# Patient Record
Sex: MALE | Race: BLACK OR AFRICAN AMERICAN | Employment: OTHER | URBAN - METROPOLITAN AREA
[De-identification: names, ages, dates, MRNs, and addresses within clinical notes are randomized per-mention and may not be internally consistent; named-entity substitution may affect disease eponyms.]

---

## 2017-02-21 ENCOUNTER — OFFICE VISIT (OUTPATIENT)
Dept: INTERNAL MEDICINE CLINIC | Age: 52
End: 2017-02-21

## 2017-02-21 VITALS
BODY MASS INDEX: 32.93 KG/M2 | HEIGHT: 70 IN | WEIGHT: 230 LBS | OXYGEN SATURATION: 99 % | SYSTOLIC BLOOD PRESSURE: 112 MMHG | TEMPERATURE: 98 F | RESPIRATION RATE: 16 BRPM | HEART RATE: 72 BPM | DIASTOLIC BLOOD PRESSURE: 80 MMHG

## 2017-02-21 DIAGNOSIS — E78.00 HYPERCHOLESTEROLEMIA: ICD-10-CM

## 2017-02-21 DIAGNOSIS — G35 MULTIPLE SCLEROSIS (HCC): Primary | ICD-10-CM

## 2017-02-21 DIAGNOSIS — R73.03 PREDIABETES: ICD-10-CM

## 2017-02-21 LAB
CHOLEST SERPL-MCNC: 168 MG/DL
GLUCOSE POC: 88 MG/DL
HBA1C MFR BLD HPLC: 6.2 %
HDLC SERPL-MCNC: 41 MG/DL
LDL CHOLESTEROL POC: 81
NON-HDL GOAL (POC): 127
TCHOL/HDL RATIO (POC): 4.1
TRIGL SERPL-MCNC: 233 MG/DL

## 2017-02-21 NOTE — MR AVS SNAPSHOT
Visit Information Date & Time Provider Department Dept. Phone Encounter #  
 2/21/2017 10:45 AM Elio Mahan MD [address] [facility] [phone] [id number] Follow-up Instructions Return in about 3 months (around 5/21/2017), or if symptoms worsen or fail to improve. Upcoming Health Maintenance Date Due  
 EYE EXAM RETINAL OR DILATED Q1 1/27/2013 FOOT EXAM Q1 9/3/2016 MICROALBUMIN Q1 2/24/2017 DTaP/Tdap/Td series (1 - Tdap) 12/20/2017* HEMOGLOBIN A1C Q6M 3/23/2017 LIPID PANEL Q1 9/23/2017 *Topic was postponed. The date shown is not the original due date. Allergies as of 2/21/2017  Review Complete On: 2/21/2017 By: Elio Mahan MD  
 No Known Allergies Current Immunizations  Reviewed on 6/25/2013 No immunizations on file. Not reviewed this visit You Were Diagnosed With   
  
 Codes Comments Multiple sclerosis ([facility]    -  Primary ICD-10-CM: G35 
ICD-9-CM: 935 Hypercholesterolemia     ICD-10-CM: E78.00 ICD-9-CM: 272.0 Prediabetes     ICD-10-CM: R73.03 
ICD-9-CM: 790.29 Vitals BP Pulse Temp Resp Height(growth percentile) Weight(growth percentile) 112/80 72 98 °F (36.7 °C) (Oral) 16 5' 10\" (1.778 m) 230 lb (104.3 kg) SpO2 BMI Smoking Status 99% 33 kg/m2 Current Every Day Smoker BMI and BSA Data Body Mass Index Body Surface Area  
 33 kg/m 2 2.27 m 2 Preferred Pharmacy Pharmacy Name Phone [facility] [address] [facility] [phone] Your Updated Medication List  
  
   
This list is accurate as of: 2/21/17 12:10 PM.  Always use your most recent med list.  
  
  
  
  
 aspirin 81 mg tablet Take 1 Tab by mouth daily. atorvastatin 80 mg tablet Commonly known as:  LIPITOR Take 1 Tab by mouth daily. baclofen 10 mg tablet Commonly known as:  LIORESAL Take 1 Tab by mouth three (3) times daily. buPROPion  mg tablet Commonly known as:  Tegan Plume Take 1 Tab by mouth every morning. colesevelam 625 mg tablet Commonly known as:  [facility] Take 3 Tabs by mouth two (2) times daily (with meals). gabapentin 300 mg capsule Commonly known as:  NEURONTIN Take 1 Cap by mouth three (3) times daily. Nerve pain HYDROcodone-acetaminophen  mg tablet Commonly known as:  NORCO  
  
 lidocaine 5 % Commonly known as:  LIDODERM  
1 Patch by TransDERmal route every twenty-four (24) hours. lidocaine HCl-hydrocortison ac topical cream  
Apply  to affected area two (2) times a day. metFORMIN 500 mg tablet Commonly known as:  GLUCOPHAGE Take 1 Tab by mouth daily (with breakfast). naproxen 500 mg tablet Commonly known as:  NAPROSYN Take 1 Tab by mouth two (2) times daily as needed. Omeprazole delayed release 20 mg tablet Commonly known as:  PRILOSEC D/R Take 1 Tab by mouth daily. * REBIF REBIDOSE 44 mcg/0.5 mL Pnij Generic drug:  interferon beta-1a (albumin) * interferon beta-1a (albumin) 44 mcg/0.5 mL injection Commonly known as:  REBIF (WITH ALBUMIN) 0.5 mL by SubCUTAneous route every Monday, Wednesday, Friday. * Notice: This list has 2 medication(s) that are the same as other medications prescribed for you. Read the directions carefully, and ask your doctor or other care provider to review them with you. We Performed the Following AMB POC GLUCOSE BLOOD, BY GLUCOSE MONITORING DEVICE [72145 CPT(R)] AMB POC HEMOGLOBIN A1C [08195 CPT(R)] AMB POC LIPID PROFILE [39065 CPT(R)] REFERRAL TO NEUROLOGY [BYL43 Custom] Follow-up Instructions Return in about 3 months (around 5/21/2017), or if symptoms worsen or fail to improve. Referral Information Referral ID Referred By Referred To  
  
 [id number] [facility], [address] Visits Status Start Date End Date 1 New Request 17 If your referral has a status of pending review or denied, additional information will be sent to support the outcome of this decision. Patient Instructions [employer]. Die F[employer]rder[employer]elmanufakturhart Activation Thank you for requesting access to [email or web address]. Please follow the instructions below to securely access and download your online medical record. [email or web address].COM allows you to send messages to your doctor, view your test results, renew your prescriptions, schedule appointments, and more. How Do I Sign Up? 1. In your internet browser, go to [email or web address] 
2. Click on the First Time User? Click Here link in the Sign In box. You will be redirect to the New Member Sign Up page. 3. Enter your ARTENCY.COM Access Code exactly as it appears below. You will not need to use this code after youve completed the sign-up process. If you do not sign up before the expiration date, you must request a new code. ARTENCY.COM Access Code: Activation code not generated Current [email or web address].COM Status: Active (This is the date your ARTENCY.COM access code will ) 4. Enter the last four digits of your Social Security Number (xxxx) and Date of Birth (mm/dd/yyyy) as indicated and click Submit. You will be taken to the next sign-up page. 5. Create a ARTENCY.COM ID. This will be your ARTENCY.COM login ID and cannot be changed, so think of one that is secure and easy to remember. 6. Create a ARTENCY.COM password. You can change your password at any time. 7. Enter your Password Reset Question and Answer. This can be used at a later time if you forget your password. 8. Enter your e-mail address. You will receive e-mail notification when new information is available in [address] 9. Click Sign Up. You can now view and download portions of your medical record. 10. Click the Download Summary menu link to download a portable copy of your medical information. Additional Information If you have questions, please visit the Frequently Asked Questions section of the Glowbl website at [email or web address] Remember, MyChart is NOT to be used for urgent needs. For medical emergencies, dial 911. Introducing [facility] SERVICES! Dear Christopher Montaño: 
Thank you for requesting a Glowbl account. Our records indicate that you already have an active Glowbl account. You can access your account anytime at [email or web address] Did you know that you can access your hospital and ER discharge instructions at any time in Glowbl? You can also review all of your test results from your hospital stay or ER visit. Additional Information If you have questions, please visit the Frequently Asked Questions section of the Glowbl website at [email or web address] Remember, MyChart is NOT to be used for urgent needs. For medical emergencies, dial 911. Now available from your iPhone and Android! Please provide this summary of care documentation to your next provider. Your primary care clinician is listed as Ivett Blulard. If you have any questions after today's visit, please call [phone].

## 2017-02-21 NOTE — PATIENT INSTRUCTIONS
[employer]Call Activation    Thank you for requesting access to Medprex. Please follow the instructions below to securely access and download your online medical record. Medprex allows you to send messages to your doctor, view your test results, renew your prescriptions, schedule appointments, and more. How Do I Sign Up? 1. In your internet browser, go to [email or web address]  2. Click on the First Time User? Click Here link in the Sign In box. You will be redirect to the New Member Sign Up page. 3. Enter your Medprex Access Code exactly as it appears below. You will not need to use this code after youve completed the sign-up process. If you do not sign up before the expiration date, you must request a new code. Medprex Access Code: Activation code not generated  Current Medprex Status: Active (This is the date your Medprex access code will )    4. Enter the last four digits of your Social Security Number (xxxx) and Date of Birth (mm/dd/yyyy) as indicated and click Submit. You will be taken to the next sign-up page. 5. Create a Medprex ID. This will be your Medprex login ID and cannot be changed, so think of one that is secure and easy to remember. 6. Create a Medprex password. You can change your password at any time. 7. Enter your Password Reset Question and Answer. This can be used at a later time if you forget your password. 8. Enter your e-mail address. You will receive e-mail notification when new information is available in [address] 9. Click Sign Up. You can now view and download portions of your medical record. 10. Click the Download Summary menu link to download a portable copy of your medical information. Additional Information    If you have questions, please visit the Frequently Asked Questions section of the Medprex website at [email or web address] Remember, Medprex is NOT to be used for urgent needs. For medical emergencies, dial 911.

## 2017-02-21 NOTE — PROGRESS NOTES
Courtney Dowd is a 54 y.o. male and presents with Hypertension and Blood sugar problem    Subjective:  Hypertension Review:  The patient has hypertension  Diet and Lifestyle: generally follows a  low sodium diet, exercises sporadically  Home BP Monitoring: is not measured at home. Pertinent ROS: taking medications as instructed, no medication side effects noted, no TIA's, no chest pain on exertion, no dyspnea on exertion, no swelling of ankles. Diabetes Mellitus Review:  He has diabetes mellitus. he has prediabetes  Diabetic ROS - medication compliance: compliant all of the time, diabetic diet compliance: compliant all of the time, home glucose monitoring: is performed. Known diabetic complications: none  Cardiovascular risk factors: family history, dyslipidemia, diabetes mellitus, obesity, hypertension  Current diabetic medications include oral agents  Eye exam current (within one year): no  Weight trend: stable  Prior visit with dietician: no  Current diet: \"healthy\" diet  in general  Current exercise: walking  Current monitoring regimen: home blood tests - daily  Home blood sugar records: trend: stable  Any episodes of hypoglycemia? no  Is He on ACE inhibitor or angiotensin II receptor blocker? Yes     Dyslipidemia Review:  Patient presents for evaluation of lipids. Compliance with treatment thus far has been excellent. A repeat fasting lipid profile was done. The patient does not use medications that may worsen dyslipidemias (corticosteroids, progestins, anabolic steroids, diuretics, beta-blockers, amiodarone, cyclosporine, olanzapine). The patient exercises daily. The patient is not known to have coexisting coronary artery disease.     He has multiple sclerosis and remains under the care of neurology  He has had multiple complaints      Review of Systems  Constitutional: negative for fevers, chills, anorexia and weight loss  Eyes:   negative for visual disturbance and irritation  ENT:   ear pains and wax  Respiratory:  negative for cough, hemoptysis, dyspnea,wheezing  CV:   negative for chest pain, palpitations, lower extremity edema  GI:   negative for nausea, vomiting, diarrhea, abdominal pain,melena  Endo:               negative for polyuria,polydipsia,polyphagia,heat intolerance  Genitourinary: negative for frequency, dysuria and hematuria  Integument:  negative for rash and pruritus  Hematologic:  negative for easy bruising and gum/nose bleeding  Musculoskel: myalgias, arthralgias,, joint pain  Neurological:  negative for headaches, dizziness, vertigo, memory problems and gait   Behavl/Psych: negative for feelings of anxiety, depression, mood changes    Past Medical History   Diagnosis Date    Back pain 10/8/2010    Back pain 10/8/2010    Depression     Diabetes ([facility]     Fatigue 8/24/2012    Hearing loss 1/25/2013    Memory loss 1/25/2013    MS (multiple sclerosis) ([facility]      Past Surgical History   Procedure Laterality Date    Hx heent       Social History     Social History    Marital status: SINGLE     Spouse name: N/A    Number of children: N/A    Years of education: N/A     Social History Main Topics    Smoking status: Current Every Day Smoker     Packs/day: 0.50     Years: 21.00     Types: Cigarettes    Smokeless tobacco: Never Used    Alcohol use No    Drug use: No    Sexual activity: Yes     Partners: Female     Birth control/ protection: Condom     Other Topics Concern    None     Social History Narrative     Family History   Problem Relation Age of Onset    Hypertension Father     Hypertension Sister     Diabetes Maternal Grandmother      Current Outpatient Prescriptions   Medication Sig Dispense Refill    lidocaine HCl-hydrocortison ac topical cream Apply  to affected area two (2) times a day. 85 g 0    interferon beta-1a, albumin, (REBIF, WITH ALBUMIN,) 44 mcg/0.5 mL injection 0.5 mL by SubCUTAneous route every Monday, Wednesday, Friday.  36 Syringe 2    metFORMIN (GLUCOPHAGE) 500 mg tablet Take 1 Tab by mouth daily (with breakfast). 30 Tab 12    atorvastatin (LIPITOR) 80 mg tablet Take 1 Tab by mouth daily. 30 Tab 12    REBIF REBIDOSE 44 mcg/0.5 mL pnij       HYDROcodone-acetaminophen (NORCO)  mg tablet       gabapentin (NEURONTIN) 300 mg capsule Take 1 Cap by mouth three (3) times daily. Nerve pain 90 Cap 5    buPROPion XL (WELLBUTRIN XL) 150 mg tablet Take 1 Tab by mouth every morning. 30 Tab 3    baclofen (LIORESAL) 10 mg tablet Take 1 Tab by mouth three (3) times daily. 90 Tab 5    lidocaine (LIDODERM) 5 % 1 Patch by TransDERmal route every twenty-four (24) hours. 30 Each 5    colesevelam (WELCHOL) 625 mg tablet Take 3 Tabs by mouth two (2) times daily (with meals). 180 Tab 12    Omeprazole delayed release (PRILOSEC D/R) 20 mg tablet Take 1 Tab by mouth daily. 30 Tab 12    naproxen (NAPROSYN) 500 mg tablet Take 1 Tab by mouth two (2) times daily as needed. 40 Tab 5    aspirin 81 mg tablet Take 1 Tab by mouth daily.  30 Tab 6     No Known Allergies    Objective:  Visit Vitals    /80    Pulse 72    Temp 98 °F (36.7 °C) (Oral)    Resp 16    Ht 5' 10\" (1.778 m)    Wt 230 lb (104.3 kg)    SpO2 99%    BMI 33 kg/m2     Physical Exam:   General appearance - alert, well appearing, and in no distress  Mental status - alert, oriented to person, place, and time  EYE-JUS, EOMI, corneas normal, no foreign bodies  ENT-ENT cerumen on lt.,rt.canal chronic scarring  Nose - normal and patent, no erythema, discharge or polyps  Mouth - mucous membranes moist, pharynx normal without lesions  Neck - supple, no significant adenopathy   Chest - clear to auscultation, no wheezes, rales or rhonchi, symmetric air entry   Heart - normal rate, regular rhythm, normal S1, S2, no murmurs, rubs, clicks or gallops   Abdomen - soft, nontender, nondistended, no masses or organomegaly  Lymph- no adenopathy palpable  Ext-peripheral pulses normal, no pedal edema, no clubbing or cyanosis  Skin-Warm and dry. no hyperpigmentation, vitiligo, or suspicious lesions  Neuro -alert, oriented, normal speech, no focal findings or movement disorder noted  Neck-normal C-spine, no tenderness, full ROM without pain      Results for orders placed or performed in visit on 02/21/17   AMB POC GLUCOSE BLOOD, BY GLUCOSE MONITORING DEVICE   Result Value Ref Range    Glucose POC 88 mg/dL   AMB POC HEMOGLOBIN A1C   Result Value Ref Range    Hemoglobin A1c (POC) 6.2 %   AMB POC LIPID PROFILE   Result Value Ref Range    Cholesterol (POC) 168     Triglycerides (POC) 233     HDL Cholesterol (POC) 41     LDL Cholesterol (POC) 81     Non-HDL Goal (POC) 127     TChol/HDL Ratio (POC) 4.1        Assessment/Plan:    ICD-10-CM ICD-9-CM    1. Multiple sclerosis (HCC) G35 340 REFERRAL TO NEUROLOGY   2. Hypercholesterolemia E78.00 272.0 AMB POC GLUCOSE BLOOD, BY GLUCOSE MONITORING DEVICE      AMB POC HEMOGLOBIN A1C      AMB POC LIPID PROFILE   3. Prediabetes R73.03 790.29 AMB POC GLUCOSE BLOOD, BY GLUCOSE MONITORING DEVICE      AMB POC HEMOGLOBIN A1C      AMB POC LIPID PROFILE     Orders Placed This Encounter    REFERRAL TO NEUROLOGY     Referral Priority:   Routine     Referral Type:   Consultation     Referral Reason:   Specialty Services Required     Referral Location:   [facility]     Referred to Provider:   Lesa Vizcaino MD     Number of Visits Requested:   1    AMB POC GLUCOSE BLOOD, BY GLUCOSE MONITORING DEVICE    AMB POC HEMOGLOBIN A1C    AMB POC LIPID PROFILE     lose weight, increase physical activity, follow low fat diet, follow low salt diet,Take 81mg aspirin daily    Cerumen was manually removed from the left ear     Patient Instructions   [employer] Activation    Thank you for requesting access to [employer]. Please follow the instructions below to securely access and download your online medical record.  [employer] allows you to send messages to your doctor, view your test results, renew your prescriptions, schedule appointments, and more. How Do I Sign Up? 1. In your internet browser, go to [email or web address].[email or web address]. [employer]  2. Click on the First Time User? Click Here link in the Sign In box. You will be redirect to the New Member Sign Up page. 3. Enter your Leonardo Biosystemst Access Code exactly as it appears below. You will not need to use this code after youve completed the sign-up process. If you do not sign up before the expiration date, you must request a new code. MyChart Access Code: Activation code not generated  Current Traackr Status: Active (This is the date your MyCLocalMedt access code will )    4. Enter the last four digits of your Social Security Number (xxxx) and Date of Birth (mm/dd/yyyy) as indicated and click Submit. You will be taken to the next sign-up page. 5. Create a Traackr ID. This will be your Traackr login ID and cannot be changed, so think of one that is secure and easy to remember. 6. Create a Traackr password. You can change your password at any time. 7. Enter your Password Reset Question and Answer. This can be used at a later time if you forget your password. 8. Enter your e-mail address. You will receive e-mail notification when new information is available in [address] 9. Click Sign Up. You can now view and download portions of your medical record. 10. Click the Download Summary menu link to download a portable copy of your medical information. Additional Information    If you have questions, please visit the Frequently Asked Questions section of the Traackr website at [email or web address] Remember, Traackr is NOT to be used for urgent needs. For medical emergencies, dial 911. Follow-up Disposition:  Return in about 3 months (around 2017), or if symptoms worsen or fail to improve. I have reviewed with the patient details of the assessment and plan and all questions were answered.  Relevent patient education was performed    An After Visit Summary was printed and given to the patient.

## 2017-04-13 ENCOUNTER — TELEPHONE (OUTPATIENT)
Dept: NEUROLOGY | Age: 52
End: 2017-04-13

## 2017-04-13 DIAGNOSIS — G35 MS (MULTIPLE SCLEROSIS) (HCC): Primary | ICD-10-CM

## 2017-04-13 NOTE — TELEPHONE ENCOUNTER
Patient called, ID verified times 2. Patient made aware available for . Patient stated she will make an appointment when she comes to  her medication.

## 2017-04-13 NOTE — TELEPHONE ENCOUNTER
Patient requesting refill on Oxycodone 15mg #60, one po q6-8 hrs prn for severe pain. Call when ready for .

## 2017-04-17 RX ORDER — OXYCODONE HYDROCHLORIDE 15 MG/1
15 TABLET ORAL
Qty: 60 TAB | Refills: 0 | Status: SHIPPED | OUTPATIENT
Start: 2017-04-17 | End: 2017-05-01 | Stop reason: SDUPTHER

## 2017-05-01 ENCOUNTER — OFFICE VISIT (OUTPATIENT)
Dept: NEUROLOGY | Age: 52
End: 2017-05-01

## 2017-05-01 VITALS
OXYGEN SATURATION: 97 % | DIASTOLIC BLOOD PRESSURE: 80 MMHG | HEIGHT: 70 IN | WEIGHT: 215.8 LBS | BODY MASS INDEX: 30.9 KG/M2 | RESPIRATION RATE: 16 BRPM | HEART RATE: 92 BPM | SYSTOLIC BLOOD PRESSURE: 130 MMHG | TEMPERATURE: 97.9 F

## 2017-05-01 DIAGNOSIS — R26.9 GAIT DISORDER: ICD-10-CM

## 2017-05-01 DIAGNOSIS — G35 MS (MULTIPLE SCLEROSIS) (HCC): ICD-10-CM

## 2017-05-01 DIAGNOSIS — R42 DIZZINESS: Primary | ICD-10-CM

## 2017-05-01 DIAGNOSIS — G95.9 CERVICAL MYELOPATHY (HCC): ICD-10-CM

## 2017-05-01 RX ORDER — OXYCODONE HYDROCHLORIDE 15 MG/1
15 TABLET ORAL
Qty: 60 TAB | Refills: 0 | Status: SHIPPED | OUTPATIENT
Start: 2017-05-01 | End: 2017-06-19 | Stop reason: SDUPTHER

## 2017-05-01 NOTE — MR AVS SNAPSHOT
Visit Information Date & Time Provider Department Dept. Phone Encounter #  
 5[facility]1/2017  2:00 [facility] [address] [place] [address] [id number] Follow-up Instructions Return in about 4 weeks (around 5/29/2017). Upcoming Health Maintenance Date Due  
 EYE EXAM RETINAL OR DILATED Q1 1/27/2013 FOOT EXAM Q1 9/3/2016 MICROALBUMIN Q1 2/24/2017 DTaP/Tdap/Td series (1 - Tdap) 12/20/2017* INFLUENZA AGE 9 TO ADULT 8/1/2017 HEMOGLOBIN A1C Q6M 8/21/2017 LIPID PANEL Q1 2/21/2018 *Topic was postponed. The date shown is not the original due date. Allergies as of 5/1/2017  Review Complete On: 5/1/2017 By: Brant Asher LPN No Known Allergies Current Immunizations  Reviewed on 6/25/2013 No immunizations on file. Not reviewed this visit You Were Diagnosed With   
  
 Codes Comments Dizziness    -  Primary ICD-10-CM: M79 ICD-9-CM: 780.4 [facility]) [facility]     ICD-10-CM: G35 
ICD-9-CM: 343 Gait disorder     ICD-10-CM: R26.9 ICD-9-CM: 376. 2 Cervical myelopathy (HCC)     ICD-10-CM: G95.9 ICD-9-CM: 721.1 Vitals BP Pulse Temp Resp Height(growth percentile) Weight(growth percentile) 130/80 (BP 1 Location: Right arm, BP Patient Position: Sitting) 92 97.9 °F (36.6 °C) (Oral) 16 5' 10\" (1.778 m) 215 lb 12.8 oz (97.9 kg) SpO2 BMI Smoking Status 97% 30.96 kg/m2 Current Every Day Smoker Vitals History BMI and BSA Data Body Mass Index Body Surface Area 30.96 kg/m 2 2.2 m 2 Preferred Pharmacy Pharmacy Name Phone [facility] [address] [facility] [phone] Your Updated Medication List  
  
   
This list is accurate as of: 5/1/17  3:18 PM.  Always use your most recent med list.  
  
  
  
  
 aspirin 81 mg tablet Take 1 Tab by mouth daily. atorvastatin 80 mg tablet Commonly known as:  LIPITOR Take 1 Tab by mouth daily. baclofen 10 mg tablet Commonly known as:  LIORESAL Take 1 Tab by mouth three (3) times daily. buPROPion  mg tablet Commonly known as:  Anuj Perla Take 1 Tab by mouth every morning. colesevelam 625 mg tablet Commonly known as:  [facility] Take 3 Tabs by mouth two (2) times daily (with meals). gabapentin 300 mg capsule Commonly known as:  NEURONTIN Take 1 Cap by mouth three (3) times daily. Nerve pain HYDROcodone-acetaminophen  mg tablet Commonly known as:  NORCO  
  
 interferon beta-1a (albumin) 44 mcg/0.5 mL injection Commonly known as:  REBIF (WITH ALBUMIN) 0.5 mL by SubCUTAneous route every Monday, Wednesday, Friday. lidocaine 5 % Commonly known as:  LIDODERM  
1 Patch by TransDERmal route every twenty-four (24) hours. lidocaine HCl-hydrocortison ac topical cream  
Apply  to affected area two (2) times a day. metFORMIN 500 mg tablet Commonly known as:  GLUCOPHAGE Take 1 Tab by mouth daily (with breakfast). naproxen 500 mg tablet Commonly known as:  NAPROSYN Take 1 Tab by mouth two (2) times daily as needed. Omeprazole delayed release 20 mg tablet Commonly known as:  PRILOSEC D/R Take 1 Tab by mouth daily. oxyCODONE IR 15 mg immediate release tablet Commonly known as:  OXY-IR Take 1 Tab by mouth every eight (8) hours as needed for Pain. Max Daily Amount: 45 mg.  
  
  
  
  
Prescriptions Printed Refills  
 oxyCODONE IR (OXY-IR) 15 mg immediate release tablet 0 Sig: Take 1 Tab by mouth every eight (8) hours as needed for Pain. Max Daily Amount: 45 mg.  
 Class: Print Route: Oral  
  
Follow-up Instructions Return in about 4 weeks (around 5/29/2017). To-Do List   
 05/01/2017 Imaging:  MRI CERV SPINE WO CONT Introducing [facility]! Dear Amisha Pill: Thank you for requesting a Shyp account. Our records indicate that you already have an active Shyp account. You can access your account anytime at [email or web address] Did you know that you can access your hospital and ER discharge instructions at any time in Shyp? You can also review all of your test results from your hospital stay or ER visit. Additional Information If you have questions, please visit the Frequently Asked Questions section of the Shyp website at [email or web address] Remember, Shyp is NOT to be used for urgent needs. For medical emergencies, dial 911. Now available from your iPhone and Android! Please provide this summary of care documentation to your next provider. Your primary care clinician is listed as Radha Logan. If you have any questions after today's visit, please call [phone].

## 2017-05-01 NOTE — PROGRESS NOTES
Neurology Progress Note     NAME:  Luis Lopez   :   1965   MRN:   [id number]     Date/Time:  2017  Subjective:      Luis Lopze is a 54 y.o. male here today for  follow up. Says he is experiencing dizziness, had some fall. Says he increasingly getting slow. Admits fatigue,numbness and tingling sensation. Occasional headache, neck pain,nausea,joint pains. Denies chest pain, Odynophagia,difficulty swallowing,constipation or diarrhea,Denies incontinence. Admits blurry vision. Says he is easily exhausted. Review of Systems:  Per HPI - Otherwise 12 point ROS was negative     []Unable to obtain  ROS due to  []mental status change  []sedated   []intubated    Medications reviewed:  Current Outpatient Prescriptions   Medication Sig Dispense Refill    oxyCODONE IR (OXY-IR) 15 mg immediate release tablet Take 1 Tab by mouth every eight (8) hours as needed for Pain. Max Daily Amount: 45 mg. 60 Tab 0    lidocaine HCl-hydrocortison ac topical cream Apply  to affected area two (2) times a day. 85 g 0    interferon beta-1a, albumin, (REBIF, WITH ALBUMIN,) 44 mcg/0.5 mL injection 0.5 mL by SubCUTAneous route every Monday, Wednesday, Friday. 36 Syringe 2    atorvastatin (LIPITOR) 80 mg tablet Take 1 Tab by mouth daily. 30 Tab 12    HYDROcodone-acetaminophen (NORCO)  mg tablet       gabapentin (NEURONTIN) 300 mg capsule Take 1 Cap by mouth three (3) times daily. Nerve pain 90 Cap 5    buPROPion XL (WELLBUTRIN XL) 150 mg tablet Take 1 Tab by mouth every morning. 30 Tab 3    baclofen (LIORESAL) 10 mg tablet Take 1 Tab by mouth three (3) times daily. 90 Tab 5    aspirin 81 mg tablet Take 1 Tab by mouth daily. 30 Tab 6    lidocaine (LIDODERM) 5 % 1 Patch by TransDERmal route every twenty-four (24) hours. 30 Each 5    metFORMIN (GLUCOPHAGE) 500 mg tablet Take 1 Tab by mouth daily (with breakfast). 30 Tab 12    colesevelam (WELCHOL) 625 mg tablet Take 3 Tabs by mouth two (2) times daily (with meals).  [address] [address] Tab 12    Omeprazole delayed release (PRILOSEC D/R) 20 mg tablet Take 1 Tab by mouth daily. 30 Tab 12    naproxen (NAPROSYN) 500 mg tablet Take 1 Tab by mouth two (2) times daily as needed. 40 Tab 5        Objective:   Vitals:  Vitals:    05/01/17 1429   BP: 130/80   Pulse: 92   Resp: 16   Temp: 97.9 °F (36.6 °C)   TempSrc: Oral   SpO2: 97%   Weight: 215 lb 12.8 oz (97.9 kg)   Height: 5' 10\" (1.778 m)   PainSc:   0 - No pain       PHYSICAL EXAM:  General:    Alert, cooperative, mild painful distress, appears stated age. Head:   Normocephalic, without obvious abnormality, atraumatic. Eyes:   Conjunctivae/corneas clear. PERRLA  Nose:  Nares normal. No drainage or sinus tenderness. Throat:    Lips, mucosa, and tongue normal.  No Thrush  Neck:  Supple, symmetrical,  no adenopathy, thyroid: non tender    no carotid bruit and no JVD. Back:    Symmetric,  No CVA tenderness. Lungs:   Clear to auscultation bilaterally. No Wheezing or Rhonchi. No rales. Chest wall:  No tenderness or deformity. No Accessory muscle use. Heart:   Regular rate and rhythm,  no murmur, rub or gallop. Abdomen:   Soft, non-tender. Not distended. Bowel sounds normal. No masses  Extremities: Extremities normal, atraumatic, No cyanosis. No edema. No clubbing  Skin:     Texture, turgor normal. No rashes or lesions. Not Jaundiced  Lymph nodes: Cervical, supraclavicular normal.  Psych:  Good insight. Depressed. Anxious . NEUROLOGICAL EXAM:  Appearance: The patient is well developed, well nourished, provides a coherent history and is in mild painful distress. Mental Status: Oriented to time, place and person. Mood and affect appropriate. Cranial Nerves:   Intact visual fields. Fundi are benign. JUS, EOM's full, no nystagmus, no ptosis. Facial sensation is normal. Corneal reflexes are intact. Facial movement is symmetric. Hearing is normal bilaterally.  Palate is midline with normal sternocleidomastoid and trapezius muscles are normal. Tongue is midline. Motor:  5/5 strength in upper and 4+/5 lower proximal and distal muscles. Normal bulk and tone. No fasciculations. Reflexes:   Deep tendon reflexes 2+/4 and symmetrical.   Sensory:   Normal to touch, pinprick and vibration. Gait:  Unsteady  gait. Tremor:   Mild tremor noted. Cerebellar:  No cerebellar signs present. Neurovascular:  Normal heart sounds and regular rhythm, peripheral pulses intact, and no carotid bruits. Lab Data Reviewed:    Office Visit on 02/21/2017   Component Date Value Ref Range Status    Glucose POC 02/21/2017 88  mg/dL Final    Hemoglobin A1c (POC) 02/21/2017 6.2  % Final    Cholesterol (POC) 02/21/2017 168   Final    Triglycerides (POC) 02/21/2017 233   Final    HDL Cholesterol (POC) 02/21/2017 41   Final    LDL Cholesterol (POC) 02/21/2017 81   Final    Non-HDL Goal (POC) 02/21/2017 127   Final    TChol/HDL Ratio (POC) 02/21/2017 4.1   Final       CT Results (recent):  No results found for this or any previous visit. MRI Results (recent):    Results from [place] encounter on 05/04/16   MRI CERV SPINE W WO CONT   Narrative **Final Report**      ICD Codes / Adm. Diagnosis: 340  336.9 / Multiple sclerosis ([facility] [address]    Unspecified disease of spinal  Examination:  MRI [facility] WO CON  - [id number] - May  4 2016  4:12PM  Accession No:  [id number]  Reason:  multiple sclerosis upper extremity weakness, myelopathy      REPORT:  Clinical history: Multiple sclerosis follow-up, increased weakness in both   arms and both legs right greater than left    INDICATION: Multiple sclerosis follow-up, increased weakness in both arms   and both legs right greater than left multiple sclerosis 340    COMPARISON: 6/6/2013    EXAM: Sagittal T1-weighted spin-echo, sagittal T2-weighted fast spin-echo,   sagittal inversion recovery, axial T1-weighted spin-echo, axial gradient   echo, and post-IV contrast-enhanced axial and sagittal T1-weighted spin-echo   MR images of the cervical spine are obtained. A total of 13 mL of   gadolinium-based contrast was administered for the study. FINDINGS: Cord signal and enhancement are normal. There is a focus of   abnormal signal without enhancement in the central right cerebellar   hemisphere. There is normal vertebral body height and bone signal. There is   anatomic alignment. No paraspinal soft tissue mass or enhancement   abnormality is shown. C2-3 shows normal disc height with minimal central disc protrusion. There is   no facet arthropathy, canal stenosis or foraminal stenosis. C3-4 shows normal disc height with small bilateral uncovertebral osteophytes   and small central disc protrusion. There is no facet arthropathy. There is   no canal stenosis. There is mild bilateral foraminal narrowing. C4-5: Disc desiccation loss of disc height. Mild broad-based disc protrusion   extends the foramina. Canal is patent. Mild bilateral foraminal stenosis. .    C5-6 Disc desiccation loss of disc height. Mild broad-based disc protrusion   extends the foramina. Canal is patent. Mild bilateral foraminal stenosis. .    C6-7, C7-T1, and the upper thoracic discs and facets appear normal.       IMPRESSION:     Mild cervical degenerative changes without significant interval change since   examination of 6/6/2013. No evidence of demyelinating disease burden in the cervical cord. No cervical cord enhancement abnormality demonstrated. Signing/Reading Doctor: Addison Pagan ([id number])    Approved: Addison Pagan ([id number])  May  4 2016  4:27PM                                      IR Results (recent):  No results found for this or any previous visit. VAS/US Results (recent):  No results found for this or any previous visit.           Assesment  Patient Active Problem List   Diagnosis Code    Back pain M54.9    Hypercholesterolemia E78.00    Cellulitis L03.90    Tobacco abuse Z72.0    ED (erectile dysfunction) N52.9    Decreased vision H54.7    Acute bronchitis J20.9    MS [facility] sclerosis) ([facility] G35    Fatigue R53.83    Incontinence of urine R32    Elevated hemoglobin A1c R73.09    Hearing loss H91.90    Memory loss R41.3    Multiple sclerosis ([facility]) G35    Prediabetes R73.03    Disability examination Z02.71    Anxiety F41.9    Midline low back pain without sciatica M54.5    Paresthesia of both hands R20.2    Mixed incontinence N39.46    Balance problem R26.89    Falls W19. XXXA    Bilateral thigh pain M79.651, M79.652      ___________________________________________________  PLAN:Continue current management. ICD-10-CM ICD-9-CM    1. Dizziness R42 780.4    2. MS (multiple sclerosis) ([facility]) G35 340 oxyCODONE IR (OXY-IR) 15 mg immediate release tablet   3. Gait disorder R26.9 781.2    4. Cervical myelopathy ([facility]) G95.9 721.1      Follow-up Disposition:  Return in about 4 weeks (around 5/29/2017).            ___________________________________________________    Total time spent with patient:  []15   []25   []35   [] __ minutes    Care Plan discussed with:    []Patient   []Family    []Care Manager   []Consultant/Specialist :    ___________________________________________________    Attending Physician: Shalom Cerda MD

## 2017-06-19 DIAGNOSIS — G35 MS (MULTIPLE SCLEROSIS) (HCC): ICD-10-CM

## 2017-06-19 RX ORDER — OXYCODONE HYDROCHLORIDE 15 MG/1
15 TABLET ORAL
Qty: 60 TAB | Refills: 0 | Status: SHIPPED | OUTPATIENT
Start: 2017-06-19 | End: 2017-08-03 | Stop reason: SDUPTHER

## 2017-06-19 NOTE — TELEPHONE ENCOUNTER
Patient in between insurance, he had to cancel his insurance because the premium went up to $390.00    Patient requesting rx for his pain medication,. Says he is not sleeping.

## 2017-08-03 ENCOUNTER — OFFICE VISIT (OUTPATIENT)
Dept: NEUROLOGY | Age: 52
End: 2017-08-03

## 2017-08-03 VITALS
DIASTOLIC BLOOD PRESSURE: 94 MMHG | WEIGHT: 227.8 LBS | OXYGEN SATURATION: 99 % | TEMPERATURE: 97.6 F | RESPIRATION RATE: 16 BRPM | HEART RATE: 74 BPM | BODY MASS INDEX: 32.61 KG/M2 | HEIGHT: 70 IN | SYSTOLIC BLOOD PRESSURE: 133 MMHG

## 2017-08-03 DIAGNOSIS — M79.652 BILATERAL THIGH PAIN: ICD-10-CM

## 2017-08-03 DIAGNOSIS — M79.651 BILATERAL THIGH PAIN: ICD-10-CM

## 2017-08-03 DIAGNOSIS — G35 MS (MULTIPLE SCLEROSIS) (HCC): ICD-10-CM

## 2017-08-03 DIAGNOSIS — G62.9 POLYNEUROPATHY: ICD-10-CM

## 2017-08-03 DIAGNOSIS — G31.84 MCI (MILD COGNITIVE IMPAIRMENT): ICD-10-CM

## 2017-08-03 DIAGNOSIS — R20.2 PARESTHESIA: ICD-10-CM

## 2017-08-03 DIAGNOSIS — F11.90 CHRONIC, CONTINUOUS USE OF OPIOIDS: Primary | ICD-10-CM

## 2017-08-03 DIAGNOSIS — R26.9 GAIT DISORDER: ICD-10-CM

## 2017-08-03 DIAGNOSIS — M54.50 MIDLINE LOW BACK PAIN WITHOUT SCIATICA, UNSPECIFIED CHRONICITY: ICD-10-CM

## 2017-08-03 DIAGNOSIS — F48.2 PBA (PSEUDOBULBAR AFFECT): ICD-10-CM

## 2017-08-03 DIAGNOSIS — G89.4 CHRONIC PAIN SYNDROME: ICD-10-CM

## 2017-08-03 DIAGNOSIS — G35 MULTIPLE SCLEROSIS (HCC): ICD-10-CM

## 2017-08-03 RX ORDER — GABAPENTIN 300 MG/1
300 CAPSULE ORAL 3 TIMES DAILY
Qty: 90 CAP | Refills: 5 | Status: SHIPPED | OUTPATIENT
Start: 2017-08-03 | End: 2019-02-07 | Stop reason: DRUGHIGH

## 2017-08-03 RX ORDER — BACLOFEN 10 MG/1
10 TABLET ORAL 3 TIMES DAILY
Qty: 90 TAB | Refills: 5 | Status: SHIPPED | OUTPATIENT
Start: 2017-08-03 | End: 2021-11-12

## 2017-08-03 RX ORDER — INTERFERON BETA-1A 44 UG/.5ML
INJECTION, SOLUTION SUBCUTANEOUS
COMMUNITY
Start: 2017-06-23 | End: 2017-08-03 | Stop reason: SDUPTHER

## 2017-08-03 RX ORDER — OXYCODONE HYDROCHLORIDE 15 MG/1
15 TABLET ORAL
Qty: 60 TAB | Refills: 0 | Status: SHIPPED | OUTPATIENT
Start: 2017-09-03 | End: 2017-10-05 | Stop reason: SDUPTHER

## 2017-08-03 RX ORDER — OXYCODONE HYDROCHLORIDE 15 MG/1
15 TABLET ORAL
Qty: 60 TAB | Refills: 0 | Status: SHIPPED | OUTPATIENT
Start: 2017-08-03 | End: 2017-08-03 | Stop reason: SDUPTHER

## 2017-08-03 NOTE — PROGRESS NOTES
Chief Complaint   Patient presents with    Multiple Sclerosis    Medication Refill     1. Have you been to the ER, urgent care clinic since your last visit? Hospitalized since your last visit? no    2. Have you seen or consulted any other health care providers outside of the [address] [place] [address] since your last visit? Include any pap smears or colon screening. no    Patient stated he is having visual disturbances and his eye are watery everyday. Patient signed control substance contract and urine specimen obtained for toxicology screening.     Samples: Nudexta 20 mg/10, quantity of 23 capsules  NDC: 62264-012-05  Lot: [id number]  Expires: 7/2019  Manufacture:[employer]

## 2017-08-03 NOTE — PROGRESS NOTES
Neurology Progress Note    NAME:  Annalisa Ponce   :   1965   MRN:   [id number]     Date/Time:  8/3/2017  Subjective:      Annalisa Ponce is a 50 y.o. male here today for follow up. Pain  Weakness  Fall  Headache        Review of Systems:   Neurological ROS: positive for - dizziness, gait disturbance, impaired coordination/balance, memory loss, numbness/tingling, speech problems and weakness            Medications reviewed:  Current Outpatient Prescriptions   Medication Sig Dispense Refill    baclofen (LIORESAL) 10 mg tablet Take 1 Tab by mouth three (3) times daily. 90 Tab 5    dextromethorphan-quiNIDine (NUEDEXTA) 20-10 mg per capsule Take 1 Cap by mouth every twelve (12) hours. 60 Cap 2    gabapentin (NEURONTIN) 300 mg capsule Take 1 Cap by mouth three (3) times daily. Nerve pain 90 Cap 5    [START ON 9/3/2017] oxyCODONE IR (OXY-IR) 15 mg immediate release tablet Take 1 Tab by mouth every eight (8) hours as needed for Pain. Max Daily Amount: 45 mg. 60 Tab 0    interferon beta-1a, albumin, (REBIF, WITH ALBUMIN,) 44 mcg/0.5 mL injection 0.5 mL by SubCUTAneous route every Monday, Wednesday, Friday. 36 Syringe 2    lidocaine HCl-hydrocortison ac topical cream Apply  to affected area two (2) times a day. 85 g 0    lidocaine (LIDODERM) 5 % 1 Patch by TransDERmal route every twenty-four (24) hours. 30 Each 5    metFORMIN (GLUCOPHAGE) 500 mg tablet Take 1 Tab by mouth daily (with breakfast). 30 Tab 12    atorvastatin (LIPITOR) 80 mg tablet Take 1 Tab by mouth daily. 30 Tab 12    HYDROcodone-acetaminophen (NORCO)  mg tablet       buPROPion XL (WELLBUTRIN XL) 150 mg tablet Take 1 Tab by mouth every morning. 30 Tab 3    colesevelam (WELCHOL) 625 mg tablet Take 3 Tabs by mouth two (2) times daily (with meals). 180 Tab 12    Omeprazole delayed release (PRILOSEC D/R) 20 mg tablet Take 1 Tab by mouth daily.  30 Tab 12    naproxen (NAPROSYN) 500 mg tablet Take 1 Tab by mouth two (2) times daily as needed. 40 Tab 5    aspirin 81 mg tablet Take 1 Tab by mouth daily. 30 Tab 6        Objective:   Vitals:  Vitals:    08/03/17 0912   BP: (!) 133/94   Pulse: 74   Resp: 16   Temp: 97.6 °F (36.4 °C)   TempSrc: Oral   SpO2: 99%   Weight: 227 lb 12.8 oz (103.3 kg)   Height: 5' 10\" (1.778 m)   PainSc:   0 - No pain               Lab Data Reviewed:  Lab Results   Component Value Date/Time    WBC 4.7 06/22/2016 02:11 PM    HCT 42.2 06/22/2016 02:11 PM    HGB 13.9 06/22/2016 02:11 PM    PLATELET 460 97/75/9396 02:11 PM       Lab Results   Component Value Date/Time    Sodium 144 06/22/2016 02:11 PM    Potassium 4.4 06/22/2016 02:11 PM    Chloride 104 06/22/2016 02:11 PM    CO2 26 06/22/2016 02:11 PM    Glucose 98 06/22/2016 02:11 PM    BUN 6 06/22/2016 02:11 PM    Creatinine 0.81 06/22/2016 02:11 PM    Calcium 9.2 06/22/2016 02:11 PM       No components found for: TROPQUANT    No results found for: BELEN      Lab Results   Component Value Date/Time    Hemoglobin A1c 6.2 05/08/2013 10:17 AM    Hemoglobin A1c (POC) 6.2 02/21/2017 12:06 PM        No results found for: B12LT, FOL, RBCF    No results found for: BELEN, Jacinda Core, XBANA    Lab Results   Component Value Date/Time    Cholesterol, total 232 05/08/2013 10:17 AM    Cholesterol (POC) 168 02/21/2017 12:06 PM    HDL Cholesterol 41 05/08/2013 10:17 AM    HDL Cholesterol (POC) 41 02/21/2017 12:06 PM    LDL Cholesterol (POC) 81 02/21/2017 12:06 PM    LDL, calculated 163 05/08/2013 10:17 AM    VLDL, calculated 28 05/08/2013 10:17 AM    Triglyceride 142 05/08/2013 10:17 AM    Triglycerides (POC) 233 02/21/2017 12:06 PM    CHOL/HDL Ratio 7.3 10/08/2010 01:10 PM         CT Results (recent):  No results found for this or any previous visit. MRI Results (recent):    Results from [place] encounter on 05/04/16   MRI CERV SPINE W WO CONT   Narrative **Final Report**      ICD Codes / Adm. Diagnosis: 340  336.9 / Multiple sclerosis (HCC)    Unspecified disease of spinal  Examination:  [facility]  - [id number] - May  4 2016  4:12PM  Accession No:  [id number]  Reason:  multiple sclerosis upper extremity weakness, myelopathy      REPORT:  Clinical history: Multiple sclerosis follow-up, increased weakness in both   arms and both legs right greater than left    INDICATION: Multiple sclerosis follow-up, increased weakness in both arms   and both legs right greater than left multiple sclerosis 340    COMPARISON: 6/6/2013    EXAM: Sagittal T1-weighted spin-echo, sagittal T2-weighted fast spin-echo,   sagittal inversion recovery, axial T1-weighted spin-echo, axial gradient   echo, and post-IV contrast-enhanced axial and sagittal T1-weighted spin-echo   MR images of the cervical spine are obtained. A total of 13 mL of   gadolinium-based contrast was administered for the study. FINDINGS: Cord signal and enhancement are normal. There is a focus of   abnormal signal without enhancement in the central right cerebellar   hemisphere. There is normal vertebral body height and bone signal. There is   anatomic alignment. No paraspinal soft tissue mass or enhancement   abnormality is shown. C2-3 shows normal disc height with minimal central disc protrusion. There is   no facet arthropathy, canal stenosis or foraminal stenosis. C3-4 shows normal disc height with small bilateral uncovertebral osteophytes   and small central disc protrusion. There is no facet arthropathy. There is   no canal stenosis. There is mild bilateral foraminal narrowing. C4-5: Disc desiccation loss of disc height. Mild broad-based disc protrusion   extends the foramina. Canal is patent. Mild bilateral foraminal stenosis. .    C5-6 Disc desiccation loss of disc height. Mild broad-based disc protrusion   extends the foramina. Canal is patent. Mild bilateral foraminal stenosis. .    C6-7, C7-T1, and the upper thoracic discs and facets appear normal.       IMPRESSION:     Mild cervical degenerative changes without significant interval change since   examination of 6/6/2013. No evidence of demyelinating disease burden in the cervical cord. No cervical cord enhancement abnormality demonstrated. Signing/Reading Doctor: Isaiah Barrow ([id number])    Approved: Isaiah Barrow ([id number])  May  4 2016  4:27PM                                      IR Results (recent):  No results found for this or any previous visit. VAS/US Results (recent):  No results found for this or any previous visit. PHYSICAL EXAM:  General:    Alert, cooperative, no distress, appears stated age. Head:   Normocephalic, without obvious abnormality, atraumatic. Eyes:   Conjunctivae/corneas clear. PERRLA  Nose:  Nares normal. No drainage or sinus tenderness. Throat:    Lips, mucosa, and tongue normal.  No Thrush  Neck:  Supple, symmetrical,  no adenopathy, thyroid: non tender    no carotid bruit and no JVD. Back:    Symmetric,  No CVA tenderness. Lungs:   Clear to auscultation bilaterally. No Wheezing or Rhonchi. No rales. Chest wall:  No tenderness or deformity. No Accessory muscle use. Heart:   Regular rate and rhythm,  no murmur, rub or gallop. Abdomen:   Soft, non-tender. Not distended. Bowel sounds normal. No masses  Extremities: Extremities normal, atraumatic, No cyanosis. No edema. No clubbing  Skin:     Texture, turgor normal. No rashes or lesions. Not Jaundiced  Lymph nodes: Cervical, supraclavicular normal.  Psych:  Good insight. Depressed. Not anxious or agitated. NEUROLOGICAL EXAM:  Appearance: The patient is well developed, well nourished, provides a coherent history and is in no acute distress. Mental Status: Oriented to time, place and person. Mood and affect appropriate. Cranial Nerves:   Intact visual fields. Fundi are benign. JUS, EOM's full, no nystagmus, no ptosis. Facial sensation is normal. Corneal reflexes are intact. Facial movement is asymmetric. Decreased right NLF Hearing is normal bilaterally. Palate is midline with normal sternocleidomastoid and trapezius muscles are normal. Tongue is midline. Motor:  4+/5 strength in right upper and lower proximal and distal muscles. Normal bulk and tone. No fasciculations. Reflexes:   Deep tendon reflexes 2+/4 and symmetrical.   Sensory:   Decreased sensation to touch, pinprick and vibration. Gait:  Unsteady  gait. A mbulates with cane   Cerebellar:  No cerebellar signs present. Neurovascular:  Normal heart sounds and regular rhythm, peripheral pulses intact, and no carotid bruits. Assesment  1. MS (multiple sclerosis) (HCC)    - baclofen (LIORESAL) 10 mg tablet; Take 1 Tab by mouth three (3) times daily. Dispense: 90 Tab; Refill: 5  - oxyCODONE IR (OXY-IR) 15 mg immediate release tablet; Take 1 Tab by mouth every eight (8) hours as needed for Pain. Max Daily Amount: 45 mg. Dispense: 60 Tab; Refill: 0  - MRI BRAIN W WO CONT; Future    2. Midline low back pain without sciatica, unspecified chronicity    - baclofen (LIORESAL) 10 mg tablet; Take 1 Tab by mouth three (3) times daily. Dispense: 90 Tab; Refill: 5    3. Chronic, continuous use of opioids    - COMPLIANCE DRUG SCREEN/PRESCRIPTION MONITORING    4. Chronic pain syndrome    5. Gait disorder    - MRI BRAIN W WO CONT; Future    6. MCI (mild cognitive impairment)    - MRI BRAIN W WO CONT; Future    7. PBA (pseudobulbar affect)      8. Paresthesia    - MRI BRAIN W WO CONT; Future    9. Polyneuropathy ([facility] [address]  *    10. Multiple sclerosis (HCC)    - gabapentin (NEURONTIN) 300 mg capsule; Take 1 Cap by mouth three (3) times daily. Nerve pain  Dispense: 90 Cap; Refill: 5    11. Bilateral thigh pain    - gabapentin (NEURONTIN) 300 mg capsule; Take 1 Cap by mouth three (3) times daily. Nerve pain  Dispense: 90 Cap; Refill: 5    ___________________________________________________  PLAN:      ICD-10-CM ICD-9-CM    1.  Chronic, continuous use of opioids F11.90 305.51 COMPLIANCE DRUG SCREEN/PRESCRIPTION MONITORING   2. MS (multiple sclerosis) ([facility]) G35 340 baclofen (LIORESAL) 10 mg tablet      oxyCODONE IR (OXY-IR) 15 mg immediate release tablet      MRI BRAIN W WO CONT      DISCONTINUED: oxyCODONE IR (OXY-IR) 15 mg immediate release tablet      DISCONTINUED: oxyCODONE IR (OXY-IR) 15 mg immediate release tablet   3. Midline low back pain without sciatica, unspecified chronicity M54.5 724.2 baclofen (LIORESAL) 10 mg tablet   4. Chronic pain syndrome G89.4 338.4    5. Gait disorder R26.9 781.2 MRI BRAIN W WO CONT   6. MCI (mild cognitive impairment) G31.84 331.83 MRI BRAIN W WO CONT   7. PBA (pseudobulbar affect) F48.2 310.81    8. Paresthesia R20.2 782.0 MRI BRAIN W WO CONT   9. Polyneuropathy (HCC) G62.9 356.9    10. Multiple sclerosis ([facility]) G35 340 gabapentin (NEURONTIN) 300 mg capsule   11. Bilateral thigh pain M79.651 729.5 gabapentin (NEURONTIN) 300 mg capsule    M79.652       Follow-up Disposition:  Return in about 2 months (around 10/3/2017).          ___________________________________________________    Total time spent with patient:  []15   []25   []35   [] __ minutes    Care Plan discussed with:    []Patient   []Family    []Care Manager   []Consultant/Specialist :    ___________________________________________________    Attending Physician: Ambreen Mathews MD

## 2017-08-03 NOTE — PROGRESS NOTES
Neurology Progress Note    NAME:  Kristian Gaines   :   1965   MRN:   [id number]     Date/Time:  8/3/2017  Subjective:      Kristian Gaines is a 50 y.o. male here today for follow up  Weak  Pain  Headache  Fall    Review of Systems:   Neurological ROS: positive for - confusion, dizziness, gait disturbance, headaches, numbness/tingling and weakness          Medications reviewed:  Current Outpatient Prescriptions   Medication Sig Dispense Refill    baclofen (LIORESAL) 10 mg tablet Take 1 Tab by mouth three (3) times daily. 90 Tab 5    dextromethorphan-quiNIDine (NUEDEXTA) 20-10 mg per capsule Take 1 Cap by mouth every twelve (12) hours. 60 Cap 2    gabapentin (NEURONTIN) 300 mg capsule Take 1 Cap by mouth three (3) times daily. Nerve pain 90 Cap 5    [START ON 9/3/2017] oxyCODONE IR (OXY-IR) 15 mg immediate release tablet Take 1 Tab by mouth every eight (8) hours as needed for Pain. Max Daily Amount: 45 mg. 60 Tab 0    interferon beta-1a, albumin, (REBIF, WITH ALBUMIN,) 44 mcg/0.5 mL injection 0.5 mL by SubCUTAneous route every Monday, Wednesday, Friday. 36 Syringe 2    lidocaine HCl-hydrocortison ac topical cream Apply  to affected area two (2) times a day. 85 g 0    lidocaine (LIDODERM) 5 % 1 Patch by TransDERmal route every twenty-four (24) hours. 30 Each 5    metFORMIN (GLUCOPHAGE) 500 mg tablet Take 1 Tab by mouth daily (with breakfast). 30 Tab 12    atorvastatin (LIPITOR) 80 mg tablet Take 1 Tab by mouth daily. 30 Tab 12    HYDROcodone-acetaminophen (NORCO)  mg tablet       buPROPion XL (WELLBUTRIN XL) 150 mg tablet Take 1 Tab by mouth every morning. 30 Tab 3    colesevelam (WELCHOL) 625 mg tablet Take 3 Tabs by mouth two (2) times daily (with meals). 180 Tab 12    Omeprazole delayed release (PRILOSEC D/R) 20 mg tablet Take 1 Tab by mouth daily. 30 Tab 12    naproxen (NAPROSYN) 500 mg tablet Take 1 Tab by mouth two (2) times daily as needed.  40 Tab 5    aspirin 81 mg tablet Take 1 Tab by mouth daily. 30 Tab 6        Objective:   Vitals:  Vitals:    08/03/17 0912   BP: (!) 133/94   Pulse: 74   Resp: 16   Temp: 97.6 °F (36.4 °C)   TempSrc: Oral   SpO2: 99%   Weight: 227 lb 12.8 oz (103.3 kg)   Height: 5' 10\" (1.778 m)   PainSc:   0 - No pain               Lab Data Reviewed:  Lab Results   Component Value Date/Time    WBC 4.7 06/22/2016 02:11 PM    HCT 42.2 06/22/2016 02:11 PM    HGB 13.9 06/22/2016 02:11 PM    PLATELET 894 95/34/2358 02:11 PM       Lab Results   Component Value Date/Time    Sodium 144 06/22/2016 02:11 PM    Potassium 4.4 06/22/2016 02:11 PM    Chloride 104 06/22/2016 02:11 PM    CO2 26 06/22/2016 02:11 PM    Glucose 98 06/22/2016 02:11 PM    BUN 6 06/22/2016 02:11 PM    Creatinine 0.81 06/22/2016 02:11 PM    Calcium 9.2 06/22/2016 02:11 PM       No components found for: TROPQUANT    No results found for: BELEN      Lab Results   Component Value Date/Time    Hemoglobin A1c 6.2 05/08/2013 10:17 AM    Hemoglobin A1c (POC) 6.2 02/21/2017 12:06 PM        No results found for: B12LT, FOL, RBCF    No results found for: BELEN, Myrle [place], XBANA    Lab Results   Component Value Date/Time    Cholesterol, total 232 05/08/2013 10:17 AM    Cholesterol (POC) 168 02/21/2017 12:06 PM    HDL Cholesterol 41 05/08/2013 10:17 AM    HDL Cholesterol (POC) 41 02/21/2017 12:06 PM    LDL Cholesterol (POC) 81 02/21/2017 12:06 PM    LDL, calculated 163 05/08/2013 10:17 AM    VLDL, calculated 28 05/08/2013 10:17 AM    Triglyceride 142 05/08/2013 10:17 AM    Triglycerides (POC) 233 02/21/2017 12:06 PM    CHOL/HDL Ratio 7.3 10/08/2010 01:10 PM         CT Results (recent):  No results found for this or any previous visit. MRI Results (recent):    Results from [place] encounter on 05/04/16   MRI CERV SPINE W WO CONT   Narrative **Final Report**      ICD Codes / Adm. Diagnosis: 340  336.9 / Multiple sclerosis (HCC)    Unspecified disease of spinal  Examination:  MRI C SPINE W [facility]  - [id number] - May  4 2016  4:12PM  Accession No:  [id number]  Reason:  multiple sclerosis upper extremity weakness, myelopathy      REPORT:  Clinical history: Multiple sclerosis follow-up, increased weakness in both   arms and both legs right greater than left    INDICATION: Multiple sclerosis follow-up, increased weakness in both arms   and both legs right greater than left multiple sclerosis 340    COMPARISON: 6/6/2013    EXAM: Sagittal T1-weighted spin-echo, sagittal T2-weighted fast spin-echo,   sagittal inversion recovery, axial T1-weighted spin-echo, axial gradient   echo, and post-IV contrast-enhanced axial and sagittal T1-weighted spin-echo   MR images of the cervical spine are obtained. A total of 13 mL of   gadolinium-based contrast was administered for the study. FINDINGS: Cord signal and enhancement are normal. There is a focus of   abnormal signal without enhancement in the central right cerebellar   hemisphere. There is normal vertebral body height and bone signal. There is   anatomic alignment. No paraspinal soft tissue mass or enhancement   abnormality is shown. C2-3 shows normal disc height with minimal central disc protrusion. There is   no facet arthropathy, canal stenosis or foraminal stenosis. C3-4 shows normal disc height with small bilateral uncovertebral osteophytes   and small central disc protrusion. There is no facet arthropathy. There is   no canal stenosis. There is mild bilateral foraminal narrowing. C4-5: Disc desiccation loss of disc height. Mild broad-based disc protrusion   extends the foramina. Canal is patent. Mild bilateral foraminal stenosis. .    C5-6 Disc desiccation loss of disc height. Mild broad-based disc protrusion   extends the foramina. Canal is patent. Mild bilateral foraminal stenosis. .    C6-7, C7-T1, and the upper thoracic discs and facets appear normal.       IMPRESSION:     Mild cervical degenerative changes without significant interval change since   examination of 6/6/2013. No evidence of demyelinating disease burden in the cervical cord. No cervical cord enhancement abnormality demonstrated. Signing/Reading Doctor: Lisa Babcock ([id number])    Approved: Lisa Babcock ([id number])  May  4 2016  4:27PM                                      IR Results (recent):  No results found for this or any previous visit. VAS/US Results (recent):  No results found for this or any previous visit. PHYSICAL EXAM:  General:    Alert, cooperative, no distress, appears stated age. Head:   Normocephalic, without obvious abnormality, atraumatic. Eyes:   Conjunctivae/corneas clear. PERRLA  Nose:  Nares normal. No drainage or sinus tenderness. Throat:    Lips, mucosa, and tongue normal.  No Thrush  Neck:  Supple, symmetrical,  no adenopathy, thyroid: non tender    no carotid bruit and no JVD. Back:    Symmetric,  No CVA tenderness. Lungs:   Clear to auscultation bilaterally. No Wheezing or Rhonchi. No rales. Chest wall:  No tenderness or deformity. No Accessory muscle use. Heart:   Regular rate and rhythm,  no murmur, rub or gallop. Abdomen:   Soft, non-tender. Not distended. Bowel sounds normal. No masses  Extremities: Extremities normal, atraumatic, No cyanosis. No edema. No clubbing  Skin:     Texture, turgor normal. No rashes or lesions. Not Jaundiced  Lymph nodes: Cervical, supraclavicular normal.  Psych:  Good insight. Depressed. Not anxious or agitated. NEUROLOGICAL EXAM:  Appearance: The patient is well developed, well nourished, provides a coherent history and is in no acute distress. Mental Status: Oriented to time, place and person. Mood and affect appropriate. Cranial Nerves:   Intact visual fields. Fundi are benign. JUS, EOM's full, no nystagmus, no ptosis. Facial sensation is normal. Corneal reflexes are intact. Facial movement is asymmetric. Hearing is normal bilaterally.  Palate is midline with normal. Right facial droop sternocleidomastoid and trapezius muscles are normal. Tongue is midline. Motor:  5/5 strength in upper and lower proximal and distal muscles. Normal bulk and tone. No fasciculations. Reflexes:   Deep tendon reflexes 2+/4 and symmetrical.   Sensory:   Decreased sensation to touch, pinprick and vibration. Gait:  Unsteady  gait. Tremor:   No tremor noted. Cerebellar:  No cerebellar signs present. Neurovascular:  Normal heart sounds and regular rhythm, peripheral pulses intact, and no carotid bruits. Assesment  1. MS (multiple sclerosis) ([facility])    - oxyCODONE IR (OXY-IR) 15 mg immediate release tablet; Take 1 Tab by mouth every eight (8) hours as needed for Pain. Max Daily Amount: 45 mg. Dispense: 60 Tab; Refill: 0  - baclofen (LIORESAL) 10 mg tablet; Take 1 Tab by mouth three (3) times daily. Dispense: 90 Tab; Refill: 5    2. Midline low back pain without sciatica, unspecified chronicity    - baclofen (LIORESAL) 10 mg tablet; Take 1 Tab by mouth three (3) times daily. Dispense: 90 Tab; Refill: 5    3. Chronic, continuous use of opioids    - COMPLIANCE DRUG SCREEN/PRESCRIPTION MONITORING    ___________________________________________________  PLAN:      ICD-10-CM ICD-9-CM    1. Chronic, continuous use of opioids F11.90 305.51 COMPLIANCE DRUG SCREEN/PRESCRIPTION MONITORING   2. MS (multiple sclerosis) ([facility]) G35 340 baclofen (LIORESAL) 10 mg tablet      oxyCODONE IR (OXY-IR) 15 mg immediate release tablet      DISCONTINUED: oxyCODONE IR (OXY-IR) 15 mg immediate release tablet      DISCONTINUED: oxyCODONE IR (OXY-IR) 15 mg immediate release tablet   3. Midline low back pain without sciatica, unspecified chronicity M54.5 724.2 baclofen (LIORESAL) 10 mg tablet   4. Chronic pain syndrome G89.4 338.4    5. Gait disorder R26.9 781.2    6. MCI (mild cognitive impairment) G31.84 331.83    7. PBA (pseudobulbar affect) F48.2 310.81    8. Paresthesia R20.2 782.0    9.  Polyneuropathy (HCC) G62.9 356.9    10. Multiple sclerosis ([facility] [address] G35 340 gabapentin (NEURONTIN) 300 mg capsule   11. Bilateral thigh pain M79.651 729.5 gabapentin (NEURONTIN) 300 mg capsule    M79.652       Follow-up Disposition:  Return in about 2 months (around 10/3/2017).          ___________________________________________________    Total time spent with patient:  []15   []25   []35   [] __ minutes    Care Plan discussed with:    []Patient   []Family    []Care Manager   []Consultant/Specialist :    ___________________________________________________    Attending Physician: Dakota Schmitt MD

## 2017-08-04 NOTE — PROGRESS NOTES
Neurology Progress Note    NAME:  Monserrat Elena   :   1965   MRN:   [id number]     Date/Time:  2017  Subjective:      Monserrat Elena is a 50 y.o. male here today for follow up  Pain is persistent, activity tend to aggravate it, pain is sharp in nature and goes down the legs, making the legs to tingle and weak  Weakness of the extremities makes it difficult for patient to fully attend to his activity of daily living. Due to weakness of the extremity patient has taken a few falls because his legs gave out on him  Headache waxes and wanes, throbbing in nature,aggravated by activity,there is associated dizziness, double and blurry vision, he noted that he has been having some weight loss  Admits fatigue,neck pain,back pain,muscle and joint pain,numbness and tingling sensation. Denies dysphagia, odynophagia,constipation,diarrhea,dysuria,hematuria,hematochezia,  Review of Systems:   Neurological ROS: positive for - dizziness, gait disturbance, headaches, impaired coordination/balance, memory loss, numbness/tingling, visual changes and weakness    Medications reviewed:  Current Outpatient Prescriptions   Medication Sig Dispense Refill    baclofen (LIORESAL) 10 mg tablet Take 1 Tab by mouth three (3) times daily. 90 Tab 5    dextromethorphan-quiNIDine (NUEDEXTA) 20-10 mg per capsule Take 1 Cap by mouth every twelve (12) hours. 60 Cap 2    gabapentin (NEURONTIN) 300 mg capsule Take 1 Cap by mouth three (3) times daily. Nerve pain 90 Cap 5    [START ON 9/3/2017] oxyCODONE IR (OXY-IR) 15 mg immediate release tablet Take 1 Tab by mouth every eight (8) hours as needed for Pain. Max Daily Amount: 45 mg. 60 Tab 0    interferon beta-1a, albumin, (REBIF, WITH ALBUMIN,) 44 mcg/0.5 mL injection 0.5 mL by SubCUTAneous route every Monday, Wednesday, Friday. 36 Syringe 2    lidocaine HCl-hydrocortison ac topical cream Apply  to affected area two (2) times a day.  85 g 0    lidocaine (LIDODERM) 5 % 1 Patch by TransDERmal route every twenty-four (24) hours. 30 Each 5    metFORMIN (GLUCOPHAGE) 500 mg tablet Take 1 Tab by mouth daily (with breakfast). 30 Tab 12    atorvastatin (LIPITOR) 80 mg tablet Take 1 Tab by mouth daily. 30 Tab 12    HYDROcodone-acetaminophen (NORCO)  mg tablet       buPROPion XL (WELLBUTRIN XL) 150 mg tablet Take 1 Tab by mouth every morning. 30 Tab 3    colesevelam (WELCHOL) 625 mg tablet Take 3 Tabs by mouth two (2) times daily (with meals). 180 Tab 12    Omeprazole delayed release (PRILOSEC D/R) 20 mg tablet Take 1 Tab by mouth daily. 30 Tab 12    naproxen (NAPROSYN) 500 mg tablet Take 1 Tab by mouth two (2) times daily as needed. 40 Tab 5    aspirin 81 mg tablet Take 1 Tab by mouth daily.  30 Tab 6        Objective:   Vitals:  Vitals:    08/03/17 0912   BP: (!) 133/94   Pulse: 74   Resp: 16   Temp: 97.6 °F (36.4 °C)   TempSrc: Oral   SpO2: 99%   Weight: 227 lb 12.8 oz (103.3 kg)   Height: 5' 10\" (1.778 m)   PainSc:   0 - No pain               Lab Data Reviewed:  Lab Results   Component Value Date/Time    WBC 4.7 06/22/2016 02:11 PM    HCT 42.2 06/22/2016 02:11 PM    HGB 13.9 06/22/2016 02:11 PM    PLATELET 390 14/76/3712 02:11 PM       Lab Results   Component Value Date/Time    Sodium 144 06/22/2016 02:11 PM    Potassium 4.4 06/22/2016 02:11 PM    Chloride 104 06/22/2016 02:11 PM    CO2 26 06/22/2016 02:11 PM    Glucose 98 06/22/2016 02:11 PM    BUN 6 06/22/2016 02:11 PM    Creatinine 0.81 06/22/2016 02:11 PM    Calcium 9.2 06/22/2016 02:11 PM       No components found for: TROPQUANT    No results found for: BELEN      Lab Results   Component Value Date/Time    Hemoglobin A1c 6.2 05/08/2013 10:17 AM    Hemoglobin A1c (POC) 6.2 02/21/2017 12:06 PM        No results found for: B12LT, FOL, RBCF    No results found for: BELEN, Lowanda [place], XBANA    Lab Results   Component Value Date/Time    Cholesterol, total 232 05/08/2013 10:17 AM    Cholesterol (POC) 168 02/21/2017 12:06 PM    HDL Cholesterol 41 05/08/2013 10:17 AM    HDL Cholesterol (POC) 41 02/21/2017 12:06 PM    LDL Cholesterol (POC) 81 02/21/2017 12:06 PM    LDL, calculated 163 05/08/2013 10:17 AM    VLDL, calculated 28 05/08/2013 10:17 AM    Triglyceride 142 05/08/2013 10:17 AM    Triglycerides (POC) 233 02/21/2017 12:06 PM    CHOL/HDL Ratio 7.3 10/08/2010 01:10 PM         CT Results (recent):  No results found for this or any previous visit. MRI Results (recent):    Results from [place] encounter on 05/04/16   MRI CERV SPINE W WO CONT   Narrative **Final Report**      ICD Codes / Adm. Diagnosis: 340  336.9 / Multiple sclerosis ([facility] [address]    Unspecified disease of spinal  Examination:  MRI Jennifer [facility] WO CON  - [id number] - May  4 2016  4:12PM  Accession No:  [id number]  Reason:  multiple sclerosis upper extremity weakness, myelopathy      REPORT:  Clinical history: Multiple sclerosis follow-up, increased weakness in both   arms and both legs right greater than left    INDICATION: Multiple sclerosis follow-up, increased weakness in both arms   and both legs right greater than left multiple sclerosis 340    COMPARISON: 6/6/2013    EXAM: Sagittal T1-weighted spin-echo, sagittal T2-weighted fast spin-echo,   sagittal inversion recovery, axial T1-weighted spin-echo, axial gradient   echo, and post-IV contrast-enhanced axial and sagittal T1-weighted spin-echo   MR images of the cervical spine are obtained. A total of 13 mL of   gadolinium-based contrast was administered for the study. FINDINGS: Cord signal and enhancement are normal. There is a focus of   abnormal signal without enhancement in the central right cerebellar   hemisphere. There is normal vertebral body height and bone signal. There is   anatomic alignment. No paraspinal soft tissue mass or enhancement   abnormality is shown. C2-3 shows normal disc height with minimal central disc protrusion. There is   no facet arthropathy, canal stenosis or foraminal stenosis.     C3-4 shows normal disc height with small bilateral uncovertebral osteophytes   and small central disc protrusion. There is no facet arthropathy. There is   no canal stenosis. There is mild bilateral foraminal narrowing. C4-5: Disc desiccation loss of disc height. Mild broad-based disc protrusion   extends the foramina. Canal is patent. Mild bilateral foraminal stenosis. .    C5-6 Disc desiccation loss of disc height. Mild broad-based disc protrusion   extends the foramina. Canal is patent. Mild bilateral foraminal stenosis. .    C6-7, C7-T1, and the upper thoracic discs and facets appear normal.       IMPRESSION:     Mild cervical degenerative changes without significant interval change since   examination of 6/6/2013. No evidence of demyelinating disease burden in the cervical cord. No cervical cord enhancement abnormality demonstrated. Signing/Reading Doctor: Rocco Warren ([id number])    Approved: Rocco Warren ([id number])  May  4 2016  4:27PM                                      IR Results (recent):  No results found for this or any previous visit. VAS/US Results (recent):  No results found for this or any previous visit. PHYSICAL EXAM:  General:    Alert, cooperative, painful distress, appears stated age. Head:   Normocephalic, without obvious abnormality, atraumatic. Eyes:   Conjunctivae/corneas clear. PERRLA  Nose:  Nares normal. No drainage or sinus tenderness. Throat:    Lips, mucosa, and tongue normal.  No Thrush  Neck:  Supple, symmetrical,  no adenopathy, thyroid: non tender    no carotid bruit and no JVD. Back:    Symmetric,  No CVA tenderness. Lungs:   Clear to auscultation bilaterally. No Wheezing or Rhonchi. No rales. Chest wall:  No tenderness or deformity. No Accessory muscle use. Heart:   Regular rate and rhythm,  no murmur, rub or gallop. Abdomen:   Soft, non-tender. Not distended. Bowel sounds normal. No masses  Extremities: Extremities normal, atraumatic, No cyanosis.   No edema. No clubbing  Skin:     Texture, turgor normal. No rashes or lesions. Not Jaundiced  Lymph nodes: Cervical, supraclavicular normal.  Psych:  Good insight. Not depressed. Not anxious or agitated. NEUROLOGICAL EXAM:  Appearance: The patient is well developed, well nourished, provides a coherent history and is in painful distress. Mental Status: Oriented to time, place and person. Mood and affect appropriate. Cranial Nerves:   Intact visual fields. Fundi are benign. JUS, EOM's full, no nystagmus, no ptosis. Facial sensation is normal. Corneal reflexes are intact. Facial movement is asymmetric, decreased right NLF. Hearing is normal bilaterally. Palate is midline with normal sternocleidomastoid and trapezius muscles are normal. Tongue is midline. Motor:  5/5 strength in upper and 4+/5 lower proximal and distal muscles. Normal bulk and tone. No fasciculations. Reflexes:   Deep tendon reflexes 3+/4 and symmetrical.   Sensory:   Normal to touch, pinprick and vibration. Gait:  Unsteady gait. .Ambulates with cane   Tremor:   No tremor noted. Cerebellar:  No cerebellar signs present. Neurovascular:  Normal heart sounds and regular rhythm, peripheral pulses intact, and no carotid bruits. Assesment  1. MS (multiple sclerosis) (HCC)    - baclofen (LIORESAL) 10 mg tablet; Take 1 Tab by mouth three (3) times daily. Dispense: 90 Tab; Refill: 5  - oxyCODONE IR (OXY-IR) 15 mg immediate release tablet; Take 1 Tab by mouth every eight (8) hours as needed for Pain. Max Daily Amount: 45 mg. Dispense: 60 Tab; Refill: 0  - MRI BRAIN W WO CONT; Future    2. Midline low back pain without sciatica, unspecified chronicity    - baclofen (LIORESAL) 10 mg tablet; Take 1 Tab by mouth three (3) times daily. Dispense: 90 Tab; Refill: 5    3. Chronic, continuous use of opioids    - COMPLIANCE DRUG SCREEN/PRESCRIPTION MONITORING    4. Chronic pain syndrome      5.  Gait disorder    - MRI BRAIN W WO CONT; Future    6. MCI (mild cognitive impairment)    - MRI BRAIN W WO CONT; Future    7. PBA (pseudobulbar affect)      8. Paresthesia    - MRI BRAIN W WO CONT; Future    9. Polyneuropathy ([address]      10. Multiple sclerosis ([facility])    - gabapentin (NEURONTIN) 300 mg capsule; Take 1 Cap by mouth three (3) times daily. Nerve pain  Dispense: 90 Cap; Refill: 5    11. Bilateral thigh pain    - gabapentin (NEURONTIN) 300 mg capsule; Take 1 Cap by mouth three (3) times daily. Nerve pain  Dispense: 90 Cap; Refill: 5    ___________________________________________________  PLAN: Medicattion reviewed with patient      ICD-10-CM ICD-9-CM    1. Chronic, continuous use of opioids F11.90 305.51 COMPLIANCE DRUG SCREEN/PRESCRIPTION MONITORING   2. MS (multiple sclerosis) ([facility]) G35 340 baclofen (LIORESAL) 10 mg tablet      oxyCODONE IR (OXY-IR) 15 mg immediate release tablet      MRI BRAIN W WO CONT      DISCONTINUED: oxyCODONE IR (OXY-IR) 15 mg immediate release tablet      DISCONTINUED: oxyCODONE IR (OXY-IR) 15 mg immediate release tablet   3. Midline low back pain without sciatica, unspecified chronicity M54.5 724.2 baclofen (LIORESAL) 10 mg tablet   4. Chronic pain syndrome G89.4 338.4    5. Gait disorder R26.9 781.2 MRI BRAIN W WO CONT   6. MCI (mild cognitive impairment) G31.84 331.83 MRI BRAIN W WO CONT   7. PBA (pseudobulbar affect) F48.2 310.81    8. Paresthesia R20.2 782.0 MRI BRAIN W WO CONT   9. Polyneuropathy ([facility]) G62.9 356.9    10. Multiple sclerosis ([facility]) G35 340 gabapentin (NEURONTIN) 300 mg capsule   11. Bilateral thigh pain M79.651 729.5 gabapentin (NEURONTIN) 300 mg capsule    M79.652       Follow-up Disposition:  Return in about 2 months (around 10/3/2017).            ___________________________________________________    Total time spent with patient:  []15   []25   []35   [] __ minutes    Care Plan discussed with:    [x]Patient   []Family    []Care Manager   []Consultant/Specialist :    ___________________________________________________    Attending Physician: Remberto Singh MD

## 2017-08-08 ENCOUNTER — OFFICE VISIT (OUTPATIENT)
Dept: INTERNAL MEDICINE CLINIC | Age: 52
End: 2017-08-08

## 2017-08-08 VITALS
HEIGHT: 70 IN | OXYGEN SATURATION: 98 % | WEIGHT: 221 LBS | HEART RATE: 85 BPM | SYSTOLIC BLOOD PRESSURE: 130 MMHG | DIASTOLIC BLOOD PRESSURE: 90 MMHG | RESPIRATION RATE: 17 BRPM | BODY MASS INDEX: 31.64 KG/M2 | TEMPERATURE: 98.6 F

## 2017-08-08 DIAGNOSIS — Z12.11 SCREENING FOR COLON CANCER: ICD-10-CM

## 2017-08-08 DIAGNOSIS — N52.1 ERECTILE DISORDER DUE TO MEDICAL CONDITION IN MALE PATIENT: ICD-10-CM

## 2017-08-08 DIAGNOSIS — E11.9 CONTROLLED TYPE 2 DIABETES MELLITUS WITHOUT COMPLICATION, WITHOUT LONG-TERM CURRENT USE OF INSULIN (HCC): ICD-10-CM

## 2017-08-08 DIAGNOSIS — E78.00 HYPERCHOLESTEROLEMIA: Primary | ICD-10-CM

## 2017-08-08 DIAGNOSIS — G35 MULTIPLE SCLEROSIS (HCC): ICD-10-CM

## 2017-08-08 LAB
GLUCOSE POC: 91 MG/DL
HBA1C MFR BLD HPLC: 5.3 %

## 2017-08-08 RX ORDER — ATORVASTATIN CALCIUM 80 MG/1
80 TABLET, FILM COATED ORAL DAILY
Qty: 30 TAB | Refills: 12 | Status: SHIPPED | OUTPATIENT
Start: 2017-08-08 | End: 2019-06-06 | Stop reason: ALTCHOICE

## 2017-08-08 RX ORDER — TADALAFIL 20 MG/1
20 TABLET ORAL AS NEEDED
Qty: 10 TAB | Refills: 12 | Status: SHIPPED | OUTPATIENT
Start: 2017-08-08 | End: 2021-11-12

## 2017-08-08 NOTE — MR AVS SNAPSHOT
Visit Information Date & Time Provider Department Dept. Phone Encounter #  
 8/8/2017  2:30 PM Dominguez Gilmore MD [facility] [phone] [id number] Follow-up Instructions Return in about 4 weeks (around 9/5/2017), or if symptoms worsen or fail to improve. Your Appointments 10/4/2017 11:00 AM  
New Patient with Ambreen Mathews MD  
[facility]) Appt Note: RETURN IN 2 MONTHS  
 [place] [address] [facility] Upcoming Health Maintenance Date Due  
 EYE EXAM RETINAL OR DILATED Q1 1/27/2013 FOOT EXAM Q1 9/3/2016 MICROALBUMIN Q1 2/24/2017 FOBT Q 1 YEAR AGE 50-75 6/13/2017 INFLUENZA AGE 9 TO ADULT 8/1/2017 HEMOGLOBIN A1C Q6M 8/21/2017 DTaP/Tdap/Td series (1 - Tdap) 12/20/2017* LIPID PANEL Q1 2/21/2018 *Topic was postponed. The date shown is not the original due date. Allergies as of 8/8/2017  Review Complete On: 8/8/2017 By: Dominguez Gilmore MD  
 No Known Allergies Current Immunizations  Reviewed on 6/25/2013 No immunizations on file. Not reviewed this visit You Were Diagnosed With   
  
 Codes Comments Hypercholesterolemia    -  Primary ICD-10-CM: E78.00 ICD-9-CM: 272.0 Multiple sclerosis ([facility] [address]     ICD-10-CM: G35 
ICD-9-CM: 130 Controlled type 2 diabetes mellitus without complication, without long-term current use of insulin ([facility] [address]     ICD-10-CM: E11.9 ICD-9-CM: 250.00 Screening for colon cancer     ICD-10-CM: Z12.11 ICD-9-CM: V76.51 Erectile disorder due to medical condition in male patient     ICD-10-CM: N52.1 ICD-9-CM: 607.84 Vitals BP Pulse Temp Resp Height(growth percentile) Weight(growth percentile)  130/90 (BP 1 Location: Left arm, BP Patient Position: Sitting) 85 98.6 °F (37 °C) (Oral) 17 5' 10\" (1.778 m) 221 lb (100.2 kg) SpO2 BMI Smoking Status 98% 31.71 kg/m2 Current Every Day Smoker BMI and BSA Data Body Mass Index Body Surface Area 31.71 kg/m 2 2.22 m 2 Preferred Pharmacy Pharmacy Name Phone [facility] [address] [facility] [phone] Your Updated Medication List  
  
   
This list is accurate as of: 8/8/17  4:12 PM.  Always use your most recent med list.  
  
  
  
  
 aspirin 81 mg tablet Take 1 Tab by mouth daily. atorvastatin 80 mg tablet Commonly known as:  LIPITOR Take 1 Tab by mouth daily. baclofen 10 mg tablet Commonly known as:  LIORESAL Take 1 Tab by mouth three (3) times daily. buPROPion  mg tablet Commonly known as:  [place] [facility] Take 1 Tab by mouth every morning. colesevelam 625 mg tablet Commonly known as:  [facility] Take 3 Tabs by mouth two (2) times daily (with meals). dextromethorphan-quiNIDine 20-10 mg per capsule Commonly known as:  [place] Ester Take 1 Cap by mouth every twelve (12) hours. gabapentin 300 mg capsule Commonly known as:  NEURONTIN Take 1 Cap by mouth three (3) times daily. Nerve pain HYDROcodone-acetaminophen  mg tablet Commonly known as:  NORCO  
  
 interferon beta-1a (albumin) 44 mcg/0.5 mL injection Commonly known as:  REBIF (WITH ALBUMIN) 0.5 mL by SubCUTAneous route every Monday, Wednesday, Friday. lidocaine 5 % Commonly known as:  LIDODERM  
1 Patch by TransDERmal route every twenty-four (24) hours. lidocaine HCl-hydrocortison ac topical cream  
Apply  to affected area two (2) times a day. metFORMIN 500 mg tablet Commonly known as:  GLUCOPHAGE Take 1 Tab by mouth daily (with breakfast). naproxen 500 mg tablet Commonly known as:  NAPROSYN Take 1 Tab by mouth two (2) times daily as needed. Omeprazole delayed release 20 mg tablet Commonly known as:  PRILOSEC D/R Take 1 Tab by mouth daily. oxyCODONE IR 15 mg immediate release tablet Commonly known as:  OXY-IR Take 1 Tab by mouth every eight (8) hours as needed for Pain. Max Daily Amount: 45 mg. Start taking on:  9/3/2017  
  
 tadalafil 20 mg tablet Commonly known as:  CIALIS Take 1 Tab by mouth as needed. Prescriptions Sent to Pharmacy Refills  
 tadalafil (CIALIS) 20 mg tablet 12 Sig: Take 1 Tab by mouth as needed. Class: Normal  
 Pharm[facility] [address] [facility] Ph #: [phone] Route: Oral  
  
We Performed the Following AMB POC GLUCOSE BLOOD, BY GLUCOSE MONITORING DEVICE [18090 CPT(R)] AMB POC HEMOGLOBIN A1C [43087 CPT(R)] AMB POC LIPID PROFILE [10864 CPT(R)] AMB POC URINE, MICROALBUMIN, SEMIQUANT (3 RESULTS) [49564 CPT(R)] OCCULT BLOOD, IMMUNOASSAY (FIT) [id number] CPT(R)] Follow-up Instructions Return in about 4 weeks (around 2017), or if symptoms worsen or fail to improve. Patient Instructions [employer] Activation Thank you for requesting access to [employer]. Please follow the instructions below to securely access and download your online medical record. [employer] allows you to send messages to your doctor, view your test results, renew your prescriptions, schedule appointments, and more. How Do I Sign Up? 1. In your internet browser, go to [email or web address] 
2. Click on the First Time User? Click Here link in the Sign In box. You will be redirect to the New Member Sign Up page. 3. Enter your [employer] Access Code exactly as it appears below. You will not need to use this code after youve completed the sign-up process. If you do not sign up before the expiration date, you must request a new code. [employer] Access Code: Activation code not generated Current [employer] Status: Active (This is the date your [employer] access code will ) 4. Enter the last four digits of your Social Security Number (xxxx) and Date of Birth (mm/dd/yyyy) as indicated and click Submit. You will be taken to the next sign-up page. 5. Create a Concept3D ID. This will be your Concept3D login ID and cannot be changed, so think of one that is secure and easy to remember. 6. Create a Concept3D password. You can change your password at any time. 7. Enter your Password Reset Question and Answer. This can be used at a later time if you forget your password. 8. Enter your e-mail address. You will receive e-mail notification when new information is available in [address] 9. Click Sign Up. You can now view and download portions of your medical record. 10. Click the Download Summary menu link to download a portable copy of your medical information. Additional Information If you have questions, please visit the Frequently Asked Questions section of the Concept3D website at [email or web address] Remember, Concept3D is NOT to be used for urgent needs. For medical emergencies, dial 911. Introducing [facility]! Dear Rio Ramsey: 
Thank you for requesting a Concept3D account. Our records indicate that you already have an active Concept3D account. You can access your account anytime at [email or web address] Did you know that you can access your hospital and ER discharge instructions at any time in Concept3D? You can also review all of your test results from your hospital stay or ER visit. Additional Information If you have questions, please visit the Frequently Asked Questions section of the Concept[employer] website at [email or web address] Remember, Concept[employer]D is NOT to be used for urgent needs. For medical emergencies, dial 911. Now available from your iPhone and Android! Please provide this summary of care documentation to your next provider. Your primary care clinician is listed as Sathish Steel. If you have any questions after today's visit, please call [phone].

## 2017-08-08 NOTE — PROGRESS NOTES
Maria Teresa Gibbs is a 50 y.o. male and presents with Diabetes; Hypertension; and Multiple Sclerosis  . Subjective:    Hypertension Review:  The patient has hypertension  Diet and Lifestyle: generally follows a  low sodium diet, exercises sporadically  Home BP Monitoring: is not measured at home. Pertinent ROS: taking medications as instructed, no medication side effects noted, no TIA's, no chest pain on exertion, no dyspnea on exertion, no swelling of ankles. Diabetes Mellitus Review:  He has diabetes mellitus. he has prediabetes  Diabetic ROS - medication compliance: compliant all of the time, diabetic diet compliance: compliant all of the time, home glucose monitoring: is performed. Known diabetic complications: none  Cardiovascular risk factors: family history, dyslipidemia, diabetes mellitus, obesity, hypertension  Current diabetic medications include oral agents  Eye exam current (within one year): no  Weight trend: stable  Prior visit with dietician: no  Current diet: \"healthy\" diet  in general  Current exercise: walking  Current monitoring regimen: home blood tests - daily  Home blood sugar records: trend: stable  Any episodes of hypoglycemia? no  Is He on ACE inhibitor or angiotensin II receptor blocker? Yes     Dyslipidemia Review:  Patient presents for evaluation of lipids. Compliance with treatment thus far has been excellent. A repeat fasting lipid profile was done. The patient does not use medications that may worsen dyslipidemias (corticosteroids, progestins, anabolic steroids, diuretics, beta-blockers, amiodarone, cyclosporine, olanzapine). The patient exercises daily. The patient is not known to have coexisting coronary artery disease.     He has multiple sclerosis and remains under the care of neurology  He has had multiple complaints to include blurred vision and bouts of weakness  His weight has been erratic  His appetite has been fair  He has had some myalgias,arthralgias and muscle weakness reported    Erectile dysfunction Review[de-identified]  Patient complains of difficulty in maintaining an adequate erection. He states that his present medication is helping thus far. Review of Systems  Constitutional: negative for fevers, chills, anorexia and weight loss  Eyes:   negative for visual disturbance and irritation  ENT:   negative for tinnitus,sore throat,nasal congestion,ear pains. hoarseness  Respiratory:  negative for cough, hemoptysis, dyspnea,wheezing  CV:   negative for chest pain, palpitations, lower extremity edema  GI:   negative for nausea, vomiting, diarrhea, abdominal pain,melena  Endo:               negative for polyuria,polydipsia,polyphagia,heat intolerance  Genitourinary: negative for frequency, dysuria and hematuria  Integument:  negative for rash and pruritus  Hematologic:  negative for easy bruising and gum/nose bleeding  Musculoskel: myalgias, arthralgias, back pain, muscle weakness, joint pain  Neurological:  negative for headaches, dizziness, vertigo, memory problems and gait   Behavl/Psych: feelings of anxiety, depression, mood changes    Past Medical History:   Diagnosis Date    Back pain 10/8/2010    Back pain 10/8/2010    Depression     Diabetes ([address]     Fatigue 8/24/2012    Hearing loss 1/25/2013    Memory loss 1/25/2013    [facility]     Muscle pain     Muscle weakness     Poor appetite     Snoring     Unintentional weight change     Visual disturbance      Past Surgical History:   Procedure Laterality Date    HX HEENT Right     ear     Social History     Social History    Marital status: SINGLE     Spouse name: N/A    Number of children: N/A    Years of education: N/A     Social History Main Topics    Smoking status: Current Every Day Smoker     Packs/day: 0.50     Years: 21.00     Types: Cigarettes    Smokeless tobacco: Never Used    Alcohol use No    Drug use: No    Sexual activity: Yes     Partners: Female     Birth control/ protection: Condom     Other Topics Concern    None     Social History Narrative     Family History   Problem Relation Age of Onset    Hypertension Father     Cancer Father      prostate    Hypertension Sister     Diabetes Maternal Grandmother     Cancer Paternal Uncle      prostate    Dementia Paternal Uncle      Current Outpatient Prescriptions   Medication Sig Dispense Refill    tadalafil (CIALIS) 20 mg tablet Take 1 Tab by mouth as needed. 10 Tab 12    gabapentin (NEURONTIN) 300 mg capsule Take 1 Cap by mouth three (3) times daily. Nerve pain 90 Cap 5    [START ON 9/3/2017] oxyCODONE IR (OXY-IR) 15 mg immediate release tablet Take 1 Tab by mouth every eight (8) hours as needed for Pain. Max Daily Amount: 45 mg. 60 Tab 0    interferon beta-1a, albumin, (REBIF, WITH ALBUMIN,) 44 mcg/0.5 mL injection 0.5 mL by SubCUTAneous route every Monday, Wednesday, Friday. 36 Syringe 2    aspirin 81 mg tablet Take 1 Tab by mouth daily. 30 Tab 6    baclofen (LIORESAL) 10 mg tablet Take 1 Tab by mouth three (3) times daily. 90 Tab 5    dextromethorphan-quiNIDine (NUEDEXTA) 20-10 mg per capsule Take 1 Cap by mouth every twelve (12) hours. 60 Cap 2    lidocaine HCl-hydrocortison ac topical cream Apply  to affected area two (2) times a day. 85 g 0    lidocaine (LIDODERM) 5 % 1 Patch by TransDERmal route every twenty-four (24) hours. 30 Each 5    metFORMIN (GLUCOPHAGE) 500 mg tablet Take 1 Tab by mouth daily (with breakfast). 30 Tab 12    atorvastatin (LIPITOR) 80 mg tablet Take 1 Tab by mouth daily. 30 Tab 12    HYDROcodone-acetaminophen (NORCO)  mg tablet       buPROPion XL (WELLBUTRIN XL) 150 mg tablet Take 1 Tab by mouth every morning. 30 Tab 3    colesevelam (WELCHOL) 625 mg tablet Take 3 Tabs by mouth two (2) times daily (with meals). 180 Tab 12    Omeprazole delayed release (PRILOSEC D/R) 20 mg tablet Take 1 Tab by mouth daily.  30 Tab 12    naproxen (NAPROSYN) 500 mg tablet Take 1 Tab by mouth two (2) times daily as needed. 40 Tab 5     No Known Allergies    Objective:  Visit Vitals    /90 (BP 1 Location: Left arm, BP Patient Position: Sitting)    Pulse 85    Temp 98.6 °F (37 °C) (Oral)    Resp 17    Ht 5' 10\" (1.778 m)    Wt 221 lb (100.2 kg)    SpO2 98%    BMI 31.71 kg/m2     Physical Exam:   General appearance - alert, well appearing, and in no distress  Mental status - alert, oriented to person, place, and time  EYE-JUS, EOMI, corneas normal, no foreign bodies  ENT-ENT exam normal, no neck nodes or sinus tenderness  Nose - normal and patent, no erythema, discharge or polyps  Mouth - mucous membranes moist, pharynx normal without lesions  Neck - supple, no significant adenopathy   Chest - clear to auscultation, no wheezes, rales or rhonchi, symmetric air entry   Heart - normal rate, regular rhythm, normal S1, S2, no murmurs, rubs, clicks or gallops   Abdomen - soft, nontender, nondistended, no masses or organomegaly  Lymph- no adenopathy palpable  Ext-peripheral pulses normal, no pedal edema, no clubbing or cyanosis  Skin-Warm and dry. no hyperpigmentation, vitiligo, or suspicious lesions  Neuro -alert, oriented, normal speech,upper and lower body weakness noted  Neck-normal C-spine, no tenderness, full ROM without pain  Feet-no nail deformities or callus formation with good pulses noted      Results for orders placed or performed in visit on 02/21/17   AMB POC GLUCOSE BLOOD, BY GLUCOSE MONITORING DEVICE   Result Value Ref Range    Glucose POC 88 mg/dL   AMB POC HEMOGLOBIN A1C   Result Value Ref Range    Hemoglobin A1c (POC) 6.2 %   AMB POC LIPID PROFILE   Result Value Ref Range    Cholesterol (POC) 168     Triglycerides (POC) 233     HDL Cholesterol (POC) 41     LDL Cholesterol (POC) 81     Non-HDL Goal (POC) 127     TChol/HDL Ratio (POC) 4.1        Assessment/Plan:    ICD-10-CM ICD-9-CM    1. Hypercholesterolemia E78.00 272.0 AMB POC LIPID PROFILE   2.  Multiple sclerosis ([facility] [address] G35 340    3. Controlled type 2 diabetes mellitus without complication, without long-term current use of insulin (HCC) E11.9 250.00 AMB POC GLUCOSE BLOOD, BY GLUCOSE MONITORING DEVICE      AMB POC HEMOGLOBIN A1C      AMB POC URINE, MICROALBUMIN, SEMIQUANT (3 RESULTS)   4. Screening for colon cancer Z12.11 V76.51 OCCULT BLOOD, IMMUNOASSAY (FIT)   5. Erectile disorder due to medical condition in male patient N52.1 607.84 tadalafil (CIALIS) 20 mg tablet     Orders Placed This Encounter    OCCULT BLOOD, IMMUNOASSAY (FIT)     Order Specific Question:   QUEST SOURCE     Answer:   Stool [1161]    AMB POC LIPID PROFILE    AMB POC GLUCOSE BLOOD, BY GLUCOSE MONITORING DEVICE    AMB POC HEMOGLOBIN A1C    AMB POC URINE, MICROALBUMIN, SEMIQUANT (3 RESULTS)    tadalafil (CIALIS) 20 mg tablet     Sig: Take 1 Tab by mouth as needed. Dispense:  10 Tab     Refill:  12     increase physical activity, follow low fat diet, follow low salt diet,Take 81mg aspirin daily  Patient Instructions   M[employer] Activation    Thank you for requesting access to [employer]. Please follow the instructions below to securely access and download your online medical record. Minekey allows you to send messages to your doctor, view your test results, renew your prescriptions, schedule appointments, and more. How Do I Sign Up? 1. In your internet browser, go to [email or web address]  2. Click on the First Time User? Click Here link in the Sign In box. You will be redirect to the New Member Sign Up page. 3. Enter your Minekey Access Code exactly as it appears below. You will not need to use this code after youve completed the sign-up process. If you do not sign up before the expiration date, you must request a new code. Minekey Access Code: Activation code not generated  Current Minekey Status: Active (This is the date your Minekey access code will )    4.  Enter the last four digits of your Social Security Number (xxxx) and Date of Birth (tyra/yolie/yyyy) as indicated and click Submit. You will be taken to the next sign-up page. 5. Create a Petcot ID. This will be your Purer Skin login ID and cannot be changed, so think of one that is secure and easy to remember. 6. Create a Purer Skin password. You can change your password at any time. 7. Enter your Password Reset Question and Answer. This can be used at a later time if you forget your password. 8. Enter your e-mail address. You will receive e-mail notification when new information is available in [address] 9. Click Sign Up. You can now view and download portions of your medical record. 10. Click the Download Summary menu link to download a portable copy of your medical information. Additional Information    If you have questions, please visit the Frequently Asked Questions section of the P[employer] website at [email or web address] Remember, [employer] is NOT to be used for urgent needs. For medical emergencies, dial 911. Follow-up Disposition:  Return in about 4 weeks (around 9/5/2017), or if symptoms worsen or fail to improve. I have reviewed with the patient details of the assessment and plan and all questions were answered. Relevent patient education was performed. The most recent lab findings were reviewed with the patient. An After Visit Summary was printed and given to the patient.

## 2017-08-08 NOTE — PATIENT INSTRUCTIONS
[employer] Activation    Thank you for requesting access to [employer]. Please follow the instructions below to securely access and download your online medical record. [employer] allows you to send messages to your doctor, view your test results, renew your prescriptions, schedule appointments, and more. How Do I Sign Up? 1. In your internet browser, go to [email or web address]  2. Click on the First Time User? Click Here link in the Sign In box. You will be redirect to the New Member Sign Up page. 3. Enter your [employer] Access Code exactly as it appears below. You will not need to use this code after youve completed the sign-up process. If you do not sign up before the expiration date, you must request a new code. [employer] Access Code: Activation code not generated  Current [employer] Status: Active (This is the date your [employer] access code will )    4. Enter the last four digits of your Social Security Number (xxxx) and Date of Birth (mm/dd/yyyy) as indicated and click Submit. You will be taken to the next sign-up page. 5. Create a FOCUS [employer]ARCH ID. This will be your [employer] login ID and cannot be changed, so think of one that is secure and easy to remember. 6. Create a F[employer] password. You can change your password at any time. 7. Enter your Password Reset Question and Answer. This can be used at a later time if you forget your password. 8. Enter your e-mail address. You will receive e-mail notification when new information is available in [address] 9. Click Sign Up. You can now view and download portions of your medical record. 10. Click the Download Summary menu link to download a portable copy of your medical information. Additional Information    If you have questions, please visit the Frequently Asked Questions section of the [employer] website at [email or web address] Remember, [employer] is NOT to be used for urgent needs. For medical emergencies, dial 911.

## 2017-08-11 LAB — DRUGS UR: NORMAL

## 2017-10-05 DIAGNOSIS — G35 MS (MULTIPLE SCLEROSIS) (HCC): ICD-10-CM

## 2017-10-05 RX ORDER — OXYCODONE HYDROCHLORIDE 15 MG/1
15 TABLET ORAL
Qty: 60 TAB | Refills: 0 | Status: SHIPPED | OUTPATIENT
Start: 2017-10-05 | End: 2017-12-20 | Stop reason: SDUPTHER

## 2017-12-20 ENCOUNTER — OFFICE VISIT (OUTPATIENT)
Dept: NEUROLOGY | Age: 52
End: 2017-12-20

## 2017-12-20 VITALS
RESPIRATION RATE: 16 BRPM | DIASTOLIC BLOOD PRESSURE: 92 MMHG | HEIGHT: 70 IN | TEMPERATURE: 98.2 F | SYSTOLIC BLOOD PRESSURE: 130 MMHG | WEIGHT: 210 LBS | BODY MASS INDEX: 30.06 KG/M2 | HEART RATE: 76 BPM | OXYGEN SATURATION: 99 %

## 2017-12-20 DIAGNOSIS — R20.2 PARESTHESIA: ICD-10-CM

## 2017-12-20 DIAGNOSIS — R26.9 GAIT DISORDER: ICD-10-CM

## 2017-12-20 DIAGNOSIS — G89.4 CHRONIC PAIN SYNDROME: Primary | ICD-10-CM

## 2017-12-20 DIAGNOSIS — R53.83 FATIGUE, UNSPECIFIED TYPE: ICD-10-CM

## 2017-12-20 DIAGNOSIS — R29.6 FREQUENT FALLS: ICD-10-CM

## 2017-12-20 DIAGNOSIS — G35 MS (MULTIPLE SCLEROSIS) (HCC): ICD-10-CM

## 2017-12-20 RX ORDER — OXYCODONE HYDROCHLORIDE 15 MG/1
15 TABLET ORAL
Qty: 60 TAB | Refills: 0 | Status: SHIPPED | OUTPATIENT
Start: 2018-01-20 | End: 2018-02-16 | Stop reason: SDUPTHER

## 2017-12-20 RX ORDER — OXYCODONE HYDROCHLORIDE 15 MG/1
15 TABLET ORAL
Qty: 60 TAB | Refills: 0 | Status: SHIPPED | OUTPATIENT
Start: 2017-12-20 | End: 2017-12-20 | Stop reason: SDUPTHER

## 2017-12-20 NOTE — PROGRESS NOTES
Neurology Progress Note    NAME:  Yoko Snider   :   1965   MRN:   [id number]     Date/Time:  2017  Subjective:      Yoko Snider is a 50 y.o. male here today for  follow up for MS, falls and pain. Says he has been having frequent falls, says his legs become weak and give out on him,even though he uses cane, he can not stand for a long time, walking has been difficult. He is always in a lot of pain,pain is sharp in nature, generalized, pain is aggravated by activity, wakes patient up at night, on a scale of 1-10, pain level remains at 8. He noted that he is constantly tired, any little exertion wear him out, his vision is always blurry, occasional double vision. He currently on Rebif.   Review of Systems - General ROS: positive for  - fatigue, night sweats and sleep disturbance  Psychological ROS: positive for - anxiety, depression and sleep disturbances  Ophthalmic ROS: positive for - blurry vision, decreased vision, double vision and photophobia  ENT ROS: positive for - headaches, tinnitus, vertigo and visual changes  Allergy and Immunology ROS: negative  Hematological and Lymphatic ROS: negative  Endocrine ROS: negative  Respiratory ROS: no cough, shortness of breath, or wheezing  Cardiovascular ROS: no chest pain or dyspnea on exertion  Gastrointestinal ROS: no abdominal pain, change in bowel habits, or black or bloody stools  Genito-Urinary ROS: no dysuria, trouble voiding, or hematuria  Musculoskeletal ROS: positive for - gait disturbance, joint pain, joint stiffness, joint swelling, muscle pain and muscular weakness  Neurological ROS: positive for - dizziness, gait disturbance, headaches, impaired coordination/balance, numbness/tingling, tremors and weakness  Dermatological ROS: negative    Medications reviewed:  Current Outpatient Prescriptions   Medication Sig Dispense Refill    oxyCODONE IR (OXY-IR) 15 mg immediate release tablet Take 1 Tab by mouth every eight (8) hours as needed for Pain. Max Daily Amount: 45 mg. 60 Tab 0    tadalafil (CIALIS) 20 mg tablet Take 1 Tab by mouth as needed. 10 Tab 12    atorvastatin (LIPITOR) 80 mg tablet Take 1 Tab by mouth daily. 30 Tab 12    baclofen (LIORESAL) 10 mg tablet Take 1 Tab by mouth three (3) times daily. 90 Tab 5    dextromethorphan-quiNIDine (NUEDEXTA) 20-10 mg per capsule Take 1 Cap by mouth every twelve (12) hours. 60 Cap 2    gabapentin (NEURONTIN) 300 mg capsule Take 1 Cap by mouth three (3) times daily. Nerve pain 90 Cap 5    lidocaine HCl-hydrocortison ac topical cream Apply  to affected area two (2) times a day. 85 g 0    interferon beta-1a, albumin, (REBIF, WITH ALBUMIN,) 44 mcg/0.5 mL injection 0.5 mL by SubCUTAneous route every Monday, Wednesday, Friday. 36 Syringe 2    metFORMIN (GLUCOPHAGE) 500 mg tablet Take 1 Tab by mouth daily (with breakfast). 30 Tab 12    buPROPion XL (WELLBUTRIN XL) 150 mg tablet Take 1 Tab by mouth every morning. 30 Tab 3    lidocaine (LIDODERM) 5 % 1 Patch by TransDERmal route every twenty-four (24) hours. 30 Each 5    HYDROcodone-acetaminophen (NORCO)  mg tablet every six (6) hours as needed.  colesevelam (WELCHOL) 625 mg tablet Take 3 Tabs by mouth two (2) times daily (with meals). 180 Tab 12    Omeprazole delayed release (PRILOSEC D/R) 20 mg tablet Take 1 Tab by mouth daily. 30 Tab 12    naproxen (NAPROSYN) 500 mg tablet Take 1 Tab by mouth two (2) times daily as needed. 40 Tab 5    aspirin 81 mg tablet Take 1 Tab by mouth daily.  30 Tab 6        Objective:   Vitals:  Vitals:    12/20/17 0958   BP: (!) 130/92   Pulse: 76   Resp: 16   Temp: 98.2 °F (36.8 °C)   TempSrc: Temporal   SpO2: 99%   Weight: 210 lb (95.3 kg)   Height: 5' 10\" (1.778 m)   PainSc:   8   PainLoc: Teeth     Lab Data Reviewed:  Lab Results   Component Value Date/Time    WBC 4.7 06/22/2016 02:11 PM    HCT 42.2 06/22/2016 02:11 PM    HGB 13.9 06/22/2016 02:11 PM    PLATELET 340 66/78/7513 02:11 PM       Lab Results Component Value Date/Time    Sodium 144 06/22/2016 02:11 PM    Potassium 4.4 06/22/2016 02:11 PM    Chloride 104 06/22/2016 02:11 PM    CO2 26 06/22/2016 02:11 PM    Glucose 98 06/22/2016 02:11 PM    BUN 6 06/22/2016 02:11 PM    Creatinine 0.81 06/22/2016 02:11 PM    Calcium 9.2 06/22/2016 02:11 PM       No components found for: TROPQUANT    No results found for: BELEN      Lab Results   Component Value Date/Time    Hemoglobin A1c 6.2 05/08/2013 10:17 AM    Hemoglobin A1c (POC) 5.3 08/08/2017 04:22 PM        No results found for: B12LT, FOL, RBCF    No results found for: BELEN, Nando Gave, XBANA    Lab Results   Component Value Date/Time    Cholesterol, total 232 05/08/2013 10:17 AM    Cholesterol (POC) 168 02/21/2017 12:06 PM    HDL Cholesterol 41 05/08/2013 10:17 AM    HDL Cholesterol (POC) 41 02/21/2017 12:06 PM    LDL Cholesterol (POC) 81 02/21/2017 12:06 PM    LDL, calculated 163 05/08/2013 10:17 AM    VLDL, calculated 28 05/08/2013 10:17 AM    Triglyceride 142 05/08/2013 10:17 AM    Triglycerides (POC) 233 02/21/2017 12:06 PM    CHOL/HDL Ratio 7.3 10/08/2010 01:10 PM         CT Results (recent):  No results found for this or any previous visit. MRI Results (recent):    Results from [place] encounter on 05/04/16   MRI CERV SPINE W WO CONT   Narrative **Final Report**      ICD Codes / Adm. Diagnosis: 340  336.9 / Multiple sclerosis ([facility]    Unspecified disease of spinal  Examination:  MRI [facility] [place] WO CON  - [id number] - May  4 2016  4:12PM  Accession No:  [id number]  Reason:  multiple sclerosis upper extremity weakness, myelopathy      REPORT:  Clinical history: Multiple sclerosis follow-up, increased weakness in both   arms and both legs right greater than left    INDICATION: Multiple sclerosis follow-up, increased weakness in both arms   and both legs right greater than left multiple sclerosis 340    COMPARISON: 6/6/2013    EXAM: Sagittal T1-weighted spin-echo, sagittal T2-weighted fast spin-echo, sagittal inversion recovery, axial T1-weighted spin-echo, axial gradient   echo, and post-IV contrast-enhanced axial and sagittal T1-weighted spin-echo   MR images of the cervical spine are obtained. A total of 13 mL of   gadolinium-based contrast was administered for the study. FINDINGS: Cord signal and enhancement are normal. There is a focus of   abnormal signal without enhancement in the central right cerebellar   hemisphere. There is normal vertebral body height and bone signal. There is   anatomic alignment. No paraspinal soft tissue mass or enhancement   abnormality is shown. C2-3 shows normal disc height with minimal central disc protrusion. There is   no facet arthropathy, canal stenosis or foraminal stenosis. C3-4 shows normal disc height with small bilateral uncovertebral osteophytes   and small central disc protrusion. There is no facet arthropathy. There is   no canal stenosis. There is mild bilateral foraminal narrowing. C4-5: Disc desiccation loss of disc height. Mild broad-based disc protrusion   extends the foramina. Canal is patent. Mild bilateral foraminal stenosis. .    C5-6 Disc desiccation loss of disc height. Mild broad-based disc protrusion   extends the foramina. Canal is patent. Mild bilateral foraminal stenosis. .    C6-7, C7-T1, and the upper thoracic discs and facets appear normal.       IMPRESSION:     Mild cervical degenerative changes without significant interval change since   examination of 6/6/2013. No evidence of demyelinating disease burden in the cervical cord. No cervical cord enhancement abnormality demonstrated. Signing/Reading Doctor: Ernesto Hernandez ([id number])    Approved: Ernesto Hernandez ([id number])  May  4 2016  4:27PM                                      IR Results (recent):  No results found for this or any previous visit. VAS/US Results (recent):  No results found for this or any previous visit.     PHYSICAL EXAM:  General:    Alert, cooperative, no distress, appears stated age. Head:   Normocephalic, without obvious abnormality, atraumatic. Eyes:   Conjunctivae/corneas clear. PERRLA  Nose:  Nares normal. No drainage or sinus tenderness. Throat:    Lips, mucosa, and tongue normal.  No Thrush  Neck:  Supple, symmetrical,  no adenopathy, thyroid: non tender    no carotid bruit and no JVD. Paraspinal tenderness  Back:    Symmetric,  Bilateral tenderness. Lungs:   Clear to auscultation bilaterally. No Wheezing or Rhonchi. No rales. Chest wall:  No tenderness or deformity. No Accessory muscle use. Heart:   Regular rate and rhythm,  no murmur, rub or gallop. Abdomen:   Soft, non-tender. Not distended. Bowel sounds normal. No masses  Extremities: Extremities normal, atraumatic, No cyanosis. No edema. No clubbing  Skin:     Texture, turgor normal. No rashes or lesions. Not Jaundiced  Lymph nodes: Cervical, supraclavicular normal.  Psych:  Good insight. Depressed. Anxious . NEUROLOGICAL EXAM:  Appearance: The patient is well developed, well nourished, provides a coherent history and is in no acute distress. Mental Status: Oriented to time, place and person. Mood and affect appropriate. Cranial Nerves:   Intact visual fields. Fundi are benign. JUS, EOM's full, no nystagmus, no ptosis. Facial sensation is normal. Corneal reflexes are intact. Facial movement is symmetric. Hearing is normal bilaterally. Palate is midline with normal sternocleidomastoid and trapezius muscles are normal. Tongue is midline. Motor:  5/5 strength in upper and 4+/5  lower proximal and distal muscles. Normal bulk and tone. No fasciculations. Reflexes:   Deep tendon reflexes 2+/4 and symmetrical.   Sensory:   Decreased sensation to touch, pinprick and vibration. Gait:  Abnormal gait. Ambulates with cane. Tremor:   No tremor noted. Cerebellar:  No cerebellar signs present.    Neurovascular:  Normal heart sounds and regular rhythm, peripheral pulses intact, and no carotid bruits. Assesment  1. MS (multiple sclerosis) ([facility])    - oxyCODONE IR (OXY-IR) 15 mg immediate release tablet; Take 1 Tab by mouth every eight (8) hours as needed for Pain. Max Daily Amount: 45 mg. Dispense: 60 Tab; Refill: 0    2. Chronic pain syndrome    Continue management  3. Paresthesia   Stable    4. Gait disorder    Physical therapy  ___________________________________________________  PLAN:Medication reviewed with patient  Continue management      ICD-10-CM ICD-9-CM    1. Chronic pain syndrome G89.4 338.4    2. MS (multiple sclerosis) ([facility]) G35 340 oxyCODONE IR (OXY-IR) 15 mg immediate release tablet   3. Paresthesia R20.2 782.0    4. Gait disorder R26.9 781.2      Follow-up Disposition:  Return in about 3 months (around 3/20/2018).            ___________________________________________________    Total time spent with patient:  []15   []25   []35   [] __ minutes    Care Plan discussed with:    [x]Patient   []Family    []Care Manager   []Consultant/Specialist :    ___________________________________________________    Attending Physician: Yolanda Kessler MD

## 2017-12-20 NOTE — PROGRESS NOTES
Chief Complaint   Patient presents with    Multiple Sclerosis    Fall     patient stated he fell 6 times    Medication Refill     1. Have you been to the ER, urgent care clinic since your last visit? Hospitalized since your last visit? No    2. Have you seen or consulted any other health care providers outside of the [address] [place] [address] since your last visit? Include any pap smears or colon screening.  No

## 2017-12-20 NOTE — MR AVS SNAPSHOT
Visit Information Date & Time Provider Department Dept. Phone Encounter #  
 12/20/2017  9:40 AM Syed Parnell MD [facility] at [address] [place] [address] [id number] Follow-up Instructions Return in about 3 months (around 3/20/2018). Your Appointments 12/26/2017 10:30 AM  
ROUTINE CARE with Belem Bledsoe MD  
[facility] - [address] [place] [address] ([address]) Appt Note: Check up [facility],[address] [place] [id number]  
[phone]  
  
   
 [address] [place] [address],[address] [place] [id number] Upcoming Health Maintenance Date Due  
 EYE EXAM RETINAL OR DILATED Q1 1/27/2013 FOOT EXAM Q1 9/3/2016 MICROALBUMIN Q1 2/24/2017 FOBT Q 1 YEAR AGE 50-75 6/13/2017 Influenza Age 5 to Adult 8/1/2017 DTaP/Tdap/Td series (1 - Tdap) 12/20/2017* HEMOGLOBIN A1C Q6M 2/8/2018 LIPID PANEL Q1 2/21/2018 *Topic was postponed. The date shown is not the original due date. Allergies as of 12/20/2017  Review Complete On: 12/20/2017 By: Hank Carballo MD  
 No Known Allergies Current Immunizations  Reviewed on 6/25/2013 No immunizations on file. Not reviewed this visit You Were Diagnosed With   
  
 Codes Comments Chronic pain syndrome    -  Primary ICD-10-CM: G89.4 ICD-9-CM: 338.4 [facility]     ICD-10-CM: G35 
ICD-9-CM: 745 Paresthesia     ICD-10-CM: R20.2 ICD-9-CM: 782.0 Gait disorder     ICD-10-CM: R26.9 ICD-9-CM: 781.2 Frequent falls     ICD-10-CM: R29.6 ICD-9-CM: V15.88 Fatigue, unspecified type     ICD-10-CM: R53.83 ICD-9-CM: 780.79 Vitals BP Pulse Temp Resp Height(growth percentile) (!) 130/92 (BP 1 Location: Right arm, BP Patient Position: Sitting) 76 98.2 °F (36.8 °C) (Temporal) 16 5' 10\" (1.778 m) Weight(growth percentile) SpO2 BMI Smoking Status 210 lb (95.3 kg) 99% 30.13 kg/m2 Current Every Day Smoker BMI and BSA Data Body Mass Index Body Surface Area  
 30.13 kg/m 2 2.17 m 2 Preferred Pharmacy Pharmacy Name [facility] [address] [facility] [phone] Your Updated Medication List  
  
   
This list is accurate as of: 12/20/17 10:26 AM.  Always use your most recent med list.  
  
  
  
  
 aspirin 81 mg tablet Take 1 Tab by mouth daily. atorvastatin 80 mg tablet Commonly known as:  LIPITOR Take 1 Tab by mouth daily. baclofen 10 mg tablet Commonly known as:  LIORESAL Take 1 Tab by mouth three (3) times daily. buPROPion  mg tablet Commonly known as:  Ema Hire Take 1 Tab by mouth every morning. colesevelam 625 mg tablet Commonly known as:  [facility] Take 3 Tabs by mouth two (2) times daily (with meals). dextromethorphan-quiNIDine 20-10 mg per capsule Commonly known as:  Marlaine Didier Take 1 Cap by mouth every twelve (12) hours. gabapentin 300 mg capsule Commonly known as:  NEURONTIN Take 1 Cap by mouth three (3) times daily. Nerve pain HYDROcodone-acetaminophen  mg tablet Commonly known as:  NORCO  
every six (6) hours as needed. interferon beta-1a (albumin) 44 mcg/0.5 mL injection Commonly known as:  REBIF (WITH ALBUMIN) 0.5 mL by SubCUTAneous route every Monday, Wednesday, Friday. lidocaine 5 % Commonly known as:  LIDODERM  
1 Patch by TransDERmal route every twenty-four (24) hours. lidocaine HCl-hydrocortison ac topical cream  
Apply  to affected area two (2) times a day. metFORMIN 500 mg tablet Commonly known as:  GLUCOPHAGE Take 1 Tab by mouth daily (with breakfast). naproxen 500 mg tablet Commonly known as:  NAPROSYN Take 1 Tab by mouth two (2) times daily as needed. Omeprazole delayed release 20 mg tablet Commonly known as:  PRILOSEC D/R Take 1 Tab by mouth daily. oxyCODONE IR 15 mg immediate release tablet Commonly known as:  OXY-IR Take 1 Tab by mouth every eight (8) hours as needed for Pain. Max Daily Amount: 45 mg. Start taking on:  1/20/2018  
  
 tadalafil 20 mg tablet Commonly known as:  CIALIS Take 1 Tab by mouth as needed. Prescriptions Printed Refills  
 oxyCODONE IR (OXY-IR) 15 mg immediate release tablet 0 Starting on: 1/20/2018 Sig: Take 1 Tab by mouth every eight (8) hours as needed for Pain. Max Daily Amount: 45 mg.  
 Class: Print Route: Oral  
  
Follow-up Instructions Return in about 3 months (around 3/20/2018). Introducing [facility]! Dear Emani Tijerina: 
Thank you for requesting a [employer] account. Our records indicate that you already have an active Kel[employer]ice account. You can access your account anytime at [email or web address] Did you know that you can access your hospital and ER discharge instructions at any time in Kel[employer]ice? You can also review all of your test results from your hospital stay or ER visit. Additional Information If you have questions, please visit the Frequently Asked Questions section of the [employer] website at [email or web address] Remember, [employer]ice is NOT to be used for urgent needs. For medical emergencies, dial 911. Now available from your iPhone and Android! Please provide this summary of care documentation to your next provider. Your primary care clinician is listed as Nena Reed. If you have any questions after today's visit, please call [phone].

## 2018-01-04 PROBLEM — F33.9 RECURRENT DEPRESSION (HCC): Status: ACTIVE | Noted: 2018-01-04

## 2018-01-11 ENCOUNTER — OFFICE VISIT (OUTPATIENT)
Dept: INTERNAL MEDICINE CLINIC | Age: 53
End: 2018-01-11

## 2018-01-11 VITALS
BODY MASS INDEX: 28.63 KG/M2 | WEIGHT: 200 LBS | OXYGEN SATURATION: 99 % | HEIGHT: 70 IN | HEART RATE: 80 BPM | TEMPERATURE: 98.7 F | SYSTOLIC BLOOD PRESSURE: 120 MMHG | DIASTOLIC BLOOD PRESSURE: 80 MMHG | RESPIRATION RATE: 19 BRPM

## 2018-01-11 DIAGNOSIS — H61.21 IMPACTED CERUMEN OF RIGHT EAR: ICD-10-CM

## 2018-01-11 DIAGNOSIS — E11.9 CONTROLLED TYPE 2 DIABETES MELLITUS WITHOUT COMPLICATION, WITHOUT LONG-TERM CURRENT USE OF INSULIN (HCC): ICD-10-CM

## 2018-01-11 DIAGNOSIS — H60.501 ACUTE OTITIS EXTERNA OF RIGHT EAR, UNSPECIFIED TYPE: ICD-10-CM

## 2018-01-11 DIAGNOSIS — E78.00 HYPERCHOLESTEROLEMIA: Primary | ICD-10-CM

## 2018-01-11 DIAGNOSIS — G35 MULTIPLE SCLEROSIS (HCC): ICD-10-CM

## 2018-01-11 DIAGNOSIS — G35 MS (MULTIPLE SCLEROSIS) (HCC): ICD-10-CM

## 2018-01-11 NOTE — PATIENT INSTRUCTIONS
[email or web address] Activation    Thank you for requesting access to [employer]. Please follow the instructions below to securely access and download your online medical record. [employer]ed allows you to send messages to your doctor, view your test results, renew your prescriptions, schedule appointments, and more. How Do I Sign Up? 1. In your internet browser, go to [email or web address]  2. Click on the First Time User? Click Here link in the Sign In box. You will be redirect to the New Member Sign Up page. 3. Enter your NewsC[employer]ed Access Code exactly as it appears below. You will not need to use this code after youve completed the sign-up process. If you do not sign up before the expiration date, you must request a new code. [employer]ed Access Code: Activation code not generated  Current NewsCrafted Status: Active (This is the date your NewsC[employer]ed access code will )    4. Enter the last four digits of your Social Security Number (xxxx) and Date of Birth (mm/dd/yyyy) as indicated and click Submit. You will be taken to the next sign-up page. 5. Create a NewsCrafted ID. This will be your [employer]ed login ID and cannot be changed, so think of one that is secure and easy to remember. 6. Create a [employer]ed password. You can change your password at any time. 7. Enter your Password Reset Question and Answer. This can be used at a later time if you forget your password. 8. Enter your e-mail address. You will receive e-mail notification when new information is available in [address] 9. Click Sign Up. You can now view and download portions of your medical record. 10. Click the Download Summary menu link to download a portable copy of your medical information. Additional Information    If you have questions, please visit the Frequently Asked Questions section of the [employer]ed website at [email or web address] Remember, [employer]ed is NOT to be used for urgent needs. For medical emergencies, dial 911.

## 2018-01-11 NOTE — PROGRESS NOTES
Sharlene Raya is a 50 y.o. male and presents with Ear Pain; Cholesterol Problem; and Diabetes  . Subjective:    Cerumen Impaction  Patient presents for evaluation of a plugged ear. He has noticed the  symptoms in the right ear a few weeks ago. There is a prior history of cerumen impaction. Patient denies ear pain. The patient was not using ear drops to loosen wax immediately prior to this visit. Hypertension Review:  The patient has hypertension  Diet and Lifestyle: generally follows a  low sodium diet, exercises sporadically  Home BP Monitoring: is not measured at home. Pertinent ROS: taking medications as instructed, no medication side effects noted, no TIA's, no chest pain on exertion, no dyspnea on exertion, no swelling of ankles. Diabetes Mellitus Review:  He has diabetes mellitus. he has prediabetes  Diabetic ROS - medication compliance: compliant all of the time, diabetic diet compliance: compliant all of the time, home glucose monitoring: is performed. Known diabetic complications: none  Cardiovascular risk factors: family history, dyslipidemia, diabetes mellitus, obesity, hypertension  Current diabetic medications include oral agents  Eye exam current (within one year): no  Weight trend: stable  Prior visit with dietician: no  Current diet: \"healthy\" diet  in general  Current exercise: walking  Current monitoring regimen: home blood tests - daily  Home blood sugar records: trend: stable  Any episodes of hypoglycemia? no  Is He on ACE inhibitor or angiotensin II receptor blocker? Yes     Dyslipidemia Review:  Patient presents for evaluation of lipids. Compliance with treatment thus far has been excellent. A repeat fasting lipid profile was done. The patient does not use medications that may worsen dyslipidemias (corticosteroids, progestins, anabolic steroids, diuretics, beta-blockers, amiodarone, cyclosporine, olanzapine). The patient exercises daily.   The patient is not known to have coexisting coronary artery disease. He has multiple sclerosis and remains under the care of neurology  He still has had multiple complaints to include blurred vision and bouts of weakness  His weight has been erratic  His appetite has been fair  He has had some myalgias,arthralgias and muscle weakness reported    Erectile dysfunction Review[de-identified]  Patient complains of difficulty in maintaining an adequate erection. He states that his present medication is helping thus far. Review of Systems  Constitutional: negative for fevers, chills, anorexia and weight loss  Eyes:   negative for visual disturbance and irritation  ENT:   negative for tinnitus,sore throat,nasal congestion,ear pains. hoarseness  Respiratory:  negative for cough, hemoptysis, dyspnea,wheezing  CV:   negative for chest pain, palpitations, lower extremity edema  GI:   negative for nausea, vomiting, diarrhea, abdominal pain,melena  Endo:               negative for polyuria,polydipsia,polyphagia,heat intolerance  Genitourinary: negative for frequency, dysuria and hematuria  Integument:  negative for rash and pruritus  Hematologic:  negative for easy bruising and gum/nose bleeding  Musculoskel: myalgias, arthralgias, back pain, muscle weakness, joint pain  Neurological:  negative for headaches, dizziness, vertigo, memory problems and gait   Behavl/Psych: feelings of anxiety, depression, mood changes    Past Medical History:   Diagnosis Date    Back pain 10/8/2010    Back pain 10/8/2010    Depression     Diabetes ([facility]     Fatigue 8/24/2012    Hearing loss 1/25/2013    Memory loss 1/25/2013    [facility] sclerosis) ([facility])     Muscle pain     Muscle weakness     Poor appetite     Snoring     Unintentional weight change     Visual disturbance      Past Surgical History:   Procedure Laterality Date    HX HEENT Right     ear     Social History     Social History    Marital status: SINGLE     Spouse name: N/A    Number of children: N/A  Years of education: N/A     Social History Main Topics    Smoking status: Current Every Day Smoker     Packs/day: 0.50     Years: 21.00     Types: Cigarettes    Smokeless tobacco: Never Used    Alcohol use No    Drug use: No    Sexual activity: Yes     Partners: Female     Birth control/ protection: Condom     Other Topics Concern    None     Social History Narrative     Family History   Problem Relation Age of Onset    Hypertension Father     Cancer Father      prostate    Hypertension Sister     Diabetes Maternal Grandmother     Cancer Paternal Uncle      prostate    Dementia Paternal Uncle      Current Outpatient Prescriptions   Medication Sig Dispense Refill    ciprofloxacin-hydrocortisone (CIPRO HC OTIC) otic suspension Administer 3 Drops in right ear two (2) times a day for 7 days. 10 mL 0    [START ON 1/20/2018] oxyCODONE IR (OXY-IR) 15 mg immediate release tablet Take 1 Tab by mouth every eight (8) hours as needed for Pain. Max Daily Amount: 45 mg. 60 Tab 0    gabapentin (NEURONTIN) 300 mg capsule Take 1 Cap by mouth three (3) times daily. Nerve pain 90 Cap 5    interferon beta-1a, albumin, (REBIF, WITH ALBUMIN,) 44 mcg/0.5 mL injection 0.5 mL by SubCUTAneous route every Monday, Wednesday, Friday. 36 Syringe 2    tadalafil (CIALIS) 20 mg tablet Take 1 Tab by mouth as needed. 10 Tab 12    atorvastatin (LIPITOR) 80 mg tablet Take 1 Tab by mouth daily. 30 Tab 12    baclofen (LIORESAL) 10 mg tablet Take 1 Tab by mouth three (3) times daily. 90 Tab 5    dextromethorphan-quiNIDine (NUEDEXTA) 20-10 mg per capsule Take 1 Cap by mouth every twelve (12) hours. 60 Cap 2    lidocaine HCl-hydrocortison ac topical cream Apply  to affected area two (2) times a day. 85 g 0    lidocaine (LIDODERM) 5 % 1 Patch by TransDERmal route every twenty-four (24) hours. 30 Each 5    metFORMIN (GLUCOPHAGE) 500 mg tablet Take 1 Tab by mouth daily (with breakfast).  30 Tab 12    HYDROcodone-acetaminophen (Rudine Ivis)  mg tablet every six (6) hours as needed.  buPROPion XL (WELLBUTRIN XL) 150 mg tablet Take 1 Tab by mouth every morning. 30 Tab 3    colesevelam (WELCHOL) 625 mg tablet Take 3 Tabs by mouth two (2) times daily (with meals). 180 Tab 12    Omeprazole delayed release (PRILOSEC D/R) 20 mg tablet Take 1 Tab by mouth daily. 30 Tab 12    naproxen (NAPROSYN) 500 mg tablet Take 1 Tab by mouth two (2) times daily as needed. 40 Tab 5    aspirin 81 mg tablet Take 1 Tab by mouth daily. 30 Tab 6     No Known Allergies    Objective:  Visit Vitals    /80 (BP 1 Location: Left arm, BP Patient Position: Sitting)    Pulse 80    Temp 98.7 °F (37.1 °C) (Oral)    Resp 19    Ht 5' 10\" (1.778 m)    Wt 200 lb (90.7 kg)    SpO2 99%    BMI 28.7 kg/m2     Physical Exam:   General appearance - alert, well appearing, and in no distress  Mental status - alert, oriented to person, place, and time  EYE-JUS, EOMI, corneas normal, no foreign bodies  ENT-ENT exam normal, no neck nodes or sinus tenderness,rt.ear cerumen impaction  Nose - normal and patent, no erythema, discharge or polyps  Mouth - mucous membranes moist, pharynx normal without lesions  Neck - supple, no significant adenopathy   Chest - clear to auscultation, no wheezes, rales or rhonchi, symmetric air entry   Heart - normal rate, regular rhythm, normal S1, S2, no murmurs, rubs, clicks or gallops   Abdomen - soft, nontender, nondistended, no masses or organomegaly  Lymph- no adenopathy palpable  Ext-peripheral pulses normal, no pedal edema, no clubbing or cyanosis  Skin-Warm and dry.  no hyperpigmentation, vitiligo, or suspicious lesions  Neuro -alert, oriented, normal speech,upper and lower body weakness noted  Neck-normal C-spine, no tenderness, full ROM without pain  Feet-no nail deformities or callus formation with good pulses noted      Results for orders placed or performed in visit on 08/08/17   AMB POC GLUCOSE BLOOD, BY GLUCOSE MONITORING DEVICE Result Value Ref Range    Glucose POC 91 mg/dL   AMB POC HEMOGLOBIN A1C   Result Value Ref Range    Hemoglobin A1c (POC) 5.3 %       Assessment/Plan:    ICD-10-CM ICD-9-CM    1. Hypercholesterolemia E78.00 272.0 AMB POC LIPID PROFILE   2. Multiple sclerosis ([facility] [place] [facility] G35 340    3. Controlled type 2 diabetes mellitus without complication, without long-term current use of insulin ([facility]) E11.9 250.00 AMB POC HEMOGLOBIN A1C      AMB POC GLUCOSE BLOOD, BY GLUCOSE MONITORING DEVICE   4. Impacted cerumen of right ear H61.21 380.4    5. MS (multiple sclerosis) ([facility] [place] [facility] G35 340    6. Acute otitis externa of right ear, unspecified type H60.501 380.10 ciprofloxacin-hydrocortisone (CIPRO HC OTIC) otic suspension     Orders Placed This Encounter    AMB POC LIPID PROFILE    AMB POC HEMOGLOBIN A1C    AMB POC GLUCOSE BLOOD, BY GLUCOSE MONITORING DEVICE    ciprofloxacin-hydrocortisone (CIPRO HC OTIC) otic suspension     Sig: Administer 3 Drops in right ear two (2) times a day for 7 days. Dispense:  10 mL     Refill:  0     increase physical activity, follow low fat diet, follow low salt diet,Take 81mg aspirin daily  Cerumen was manually removed from the right ear     Patient Instructions   Instabug Activation    Thank you for requesting access to Instabug. Please follow the instructions below to securely access and download your online medical record. Instabug allows you to send messages to your doctor, view your test results, renew your prescriptions, schedule appointments, and more. How Do I Sign Up? 1. In your internet browser, go to [email or web address]  2. Click on the First Time User? Click Here link in the Sign In box. You will be redirect to the New Member Sign Up page. 3. Enter your Instabug Access Code exactly as it appears below. You will not need to use this code after youve completed the sign-up process. If you do not sign up before the expiration date, you must request a new code.     Instabug Access Code: Activation code not generated  Current Osen Status: Active (This is the date your Triboldt access code will )    4. Enter the last four digits of your Social Security Number (xxxx) and Date of Birth (mm/dd/yyyy) as indicated and click Submit. You will be taken to the next sign-up page. 5. Create a Triboldt ID. This will be your Osen login ID and cannot be changed, so think of one that is secure and easy to remember. 6. Create a Triboldt password. You can change your password at any time. 7. Enter your Password Reset Question and Answer. This can be used at a later time if you forget your password. 8. Enter your e-mail address. You will receive e-mail notification when new information is available in [address] 9. Click Sign Up. You can now view and download portions of your medical record. 10. Click the Download Summary menu link to download a portable copy of your medical information. Additional Information    If you have questions, please visit the Frequently Asked Questions section of the Osen website at [email or web address] Remember, Osen is NOT to be used for urgent needs. For medical emergencies, dial 911. Follow-up Disposition:  Return in about 4 weeks (around 2018), or if symptoms worsen or fail to improve. I have reviewed with the patient details of the assessment and plan and all questions were answered. Relevent patient education was performed. The most recent lab findings were reviewed with the patient. An After Visit Summary was printed and given to the patient.

## 2018-01-11 NOTE — MR AVS SNAPSHOT
Visit Information Date & Time Provider Department Dept. Phone Encounter #  
 1/11/2018 11:45 AM Lisset Castillo MD [address] [place] [address] [phone] [id number] Follow-up Instructions Return in about 4 weeks (around 2/8/2018), or if symptoms worsen or fail to improve. Your Appointments 3/20/2018  1:40 PM  
Follow Up with Syed Howard MD  
[facility]) Appt Note: [facility], [address] [facility] Upcoming Health Maintenance Date Due DTaP/Tdap/Td series (1 - Tdap) 6/13/1988 EYE EXAM RETINAL OR DILATED Q1 1/27/2013 FOOT EXAM Q1 9/3/2016 MICROALBUMIN Q1 2/24/2017 FOBT Q 1 YEAR AGE 50-75 6/13/2017 Influenza Age 5 to Adult 8/1/2017 HEMOGLOBIN A1C Q6M 2/8/2018 LIPID PANEL Q1 2/21/2018 Allergies as of 1/11/2018  Review Complete On: 1/11/2018 By: Lisset Castillo MD  
 No Known Allergies Current Immunizations  Reviewed on 6/25/2013 No immunizations on file. Not reviewed this visit You Were Diagnosed With   
  
 Codes Comments Hypercholesterolemia    -  Primary ICD-10-CM: E78.00 ICD-9-CM: 272.0 Multiple sclerosis ([facility]     ICD-10-CM: G35 
ICD-9-CM: 890 Controlled type 2 diabetes mellitus without complication, without long-term current use of insulin ([facility]     ICD-10-CM: E11.9 ICD-9-CM: 250.00 Impacted cerumen of right ear     ICD-10-CM: H61.21 ICD-9-CM: 380.4 MS (multiple sclerosis) ([facility]     ICD-10-CM: G35 
ICD-9-CM: 647 Acute otitis externa of right ear, unspecified type     ICD-10-CM: H60.501 ICD-9-CM: 380.10 Vitals BP Pulse Temp Resp Height(growth percentile) Weight(growth percentile)  120/80 (BP 1 Location: Left arm, BP Patient Position: Sitting) 80 98.7 °F (37.1 °C) (Oral) 19 5' 10\" (1.778 m) 200 lb (90.7 kg) SpO2 BMI Smoking Status 99% 28.7 kg/m2 Current Every Day Smoker BMI and BSA Data Body Mass Index Body Surface Area 28.7 kg/m 2 2.12 m 2 Preferred Pharmacy Pharmacy Name Phone [address] Darlene Schaeffer [phone] Your Updated Medication List  
  
   
This list is accurate as of: 1/11/18 12:53 PM.  Always use your most recent med list.  
  
  
  
  
 aspirin 81 mg tablet Take 1 Tab by mouth daily. atorvastatin 80 mg tablet Commonly known as:  LIPITOR Take 1 Tab by mouth daily. baclofen 10 mg tablet Commonly known as:  LIORESAL Take 1 Tab by mouth three (3) times daily. buPROPion  mg tablet Commonly known as:  [place] Hence Take 1 Tab by mouth every morning. ciprofloxacin-hydrocortisone otic suspension Commonly known as:  CIPRO HC OTIC Administer 3 Drops in right ear two (2) times a day for 7 days. colesevelam 625 mg tablet Commonly known as:  [facility] Take 3 Tabs by mouth two (2) times daily (with meals). dextromethorphan-quiNIDine 20-10 mg per capsule Commonly known as:  [place] Larry Take 1 Cap by mouth every twelve (12) hours. gabapentin 300 mg capsule Commonly known as:  NEURONTIN Take 1 Cap by mouth three (3) times daily. Nerve pain HYDROcodone-acetaminophen  mg tablet Commonly known as:  NORCO  
every six (6) hours as needed. interferon beta-1a (albumin) 44 mcg/0.5 mL injection Commonly known as:  REBIF (WITH ALBUMIN) 0.5 mL by SubCUTAneous route every Monday, Wednesday, Friday. lidocaine 5 % Commonly known as:  LIDODERM  
1 Patch by TransDERmal route every twenty-four (24) hours. lidocaine HCl-hydrocortison ac topical cream  
Apply  to affected area two (2) times a day. metFORMIN 500 mg tablet Commonly known as:  GLUCOPHAGE  
 Take 1 Tab by mouth daily (with breakfast). naproxen 500 mg tablet Commonly known as:  NAPROSYN Take 1 Tab by mouth two (2) times daily as needed. Omeprazole delayed release 20 mg tablet Commonly known as:  PRILOSEC D/R Take 1 Tab by mouth daily. oxyCODONE IR 15 mg immediate release tablet Commonly known as:  OXY-IR Take 1 Tab by mouth every eight (8) hours as needed for Pain. Max Daily Amount: 45 mg. Start taking on:  1/20/2018  
  
 tadalafil 20 mg tablet Commonly known as:  CIALIS Take 1 Tab by mouth as needed. Prescriptions Sent to Pharmacy Refills  
 ciprofloxacin-hydrocortisone (CIPRO HC OTIC) otic suspension 0 Sig: Administer 3 Drops in right ear two (2) times a day for 7 days. Class: Normal  
 Pharmacy: [facility] [address] [facility] Ph #: [phone] Route: Right Ear We Performed the Following AMB POC GLUCOSE BLOOD, BY GLUCOSE MONITORING DEVICE [65345 CPT(R)] AMB POC HEMOGLOBIN A1C [00185 CPT(R)] AMB POC LIPID PROFILE [89910 CPT(R)] Follow-up Instructions Return in about 4 weeks (around 2/8/2018), or if symptoms worsen or fail to improve. Patient Instructions Qwitet Activation Thank you for requesting access to Tripshare. Please follow the instructions below to securely access and download your online medical record. Tripshare allows you to send messages to your doctor, view your test results, renew your prescriptions, schedule appointments, and more. How Do I Sign Up? 1. In your internet browser, go to [email or web address] 
2. Click on the First Time User? Click Here link in the Sign In box. You will be redirect to the New Member Sign Up page. 3. Enter your Tripshare Access Code exactly as it appears below. You will not need to use this code after youve completed the sign-up process. If you do not sign up before the expiration date, you must request a new code. InView Technology Access Code: Activation code not generated Current InView Technology Status: Active (This is the date your InView Technology access code will ) 4. Enter the last four digits of your Social Security Number (xxxx) and Date of Birth (mm/dd/yyyy) as indicated and click Submit. You will be taken to the next sign-up page. 5. Create a Ateedat ID. This will be your InView Technology login ID and cannot be changed, so think of one that is secure and easy to remember. 6. Create a InView Technology password. You can change your password at any time. 7. Enter your Password Reset Question and Answer. This can be used at a later time if you forget your password. 8. Enter your e-mail address. You will receive e-mail notification when new information is available in [address] 9. Click Sign Up. You can now view and download portions of your medical record. 10. Click the Download Summary menu link to download a portable copy of your medical information. Additional Information If you have questions, please visit the Frequently Asked Questions section of the InView Technology website at [email or web address] Remember, InView Technology is NOT to be used for urgent needs. For medical emergencies, dial 911. Introducing [facility] SERVICES! Dear Christine Terry: 
Thank you for requesting a InView Technology account. Our records indicate that you already have an active InView Technology account. You can access your account anytime at [email or web address] Did you know that you can access your hospital and ER discharge instructions at any time in InView Technology? You can also review all of your test results from your hospital stay or ER visit. Additional Information If you have questions, please visit the Frequently Asked Questions section of the InView Technology website at [email or web address] Remember, Ateedat is NOT to be used for urgent needs. For medical emergencies, dial 911. Now available from your iPhone and Android! Please provide this summary of care documentation to your next provider. Your primary care clinician is listed as Namrata Ivy. If you have any questions after today's visit, please call [phone].

## 2018-01-17 LAB
CHOLEST SERPL-MCNC: 195 MG/DL
GLUCOSE POC: 99 MG/DL
HBA1C MFR BLD HPLC: 5.4 %
HDLC SERPL-MCNC: 37 MG/DL
LDL CHOLESTEROL POC: 105 MG/DL
NON-HDL GOAL (POC): 158
TCHOL/HDL RATIO (POC): 5.3
TRIGL SERPL-MCNC: 266 MG/DL

## 2018-02-16 DIAGNOSIS — G35 MS (MULTIPLE SCLEROSIS) (HCC): ICD-10-CM

## 2018-02-16 RX ORDER — OXYCODONE HYDROCHLORIDE 15 MG/1
15 TABLET ORAL
Qty: 60 TAB | Refills: 0 | Status: SHIPPED | OUTPATIENT
Start: 2018-02-16 | End: 2018-03-20 | Stop reason: SDUPTHER

## 2018-03-06 ENCOUNTER — OFFICE VISIT (OUTPATIENT)
Dept: INTERNAL MEDICINE CLINIC | Age: 53
End: 2018-03-06

## 2018-03-06 VITALS
BODY MASS INDEX: 29.2 KG/M2 | OXYGEN SATURATION: 99 % | WEIGHT: 204 LBS | HEIGHT: 70 IN | HEART RATE: 69 BPM | SYSTOLIC BLOOD PRESSURE: 120 MMHG | RESPIRATION RATE: 20 BRPM | TEMPERATURE: 98.5 F | DIASTOLIC BLOOD PRESSURE: 88 MMHG

## 2018-03-06 DIAGNOSIS — J20.9 ACUTE BRONCHITIS, UNSPECIFIED ORGANISM: Primary | ICD-10-CM

## 2018-03-06 DIAGNOSIS — B30.9 ACUTE VIRAL CONJUNCTIVITIS OF LEFT EYE: ICD-10-CM

## 2018-03-06 DIAGNOSIS — E78.00 HYPERCHOLESTEROLEMIA: ICD-10-CM

## 2018-03-06 LAB — GLUCOSE POC: 97 MG/DL

## 2018-03-06 RX ORDER — LEVOFLOXACIN 500 MG/1
500 TABLET, FILM COATED ORAL DAILY
Qty: 7 TAB | Refills: 0 | Status: SHIPPED | OUTPATIENT
Start: 2018-03-06 | End: 2018-03-20 | Stop reason: ALTCHOICE

## 2018-03-06 NOTE — PROGRESS NOTES
Clarissa Frazier is a 50 y.o. male and presents with Sore Throat; Eye Drainage; and Multiple Sclerosis  . Subjective:  Upper respiratory infection Review:  Clarissa Frazier is a 50 y.o. male who complains of nasal congestion,sore throat,chills,fever, productive cough, myalgias and headache for the past few days, gradually worsening since that time. He denies a history of shortness of breath. Evaluation to date: none. Treatment to date: decongestants, antihistamines, cough suppressants, OTC products. Patient does not smoke cigarettes. Relevant PMH: No pertinent additional PMH. Dyslipidemia Review:  Patient presents for evaluation of lipids. Compliance with treatment thus far has been excellent. A repeat fasting lipid profile was done. The patient does not use medications that may worsen dyslipidemias (corticosteroids, progestins, anabolic steroids, diuretics, beta-blockers, amiodarone, cyclosporine, olanzapine). The patient exercises some      Lt.eye redness reported the past few days        Review of Systems  Constitutional: negative for fevers, chills, anorexia and weight loss  Eyes:    irritation  ENT:   negative for tinnitus,sore throat,nasal congestion,ear pains. hoarseness  Respiratory:  negative for cough, hemoptysis, dyspnea,wheezing  CV:   negative for chest pain, palpitations, lower extremity edema  GI:   negative for nausea, vomiting, diarrhea, abdominal pain,melena  Endo:               negative for polyuria,polydipsia,polyphagia,heat intolerance  Genitourinary: negative for frequency, dysuria and hematuria  Integument:  negative for rash and pruritus  Hematologic:  negative for easy bruising and gum/nose bleeding  Musculoskel: negative for myalgias, arthralgias, back pain, muscle weakness, joint pain  Neurological:  negative for headaches, dizziness, vertigo, memory problems and gait   Behavl/Psych: negative for feelings of anxiety, depression, mood changes    Past Medical History: Diagnosis Date    Back pain 10/8/2010    Back pain 10/8/2010    Depression     Diabetes ([address]     Fatigue 8/24/2012    Hearing loss 1/25/2013    Memory loss 1/25/2013    MS [facility]multiple sclerosis[facility])     Muscle pain     Muscle weakness     Poor appetite     Snoring     Unintentional weight change     Visual disturbance      Past Surgical History:   Procedure Laterality Date    HX HEENT Right     ear     Social History     Social History    Marital status: SINGLE     Spouse name: N/A    Number of children: N/A    Years of education: N/A     Social History Main Topics    Smoking status: Current Every Day Smoker     Packs/day: 0.50     Years: 21.00     Types: Cigarettes    Smokeless tobacco: Never Used    Alcohol use No    Drug use: No    Sexual activity: Yes     Partners: Female     Birth control/ protection: Condom     Other Topics Concern    None     Social History Narrative     Family History   Problem Relation Age of Onset    Hypertension Father     Cancer Father      prostate    Hypertension Sister     Diabetes Maternal Grandmother     Cancer Paternal Uncle      prostate    Dementia Paternal Uncle      Current Outpatient Prescriptions   Medication Sig Dispense Refill    levoFLOXacin (LEVAQUIN) 500 mg tablet Take 1 Tab by mouth daily. 7 Tab 0    atorvastatin (LIPITOR) 80 mg tablet Take 1 Tab by mouth daily. 30 Tab 12    baclofen (LIORESAL) 10 mg tablet Take 1 Tab by mouth three (3) times daily. 90 Tab 5    dextromethorphan-quiNIDine (NUEDEXTA) 20-10 mg per capsule Take 1 Cap by mouth every twelve (12) hours. 60 Cap 2    gabapentin (NEURONTIN) 300 mg capsule Take 1 Cap by mouth three (3) times daily. Nerve pain 90 Cap 5    lidocaine HCl-hydrocortison ac topical cream Apply  to affected area two (2) times a day. 85 g 0    interferon beta-1a, albumin, (REBIF, WITH ALBUMIN,) 44 mcg/0.5 mL injection 0.5 mL by SubCUTAneous route every Monday, Wednesday, Friday.  36 Syringe 2    metFORMIN (GLUCOPHAGE) 500 mg tablet Take 1 Tab by mouth daily (with breakfast). 30 Tab 12    buPROPion XL (WELLBUTRIN XL) 150 mg tablet Take 1 Tab by mouth every morning. 30 Tab 3    oxyCODONE IR (OXY-IR) 15 mg immediate release tablet Take 1 Tab by mouth every eight (8) hours as needed for Pain. Max Daily Amount: 45 mg. 60 Tab 0    tadalafil (CIALIS) 20 mg tablet Take 1 Tab by mouth as needed. 10 Tab 12    lidocaine (LIDODERM) 5 % 1 Patch by TransDERmal route every twenty-four (24) hours. 30 Each 5    HYDROcodone-acetaminophen (NORCO)  mg tablet every six (6) hours as needed.  colesevelam (WELCHOL) 625 mg tablet Take 3 Tabs by mouth two (2) times daily (with meals). 180 Tab 12    Omeprazole delayed release (PRILOSEC D/R) 20 mg tablet Take 1 Tab by mouth daily. 30 Tab 12    naproxen (NAPROSYN) 500 mg tablet Take 1 Tab by mouth two (2) times daily as needed. 40 Tab 5    aspirin 81 mg tablet Take 1 Tab by mouth daily.  30 Tab 6     No Known Allergies    Objective:  Visit Vitals    /88 (BP 1 Location: Right arm, BP Patient Position: Sitting)    Pulse 69    Temp 98.5 °F (36.9 °C) (Oral)    Resp 20    Ht 5' 10\" (1.778 m)    Wt 204 lb (92.5 kg)    SpO2 99%    BMI 29.27 kg/m2     Physical Exam:   General appearance - alert, well appearing, and in no distress  Mental status - alert, oriented to person, place, and time  EYE-JUS, EOMI, lt.eye erythema  ENT-ENT exam normal, no neck nodes or sinus tenderness  Nose - normal and patent, no erythema, discharge or polyps  Mouth - mucous membranes moist, pharynx normal without lesions  Neck - supple, no significant adenopathy   Chest - clear to auscultation, no wheezes, rales or rhonchi, symmetric air entry   Heart - normal rate, regular rhythm, normal S1, S2, no murmurs, rubs, clicks or gallops   Abdomen - soft, nontender, nondistended, no masses or organomegaly  Lymph- no adenopathy palpable  Ext-peripheral pulses normal, no pedal edema, no clubbing or cyanosis  Skin-Warm and dry. no hyperpigmentation, vitiligo, or suspicious lesions  Neuro -alert, oriented, normal speech, no focal findings or movement disorder noted  Neck-normal C-spine, no tenderness, full ROM without pain  Feet-no nail deformities or callus formation with good pulses noted      Results for orders placed or performed in visit on 01/11/18   AMB POC LIPID PROFILE   Result Value Ref Range    Cholesterol (POC) 195     Triglycerides (POC) 266     HDL Cholesterol (POC) 37     LDL Cholesterol (POC) 105 MG/DL    Non-HDL Goal (POC) 158     TChol/HDL Ratio (POC) 5.3    AMB POC HEMOGLOBIN A1C   Result Value Ref Range    Hemoglobin A1c (POC) 5.4 %   AMB POC GLUCOSE BLOOD, BY GLUCOSE MONITORING DEVICE   Result Value Ref Range    Glucose POC 99 mg/dL       Assessment/Plan:    ICD-10-CM ICD-9-CM    1. Acute bronchitis, unspecified organism J20.9 466.0 AMB POC GLUCOSE BLOOD, BY GLUCOSE MONITORING DEVICE   2. Acute viral conjunctivitis of left eye B30.9 077.99 AMB POC GLUCOSE BLOOD, BY GLUCOSE MONITORING DEVICE   3. Hypercholesterolemia E78.00 272.0      Orders Placed This Encounter    AMB POC GLUCOSE BLOOD, BY GLUCOSE MONITORING DEVICE    levoFLOXacin (LEVAQUIN) 500 mg tablet     Sig: Take 1 Tab by mouth daily. Dispense:  7 Tab     Refill:  0     lose weight, follow low fat diet, follow low salt diet,Take 81mg aspirin daily  Patient Instructions   FuniumharZite Activation    Thank you for requesting access to WeGather. Please follow the instructions below to securely access and download your online medical record. WeGather allows you to send messages to your doctor, view your test results, renew your prescriptions, schedule appointments, and more. How Do I Sign Up? 1. In your internet browser, go to [email or web address]  2. Click on the First Time User? Click Here link in the Sign In box. You will be redirect to the New Member Sign Up page.   3. Enter your WeGather Access Code exactly as it appears below. You will not need to use this code after youve completed the sign-up process. If you do not sign up before the expiration date, you must request a new code. Showcase Gig Access Code: Activation code not generated  Current Showcase Gig Status: Active (This is the date your Showcase Gig access code will )    4. Enter the last four digits of your Social Security Number (xxxx) and Date of Birth (mm/dd/yyyy) as indicated and click Submit. You will be taken to the next sign-up page. 5. Create a SwingTimet ID. This will be your Showcase Gig login ID and cannot be changed, so think of one that is secure and easy to remember. 6. Create a SwingTimet password. You can change your password at any time. 7. Enter your Password Reset Question and Answer. This can be used at a later time if you forget your password. 8. Enter your e-mail address. You will receive e-mail notification when new information is available in [address] 9. Click Sign Up. You can now view and download portions of your medical record. 10. Click the Download Summary menu link to download a portable copy of your medical information. Additional Information    If you have questions, please visit the Frequently Asked Questions section of the Showcase Gig website at [email or web address] Remember, Showcase Gig is NOT to be used for urgent needs. For medical emergencies, dial 911. Follow-up Disposition:  Return in about 3 months (around 2018), or if symptoms worsen or fail to improve. I have reviewed with the patient details of the assessment and plan and all questions were answered. Relevent patient education was performed. The most recent lab findings were reviewed with the patient. An After Visit Summary was printed and given to the patient.

## 2018-03-06 NOTE — PATIENT INSTRUCTIONS
SoccerFreakzharSDI-Solution Activation    Thank you for requesting access to FiftyThree. Please follow the instructions below to securely access and download your online medical record. FiftyThree allows you to send messages to your doctor, view your test results, renew your prescriptions, schedule appointments, and more. How Do I Sign Up? 1. In your internet browser, go to [email or web address]  2. Click on the First Time User? Click Here link in the Sign In box. You will be redirect to the New Member Sign Up page. 3. Enter your FiftyThree Access Code exactly as it appears below. You will not need to use this code after youve completed the sign-up process. If you do not sign up before the expiration date, you must request a new code. FiftyThree Access Code: Activation code not generated  Current FiftyThree Status: Active (This is the date your FiftyThree access code will )    4. Enter the last four digits of your Social Security Number (xxxx) and Date of Birth (mm/dd/yyyy) as indicated and click Submit. You will be taken to the next sign-up page. 5. Create a FiftyThree ID. This will be your FiftyThree login ID and cannot be changed, so think of one that is secure and easy to remember. 6. Create a FiftyThree password. You can change your password at any time. 7. Enter your Password Reset Question and Answer. This can be used at a later time if you forget your password. 8. Enter your e-mail address. You will receive e-mail notification when new information is available in [address] 9. Click Sign Up. You can now view and download portions of your medical record. 10. Click the Download Summary menu link to download a portable copy of your medical information. Additional Information    If you have questions, please visit the Frequently Asked Questions section of the FiftyThree website at [email or web address] Remember, FiftyThree is NOT to be used for urgent needs. For medical emergencies, dial 911.

## 2018-03-06 NOTE — PROGRESS NOTES
1. Have you been to the ER, urgent care clinic since your last visit? Hospitalized since your last visit?no    2. Have you seen or consulted any other health care providers outside of the [address] [place] [address] since your last visit? Include any pap smears or colon screening.  no

## 2018-03-06 NOTE — MR AVS SNAPSHOT
[address] [facility] [address] 
[phone] Patient: Kami Katz MRN: [id number] :1965 Visit Information Date & Time Provider Department Dept. Phone Encounter #  
 3/6/2018 11:30 AM Maria D Sutherland MD [address] [facility] [phone] [id number] Follow-up Instructions Return in about 3 months (around 2018), or if symptoms worsen or fail to improve. Your Appointments 3/20/2018  1:00 PM  
Follow Up with Milena Cox NP [facility]) Appt Note: FUV, MS; D[facility]YV, [facility] Upcoming Health Maintenance Date Due DTaP/Tdap/Td series (1 - Tdap) 1988 EYE EXAM RETINAL OR DILATED Q1 2013 FOOT EXAM Q1 9/3/2016 MICROALBUMIN Q1 2017 FOBT Q 1 YEAR AGE 50-75 2017 Influenza Age 5 to Adult 2017 HEMOGLOBIN A1C Q6M 2018 LIPID PANEL Q1 2019 Allergies as of 3/6/2018  Review Complete On: 3/6/2018 By: Monique Aguirre LPN No Known Allergies Current Immunizations  Reviewed on 2013 No immunizations on file. Not reviewed this visit You Were Diagnosed With   
  
 Codes Comments Acute bronchitis, unspecified organism    -  Primary ICD-10-CM: J20.9 ICD-9-CM: 466.0 Acute viral conjunctivitis of left eye     ICD-10-CM: B30.9 ICD-9-CM: 077.99 Hypercholesterolemia     ICD-10-CM: E78.00 ICD-9-CM: 272.0 Vitals BP Pulse Temp Resp Height(growth percentile) Weight(growth percentile) 120/88 (BP 1 Location: Right arm, BP Patient Position: Sitting) 69 98.5 °F (36.9 °C) (Oral) 20 5' 10\" (1.778 m) 204 lb (92.5 kg) SpO2 BMI Smoking Status 99% 29.27 kg/m2 Current Every Day Smoker BMI and BSA Data Body Mass Index Body Surface Area  
 29.27 kg/m 2 2.14 m 2 Preferred Pharmacy Pharmacy Name Phone [facility], [address] Darlene Schaeffer [phone] Your Updated Medication List  
  
   
This list is accurate as of 3/6/18 12:35 PM.  Always use your most recent med list.  
  
  
  
  
 aspirin 81 mg tablet Take 1 Tab by mouth daily. atorvastatin 80 mg tablet Commonly known as:  LIPITOR Take 1 Tab by mouth daily. baclofen 10 mg tablet Commonly known as:  LIORESAL Take 1 Tab by mouth three (3) times daily. buPROPion  mg tablet Commonly known as:  Noralyn Rife Take 1 Tab by mouth every morning. colesevelam 625 mg tablet Commonly known as:  [facility] Take 3 Tabs by mouth two (2) times daily (with meals). dextromethorphan-quiNIDine 20-10 mg per capsule Commonly known as:  Ciera Mouse Take 1 Cap by mouth every twelve (12) hours. gabapentin 300 mg capsule Commonly known as:  NEURONTIN Take 1 Cap by mouth three (3) times daily. Nerve pain HYDROcodone-acetaminophen  mg tablet Commonly known as:  NORCO  
every six (6) hours as needed. interferon beta-1a (albumin) 44 mcg/0.5 mL injection Commonly known as:  REBIF (WITH ALBUMIN) 0.5 mL by SubCUTAneous route every Monday, Wednesday, Friday. levoFLOXacin 500 mg tablet Commonly known as:  Shane Shaggy Take 1 Tab by mouth daily. lidocaine 5 % Commonly known as:  LIDODERM  
1 Patch by TransDERmal route every twenty-four (24) hours. lidocaine HCl-hydrocortison ac topical cream  
Apply  to affected area two (2) times a day. metFORMIN 500 mg tablet Commonly known as:  GLUCOPHAGE Take 1 Tab by mouth daily (with breakfast). naproxen 500 mg tablet Commonly known as:  NAPROSYN Take 1 Tab by mouth two (2) times daily as needed. Omeprazole delayed release 20 mg tablet Commonly known as:  PRILOSEC D/R  
 Take 1 Tab by mouth daily. oxyCODONE IR 15 mg immediate release tablet Commonly known as:  OXY-IR Take 1 Tab by mouth every eight (8) hours as needed for Pain. Max Daily Amount: 45 mg.  
  
 tadalafil 20 mg tablet Commonly known as:  CIALIS Take 1 Tab by mouth as needed. Prescriptions Sent to Pharmacy Refills  
 levoFLOXacin (LEVAQUIN) 500 mg tablet 0 Sig: Take 1 Tab by mouth daily. Class: Normal  
 Pharmacy: [facility] [address], [address] #: [phone] Route: Oral  
  
We Performed the Following AMB POC GLUCOSE BLOOD, BY GLUCOSE MONITORING DEVICE [98671 CPT(R)] Follow-up Instructions Return in about 3 months (around 2018), or if symptoms worsen or fail to improve. Patient Instructions EUROBOX Activation Thank you for requesting access to EUROBOX. Please follow the instructions below to securely access and download your online medical record. EUROBOX allows you to send messages to your doctor, view your test results, renew your prescriptions, schedule appointments, and more. How Do I Sign Up? 1. In your internet browser, go to [email or web address] 
2. Click on the First Time User? Click Here link in the Sign In box. You will be redirect to the New Member Sign Up page. 3. Enter your EUROBOX Access Code exactly as it appears below. You will not need to use this code after youve completed the sign-up process. If you do not sign up before the expiration date, you must request a new code. EUROBOX Access Code: Activation code not generated Current EUROBOX Status: Active (This is the date your EUROBOX access code will ) 4. Enter the last four digits of your Social Security Number (xxxx) and Date of Birth (mm/dd/yyyy) as indicated and click Submit. You will be taken to the next sign-up page. 5. Create a EUROBOX ID.  This will be your EUROBOX login ID and cannot be changed, so think of one that is secure and easy to remember. 6. Create a [employer] password. You can change your password at any time. 7. Enter your Password Reset Question and Answer. This can be used at a later time if you forget your password. 8. Enter your e-mail address. You will receive e-mail notification when new information is available in [address] 9. Click Sign Up. You can now view and download portions of your medical record. 10. Click the Download Summary menu link to download a portable copy of your medical information. Additional Information If you have questions, please visit the Frequently Asked Questions section of the [employer] website at [email or web address] Remember, [employer] is NOT to be used for urgent needs. For medical emergencies, dial 911. Introducing [facility] & [facility] SERVICES! Monica Parrish: 
Thank you for requesting a [employer] account. Our records indicate that you already have an active [employer] account. You can access your account anytime at [email or web address] Did you know that you can access your hospital and ER discharge instructions at any time in [employer] You can also review all of your test results from your hospital stay or ER visit. Additional Information If you have questions, please visit the Frequently Asked Questions section of the [employer] website at [email or web address] Remember, [employer] is NOT to be used for urgent needs. For medical emergencies, dial 911. Now available from your iPhone and Android! Please provide this summary of care documentation to your next provider. Your primary care clinician is listed as Pura Salazar. If you have any questions after today's visit, please call [phone].

## 2018-03-20 ENCOUNTER — OFFICE VISIT (OUTPATIENT)
Dept: NEUROLOGY | Age: 53
End: 2018-03-20

## 2018-03-20 VITALS
HEART RATE: 79 BPM | SYSTOLIC BLOOD PRESSURE: 112 MMHG | BODY MASS INDEX: 29.98 KG/M2 | DIASTOLIC BLOOD PRESSURE: 80 MMHG | TEMPERATURE: 97.7 F | OXYGEN SATURATION: 99 % | WEIGHT: 209.4 LBS | HEIGHT: 70 IN | RESPIRATION RATE: 16 BRPM

## 2018-03-20 DIAGNOSIS — R29.6 FREQUENT FALLS: ICD-10-CM

## 2018-03-20 DIAGNOSIS — R26.9 GAIT DISORDER: ICD-10-CM

## 2018-03-20 DIAGNOSIS — G89.4 CHRONIC PAIN SYNDROME: Primary | ICD-10-CM

## 2018-03-20 DIAGNOSIS — Z79.891 USE OF OPIATES FOR THERAPEUTIC PURPOSES: ICD-10-CM

## 2018-03-20 DIAGNOSIS — G35 MS (MULTIPLE SCLEROSIS) (HCC): ICD-10-CM

## 2018-03-20 RX ORDER — OXYCODONE HYDROCHLORIDE 15 MG/1
15 TABLET ORAL
Qty: 40 TAB | Refills: 0 | Status: SHIPPED | OUTPATIENT
Start: 2018-03-20 | End: 2018-05-04 | Stop reason: SDUPTHER

## 2018-03-20 NOTE — PROGRESS NOTES
Date:  18     Name:  Donna Philip  :  1965  MRN:  [id number]     PCP:  Jeimy Norris MD    Chief Complaint   Patient presents with    Multiple Sclerosis    Eye Problem       HISTORY OF PRESENT ILLNESS:The patient comes in for a follow-up for MS. He is currently on Rebif for his DMT which he is doing well on. Missing injection occasionally. He has not had any flare up/ exacerbation but states his still having occasional leg stiffness when his back hurts. He complains of increase back pain. He currently taking Oxycodone(Oxy IR) 15mg. He has not found a pain specialist yet. He has had freq falls he notes 3 since the last visit. As for his spasticity, he is not taking anything but Baclofen. Appetite is fair. Sleeping is good. No difficulties with voiding. He is having blurred vision. He recently saw a eye Doctor, they are planning to  evaluate him for glaucoma. Current Outpatient Prescriptions   Medication Sig    oxyCODONE IR (OXY-IR) 15 mg immediate release tablet Take 1 Tab by mouth every eight (8) hours as needed for Pain. Max Daily Amount: 45 mg.    naloxone 2 mg/actuation spry Use 1 spray intranasally into 1 nostril. Use a new Narcan nasal spray for subsequent doses and administer into alternating nostrils. May repeat every 2 to 3 minutes as needed.  tadalafil (CIALIS) 20 mg tablet Take 1 Tab by mouth as needed.  atorvastatin (LIPITOR) 80 mg tablet Take 1 Tab by mouth daily.  baclofen (LIORESAL) 10 mg tablet Take 1 Tab by mouth three (3) times daily.  dextromethorphan-quiNIDine (NUEDEXTA) 20-10 mg per capsule Take 1 Cap by mouth every twelve (12) hours.  gabapentin (NEURONTIN) 300 mg capsule Take 1 Cap by mouth three (3) times daily. Nerve pain    lidocaine (LIDODERM) 5 % 1 Patch by TransDERmal route every twenty-four (24) hours.     interferon beta-1a, albumin, (REBIF, WITH ALBUMIN,) 44 mcg/0.5 mL injection 0.5 mL by SubCUTAneous route every Monday, Wednesday, Friday.  metFORMIN (GLUCOPHAGE) 500 mg tablet Take 1 Tab by mouth daily (with breakfast).  buPROPion XL (WELLBUTRIN XL) 150 mg tablet Take 1 Tab by mouth every morning. No current facility-administered medications for this visit. No Known Allergies  Past Medical History:   Diagnosis Date    Back pain 10/8/2010    Back pain 10/8/2010    Depression     Diabetes ([address]     Fatigue 8/24/2012    Hearing loss 1/25/2013    Memory loss 1/25/2013    [facility] sclerosis) ([facility])     Muscle pain     Muscle weakness     Poor appetite     Snoring     Unintentional weight change     Visual disturbance      Past Surgical History:   Procedure Laterality Date    HX HEENT Right     ear     Social History     Social History    Marital status: SINGLE     Spouse name: N/A    Number of children: N/A    Years of education: N/A     Occupational History    Not on file. Social History Main Topics    Smoking status: Current Every Day Smoker     Packs/day: 0.50     Years: 21.00     Types: Cigarettes    Smokeless tobacco: Never Used    Alcohol use No    Drug use: No    Sexual activity: Yes     Partners: Female     Birth control/ protection: Condom     Other Topics Concern    Not on file     Social History Narrative     Family History   Problem Relation Age of Onset    Hypertension Father     Cancer Father      prostate    Hypertension Sister     Diabetes Maternal Grandmother     Cancer Paternal Uncle      prostate    Dementia Paternal Uncle        PHYSICAL EXAMINATION:    Visit Vitals    /80 (BP 1 Location: Right arm, BP Patient Position: Sitting)    Pulse 79    Temp 97.7 °F (36.5 °C) (Oral)    Resp 16    Ht 5' 10\" (1.778 m)    Wt 209 lb 6.4 oz (95 kg)    SpO2 99%    BMI 30.05 kg/m2     General:                    Alert, cooperative, no distress, appears stated age. Head:                                   Normocephalic, without obvious abnormality, atraumatic.   Eyes: Conjunctivae/corneas clear. PERRLA  Nose:                                   Nares normal. No drainage or sinus tenderness. Throat:                                 Lips, mucosa, and tongue normal.  No Thrush  Neck:                                   Supple, symmetrical,  no adenopathy, thyroid: non tender                                              no carotid bruit and no JVD. Paraspinal tenderness  Back:                                   Symmetric,  Bilateral tenderness. Lungs:                       Clear to auscultation bilaterally. No Wheezing or Rhonchi. No rales. Chest wall:                No tenderness or deformity. No Accessory muscle use. Heart:                                  Regular rate and rhythm,  no murmur, rub or gallop. Abdomen:                  Soft, non-tender. Not distended. Bowel sounds normal. No masses  Extremities:               Extremities normal, atraumatic, No cyanosis. No edema. No clubbing  Skin:                                    Texture, turgor normal. No rashes or lesions. Not Jaundiced  Lymph nodes:           Cervical, supraclavicular normal.  Psych:                                 Good insight. Depressed. Anxious .        NEUROLOGICAL EXAM:  Appearance: The patient is well developed, well nourished, provides a coherent history and is in no acute distress. Mental Status: Oriented to time, place and person. Mood and affect appropriate. Cranial Nerves:   Intact visual fields. Fundi are benign. JUS, EOM's full, no nystagmus, no ptosis. Facial sensation is normal. Corneal reflexes are intact. Facial movement is symmetric. Hearing is normal bilaterally. Palate is midline with normal sternocleidomastoid and trapezius muscles are normal. Tongue is midline. Motor:  5/5 strength in upper and 4+/5  lower proximal and distal muscles. Normal bulk and tone. No fasciculations.    Reflexes:   Deep tendon reflexes 2+/4 and symmetrical.   Sensory: Decreased sensation to touch, pinprick and vibration. Gait:  Abnormal gait. Ambulates with cane. Tremor:   No tremor noted. Cerebellar:  No cerebellar signs present. Neurovascular:  Normal heart sounds and regular rhythm, peripheral pulses intact, and no carotid bruits.          ASSESSMENT AND PLAN    ICD-10-CM ICD-9-CM    1. Chronic pain syndrome G89.4 338.4 REFERRAL TO PAIN MANAGEMENT      COMPLIANCE DRUG SCREEN/PRESCRIPTION MONITORING   2. MS (multiple sclerosis) ([facility]) G35 340 oxyCODONE IR (OXY-IR) 15 mg immediate release tablet      REFERRAL TO PAIN MANAGEMENT      MRI BRAIN WO CONT      MRI CERV SPINE WO CONT      MRI THORAC SPINE WO CONT      COMPLIANCE DRUG SCREEN/PRESCRIPTION MONITORING   3. Gait disorder R26.9 781.2 COMPLIANCE DRUG SCREEN/PRESCRIPTION MONITORING   4. Frequent falls R29.6 V15.88 COMPLIANCE DRUG SCREEN/PRESCRIPTION MONITORING   5. Use of opiates for therapeutic purposes Z79.891 V58.69 COMPLIANCE DRUG SCREEN/PRESCRIPTION MONITORING    MS. Stable continue Rebif as prescribed. We will get serial imaging since last imaging was about 2 years ago. This is a chronic problem that is not changed. Per review of available records and patients , there are not sign of overuse, misuse, diversion, or concerning side effects. Today we reviewed: the risks and benefits of continuing with a narcotic based pain regimen taper and discontinue narcotic therapy  and refer to pain management   The following changes were made to the patients current treatment plan: Referral to Pain Management  medications adjusted, see orders. Plan  1. Pain management-  Weaning off Oxy 1R 5 mg  Quantity decrease from  60/40. Next visit 20 then stop. 2. Serial Imaging   3. Continue Rebif  $. Would benefit from PT, we discussed talking about this at his next visit after view Imaging. 5. Add  Narcan Nasal   This note will not be viewable in I[employer]ihart.     Vinh Spence NP

## 2018-03-20 NOTE — PROGRESS NOTES
Chief Complaint   Patient presents with    Multiple Sclerosis    Eye Problem     1. Have you been to the ER, urgent care clinic since your last visit? Hospitalized since your last visit? No    2. Have you seen or consulted any other health care providers outside of the [address] [place] [address] since your last visit? Include any pap smears or colon screening.  No    Pill count not performed patient did not bring medications        Pill count not verified with patient  Patient reports taking medication     UDS obtained and sent   Pain contract on file   made available for Anne Burgos NP review             Script given for Oxycodone and Narcan

## 2018-03-20 NOTE — MR AVS SNAPSHOT
[address] [place] [address] [address] [place] [address] 
[phone] Patient: Sybil Orlando MRN: [id number] :1965 Visit Information Date & Time Provider Department Dept. Phone Encounter #  
 3/20/2018  1:00 PM Mildred Jacobo NP [facility] at [address] [place] [address] Dr [id number] Follow-up Instructions Return for after testing. Your Appointments 2018 11:30 AM  
ROUTINE CARE with Belem Bledsoe MD  
[facility] - [address] [place] [address] ([facility]) Appt Note: 3 month check up [address] [place] [address] 7 [phone]  
[phone]  
  
   
 [address] [place] [address] 7 [phone] Upcoming Health Maintenance Date Due DTaP/Tdap/Td series (1 - Tdap) 1988 EYE EXAM RETINAL OR DILATED Q1 2013 FOOT EXAM Q1 9/3/2016 MICROALBUMIN Q1 2017 FOBT Q 1 YEAR AGE 50-75 2017 Influenza Age 5 to Adult 2017 MEDICARE YEARLY EXAM 3/14/2018 HEMOGLOBIN A1C Q6M 2018 LIPID PANEL Q1 2019 Allergies as of 3/20/2018  Review Complete On: 3/20/2018 By: David Jain LPN No Known Allergies Current Immunizations  Reviewed on 2013 No immunizations on file. Not reviewed this visit You Were Diagnosed With   
  
 Codes Comments Chronic pain syndrome    -  Primary ICD-10-CM: G89.4 ICD-9-CM: 338.4 [facility] ([facility]     ICD-10-CM: G35 
ICD-9-CM: 436 Gait disorder     ICD-10-CM: R26.9 ICD-9-CM: 781.2 Frequent falls     ICD-10-CM: R29.6 ICD-9-CM: V15.88 Vitals BP Pulse Temp Resp Height(growth percentile) Weight(growth percentile) 112/80 (BP 1 Location: Right arm, BP Patient Position: Sitting) 79 97.7 °F (36.5 °C) (Oral) 16 5' 10\" (1.778 m) 209 lb 6.4 oz (95 kg) SpO2 BMI Smoking Status 99% 30.05 kg/m2 Current Every Day Smoker BMI and BSA Data Body Mass Index Body Surface Area 30.05 kg/m 2 2.17 m 2 Preferred Pharmacy Pharmacy Name Phone [facility] Darlene Schaeffer [phone] Your Updated Medication List  
  
   
This list is accurate as of 3/20/18  1:39 PM.  Always use your most recent med list.  
  
  
  
  
 atorvastatin 80 mg tablet Commonly known as:  LIPITOR Take 1 Tab by mouth daily. baclofen 10 mg tablet Commonly known as:  LIORESAL Take 1 Tab by mouth three (3) times daily. buPROPion  mg tablet Commonly known as:  Mansi Gills Take 1 Tab by mouth every morning. dextromethorphan-quiNIDine 20-10 mg per capsule Commonly known as:  Tish Poisson Take 1 Cap by mouth every twelve (12) hours. gabapentin 300 mg capsule Commonly known as:  NEURONTIN Take 1 Cap by mouth three (3) times daily. Nerve pain  
  
 interferon beta-1a (albumin) 44 mcg/0.5 mL injection Commonly known as:  REBIF (WITH ALBUMIN) 0.5 mL by SubCUTAneous route every Monday, Wednesday, Friday. lidocaine 5 % Commonly known as:  LIDODERM  
1 Patch by TransDERmal route every twenty-four (24) hours. metFORMIN 500 mg tablet Commonly known as:  GLUCOPHAGE Take 1 Tab by mouth daily (with breakfast). naloxone 2 mg/actuation Spry Use 1 spray intranasally into 1 nostril. Use a new Narcan nasal spray for subsequent doses and administer into alternating nostrils. May repeat every 2 to 3 minutes as needed. oxyCODONE IR 15 mg immediate release tablet Commonly known as:  OXY-IR Take 1 Tab by mouth every eight (8) hours as needed for Pain. Max Daily Amount: 45 mg.  
  
 tadalafil 20 mg tablet Commonly known as:  CIALIS Take 1 Tab by mouth as needed. Prescriptions Printed Refills  
 oxyCODONE IR (OXY-IR) 15 mg immediate release tablet 0 Sig: Take 1 Tab by mouth every eight (8) hours as needed for Pain.  Max Daily Amount: 45 mg.  
 Class: Print Route: Oral  
 naloxone 2 mg/actuation spry 2 Sig: Use 1 spray intranasally into 1 nostril. Use a new Narcan nasal spray for subsequent doses and administer into alternating nostrils. May repeat every 2 to 3 minutes as needed. Class: Print We Performed the Following REFERRAL TO PAIN MANAGEMENT [EMB785 Custom] Follow-up Instructions Return for after testing. To-Do List   
 03/20/2018 Imaging:  MRI BRAIN WO CONT   
  
 03/20/2018 Imaging:  MRI CERV SPINE WO CONT   
  
 03/20/2018 Imaging:  MRI [facility] SPINE WO CONT Referral Information Referral ID Referred By Referred To  
  
 [id number] Cyn Simons Not Available Visits Status Start Date End Date 1 New Request 3/20/18 3/20/19 If your referral has a status of pending review or denied, additional information will be sent to support the outcome of this decision. Introducing [facility] & HEALTH SERVICES! Dear Tilden Nissen: 
Thank you for requesting a [employer]ud[employer]CIL account. Our records indicate that you already have an active [employer]CIL account. You can access your account anytime at [email or web address] Did you know that you can access your hospital and ER discharge instructions at any time in [employer]ud[employer]CIL? You can also review all of your test results from your hospital stay or ER visit. Additional Information If you have questions, please visit the Frequently Asked Questions section of the [employer] website at [email or web address] Remember, SaludF[employer]CIL is NOT to be used for urgent needs. For medical emergencies, dial 911. Now available from your iPhone and Android! Please provide this summary of care documentation to your next provider. Your primary care clinician is listed as Keri Nolan. If you have any questions after today's visit, please call [phone].

## 2018-03-26 LAB — DRUGS UR: NORMAL

## 2018-04-04 ENCOUNTER — TELEPHONE (OUTPATIENT)
Dept: NEUROLOGY | Age: 53
End: 2018-04-04

## 2018-04-04 NOTE — TELEPHONE ENCOUNTER
Patient made aware Dr. Christine Blakely will not be refill pain medications, due to he was referred to pain management by Marnie Forrest NP. Patient verbalized understanding.

## 2018-04-13 ENCOUNTER — HOSPITAL ENCOUNTER (OUTPATIENT)
Dept: MRI IMAGING | Age: 53
Discharge: HOME | End: 2018-04-13
Attending: NURSE PRACTITIONER
Payer: MEDICARE

## 2018-04-13 DIAGNOSIS — G35 MS (MULTIPLE SCLEROSIS) (HCC): ICD-10-CM

## 2018-04-13 PROCEDURE — 72146 MRI CHEST SPINE W/O DYE: CPT

## 2018-04-13 PROCEDURE — 72141 MRI NECK SPINE W/O DYE: CPT

## 2018-04-13 PROCEDURE — 70551 MRI BRAIN STEM W/O DYE: CPT

## 2018-04-20 ENCOUNTER — OFFICE VISIT (OUTPATIENT)
Dept: INTERNAL MEDICINE CLINIC | Age: 53
End: 2018-04-20

## 2018-04-20 VITALS
SYSTOLIC BLOOD PRESSURE: 130 MMHG | DIASTOLIC BLOOD PRESSURE: 90 MMHG | HEART RATE: 75 BPM | HEIGHT: 70 IN | BODY MASS INDEX: 29.92 KG/M2 | RESPIRATION RATE: 20 BRPM | OXYGEN SATURATION: 100 % | TEMPERATURE: 98.1 F | WEIGHT: 209 LBS

## 2018-04-20 DIAGNOSIS — E11.9 TYPE 2 DIABETES MELLITUS WITHOUT COMPLICATION, WITHOUT LONG-TERM CURRENT USE OF INSULIN (HCC): ICD-10-CM

## 2018-04-20 DIAGNOSIS — K04.7 TOOTH ABSCESS: ICD-10-CM

## 2018-04-20 DIAGNOSIS — G35 MULTIPLE SCLEROSIS (HCC): Primary | ICD-10-CM

## 2018-04-20 LAB — GLUCOSE POC: 121 MG/DL

## 2018-04-20 RX ORDER — CEPHALEXIN 500 MG/1
500 CAPSULE ORAL 4 TIMES DAILY
Qty: 28 CAP | Refills: 0 | Status: SHIPPED | OUTPATIENT
Start: 2018-04-20 | End: 2018-05-23 | Stop reason: ALTCHOICE

## 2018-04-20 NOTE — PROGRESS NOTES
1. Have you been to the ER, urgent care clinic since your last visit? Hospitalized since your last visit? yes, [facility]    2. Have you seen or consulted any other health care providers outside of the [address] [place] [address] since your last visit? Include any pap smears or colon screening.  no

## 2018-04-20 NOTE — MR AVS SNAPSHOT
[address] [facility] [address] 
[phone] Patient: Ottoniel Luciano MRN: [id number] :1965 Visit Information Date & Time Provider Department Dept. Phone Encounter #  
 2018 10:45 AM Emily Tee MD [address] [facility] [phone] [id number] Follow-up Instructions Return in about 4 weeks (around 2018). Your Appointments 2018  8:40 AM  
Follow Up with MD Renetta Jaramillo [facility] at [facility]) Appt Note: follow up MS MRI results. ..tlw 18 [facility] 7 [facility] Upcoming Health Maintenance Date Due DTaP/Tdap/Td series (1 - Tdap) 1988 EYE EXAM RETINAL OR DILATED Q1 2013 FOOT EXAM Q1 9/3/2016 MICROALBUMIN Q1 2017 FOBT Q 1 YEAR AGE 50-75 2017 Influenza Age 5 to Adult 2017 MEDICARE YEARLY EXAM 3/14/2018 HEMOGLOBIN A1C Q6M 2018 LIPID PANEL Q1 2019 Allergies as of 2018  Review Complete On: 2018 By: Sue Mccollum LPN No Known Allergies Current Immunizations  Reviewed on 2013 No immunizations on file. Not reviewed this visit You Were Diagnosed With   
  
 Codes Comments Multiple sclerosis ([facility]    -  Primary ICD-10-CM: G35 
ICD-9-CM: 480 Type 2 diabetes mellitus without complication, without long-term current use of insulin (HCC)     ICD-10-CM: E11.9 ICD-9-CM: 250.00 Tooth abscess     ICD-10-CM: K04.7 ICD-9-CM: 522.5 Vitals BP Pulse Temp Resp Height(growth percentile) Weight(growth percentile) 130/90 (BP 1 Location: Right arm, BP Patient Position: Sitting) 75 98.1 °F (36.7 °C) (Oral) 20 5' 10\" (1.778 m) 209 lb (94.8 kg) SpO2 BMI Smoking Status 100% 29.99 kg/m2 Current Every Day Smoker BMI and BSA Data Body Mass Index Body Surface Area  
 29.99 kg/m 2 2.16 m 2 Preferred Pharmacy Pharmacy Name Phone [facility], [address] Darlene Schaeffer [phone] Your Updated Medication List  
  
   
This list is accurate as of 4/20/18 11:02 AM.  Always use your most recent med list.  
  
  
  
  
 atorvastatin 80 mg tablet Commonly known as:  LIPITOR Take 1 Tab by mouth daily. baclofen 10 mg tablet Commonly known as:  LIORESAL Take 1 Tab by mouth three (3) times daily. buPROPion  mg tablet Commonly known as:  Handley Luke Take 1 Tab by mouth every morning. cephALEXin 500 mg capsule Commonly known as:  Harish Lynn Take 1 Cap by mouth four (4) times daily. dextromethorphan-quiNIDine 20-10 mg per capsule Commonly known as:  Marylouise Asa Take 1 Cap by mouth every twelve (12) hours. gabapentin 300 mg capsule Commonly known as:  NEURONTIN Take 1 Cap by mouth three (3) times daily. Nerve pain  
  
 interferon beta-1a (albumin) 44 mcg/0.5 mL injection Commonly known as:  REBIF (WITH ALBUMIN) 0.5 mL by SubCUTAneous route every Monday, Wednesday, Friday. lidocaine 5 % Commonly known as:  LIDODERM  
1 Patch by TransDERmal route every twenty-four (24) hours. metFORMIN 500 mg tablet Commonly known as:  GLUCOPHAGE Take 1 Tab by mouth daily (with breakfast). naloxone 2 mg/actuation Spry Use 1 spray intranasally into 1 nostril. Use a new Narcan nasal spray for subsequent doses and administer into alternating nostrils. May repeat every 2 to 3 minutes as needed. oxyCODONE IR 15 mg immediate release tablet Commonly known as:  OXY-IR Take 1 Tab by mouth every eight (8) hours as needed for Pain. Max Daily Amount: 45 mg.  
  
 tadalafil 20 mg tablet Commonly known as:  CIALIS Take 1 Tab by mouth as needed. Prescriptions Sent to Pharmacy Refills  
 cephALEXin (KEFLEX) 500 mg capsule 0 Sig: Take 1 Cap by mouth four (4) times daily. Class: Normal  
 Pharmacy: [address] [facility]  #: [phone] Route: Oral  
  
We Performed the Following AMB POC GLUCOSE BLOOD, BY GLUCOSE MONITORING DEVICE [97468 CPT(R)] AMB POC URINE, MICROALBUMIN, SEMIQUANT (3 RESULTS) [64372 CPT(R)] REFERRAL TO NEUROLOGY [HEO48 Custom] Follow-up Instructions Return in about 4 weeks (around 2018). Referral Information Referral ID Referred By Referred To  
  
 [id number] Moraima ALTMAN JR 26 MD [place]   
   [address]   
   [address] [place], [address] Phone: [phone] Fax: [phone] Visits Status Start Date End Date 1 New Request 18 If your referral has a status of pending review or denied, additional information will be sent to support the outcome of this decision. Patient Instructions [employer] Activation Thank you for requesting access to [employer]. Please follow the instructions below to securely access and download your online medical record. [employer] allows you to send messages to your doctor, view your test results, renew your prescriptions, schedule appointments, and more. How Do I Sign Up? 1. In your internet browser, go to [email or web address] 
2. Click on the First Time User? Click Here link in the Sign In box. You will be redirect to the New Member Sign Up page. 3. Enter your [employer] Access Code exactly as it appears below. You will not need to use this code after youve completed the sign-up process. If you do not sign up before the expiration date, you must request a new code. [employer] Access Code: Activation code not generated Current [employer] Status: Active (This is the date your [employer] access code will ) 4. Enter the last four digits of your Social Security Number (xxxx) and Date of Birth (mm/dd/yyyy) as indicated and click Submit. You will be taken to the next sign-up page. 5. Create a [employer] ID. This will be your [employer] login ID and cannot be changed, so think of one that is secure and easy to remember. 6. Create a [employer] password. You can change your password at any time. 7. Enter your Password Reset Question and Answer. This can be used at a later time if you forget your password. 8. Enter your e-mail address. You will receive e-mail notification when new information is available in [address] 9. Click Sign Up. You can now view and download portions of your medical record. 10. Click the Download Summary menu link to download a portable copy of your medical information. Additional Information If you have questions, please visit the Frequently Asked Questions section of the [employer] website at [email or web address] Remember, [employer] is NOT to be used for urgent needs. For medical emergencies, dial 911. Introducing [facility] & HEALTH SERVICES! Dear Maria Isabel Ragland: 
Thank you for requesting a [employer] account. Our records indicate that you already have an active [employer] account. You can access your account anytime at [email or web address] Did you know that you can access your hospital and ER discharge instructions at any time in [employer]? You can also review all of your test results from your hospital stay or ER visit. Additional Information If you have questions, please visit the Frequently Asked Questions section of the [employer] website at [email or web address] Remember, [employer] is NOT to be used for urgent needs. For medical emergencies, dial 911. Now available from your iPhone and Android! Please provide this summary of care documentation to your next provider. Your primary care clinician is listed as Tressa Bullard. If you have any questions after today's visit, please call [phone].

## 2018-04-20 NOTE — PATIENT INSTRUCTIONS
[employer] Activation    Thank you for requesting access to [employer]. Please follow the instructions below to securely access and download your online medical record. [employer] allows you to send messages to your doctor, view your test results, renew your prescriptions, schedule appointments, and more. How Do I Sign Up? 1. In your internet browser, go to [email or web address]  2. Click on the First Time User? Click Here link in the Sign In box. You will be redirect to the New Member Sign Up page. 3. Enter your [employer] Access Code exactly as it appears below. You will not need to use this code after youve completed the sign-up process. If you do not sign up before the expiration date, you must request a new code. [employer] Access Code: Activation code not generated  Current [employer] Status: Active (This is the date your [employer] access code will )    4. Enter the last four digits of your Social Security Number (xxxx) and Date of Birth (mm/dd/yyyy) as indicated and click Submit. You will be taken to the next sign-up page. 5. Create a [employer] ID. This will be your [employer] login ID and cannot be changed, so think of one that is secure and easy to remember. 6. Create a [employer] password. You can change your password at any time. 7. Enter your Password Reset Question and Answer. This can be used at a later time if you forget your password. 8. Enter your e-mail address. You will receive e-mail notification when new information is available in [address] 9. Click Sign Up. You can now view and download portions of your medical record. 10. Click the Download Summary menu link to download a portable copy of your medical information. Additional Information    If you have questions, please visit the Frequently Asked Questions section of the [employer] website at [email or web address] Remember, [employer] is NOT to be used for urgent needs. For medical emergencies, dial 911.

## 2018-05-04 ENCOUNTER — OFFICE VISIT (OUTPATIENT)
Dept: NEUROLOGY | Age: 53
End: 2018-05-04

## 2018-05-04 VITALS
OXYGEN SATURATION: 99 % | SYSTOLIC BLOOD PRESSURE: 131 MMHG | DIASTOLIC BLOOD PRESSURE: 93 MMHG | RESPIRATION RATE: 16 BRPM | BODY MASS INDEX: 29.81 KG/M2 | TEMPERATURE: 97.8 F | HEART RATE: 71 BPM | WEIGHT: 208.2 LBS | HEIGHT: 70 IN

## 2018-05-04 DIAGNOSIS — G95.9 CERVICAL MYELOPATHY WITH CERVICAL RADICULOPATHY (HCC): ICD-10-CM

## 2018-05-04 DIAGNOSIS — G35 MULTIPLE SCLEROSIS, SECONDARY PROGRESSIVE (HCC): ICD-10-CM

## 2018-05-04 DIAGNOSIS — R63.4 WEIGHT LOSS: ICD-10-CM

## 2018-05-04 DIAGNOSIS — G35 MS (MULTIPLE SCLEROSIS) (HCC): ICD-10-CM

## 2018-05-04 DIAGNOSIS — G89.4 CHRONIC PAIN SYNDROME: ICD-10-CM

## 2018-05-04 DIAGNOSIS — M54.12 CERVICAL MYELOPATHY WITH CERVICAL RADICULOPATHY (HCC): ICD-10-CM

## 2018-05-04 DIAGNOSIS — F51.01 PRIMARY INSOMNIA: ICD-10-CM

## 2018-05-04 DIAGNOSIS — R20.2 PARESTHESIA: ICD-10-CM

## 2018-05-04 DIAGNOSIS — R26.9 GAIT DISORDER: ICD-10-CM

## 2018-05-04 DIAGNOSIS — G35 MULTIPLE SCLEROSIS, SECONDARY PROGRESSIVE (HCC): Primary | ICD-10-CM

## 2018-05-04 DIAGNOSIS — S22.009A CLOSED FRACTURE OF THORACIC SPINE WITHOUT SPINAL CORD LESION, INITIAL ENCOUNTER (HCC): ICD-10-CM

## 2018-05-04 DIAGNOSIS — R11.0 NAUSEA: ICD-10-CM

## 2018-05-04 RX ORDER — DRONABINOL 5 MG/1
5 CAPSULE ORAL 2 TIMES DAILY
Qty: 60 CAP | Refills: 2 | Status: SHIPPED | OUTPATIENT
Start: 2018-05-04 | End: 2019-02-07 | Stop reason: SDUPTHER

## 2018-05-04 RX ORDER — OXYCODONE HYDROCHLORIDE 15 MG/1
15 TABLET ORAL
Qty: 40 TAB | Refills: 0 | Status: SHIPPED | OUTPATIENT
Start: 2018-05-04 | End: 2018-05-31 | Stop reason: SDUPTHER

## 2018-05-04 RX ORDER — DALFAMPRIDINE 10 MG/1
10 TABLET, FILM COATED, EXTENDED RELEASE ORAL 2 TIMES DAILY
Qty: 60 TAB | Refills: 2 | Status: SHIPPED | OUTPATIENT
Start: 2018-05-04 | End: 2018-12-07 | Stop reason: SDUPTHER

## 2018-05-04 RX ORDER — PREDNISONE 20 MG/1
20 TABLET ORAL
Qty: 30 TAB | Refills: 0 | Status: SHIPPED | OUTPATIENT
Start: 2018-05-04 | End: 2020-01-15 | Stop reason: CLARIF

## 2018-05-04 RX ORDER — DALFAMPRIDINE 10 MG/1
10 TABLET, FILM COATED, EXTENDED RELEASE ORAL 2 TIMES DAILY
Qty: 60 TAB | Refills: 2 | Status: SHIPPED | OUTPATIENT
Start: 2018-05-04 | End: 2018-05-04 | Stop reason: SDUPTHER

## 2018-05-04 NOTE — PROGRESS NOTES
Neurology Progress Note    NAME:  Nabil Eisenberg   :   1965   MRN:   [id number]     Date/Time:  2018  Subjective:      Nabil Eisenberg is a 50 y.o. male here today for follow up Relapsing  MS, Weakness, Hands and feett numbness and cold, test results. Says for the past 3 weeks he has been having numbness and coldness of both hands and feet,according to patient , he is having increasing difficulty ambulating, holding onto things, always very tired. He reports tingling sensation of the arms, tinel sign performed on the patient was positive. Says he has couple of falls, he has difficulty going to sleep and staying asleep, there is persistent nausea. Patient noted that his pain has increased  in intensity,pain is sharp in nature, generalized, activity makes it worse, pain wakes patient up from sleep, on a scale of 1-10, patient reports the pain level to be  above 10. Will obtain EMG/NCS of the extremities. MRI Brain reviewed with patient showed progression since , Cervical spine showed Large new lesion in cervicomedullary junction,shelley lesions in C1, C4-5,Thoracic spine ,showed T9-T10  Cord compression. At this time , I will discontinue Rebif, start patient on ocrevus, patient will be started on Amyra also.   Review of Systems - General ROS: positive for  - fatigue, malaise, night sweats, sleep disturbance and weight loss  Psychological ROS: positive for - anxiety, depression, memory difficulties and sleep disturbances  Ophthalmic ROS: positive for - blurry vision, decreased vision and double vision  ENT ROS: positive for - headaches, tinnitus, vertigo and visual changes  Allergy and Immunology ROS: negative  Hematological and Lymphatic ROS: negative  Endocrine ROS: negative  Respiratory ROS: no cough, shortness of breath, or wheezing  Cardiovascular ROS: no chest pain or dyspnea on exertion  Gastrointestinal ROS: no abdominal pain, change in bowel habits, or black or bloody stools  Genito-Urinary ROS: no dysuria, trouble voiding, or hematuria  Musculoskeletal ROS: positive for - gait disturbance, joint pain, joint stiffness, joint swelling, muscle pain and muscular weakness  Neurological ROS: positive for - dizziness, gait disturbance, headaches, impaired coordination/balance, numbness/tingling, tremors, visual changes and weakness  Dermatological ROS: negative    Medications reviewed:  Current Outpatient Prescriptions   Medication Sig Dispense Refill    oxyCODONE IR (OXY-IR) 15 mg immediate release tablet Take 1 Tab by mouth every eight (8) hours as needed for Pain. Max Daily Amount: 45 mg. 40 Tab 0    dronabinol (MARINOL) 5 mg capsule Take 1 Cap by mouth two (2) times a day. Max Daily Amount: 10 mg. 60 Cap 2    ocrelizumab (OCREVUS) 30 mg/mL soln injection 10 mL by IntraVENous route every fourteen (14) days. 10 mL 1    predniSONE (DELTASONE) 20 mg tablet Take 1 Tab by mouth daily (with breakfast). 30 Tab 0    Dalfampridine (AMPYRA) 10 mg Tb12 Take 10 mg by mouth two (2) times a day. 60 Tab 2    cephALEXin (KEFLEX) 500 mg capsule Take 1 Cap by mouth four (4) times daily. 28 Cap 0    tadalafil (CIALIS) 20 mg tablet Take 1 Tab by mouth as needed. 10 Tab 12    atorvastatin (LIPITOR) 80 mg tablet Take 1 Tab by mouth daily. 30 Tab 12    baclofen (LIORESAL) 10 mg tablet Take 1 Tab by mouth three (3) times daily. 90 Tab 5    dextromethorphan-quiNIDine (NUEDEXTA) 20-10 mg per capsule Take 1 Cap by mouth every twelve (12) hours. 60 Cap 2    gabapentin (NEURONTIN) 300 mg capsule Take 1 Cap by mouth three (3) times daily. Nerve pain 90 Cap 5    metFORMIN (GLUCOPHAGE) 500 mg tablet Take 1 Tab by mouth daily (with breakfast). 30 Tab 12    buPROPion XL (WELLBUTRIN XL) 150 mg tablet Take 1 Tab by mouth every morning. 30 Tab 3    naloxone 2 mg/actuation spry Use 1 spray intranasally into 1 nostril. Use a new Narcan nasal spray for subsequent doses and administer into alternating nostrils.  May repeat every 2 to 3 minutes as needed. 4 Each 2    lidocaine (LIDODERM) 5 % 1 Patch by TransDERmal route every twenty-four (24) hours. 30 Each 5        Objective:   Vitals:  Vitals:    05/04/18 0837   BP: (!) 131/93   Pulse: 71   Resp: 16   Temp: 97.8 °F (36.6 °C)   TempSrc: Temporal   SpO2: 99%   Weight: 208 lb 3.2 oz (94.4 kg)   Height: 5' 10\" (1.778 m)   PainSc:  10 - Worst pain ever   PainLoc: Generalized     Lab Data Reviewed:  Lab Results   Component Value Date/Time    WBC 4.7 06/22/2016 02:11 PM    HCT 42.2 06/22/2016 02:11 PM    HGB 13.9 06/22/2016 02:11 PM    PLATELET 551 67/90/6870 02:11 PM       Lab Results   Component Value Date/Time    Sodium 144 06/22/2016 02:11 PM    Potassium 4.4 06/22/2016 02:11 PM    Chloride 104 06/22/2016 02:11 PM    CO2 26 06/22/2016 02:11 PM    Glucose 98 06/22/2016 02:11 PM    BUN 6 06/22/2016 02:11 PM    Creatinine 0.81 06/22/2016 02:11 PM    Calcium 9.2 06/22/2016 02:11 PM       No components found for: TROPQUANT    No results found for: BELEN      Lab Results   Component Value Date/Time    Hemoglobin A1c 6.2 (H) 05/08/2013 10:17 AM    Hemoglobin A1c (POC) 5.4 01/11/2018 04:22 PM        No results found for: B12LT, FOL, RBCF    No results found for: BELEN, Keila Marques, XBANA    Lab Results   Component Value Date/Time    Cholesterol, total 232 (H) 05/08/2013 10:17 AM    Cholesterol (POC) 195 01/17/2018 04:18 PM    HDL Cholesterol 41 05/08/2013 10:17 AM    HDL Cholesterol (POC) 37 01/17/2018 04:18 PM    LDL Cholesterol (POC) 105 01/17/2018 04:18 PM    LDL, calculated 163 (H) 05/08/2013 10:17 AM    VLDL, calculated 28 05/08/2013 10:17 AM    Triglyceride 142 05/08/2013 10:17 AM    Triglycerides (POC) 266 01/17/2018 04:18 PM    CHOL/HDL Ratio 7.3 (H) 10/08/2010 01:10 PM         CT Results (recent):  No results found for this or any previous visit.     MRI Results (recent):    Results from [facility] encounter on 04/13/18   MRI [facility] SPINE WO CONT   Narrative EXAM:   [facility] CONT    INDICATION: ms    COMPARISON: 6/6/2013    TECHNIQUE:   MR imaging of the thoracic spine was performed with the following sequences:  sagittal T1, T2, stir; axial T1, T2. The study was performed without IV  contrast, as requested. The study was obtained on the 0.7 Marily open MRI unit  because of the patient's claustrophobia. Debra Landrum FINDINGS:  The thoracic spinal cord appears somewhat atrophic. Because of limited spatial  and contrast resolution of the study and mild motion artifact, discrete lesions  are difficult to identify. Axial T2-weighted images suggest multiple scattered  foci of T2 hyperintensity peripherally in the spinal cord. These appear more  prominent currently than on the previous study. There is unchanged mild central disc protrusion at T7-8, with mild indentation  of the ventral cord. There is mild to moderate central disc protrusion at T8-9, with mild indentation  of the ventral cord, slightly increased. There is marked left facet hypertrophy at T9-10 with right anterior displacement  and moderate compression of the spinal cord, unchanged. Impression IMPRESSION:      1. Study somewhat limited by motion, spatial resolution, and contrast  resolution. Atrophic spinal cord with multiple small focal lesions, more clearly  demonstrated currently than on previous study. 2. Unchanged T9-10 cord compression related to left facet hypertrophy. Slight  increase in mild to moderate central disc protrusion at T8-9 and mild central  disc protrusion at T7-8. IR Results (recent):  No results found for this or any previous visit. VAS/US Results (recent):  No results found for this or any previous visit. PHYSICAL EXAM:  General:    Alert, cooperative, no distress, appears stated age. Head:   Normocephalic, without obvious abnormality, atraumatic. Eyes:   Conjunctivae/corneas clear. PERRLA  Nose:  Nares normal. No drainage or sinus tenderness.   Throat:    Lips, mucosa, and tongue normal.  No Thrush  Neck:  Supple, symmetrical,  no adenopathy, thyroid: non tender    no carotid bruit and no JVD. Paraspinal tenderness  Back:    Symmetric,  Bilerateral tenderness. Lungs:   Clear to auscultation bilaterally. No Wheezing or Rhonchi. No rales. Chest wall:  No tenderness or deformity. No Accessory muscle use. Heart:   Regular rate and rhythm,  no murmur, rub or gallop. Abdomen:   Soft, non-tender. Not distended. Bowel sounds normal. No masses  Extremities: Extremities normal, atraumatic, No cyanosis. No edema. No clubbing  Skin:     Texture, turgor normal. No rashes or lesions. Not Jaundiced  Lymph nodes: Cervical, supraclavicular normal.  Psych:  Good insight. Depressed. Anxious . NEUROLOGICAL EXAM:  Appearance: The patient is well developed, well nourished, provides a coherent history and is in no acute distress. Mental Status: Oriented to time, place and person. Mood and affect appropriate. Cranial Nerves:   Intact visual fields. Fundi are benign. JUS, EOM's full, no nystagmus, no ptosis. Facial sensation is normal. Corneal reflexes are intact. Facial movement is symmetric. Hearing is normal bilaterally. Palate is midline with normal sternocleidomastoid and trapezius muscles are normal. Tongue is midline. Motor:  5-/5 strength in upper and 4/5lower proximal and distal muscles. Normal bulk and tone. No fasciculations. Reflexes:   Deep tendon reflexes 3+/4 and symmetrical.   Sensory:   Dysesthesia to touch, pinprick and vibration. Gait:  Abnormal gait. Tremor:   Mild  tremor noted. Cerebellar:  No cerebellar signs present. Neurovascular:  Normal heart sounds and regular rhythm, peripheral pulses intact, and no carotid bruits. Assesment  1. Multiple sclerosis, secondary progressive ([address]  Will d/c Rebif  Commence Ocrevus    2. Paresthesia  Continue  management    3. Cervical myelopathy with cervical radiculopathy  Physical terapy    4.  Gait disorder  Physical therapy  Ampyra 10 mg p.o. bid    5. Primary insomnia  Marinol    6. Weight loss  Will monitor    ___________________________________________________  PLAN:Medication reviewed with patient  D/c Rebif  Ocrevus starter  CBC,LFT, BMP  Ampyra 10 mg p.o. bid      ICD-10-CM ICD-9-CM    1. Multiple sclerosis, secondary progressive (HCC) G35 340 dronabinol (MARINOL) 5 mg capsule      REFERRAL TO PHYSICAL THERAPY      CBC WITH AUTOMATED DIFF      HEPATITIS B EVALUATION      METABOLIC PANEL, COMPREHENSIVE   2. Paresthesia R20.2 782.0 EMG NCV MOTOR WITH F/WAVE PER NERVE      REFERRAL TO PHYSICAL THERAPY   3. Cervical myelopathy with cervical radiculopathy M47.12 721.1 EMG NCV MOTOR WITH F/WAVE PER NERVE      REFERRAL TO PHYSICAL THERAPY   4. Gait disorder R26.9 781.2 REFERRAL TO PHYSICAL THERAPY   5. Primary insomnia F51.01 307.42 dronabinol (MARINOL) 5 mg capsule   6. Weight loss R63.4 783.21    7. Chronic pain syndrome G89.4 338.4    8. Closed fracture of thoracic spine without spinal cord lesion, initial encounter ([address] S22.009A 805.2 EMG NCV MOTOR WITH F/WAVE PER NERVE      REFERRAL TO PHYSICAL THERAPY   9. MS (multiple sclerosis) (HCC) G35 340 oxyCODONE IR (OXY-IR) 15 mg immediate release tablet      CBC WITH AUTOMATED DIFF      HEPATITIS B EVALUATION      METABOLIC PANEL, COMPREHENSIVE   10. Nausea R11.0 787.02 dronabinol (MARINOL) 5 mg capsule     Follow-up Disposition:  Return in about 4 weeks (around 6/1/2018).            ___________________________________________________    Total time spent with patient:  []15   []25   []35   [] __ minutes    Care Plan discussed with:    [x]Patient   []Family    []Care Manager   []Consultant/Specialist :    ___________________________________________________    Attending Physician: Delia Schultz MD

## 2018-05-04 NOTE — PROGRESS NOTES
Chief Complaint   Patient presents with    Multiple Sclerosis    Cold     hands and feet times 3 weeks per patient    Results     MRI     1. Have you been to the ER, urgent care clinic since your last visit? Hospitalized since your last visit? 4/18.18 [facility]    2. Have you seen or consulted any other health care providers outside of the [address] [place] [address] since your last visit? Include any pap smears or colon screening.  [facility] ED    Pill count not performed patient did not bring medications      Pill count not verified with patient  Patient reports taking medication    UDS obtained 3/20/18  Pain contract on file and patient was given a referral to pain management   made available Dr. Annalisa Yi given for Oxycodone and Marinol

## 2018-05-04 NOTE — PATIENT INSTRUCTIONS
All test results will be discussed at the next follow up appointment. Electromyogram (EMG) and Nerve Conduction Studies: About These Tests  What are they? An electromyogram (EMG) measures the electrical activity of your muscles when you are not using them (at rest) and when you tighten them (muscle contraction). Nerve conduction studies (NCS) measure how well and how fast the nerves can send electrical signals. EMG and nerve conduction studies are often done together. If they are done together, the nerve conduction studies are done before the EMG. Why are they done? You may need an EMG to find diseases that damage your muscles or nerves or to find why you cannot move your muscles (paralysis), why they feel weak, or why they twitch. You may need nerve conduction studies to find damage to the nerves that lead from the brain and spinal cord to the rest of the body (peripheral nervous system). Nerve conduction studies are often used to help find nerve disorders, such as carpal tunnel syndrome. How can you prepare for these tests? · Tell your doctors ALL the medicines, vitamins, supplements, and herbal remedies you take. Some medicines can affect the test results. You may need to stop taking some medicines before you have this test.   ? · If you take aspirin or some other blood thinner, be sure to talk to your doctor. He or she will tell you if you should stop taking it before your test. Make sure that you understand exactly what your doctor wants you to do. ? · Wear loose-fitting clothing. You may be given a hospital gown to wear. ? · The electrodes for the test are attached to your skin. Your skin needs to be clean and free of sprays, oils, creams, and lotions. What happens during the tests? You lie on a table or bed or sit in a reclining chair so your muscles are relaxed. For an EMG:  · Your doctor will insert a needle electrode into a muscle.  This will record the electrical activity while the muscle is at rest. You may feel a quick, sharp pain when the needle electrode is put into a muscle. · Your doctor will ask you to tighten the same muscle slowly and steadily while the electrical activity is recorded. · Your doctor may move the electrode to a different area of the muscle or a different muscle. For nerve conduction studies:  · Your doctor will attach two types of electrodes to your skin. ¨ One type of electrode is placed over a nerve and will give the nerve an electrical pulse. ¨ The other type of electrode is placed over the muscle that the nerve controls. It will record how long it takes the muscle to react to the electrical pulse. · You will be able to feel the electrical pulses. They are small shocks and are safe. What else should you know about these tests? · After an EMG, you may be sore and have a tingling feeling in your muscles for up to 2 days. You may have small bruises or swelling at the needle site. · For an EMG, you may be asked to sign a consent form. Talk to your doctor about any concerns you have about the need for the test, its risks, how it will be done, or what the results will mean. How long do they take? · An EMG may take 30 to 60 minutes. · Nerve conduction tests may take from 15 minutes to 1 hour or more. It depends on how many nerves and muscles your doctor tests. What happens after these tests? · If any of the test areas are sore:  ¨ Put ice or a cold pack on the area for 10 to 20 minutes at a time. Put a thin cloth between the ice and your skin. ¨ Take an over-the-counter pain medicine, such as acetaminophen (Tylenol), ibuprofen (Advil, Motrin), or naproxen (Aleve). Be safe with medicines. Read and follow all instructions on the label. · You will probably be able to go home right away. · You can go back to your usual activities right away. When should you call for help?   Watch closely for changes in your health, and be sure to contact your doctor if:  · Muscle pain from an EMG test gets worse or you have swelling, tenderness, or pus at any of the needle sites. · You have any problems that you think may be from the test.  · You have any questions about the test or have not received your results. Follow-up care is a key part of your treatment and safety. Be sure to make and go to all appointments, and call your doctor if you are having problems. It's also a good idea to keep a list of the medicines you take. Ask your doctor when you can expect to have your test results. Where can you learn more? Go to http://concepcion-deepika.info/. Enter C077 in the search box to learn more about \"Electromyogram (EMG) and Nerve Conduction Studies: About These Tests. \"  Current as of: October 14, 2016  Content Version: 11.4  © 9176-5677 [employer]. Care instructions adapted under license by [employer] (which disclaims liability or warranty for this information). If you have questions about a medical condition or this instruction, always ask your healthcare professional. [address] any warranty or liability for your use of this information.

## 2018-05-04 NOTE — MR AVS SNAPSHOT
[address] [place] [address] [address] [address] 
[phone] Patient: Ottoniel Luciano MRN: [id number] :1965 Visit Information Date & Time Provider Department Dept. Phone Encounter #  
 2018  8:40 AM MD Renetta Jaramillo [facility] [phone] [id number] Follow-up Instructions Return in about 4 weeks (around 2018). Your Appointments 2018 10:30 AM  
ROUTINE CARE with Emily Tee MD  
[facility] - [address] [place] [address] ([address]) Appt Note: 4 week fu  
 [facility] [[email or web address]],[address] [place] [id number]  
[phone]  
  
   
 [address] [place] [address] Upcoming Health Maintenance Date Due DTaP/Tdap/Td series (1 - Tdap) 1988 EYE EXAM RETINAL OR DILATED Q1 2013 FOOT EXAM Q1 9/3/2016 MICROALBUMIN Q1 2017 FOBT Q 1 YEAR AGE 50-75 2017 MEDICARE YEARLY EXAM 3/14/2018 HEMOGLOBIN A1C Q6M 2018 Influenza Age 5 to Adult 2018 LIPID PANEL Q1 2019 Allergies as of 2018  Review Complete On: 2018 By: Bre Draper MD  
 No Known Allergies Current Immunizations  Reviewed on 2013 No immunizations on file. Not reviewed this visit You Were Diagnosed With   
  
 Codes Comments Multiple sclerosis, secondary progressive ([facility] [address]    -  Primary ICD-10-CM: G35 
ICD-9-CM: 331 Paresthesia     ICD-10-CM: R20.2 ICD-9-CM: 782.0 Cervical myelopathy with cervical radiculopathy     ICD-10-CM: M47.12 
ICD-9-CM: 721.1 Gait disorder     ICD-10-CM: R26.9 ICD-9-CM: 781.2 Primary insomnia     ICD-10-CM: F51.01 
ICD-9-CM: 307.42 Weight loss     ICD-10-CM: R63.4 ICD-9-CM: 783.21 Chronic pain syndrome     ICD-10-CM: G89.4 ICD-9-CM: 338. 4  Closed fracture of thoracic spine without spinal cord lesion, initial encounter [facility])     ICD-10-CM: G68.541T ICD-9-CM: 805.2 MS [facility]multiple sclerosis) ([facility]     ICD-10-CM: G35 
ICD-9-CM: 055 Nausea     ICD-10-CM: R11.0 ICD-9-CM: 787.02 Vitals BP Pulse Temp Resp Height(growth percentile) Weight(growth percentile) (!) 131/93 71 97.8 °F (36.6 °C) (Temporal) 16 5' 10\" (1.778 m) 208 lb 3.2 oz (94.4 kg) SpO2 BMI Smoking Status 99% 29.87 kg/m2 Current Every Day Smoker BMI and BSA Data Body Mass Index Body Surface Area  
 29.87 kg/m 2 2.16 m 2 Preferred Pharmacy Pharmacy Name Phone [address], [address] [address] [phone] Your Updated Medication List  
  
   
This list is accurate as of 5/4/18  9:48 AM.  Always use your most recent med list.  
  
  
  
  
 atorvastatin 80 mg tablet Commonly known as:  LIPITOR Take 1 Tab by mouth daily. baclofen 10 mg tablet Commonly known as:  LIORESAL Take 1 Tab by mouth three (3) times daily. buPROPion  mg tablet Commonly known as:  Julian Therese Take 1 Tab by mouth every morning. cephALEXin 500 mg capsule Commonly known as:  Graciela Crigler Take 1 Cap by mouth four (4) times daily. Dalfampridine 10 mg Tb12 Commonly known as:  AMPYRA Take 10 mg by mouth two (2) times a day. dextromethorphan-quiNIDine 20-10 mg per capsule Commonly known as:  Rendall Kings Take 1 Cap by mouth every twelve (12) hours. dronabinol 5 mg capsule Commonly known as:  [place] Take 1 Cap by mouth two (2) times a day. Max Daily Amount: 10 mg.  
  
 gabapentin 300 mg capsule Commonly known as:  NEURONTIN Take 1 Cap by mouth three (3) times daily. Nerve pain  
  
 lidocaine 5 % Commonly known as:  LIDODERM  
1 Patch by TransDERmal route every twenty-four (24) hours. metFORMIN 500 mg tablet Commonly known as:  GLUCOPHAGE Take 1 Tab by mouth daily (with breakfast). naloxone 2 mg/actuation Spry Use 1 spray intranasally into 1 nostril. Use a new Narcan nasal spray for subsequent doses and administer into alternating nostrils. May repeat every 2 to 3 minutes as needed. ocrelizumab 30 mg/mL Soln injection Commonly known as:  OCREVUS  
10 mL by IntraVENous route every fourteen (14) days. oxyCODONE IR 15 mg immediate release tablet Commonly known as:  OXY-IR Take 1 Tab by mouth every eight (8) hours as needed for Pain. Max Daily Amount: 45 mg.  
  
 predniSONE 20 mg tablet Commonly known as:  Lennart Spearing Take 1 Tab by mouth daily (with breakfast). tadalafil 20 mg tablet Commonly known as:  CIALIS Take 1 Tab by mouth as needed. Prescriptions Printed Refills  
 oxyCODONE IR (OXY-IR) 15 mg immediate release tablet 0 Sig: Take 1 Tab by mouth every eight (8) hours as needed for Pain. Max Daily Amount: 45 mg.  
 Class: Print Route: Oral  
 dronabinol (MARINOL) 5 mg capsule 2 Sig: Take 1 Cap by mouth two (2) times a day. Max Daily Amount: 10 mg.  
 Class: Print Route: Oral  
  
Prescriptions Sent to Pharmacy Refills Dalfampridine (AMPYRA) 10 mg Tb12 2 Sig: Take 10 mg by mouth two (2) times a day. Class: Normal  
 Pharmacy: [facility] [address], [address] [place] [address] Ph #: [phone] Route: Oral  
 ocrelizumab (OCREVUS) 30 mg/mL soln injection 1 Sig: 10 mL by IntraVENous route every fourteen (14) days. Class: Normal  
 Pharmacy: [facility] [address], [address] Ph #: [phone] Route: IntraVENous  
 predniSONE (DELTASONE) 20 mg tablet 0 Sig: Take 1 Tab by mouth daily (with breakfast). Class: Normal  
 Pharmacy: [facility] [address], [address] [place] [address] Ph #: [phone] Route: Oral  
  
We Performed the Following REFERRAL TO PHYSICAL THERAPY [DYZ52 Custom] Follow-up Instructions Return in about 4 weeks (around 6/1/2018). To-Do List   
 05/04/2018 Neurology:  EMG NCV MOTOR WITH F/WAVE PER NERVE Referral Information Referral ID Referred By Referred To  
  
 [id number] Tegan TEMPLE Not Available Visits Status Start Date End Date 1 New Request 5/4/18 5/4/19 If your referral has a status of pending review or denied, additional information will be sent to support the outcome of this decision. Patient Instructions All test results will be discussed at the next follow up appointment. Electromyogram (EMG) and Nerve Conduction Studies: About These Tests What are they? An electromyogram (EMG) measures the electrical activity of your muscles when you are not using them (at rest) and when you tighten them (muscle contraction). Nerve conduction studies (NCS) measure how well and how fast the nerves can send electrical signals. EMG and nerve conduction studies are often done together. If they are done together, the nerve conduction studies are done before the EMG. Why are they done? You may need an EMG to find diseases that damage your muscles or nerves or to find why you cannot move your muscles (paralysis), why they feel weak, or why they twitch. You may need nerve conduction studies to find damage to the nerves that lead from the brain and spinal cord to the rest of the body (peripheral nervous system). Nerve conduction studies are often used to help find nerve disorders, such as carpal tunnel syndrome. How can you prepare for these tests? · Tell your doctors ALL the medicines, vitamins, supplements, and herbal remedies you take. Some medicines can affect the test results. You may need to stop taking some medicines before you have this test.  
? · If you take aspirin or some other blood thinner, be sure to talk to your doctor. He or she will tell you if you should stop taking it before your test. Make sure that you understand exactly what your doctor wants you to do. ? · Wear loose-fitting clothing. You may be given a hospital gown to wear. ? · The electrodes for the test are attached to your skin. Your skin needs to be clean and free of sprays, oils, creams, and lotions. What happens during the tests? You lie on a table or bed or sit in a reclining chair so your muscles are relaxed. For an EMG: 
· Your doctor will insert a needle electrode into a muscle. This will record the electrical activity while the muscle is at rest. You may feel a quick, sharp pain when the needle electrode is put into a muscle. · Your doctor will ask you to tighten the same muscle slowly and steadily while the electrical activity is recorded. · Your doctor may move the electrode to a different area of the muscle or a different muscle. For nerve conduction studies: 
· Your doctor will attach two types of electrodes to your skin. ¨ One type of electrode is placed over a nerve and will give the nerve an electrical pulse. ¨ The other type of electrode is placed over the muscle that the nerve controls. It will record how long it takes the muscle to react to the electrical pulse. · You will be able to feel the electrical pulses. They are small shocks and are safe. What else should you know about these tests? · After an EMG, you may be sore and have a tingling feeling in your muscles for up to 2 days. You may have small bruises or swelling at the needle site. · For an EMG, you may be asked to sign a consent form. Talk to your doctor about any concerns you have about the need for the test, its risks, how it will be done, or what the results will mean. How long do they take? · An EMG may take 30 to 60 minutes. · Nerve conduction tests may take from 15 minutes to 1 hour or more. It depends on how many nerves and muscles your doctor tests. What happens after these tests? · If any of the test areas are sore: ¨ Put ice or a cold pack on the area for 10 to 20 minutes at a time.  Put a thin cloth between the ice and your skin. ¨ Take an over-the-counter pain medicine, such as acetaminophen (Tylenol), ibuprofen (Advil, Motrin), or naproxen (Aleve). Be safe with medicines. Read and follow all instructions on the label. · You will probably be able to go home right away. · You can go back to your usual activities right away. When should you call for help? Watch closely for changes in your health, and be sure to contact your doctor if: · Muscle pain from an EMG test gets worse or you have swelling, tenderness, or pus at any of the needle sites. · You have any problems that you think may be from the test. 
· You have any questions about the test or have not received your results. Follow-up care is a key part of your treatment and safety. Be sure to make and go to all appointments, and call your doctor if you are having problems. It's also a good idea to keep a list of the medicines you take. Ask your doctor when you can expect to have your test results. Where can you learn more? Go to http://concepcion-deepika.info/. Enter A869 in the search box to learn more about \"Electromyogram (EMG) and Nerve Conduction Studies: About These Tests. \" Current as of: October 14, 2016 Content Version: 11.4 © 6154-6861 Healthwise, Incorporated. Care instructions adapted under license by [employer] (which disclaims liability or warranty for this information). If you have questions about a medical condition or this instruction, always ask your healthcare professional. [place] [address] any warranty or liability for your use of this information. Introducing [facility]! Dear Sayda Velasquez: 
Thank you for requesting a [employer] account. Our records indicate that you already have an active [employer] account. You can access your account anytime at [email or web address]://"[employer]". Preedo/"[employer]" Did you know that you can access your hospital and ER discharge instructions at any time in myZamana? You can also review all of your test results from your hospital stay or ER visit. Additional Information If you have questions, please visit the Frequently Asked Questions section of the myZamana website at [email or web address] Remember, myZamana is NOT to be used for urgent needs. For medical emergencies, dial 911. Now available from your iPhone and Android! Please provide this summary of care documentation to your next provider. Your primary care clinician is listed as Danica Cristina. If you have any questions after today's visit, please call [phone].

## 2018-05-07 RX ORDER — ACETAMINOPHEN 325 MG/1
650 TABLET ORAL ONCE
Status: COMPLETED | OUTPATIENT
Start: 2018-05-11 | End: 2018-05-11

## 2018-05-07 RX ORDER — ACETAMINOPHEN 325 MG/1
650 TABLET ORAL
Status: ACTIVE | OUTPATIENT
Start: 2018-05-11 | End: 2018-05-11

## 2018-05-07 RX ORDER — DIPHENHYDRAMINE HYDROCHLORIDE 50 MG/ML
50 INJECTION, SOLUTION INTRAMUSCULAR; INTRAVENOUS ONCE
Status: COMPLETED | OUTPATIENT
Start: 2018-05-11 | End: 2018-05-11

## 2018-05-07 RX ORDER — SODIUM CHLORIDE 9 MG/ML
25 INJECTION, SOLUTION INTRAVENOUS CONTINUOUS
Status: DISPENSED | OUTPATIENT
Start: 2018-05-11 | End: 2018-05-11

## 2018-05-07 RX ORDER — DIPHENHYDRAMINE HYDROCHLORIDE 50 MG/ML
50 INJECTION, SOLUTION INTRAMUSCULAR; INTRAVENOUS
Status: ACTIVE | OUTPATIENT
Start: 2018-05-11 | End: 2018-05-11

## 2018-05-08 ENCOUNTER — TELEPHONE (OUTPATIENT)
Dept: NEUROLOGY | Age: 53
End: 2018-05-08

## 2018-05-08 NOTE — TELEPHONE ENCOUNTER
Fax notes, referral, MRI over to  Fax: [phone] for pain management so that he can get an appointment

## 2018-05-10 LAB
ALBUMIN SERPL-MCNC: 4.5 G/DL (ref 3.5–5.5)
ALBUMIN/GLOB SERPL: 1.8 {RATIO} (ref 1.2–2.2)
ALP SERPL-CCNC: 36 IU/L (ref 39–117)
ALT SERPL-CCNC: 6 IU/L (ref 0–44)
AST SERPL-CCNC: 12 IU/L (ref 0–40)
BASOPHILS # BLD AUTO: 0 X10E3/UL (ref 0–0.2)
BASOPHILS NFR BLD AUTO: 0 %
BILIRUB SERPL-MCNC: 0.7 MG/DL (ref 0–1.2)
BUN SERPL-MCNC: 6 MG/DL (ref 6–24)
BUN/CREAT SERPL: 8 (ref 9–20)
CALCIUM SERPL-MCNC: 9.5 MG/DL (ref 8.7–10.2)
CHLORIDE SERPL-SCNC: 100 MMOL/L (ref 96–106)
CO2 SERPL-SCNC: 30 MMOL/L (ref 18–29)
CREAT SERPL-MCNC: 0.78 MG/DL (ref 0.76–1.27)
EOSINOPHIL # BLD AUTO: 0.2 X10E3/UL (ref 0–0.4)
EOSINOPHIL NFR BLD AUTO: 4 %
ERYTHROCYTE [DISTWIDTH] IN BLOOD BY AUTOMATED COUNT: 14.2 % (ref 12.3–15.4)
GFR SERPLBLD CREATININE-BSD FMLA CKD-EPI: 105 ML/MIN/1.73
GFR SERPLBLD CREATININE-BSD FMLA CKD-EPI: 122 ML/MIN/1.73
GLOBULIN SER CALC-MCNC: 2.5 G/DL (ref 1.5–4.5)
GLUCOSE SERPL-MCNC: 92 MG/DL (ref 65–99)
HBV CORE AB SERPL QL IA: NEGATIVE
HBV CORE IGM SERPL QL IA: NEGATIVE
HBV E AB SERPL QL IA: NEGATIVE
HBV E AG SERPL QL IA: NEGATIVE
HBV SURFACE AB SER QL: NON REACTIVE
HBV SURFACE AG SERPL QL IA: NEGATIVE
HCT VFR BLD AUTO: 40.8 % (ref 37.5–51)
HGB BLD-MCNC: 13.7 G/DL (ref 13–17.7)
IMM GRANULOCYTES # BLD: 0 X10E3/UL (ref 0–0.1)
IMM GRANULOCYTES NFR BLD: 0 %
LYMPHOCYTES # BLD AUTO: 2.5 X10E3/UL (ref 0.7–3.1)
LYMPHOCYTES NFR BLD AUTO: 49 %
MCH RBC QN AUTO: 29.6 PG (ref 26.6–33)
MCHC RBC AUTO-ENTMCNC: 33.6 G/DL (ref 31.5–35.7)
MCV RBC AUTO: 88 FL (ref 79–97)
MONOCYTES # BLD AUTO: 0.3 X10E3/UL (ref 0.1–0.9)
MONOCYTES NFR BLD AUTO: 7 %
NEUTROPHILS # BLD AUTO: 2.1 X10E3/UL (ref 1.4–7)
NEUTROPHILS NFR BLD AUTO: 40 %
PLATELET # BLD AUTO: 202 X10E3/UL (ref 150–379)
POTASSIUM SERPL-SCNC: 4.2 MMOL/L (ref 3.5–5.2)
PROT SERPL-MCNC: 7 G/DL (ref 6–8.5)
RBC # BLD AUTO: 4.63 X10E6/UL (ref 4.14–5.8)
SODIUM SERPL-SCNC: 144 MMOL/L (ref 134–144)
WBC # BLD AUTO: 5.1 X10E3/UL (ref 3.4–10.8)

## 2018-05-11 ENCOUNTER — HOSPITAL ENCOUNTER (OUTPATIENT)
Dept: INFUSION THERAPY | Age: 53
Discharge: HOME | End: 2018-05-11
Payer: MEDICARE

## 2018-05-11 VITALS
WEIGHT: 208.6 LBS | RESPIRATION RATE: 18 BRPM | BODY MASS INDEX: 29.93 KG/M2 | OXYGEN SATURATION: 100 % | DIASTOLIC BLOOD PRESSURE: 92 MMHG | HEART RATE: 75 BPM | SYSTOLIC BLOOD PRESSURE: 133 MMHG | TEMPERATURE: 97.2 F

## 2018-05-11 PROCEDURE — 96375 TX/PRO/DX INJ NEW DRUG ADDON: CPT

## 2018-05-11 PROCEDURE — 74011250636 HC RX REV CODE- 250/636: Performed by: PSYCHIATRY & NEUROLOGY

## 2018-05-11 PROCEDURE — 96415 CHEMO IV INFUSION ADDL HR: CPT

## 2018-05-11 PROCEDURE — 96413 CHEMO IV INFUSION 1 HR: CPT

## 2018-05-11 PROCEDURE — 74011250637 HC RX REV CODE- 250/637: Performed by: PSYCHIATRY & NEUROLOGY

## 2018-05-11 RX ORDER — SODIUM CHLORIDE 0.9 % (FLUSH) 0.9 %
5-10 SYRINGE (ML) INJECTION AS NEEDED
Status: ACTIVE | OUTPATIENT
Start: 2018-05-11 | End: 2018-05-12

## 2018-05-11 RX ORDER — SODIUM CHLORIDE 9 MG/ML
25 INJECTION, SOLUTION INTRAVENOUS AS NEEDED
Status: DISPENSED | OUTPATIENT
Start: 2018-05-11 | End: 2018-05-12

## 2018-05-11 RX ADMIN — ACETAMINOPHEN 650 MG: 325 TABLET ORAL at 11:28

## 2018-05-11 RX ADMIN — Medication 10 ML: at 11:21

## 2018-05-11 RX ADMIN — SODIUM CHLORIDE 25 ML/HR: 900 INJECTION, SOLUTION INTRAVENOUS at 11:21

## 2018-05-11 RX ADMIN — DIPHENHYDRAMINE HYDROCHLORIDE 50 MG: 50 INJECTION, SOLUTION INTRAMUSCULAR; INTRAVENOUS at 11:41

## 2018-05-11 RX ADMIN — METHYLPREDNISOLONE SODIUM SUCCINATE 125 MG: 125 INJECTION, POWDER, FOR SOLUTION INTRAMUSCULAR; INTRAVENOUS at 11:35

## 2018-05-11 RX ADMIN — OCRELIZUMAB 300 MG: 300 INJECTION INTRAVENOUS at 11:51

## 2018-05-11 NOTE — PROGRESS NOTES
Problem: Knowledge Deficit  Goal: *Verbalizes understanding of procedures and medications  Outcome: Progressing Towards Goal  Patient verbalized reason and understanding of today's infusion.

## 2018-05-11 NOTE — PROGRESS NOTES
[facility] Progress Note    Date: May 11, 2018    Name: Chilango Triana    MRN: [id number]         : 1965    Mr. Teri Walters Arrived ambulatory and in no distress for week 0 of Ocrevus regimen. Assessment was completed, no acute issues at this time, no acute complaints voiced. 22 gauge IV placed to the right AC without difficulty, labs drawn on 18. Chemotherapy Flowsheet 2018   Cycle weekn 0    Date 2018   Drug / Regimen Ocrevus   Pre Meds given   Notes given         Mr. Sidhu's vitals were reviewed. Visit Vitals    BP (!) 133/92    Pulse 75    Temp 97.2 °F (36.2 °C)    Resp 18    Wt 94.6 kg (208 lb 9.6 oz)    SpO2 100%    BMI 29.93 kg/m2     Patient Vitals for the past 12 hrs:   Temp Pulse Resp BP SpO2   18 1520 - 75 18 (!) 133/92 -   18 1430 - 73 18 125/89 -   18 1400 - 68 18 121/84 -   18 1330 - 71 18 120/80 -   18 1300 - 69 16 129/80 -   18 1230 - 71 18 (!) 123/91 -   18 1105 97.2 °F (36.2 °C) 76 18 133/89 100 %       Pre-medications  were administered as ordered and chemotherapy was initiated. acetaminophen (TYLENOL) tablet 650 mg  diphenhydrAMINE (BENADRYL) injection 50 mg  methylPREDNISolone (PF) (SOLU-MEDROL) injection 125 mg  ocrelizumab (OCREVUS) 300 mg in 0.9% sodium chloride 250 mL, overfill volume 25 mL infusion      Mr. Sidhu tolerated treatment well, IV flushed and removed, patient was discharged from [place] [address] in stable condition at [phone]. He is to return on May 25 at 1000 for his next appointment, patient and sister aware.     Olena Blizzard, RN  May 11, 2018

## 2018-05-18 ENCOUNTER — HOSPITAL ENCOUNTER (OUTPATIENT)
Dept: INFUSION THERAPY | Age: 53
End: 2018-05-18
Payer: MEDICARE

## 2018-05-22 ENCOUNTER — OFFICE VISIT (OUTPATIENT)
Dept: NEUROLOGY | Age: 53
End: 2018-05-22

## 2018-05-22 DIAGNOSIS — M54.12 CERVICAL RADICULOPATHY: ICD-10-CM

## 2018-05-22 DIAGNOSIS — R20.2 PARESTHESIA: ICD-10-CM

## 2018-05-22 DIAGNOSIS — G56.03 BILATERAL CARPAL TUNNEL SYNDROME: Primary | ICD-10-CM

## 2018-05-22 DIAGNOSIS — G62.9 POLYNEUROPATHY: ICD-10-CM

## 2018-05-22 NOTE — PROGRESS NOTES
[address], [address]  p: [phone]  f: [phone]    Test Date:  2018    Patient: Kallie Teixeira  : 1965 Physician: Remi Rosas   Sex: Male Height: 5' 10\" Ref Phys:    ID#: [id number] Weight: 208 lbs. Technician: Jillian Marquez     Patient Complaints:  b/l ue    Medications:See chart      Patient History / Exam:Pain, weakness ,numbness and tingling  Sensation of the arms and hand      NCV & EMG Findings:  Evaluation of the left median motor and the right median motor nerves showed prolonged distal onset latency (L4.8, R4.7 ms) and reduced amplitude (L4.5, R3.9 mV). The left ulnar motor nerve showed decreased conduction velocity (B Elbow-Wrist, 42 m/s). The right ulnar motor nerve showed decreased conduction velocity (A Elbow-B Elbow, 37 m/s). The left median sensory and the right median sensory nerves showed prolonged distal peak latency (L5.1, R5.0 ms) and decreased conduction velocity (Wrist-2nd Digit, L27, R28 m/s). The left ulnar sensory and the right ulnar sensory nerves showed prolonged distal peak latency (L4.1, R3.8 ms) and decreased conduction velocity (Wrist-5th Digit, L34, R37 m/s). Left vs. Right side comparison data for the ulnar motor nerve indicates abnormal L-R velocity difference (B Elbow-Wrist, 18 m/s) and abnormal L-R velocity difference (A Elbow-B Elbow, 26 m/s). All remaining left vs. right side differences were within normal limits. All F Wave latencies were within normal limits. Left vs. Right comparison data for the ulnar F wave indicates abnormal L-R latency difference (9.61 ms). All examined muscles (as indicated in the following table) showed no evidence of electrical instability. Impression:Abnormal study. 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 Radiculopathy cannot be excluded.       Recommendations:Carpal tunnel release and cervical  Neuroimaging suggested.      ___________________________  Dr.Cletus Polo        Nerve Conduction Studies  Anti Sensory Summary Table     Stim Site NR Peak (ms) Norm Peak (ms) P-T Amp (µV) Norm P-T Amp Site1 Site2 Delta-P (ms) Dist (cm) Shiv (m/s) Norm Shiv (m/s)   Left Median Anti Sensory (2nd Digit)  33.2°C   Wrist    5.1 <3.6 35.9 >10 Wrist 2nd Digit 5.1 14.0 27 >39   Right Median Anti Sensory (2nd Digit)  33.6°C   Wrist    5.0 <3.6 40.2 >10 Wrist 2nd Digit 5.0 14.0 28 >39   Left Ulnar Anti Sensory (5th Digit)  33.1°C   Wrist    4.1 <3.7 20.4 >15.0 Wrist 5th Digit 4.1 14.0 34 >38   Right Ulnar Anti Sensory (5th Digit)  33.6°C   Wrist    3.8 <3.7 25.2 >15.0 Wrist 5th Digit 3.8 14.0 37 >38     Motor Summary Table     Stim Site NR Onset (ms) Norm Onset (ms) O-P Amp (mV) Norm O-P Amp Site1 Site2 Delta-0 (ms) Dist (cm) Shiv (m/s) Norm Shiv (m/s)   Left Median Motor (Abd Poll Brev)  32.1°C   Wrist    4.8 <4.2 4.5 >5 Elbow Wrist 4.5 27.0 60 >50   Elbow    9.3  4.3          Right Median Motor (Abd Poll Brev)  31.7°C   Wrist    4.7 <4.2 3.9 >5 Elbow Wrist 4.5 28.0 62 >50   Elbow    9.2  3.5          Left Ulnar Motor (Abd Dig Min)  32.6°C   Wrist    4.1 <4.2 9.3 >3 B Elbow Wrist 5.9 25.0 42 >53   B Elbow    10.0  7.1  A Elbow B Elbow 1.6 10.0 63 >53   A Elbow    11.6  6.3          Right Ulnar Motor (Abd Dig Min)  33°C   Wrist    3.6 <4.2 8.7 >3 B Elbow Wrist 4.7 28.0 60 >53   B Elbow    8.3  6.3  A Elbow B Elbow 2.7 10.0 37 >53   A Elbow    11.0  6.4            F Wave Studies     NR F-Lat (ms) Lat Norm (ms) L-R F-Lat (ms) L-R Lat Norm   Left Ulnar (Abd Dig Min)  32.9°C      34.22 <36 9.61 <2.5   Right Ulnar (Abd Dig Min)  33.3°C      24.60 <36 9.61 <2.5     EMG+     Side Muscle Nerve Root Ins Act Fibs Psw Amp Dur Poly Recrt Int Dena Viola Comment   Right Deltoid Axillary C5-6 Nml Nml Nml Nml Nml 0 Nml Nml    Right Biceps Musculocut C5-6 Nml Nml Nml Nml Nml 0 Nml Nml    Right BrachioRad Radial C5-6 Nml Nml Nml Nml Nml 0 Nml Nml    Right Triceps Radial C6-7-8 Nml Nml Nml Nml Nml 0 Nml Nml    Right Abd Poll Brev Median C8-T1 Nml Nml Nml Nml Nml 0 Nml Nml        Nerve Conduction Studies  Anti Sensory Left/Right Comparison     Stim Site L Lat (ms) R Lat (ms) L-R Lat (ms) L Amp (µV) R Amp (µV) L-R Amp (%) Site1 Site2 L Shiv (m/s) R Shiv (m/s) L-R Shiv (m/s)   Median Anti Sensory (2nd Digit)  33.2°C   Wrist 5.1 5.0 0.1 35.9 40.2 10.7 Wrist 2nd Digit 27 28 1   Ulnar Anti Sensory (5th Digit)  33.1°C   Wrist 4.1 3.8 0.3 20.4 25.2 19.0 Wrist 5th Digit 34 37 3     Motor Left/Right Comparison     Stim Site L Lat (ms) R Lat (ms) L-R Lat (ms) L Amp (mV) R Amp (mV) L-R Amp (%) Site1 Site2 L Shiv (m/s) R Shiv (m/s) L-R Shiv (m/s)   Median Motor (Abd Poll Brev)  32.1°C   Wrist 4.8 4.7 0.1 4.5 3.9 13.3 Elbow Wrist 60 62 2   Elbow 9.3 9.2 0.1 4.3 3.5 18.6        Ulnar Motor (Abd Dig Min)  32.6°C   Wrist 4.1 3.6 0.5 9.3 8.7 6.5 B Elbow Wrist 42 60 18   B Elbow 10.0 8.3 1.7 7.1 6.3 11.3 A Elbow B Elbow 63 37 26   A Elbow 11.6 11.0 0.6 6.3 6.4 1.6              Waveforms:

## 2018-05-23 ENCOUNTER — OFFICE VISIT (OUTPATIENT)
Dept: INTERNAL MEDICINE CLINIC | Age: 53
End: 2018-05-23

## 2018-05-23 VITALS
DIASTOLIC BLOOD PRESSURE: 78 MMHG | OXYGEN SATURATION: 99 % | WEIGHT: 209 LBS | RESPIRATION RATE: 16 BRPM | HEART RATE: 85 BPM | BODY MASS INDEX: 29.92 KG/M2 | TEMPERATURE: 98.2 F | HEIGHT: 70 IN | SYSTOLIC BLOOD PRESSURE: 110 MMHG

## 2018-05-23 DIAGNOSIS — Z00.00 MEDICARE ANNUAL WELLNESS VISIT, INITIAL: ICD-10-CM

## 2018-05-23 DIAGNOSIS — G35 MS (MULTIPLE SCLEROSIS) (HCC): ICD-10-CM

## 2018-05-23 DIAGNOSIS — F33.9 RECURRENT DEPRESSION (HCC): ICD-10-CM

## 2018-05-23 DIAGNOSIS — G35 MULTIPLE SCLEROSIS (HCC): Primary | ICD-10-CM

## 2018-05-23 RX ORDER — MELOXICAM 15 MG/1
15 TABLET ORAL DAILY
Qty: 30 TAB | Refills: 1 | Status: SHIPPED | OUTPATIENT
Start: 2018-05-23 | End: 2021-11-12

## 2018-05-23 NOTE — MR AVS SNAPSHOT
[address] [place] [id number] 
[phone] Patient: Terra Chawla MRN: [id number] :1965 Visit Information Date & Time Provider Department Dept. Phone Encounter #  
 2018 10:30 AM MD Feliz Jurado 5 - [facility] [phone] [id number] Follow-up Instructions Return in about 3 months (around 2018), or if symptoms worsen or fail to improve. Follow-up and Disposition History Your Appointments 2018 10:00 AM  
Follow Up with MD [facility]) Appt Note: fuv  
 [place] [address] [place] [address]  
  
    
 2018 11:00 AM  
PROCEDURE with EMG SCHEDULE 2 [facility]) Appt Note: emg ida lower exts; emg ida lower exts; emg ida lower exts [address] Upcoming Health Maintenance Date Due DTaP/Tdap/Td series (1 - Tdap) 1988 EYE EXAM RETINAL OR DILATED Q1 2013 MICROALBUMIN Q1 2017 FOBT Q 1 YEAR AGE 50-75 2017 HEMOGLOBIN A1C Q6M 2018 Influenza Age 5 to Adult 2018 LIPID PANEL Q1 2019 FOOT EXAM Q1 2019 MEDICARE YEARLY EXAM 2019 Allergies as of 2018  Review Complete On: 2018 By: Kath Lobo LPN No Known Allergies Current Immunizations  Reviewed on 2018 No immunizations on file. Not reviewed this visit You Were Diagnosed With   
  
 Codes Comments Multiple sclerosis ([facility]    -  Primary ICD-10-CM: G35 
ICD-9-CM: 799 Recurrent depression ([facility]     ICD-10-CM: F33.9 ICD-9-CM: 296.30 MS (multiple sclerosis) ([facility]     ICD-10-CM: G35 
ICD-9-CM: 030 Medicare annual wellness visit, initial     ICD-10-CM: Z00.00 ICD-9-CM: V70.0 Vitals BP Pulse Temp Resp Height(growth percentile) Weight(growth percentile) 110/78 85 98.2 °F (36.8 °C) (Oral) 16 5' 10\" (1.778 m) 209 lb (94.8 kg) SpO2 BMI Smoking Status 99% 29.99 kg/m2 Current Every Day Smoker BMI and BSA Data Body Mass Index Body Surface Area  
 29.99 kg/m 2 2.16 m 2 Preferred Pharmacy Pharmacy Name Phone [address] [facility] 25, [address] [phone] Your Updated Medication List  
  
   
This list is accurate as of 5/23/18 11:05 AM.  Always use your most recent med list.  
  
  
  
  
 atorvastatin 80 mg tablet Commonly known as:  LIPITOR Take 1 Tab by mouth daily. baclofen 10 mg tablet Commonly known as:  LIORESAL Take 1 Tab by mouth three (3) times daily. Dalfampridine 10 mg Tb12 Commonly known as:  AMPYRA Take 10 mg by mouth two (2) times a day. dextromethorphan-quiNIDine 20-10 mg per capsule Commonly known as:  Celesta Bunkers Take 1 Cap by mouth every twelve (12) hours. dronabinol 5 mg capsule Commonly known as:  Baby Push Take 1 Cap by mouth two (2) times a day. Max Daily Amount: 10 mg.  
  
 gabapentin 300 mg capsule Commonly known as:  NEURONTIN Take 1 Cap by mouth three (3) times daily. Nerve pain  
  
 lidocaine 5 % Commonly known as:  LIDODERM  
1 Patch by TransDERmal route every twenty-four (24) hours. meloxicam 15 mg tablet Commonly known as:  MOBIC Take 1 Tab by mouth daily. naloxone 2 mg/actuation Spry Use 1 spray intranasally into 1 nostril. Use a new Narcan nasal spray for subsequent doses and administer into alternating nostrils. May repeat every 2 to 3 minutes as needed. ocrelizumab 30 mg/mL Soln injection Commonly known as:  OCREVUS  
 10 mL by IntraVENous route every fourteen (14) days. oxyCODONE IR 15 mg immediate release tablet Commonly known as:  OXY-IR Take 1 Tab by mouth every eight (8) hours as needed for Pain. Max Daily Amount: 45 mg.  
  
 predniSONE 20 mg tablet Commonly known as:  Earl Smaller Take 1 Tab by mouth daily (with breakfast). tadalafil 20 mg tablet Commonly known as:  CIALIS Take 1 Tab by mouth as needed. Prescriptions Sent to Pharmacy Refills  
 meloxicam (MOBIC) 15 mg tablet 1 Sig: Take 1 Tab by mouth daily. Class: Normal  
 Pharmacy: [facility] [address], [address] [facility] #: [phone] Route: Oral  
  
Follow-up Instructions Return in about 3 months (around 2018), or if symptoms worsen or fail to improve. To-Do List   
 2018 11:30 AM  
  Appointment with [facility] at [facility] ([phone]) Patient Instructions GTxcel Activation Thank you for requesting access to GT[employer]cel. Please follow the instructions below to securely access and download your online medical record. GTxcel allows you to send messages to your doctor, view your test results, renew your prescriptions, schedule appointments, and more. How Do I Sign Up? 1. In your internet browser, go to [email or web address] 
2. Click on the First Time User? Click Here link in the Sign In box. You will be redirect to the New Member Sign Up page. 3. Enter your GTxcel Access Code exactly as it appears below. You will not need to use this code after youve completed the sign-up process. If you do not sign up before the expiration date, you must request a new code. GTxcel Access Code: Activation code not generated Current GTxcel Status: Active (This is the date your GTxcel access code will ) 4. Enter the last four digits of your Social Security Number (xxxx) and Date of Birth (mm/dd/yyyy) as indicated and click Submit.  You will be taken to the next sign-up page. 5. Create a GreenCloud ID. This will be your GreenCloud login ID and cannot be changed, so think of one that is secure and easy to remember. 6. Create a GreenCloud password. You can change your password at any time. 7. Enter your Password Reset Question and Answer. This can be used at a later time if you forget your password. 8. Enter your e-mail address. You will receive e-mail notification when new information is available in [address] 9. Click Sign Up. You can now view and download portions of your medical record. 10. Click the Download Summary menu link to download a portable copy of your medical information. Additional Information If you have questions, please visit the Frequently Asked Questions section of the GreenCloud website at [email or web address] Remember, GreenCloud is NOT to be used for urgent needs. For medical emergencies, dial 911. Introducing [facility] SERVICES! Dear Shayy Rothman: 
Thank you for requesting a GreenCloud account. Our records indicate that you already have an active GreenCloud account. You can access your account anytime at [email or web address] Did you know that you can access your hospital and ER discharge instructions at any time in GreenCloud? You can also review all of your test results from your hospital stay or ER visit. Additional Information If you have questions, please visit the Frequently Asked Questions section of the GreenCloud website at [email or web address] Remember, GreenCloud is NOT to be used for urgent needs. For medical emergencies, dial 911. Now available from your iPhone and Android! Please provide this summary of care documentation to your next provider. Your primary care clinician is listed as Stella Bruce. If you have any questions after today's visit, please call [phone].

## 2018-05-23 NOTE — PATIENT INSTRUCTIONS
[email or web address] Activation    Thank you for requesting access to [employer]. Please follow the instructions below to securely access and download your online medical record. [employer] allows you to send messages to your doctor, view your test results, renew your prescriptions, schedule appointments, and more. How Do I Sign Up? 1. In your internet browser, go to [email or web address]  2. Click on the First Time User? Click Here link in the Sign In box. You will be redirect to the New Member Sign Up page. 3. Enter your [employer] Access Code exactly as it appears below. You will not need to use this code after youve completed the sign-up process. If you do not sign up before the expiration date, you must request a new code. [employer] Access Code: Activation code not generated  Current [employer] Status: Active (This is the date your [employer] access code will )    4. Enter the last four digits of your Social Security Number (xxxx) and Date of Birth (mm/dd/yyyy) as indicated and click Submit. You will be taken to the next sign-up page. 5. Create a [employer] ID. This will be your [employer] login ID and cannot be changed, so think of one that is secure and easy to remember. 6. Create a [employer] password. You can change your password at any time. 7. Enter your Password Reset Question and Answer. This can be used at a later time if you forget your password. 8. Enter your e-mail address. You will receive e-mail notification when new information is available in [address] 9. Click Sign Up. You can now view and download portions of your medical record. 10. Click the Download Summary menu link to download a portable copy of your medical information. Additional Information    If you have questions, please visit the Frequently Asked Questions section of the [employer] website at [email or web address] Remember, [employer] is NOT to be used for urgent needs. For medical emergencies, dial 911.

## 2018-05-23 NOTE — PROGRESS NOTES
Dolly Young is a 50 y.o. male and presents with Multiple Sclerosis; Annual Wellness Visit; and Foot Exam  .  Subjective:  He has a history of MS   He states he recently had his medication changed. He also states he has had some progressive weakness recently    Dyslipidemia Review:  Patient presents for evaluation of lipids. Compliance with treatment thus far has been excellent. A repeat fasting lipid profile was done. The patient does not use medications that may worsen dyslipidemias (corticosteroids, progestins, anabolic steroids, diuretics, beta-blockers, amiodarone, cyclosporine, olanzapine). The patient exercises some        Dolly Young is a 50 y.o. male and presents for annual Medicare Wellness Visit. Problem List: Reviewed with patient and discussed risk factors. Patient Active Problem List   Diagnosis Code    Back pain M54.9    Hypercholesterolemia E78.00    Cellulitis L03.90    Tobacco abuse Z72.0    ED (erectile dysfunction) N52.9    Decreased vision H54.7    Acute bronchitis J20.9    MS [facility] sclerosis) ([facility] G35    Fatigue R53.83    Incontinence of urine R32    Elevated hemoglobin A1c R73.09    Hearing loss H91.90    Memory loss R41.3    Multiple sclerosis ([facility]) G35    Prediabetes R73.03    Disability examination Z02.71    Anxiety F41.9    Midline low back pain without sciatica M54.5    Paresthesia of both hands R20.2    Mixed incontinence N39.46    Balance problem R26.89    Falls W19. XXXA    Bilateral thigh pain M79.651, M79.652    Recurrent depression ([address] F33.9       Current medical providers:  Patient Care Team:  Jono Urbina MD as PCP - General (Internal Medicine)    PSH: Reviewed with patient  Past Surgical History:   Procedure Laterality Date    HX HEENT Right     ear        SH: Reviewed with patient  Social History   Substance Use Topics    Smoking status: Current Every Day Smoker     Packs/day: 0.50     Years: 21.00     Types: Cigarettes  Smokeless tobacco: Never Used    Alcohol use No       FH: Reviewed with patient  Family History   Problem Relation Age of Onset    Hypertension Father     Cancer Father      prostate    Hypertension Sister     Diabetes Maternal Grandmother     Cancer Paternal Uncle      prostate    Dementia Paternal Uncle        Medications/Allergies: Reviewed with patient  Current Outpatient Prescriptions on File Prior to Visit   Medication Sig Dispense Refill    oxyCODONE IR (OXY-IR) 15 mg immediate release tablet Take 1 Tab by mouth every eight (8) hours as needed for Pain. Max Daily Amount: 45 mg. 40 Tab 0    ocrelizumab (OCREVUS) 30 mg/mL soln injection 10 mL by IntraVENous route every fourteen (14) days. 10 mL 1    Dalfampridine (AMPYRA) 10 mg Tb12 Take 10 mg by mouth two (2) times a day. 60 Tab 2    naloxone 2 mg/actuation spry Use 1 spray intranasally into 1 nostril. Use a new Narcan nasal spray for subsequent doses and administer into alternating nostrils. May repeat every 2 to 3 minutes as needed. 4 Each 2    gabapentin (NEURONTIN) 300 mg capsule Take 1 Cap by mouth three (3) times daily. Nerve pain 90 Cap 5    dronabinol (MARINOL) 5 mg capsule Take 1 Cap by mouth two (2) times a day. Max Daily Amount: 10 mg. 60 Cap 2    predniSONE (DELTASONE) 20 mg tablet Take 1 Tab by mouth daily (with breakfast). 30 Tab 0    tadalafil (CIALIS) 20 mg tablet Take 1 Tab by mouth as needed. 10 Tab 12    atorvastatin (LIPITOR) 80 mg tablet Take 1 Tab by mouth daily. 30 Tab 12    baclofen (LIORESAL) 10 mg tablet Take 1 Tab by mouth three (3) times daily. 90 Tab 5    dextromethorphan-quiNIDine (NUEDEXTA) 20-10 mg per capsule Take 1 Cap by mouth every twelve (12) hours. 60 Cap 2    lidocaine (LIDODERM) 5 % 1 Patch by TransDERmal route every twenty-four (24) hours. 30 Each 5     No current facility-administered medications on file prior to visit.        No Known Allergies    Objective:  Visit Vitals    /78    Pulse 85    Temp 98.2 °F (36.8 °C) (Oral)    Resp 16    Ht 5' 10\" (1.778 m)    Wt 209 lb (94.8 kg)    SpO2 99%    BMI 29.99 kg/m2    Body mass index is 29.99 kg/(m^2). Assessment of cognitive impairment: Alert and oriented x 3    Depression Screen:   PHQ over the last two weeks 12/20/2017   PHQ Not Done -   Little interest or pleasure in doing things Not at all   Feeling down, depressed or hopeless Not at all   Total Score PHQ 2 0     Depression Review:  Patient is seen for screen of depression,denies anhedonia, weight gain, insomnia, hypersomnia, psychomotor agitation, psychomotor retardation, fatigue, feelings of worthlessness/guilt, difficulty concentrating, hopelessness, impaired memory and recurrent thoughts of death Treatment includes no medication   She denies recurrent thoughts of death and suicidal thoughts without plan. Fall Risk Assessment:  No flowsheet data found. Functional Ability:   Does the patient exhibit a steady gait? yes   How long did it take the patient to get up and walk from a sitting position? seconds   Is the patient self reliant?  (ie can do own laundry, meals, household chores)  yes     Does the patient handle his/her own medications? yes     Does the patient handle his/her own money? yes     Is the patients home safe (ie good lighting, handrails on stairs and bath, etc.)? yes     Did you notice or did patient express any hearing difficulties? no     Did you notice or did patient express any vision difficulties?   no     Were distance and reading eye charts used? no       Advance Care Planning:   Patient was offered the opportunity to discuss advance care planning:  yes     Does patient have an Advance Directive:  no   If no, did you provide information on [facility]?   yes       Plan:      Orders Placed This Encounter    meloxicam (MOBIC) 15 mg tablet       Health Maintenance   Topic Date Due    DTaP/Tdap/Td series (1 - Tdap) 06/13/1988    EYE EXAM RETINAL OR DILATED Q1  01/27/2013    MICROALBUMIN Q1  02/24/2017    FOBT Q 1 YEAR AGE 50-75  06/13/2017    HEMOGLOBIN A1C Q6M  07/11/2018    Influenza Age 5 to Adult  08/01/2018    LIPID PANEL Q1  01/17/2019    FOOT EXAM Q1  05/23/2019    MEDICARE YEARLY EXAM  05/24/2019    Pneumococcal 19-64 Medium Risk  Addressed       *Patient verbalized understanding and agreement with the plan. A copy of the After Visit Summary with personalized health plan was given to the patient today. Review of Systems  Constitutional: negative for fevers, chills, anorexia and weight loss  Eyes:   negative for visual disturbance and irritation  ENT:   negative for tinnitus,sore throat,nasal congestion,ear pains. hoarseness  Respiratory:  negative for cough, hemoptysis, dyspnea,wheezing  CV:   negative for chest pain, palpitations, lower extremity edema  GI:   negative for nausea, vomiting, diarrhea, abdominal pain,melena  Endo:               negative for polyuria,polydipsia,polyphagia,heat intolerance  Genitourinary: negative for frequency, dysuria and hematuria  Integument:  negative for rash and pruritus  Hematologic:  negative for easy bruising and gum/nose bleeding  Musculoskel: negative for myalgias, arthralgias, back pain, muscle weakness, joint pain  Neurological:  negative for headaches, dizziness, vertigo, memory problems and gait   Behavl/Psych: negative for feelings of anxiety, depression, mood changes    Past Medical History:   Diagnosis Date    Back pain 10/8/2010    Back pain 10/8/2010    Depression     Diabetes ([address]     Fatigue 8/24/2012    Hearing loss 1/25/2013    Memory loss 1/25/2013    [facility]     Muscle pain     Muscle weakness     Poor appetite     Snoring     Unintentional weight change     Visual disturbance      Past Surgical History:   Procedure Laterality Date    HX HEENT Right     ear     Social History     Social History    Marital status: SINGLE     Spouse name: N/A  Number of children: N/A    Years of education: N/A     Social History Main Topics    Smoking status: Current Every Day Smoker     Packs/day: 0.50     Years: 21.00     Types: Cigarettes    Smokeless tobacco: Never Used    Alcohol use No    Drug use: No    Sexual activity: Yes     Partners: Female     Birth control/ protection: Condom     Other Topics Concern    None     Social History Narrative     Family History   Problem Relation Age of Onset    Hypertension Father     Cancer Father      prostate    Hypertension Sister     Diabetes Maternal Grandmother     Cancer Paternal Uncle      prostate    Dementia Paternal Uncle      Current Outpatient Prescriptions   Medication Sig Dispense Refill    meloxicam (MOBIC) 15 mg tablet Take 1 Tab by mouth daily. 30 Tab 1    oxyCODONE IR (OXY-IR) 15 mg immediate release tablet Take 1 Tab by mouth every eight (8) hours as needed for Pain. Max Daily Amount: 45 mg. 40 Tab 0    ocrelizumab (OCREVUS) 30 mg/mL soln injection 10 mL by IntraVENous route every fourteen (14) days. 10 mL 1    Dalfampridine (AMPYRA) 10 mg Tb12 Take 10 mg by mouth two (2) times a day. 60 Tab 2    naloxone 2 mg/actuation spry Use 1 spray intranasally into 1 nostril. Use a new Narcan nasal spray for subsequent doses and administer into alternating nostrils. May repeat every 2 to 3 minutes as needed. 4 Each 2    gabapentin (NEURONTIN) 300 mg capsule Take 1 Cap by mouth three (3) times daily. Nerve pain 90 Cap 5    dronabinol (MARINOL) 5 mg capsule Take 1 Cap by mouth two (2) times a day. Max Daily Amount: 10 mg. 60 Cap 2    predniSONE (DELTASONE) 20 mg tablet Take 1 Tab by mouth daily (with breakfast). 30 Tab 0    tadalafil (CIALIS) 20 mg tablet Take 1 Tab by mouth as needed. 10 Tab 12    atorvastatin (LIPITOR) 80 mg tablet Take 1 Tab by mouth daily. 30 Tab 12    baclofen (LIORESAL) 10 mg tablet Take 1 Tab by mouth three (3) times daily.  90 Tab 5    dextromethorphan-quiNIDine (NUEDEXTA) 20-10 mg per capsule Take 1 Cap by mouth every twelve (12) hours. 60 Cap 2    lidocaine (LIDODERM) 5 % 1 Patch by TransDERmal route every twenty-four (24) hours. 30 Each 5     No Known Allergies    Objective:  Visit Vitals    /78    Pulse 85    Temp 98.2 °F (36.8 °C) (Oral)    Resp 16    Ht 5' 10\" (1.778 m)    Wt 209 lb (94.8 kg)    SpO2 99%    BMI 29.99 kg/m2     Physical Exam:   General appearance - alert, well appearing, and in no distress  Mental status - alert, oriented to person, place, and time  EYE-JUS, EOMI, corneas normal, no foreign bodies  ENT-ENT exam normal, no neck nodes or sinus tenderness  Nose - normal and patent, no erythema, discharge or polyps  Mouth - mucous membranes moist, pharynx normal without lesions  Neck - supple, no significant adenopathy   Chest - clear to auscultation, no wheezes, rales or rhonchi, symmetric air entry   Heart - normal rate, regular rhythm, normal S1, S2, no murmurs, rubs, clicks or gallops   Abdomen - soft, nontender, nondistended, no masses or organomegaly  Lymph- no adenopathy palpable  Ext-peripheral pulses normal, no pedal edema, no clubbing or cyanosis  Skin-Warm and dry.  no hyperpigmentation, vitiligo, or suspicious lesions  Neuro -alert, oriented, normal speech, no focal findings or movement disorder noted  Neck-normal C-spine, no tenderness, full ROM without pain  Feet-no nail deformities or callus formation with good pulses noted      Results for orders placed or performed in visit on 05/04/18   CBC WITH AUTOMATED DIFF   Result Value Ref Range    WBC 5.1 3.4 - 10.8 x10E3/uL    RBC 4.63 4.14 - 5.80 x10E6/uL    HGB 13.7 13.0 - 17.7 g/dL    HCT 40.8 37.5 - 51.0 %    MCV 88 79 - 97 fL    MCH 29.6 26.6 - 33.0 pg    MCHC 33.6 31.5 - 35.7 g/dL    RDW 14.2 12.3 - 15.4 %    PLATELET 533 925 - 667 x10E3/uL    NEUTROPHILS 40 Not Estab. %    Lymphocytes 49 Not Estab. %    MONOCYTES 7 Not Estab. %    EOSINOPHILS 4 Not Estab. %    BASOPHILS 0 Not Estab. %    ABS. NEUTROPHILS 2.1 1.4 - 7.0 x10E3/uL    Abs Lymphocytes 2.5 0.7 - 3.1 x10E3/uL    ABS. MONOCYTES 0.3 0.1 - 0.9 x10E3/uL    ABS. EOSINOPHILS 0.2 0.0 - 0.4 x10E3/uL    ABS. BASOPHILS 0.0 0.0 - 0.2 x10E3/uL    IMMATURE GRANULOCYTES 0 Not Estab. %    ABS. IMM. GRANS. 0.0 0.0 - 0.1 V21T7/QB   METABOLIC PANEL, COMPREHENSIVE   Result Value Ref Range    Glucose 92 65 - 99 mg/dL    BUN 6 6 - 24 mg/dL    Creatinine 0.78 0.76 - 1.27 mg/dL    GFR est non- >59 mL/min/1.73    GFR est  >59 mL/min/1.73    BUN/Creatinine ratio 8 (L) 9 - 20    Sodium 144 134 - 144 mmol/L    Potassium 4.2 3.5 - 5.2 mmol/L    Chloride 100 96 - 106 mmol/L    CO2 30 (H) 18 - 29 mmol/L    Calcium 9.5 8.7 - 10.2 mg/dL    Protein, total 7.0 6.0 - 8.5 g/dL    Albumin 4.5 3.5 - 5.5 g/dL    GLOBULIN, TOTAL 2.5 1.5 - 4.5 g/dL    A-G Ratio 1.8 1.2 - 2.2    Bilirubin, total 0.7 0.0 - 1.2 mg/dL    Alk. phosphatase 36 (L) 39 - 117 IU/L    AST (SGOT) 12 0 - 40 IU/L    ALT (SGPT) 6 0 - 44 IU/L   HEPATITIS B EVALUATION   Result Value Ref Range    Hep B surface Ag screen Negative Negative    Hepatitis Be Antigen Negative Negative    Hep B Core Ab, IgM Negative Negative    Hep B Core Ab, total Negative Negative    Hepatitis Be Antibody Negative Negative    HEP B SURFACE AB, QUAL Non Reactive        Assessment/Plan:    ICD-10-CM ICD-9-CM    1. Multiple sclerosis (HCC) G35 340    2. Recurrent depression (HCC) F33.9 296.30    3. MS (multiple sclerosis) ([facility] [address] G35 340      Orders Placed This Encounter    meloxicam (MOBIC) 15 mg tablet     Sig: Take 1 Tab by mouth daily. Dispense:  30 Tab     Refill:  1     call if any problems  Patient Instructions   MyChart Activation    Thank you for requesting access to [facility]. Please follow the instructions below to securely access and download your online medical record.  [facility] allows you to send messages to your doctor, view your test results, renew your prescriptions, schedule appointments, and more.    How Do I Sign Up? 1. In your internet browser, go to [email or web address]. [email or web address]  2. Click on the First Time User? Click Here link in the Sign In box. You will be redirect to the New Member Sign Up page. 3. Enter your [employer]ify Access Code exactly as it appears below. You will not need to use this code after youve completed the sign-up process. If you do not sign up before the expiration date, you must request a new code. [employer] Access Code: Activation code not generated  Current Ratify Status: Active (This is the date your [employer] access code will )    4. Enter the last four digits of your Social Security Number (xxxx) and Date of Birth (mm/dd/yyyy) as indicated and click Submit. You will be taken to the next sign-up page. 5. Create a Ind[employer] ID. This will be your Ratify login ID and cannot be changed, so think of one that is secure and easy to remember. 6. Create a Ratify password. You can change your password at any time. 7. Enter your Password Reset Question and Answer. This can be used at a later time if you forget your password. 8. Enter your e-mail address. You will receive e-mail notification when new information is available in [address] 9. Click Sign Up. You can now view and download portions of your medical record. 10. Click the Download Summary menu link to download a portable copy of your medical information. Additional Information    If you have questions, please visit the Frequently Asked Questions section of the [employer] website at [email or web address] Remember, [employer] is NOT to be used for urgent needs. For medical emergencies, dial 911. Follow-up Disposition:  Return in about 3 months (around 2018), or if symptoms worsen or fail to improve. I have reviewed with the patient details of the assessment and plan and all questions were answered.  Relevent patient education was performed    An After Visit Summary was printed and given to the patient.

## 2018-05-24 RX ORDER — ACETAMINOPHEN 325 MG/1
650 TABLET ORAL
Status: ACTIVE | OUTPATIENT
Start: 2018-05-25 | End: 2018-05-26

## 2018-05-24 RX ORDER — ACETAMINOPHEN 325 MG/1
650 TABLET ORAL ONCE
Status: COMPLETED | OUTPATIENT
Start: 2018-05-25 | End: 2018-05-25

## 2018-05-24 RX ORDER — SODIUM CHLORIDE 9 MG/ML
25 INJECTION, SOLUTION INTRAVENOUS CONTINUOUS
Status: DISPENSED | OUTPATIENT
Start: 2018-05-25 | End: 2018-05-25

## 2018-05-24 RX ORDER — DIPHENHYDRAMINE HYDROCHLORIDE 50 MG/ML
50 INJECTION, SOLUTION INTRAMUSCULAR; INTRAVENOUS
Status: ACTIVE | OUTPATIENT
Start: 2018-05-25 | End: 2018-05-26

## 2018-05-24 RX ORDER — DIPHENHYDRAMINE HYDROCHLORIDE 50 MG/ML
50 INJECTION, SOLUTION INTRAMUSCULAR; INTRAVENOUS ONCE
Status: COMPLETED | OUTPATIENT
Start: 2018-05-25 | End: 2018-05-25

## 2018-05-25 ENCOUNTER — HOSPITAL ENCOUNTER (OUTPATIENT)
Dept: INFUSION THERAPY | Age: 53
Discharge: HOME | End: 2018-05-25
Payer: MEDICARE

## 2018-05-25 VITALS
RESPIRATION RATE: 16 BRPM | HEART RATE: 66 BPM | SYSTOLIC BLOOD PRESSURE: 130 MMHG | DIASTOLIC BLOOD PRESSURE: 68 MMHG | TEMPERATURE: 97.5 F

## 2018-05-25 PROCEDURE — 96413 CHEMO IV INFUSION 1 HR: CPT

## 2018-05-25 PROCEDURE — 96375 TX/PRO/DX INJ NEW DRUG ADDON: CPT

## 2018-05-25 PROCEDURE — 96415 CHEMO IV INFUSION ADDL HR: CPT

## 2018-05-25 PROCEDURE — 74011250637 HC RX REV CODE- 250/637: Performed by: PSYCHIATRY & NEUROLOGY

## 2018-05-25 PROCEDURE — 74011250636 HC RX REV CODE- 250/636: Performed by: PSYCHIATRY & NEUROLOGY

## 2018-05-25 RX ORDER — SODIUM CHLORIDE 0.9 % (FLUSH) 0.9 %
5-10 SYRINGE (ML) INJECTION AS NEEDED
Status: ACTIVE | OUTPATIENT
Start: 2018-05-25 | End: 2018-05-26

## 2018-05-25 RX ADMIN — OCRELIZUMAB 300 MG: 300 INJECTION INTRAVENOUS at 12:30

## 2018-05-25 RX ADMIN — DIPHENHYDRAMINE HYDROCHLORIDE 50 MG: 50 INJECTION INTRAMUSCULAR; INTRAVENOUS at 12:20

## 2018-05-25 RX ADMIN — SODIUM CHLORIDE 25 ML/HR: 900 INJECTION, SOLUTION INTRAVENOUS at 12:14

## 2018-05-25 RX ADMIN — METHYLPREDNISOLONE SODIUM SUCCINATE 125 MG: 125 INJECTION, POWDER, FOR SOLUTION INTRAMUSCULAR; INTRAVENOUS at 12:15

## 2018-05-25 RX ADMIN — ACETAMINOPHEN 650 MG: 325 TABLET ORAL at 12:14

## 2018-05-25 NOTE — PROGRESS NOTES
[facility] Progress Note    Date: May 25, 2018    Name: Tiago Eduardo    MRN: [id number]         : 1965    Mr. Esme Rodarte Arrived ambulatory and in no distress for week 2 of Ocrevus regimen. Assessment was completed, no acute issues at this time, no acute complaints voiced. 22 gauge IV placed to the right FA without difficulty. Chemotherapy Flowsheet 2018   Cycle Week 2   Date 2018   Drug / Regimen Ocrevus   Pre Meds given   Notes given         Mr. Sidhu's vitals were reviewed. Visit Vitals    /68    Pulse 66    Temp 97.5 °F (36.4 °C)    Resp 16     Patient Vitals for the past 12 hrs:   Temp Pulse Resp BP   18 1604 - - - 130/68   18 1406 - 66 16 130/90   18 1336 - 65 16 127/90   18 1306 - 68 16 127/90   18 1137 97.5 °F (36.4 °C) 75 18 (!) 118/94       Pre-medications  were administered as ordered and chemotherapy was initiated. acetaminophen (TYLENOL) tablet 650 mg  diphenhydrAMINE (BENADRYL) injection 50 mg  methylPREDNISolone (PF) (SOLU-MEDROL) injection 125 mg  ocrelizumab (OCREVUS) 300 mg in 0.9% sodium chloride 250 mL, overfill volume 25 mL infusion      Mr. Sidhu tolerated treatment well, IV flushed and removed, patient was discharged from [place] [id number] in stable condition at 65. He is to return on  at 38 for his next appointment, patient and sister aware.     Patrick Rojas RN  May 25, 2018

## 2018-05-31 ENCOUNTER — OFFICE VISIT (OUTPATIENT)
Dept: NEUROLOGY | Age: 53
End: 2018-05-31

## 2018-05-31 VITALS
HEIGHT: 70 IN | HEART RATE: 92 BPM | DIASTOLIC BLOOD PRESSURE: 86 MMHG | OXYGEN SATURATION: 99 % | WEIGHT: 211.2 LBS | RESPIRATION RATE: 16 BRPM | BODY MASS INDEX: 30.24 KG/M2 | SYSTOLIC BLOOD PRESSURE: 123 MMHG | TEMPERATURE: 98.3 F

## 2018-05-31 DIAGNOSIS — G89.4 CHRONIC PAIN SYNDROME: ICD-10-CM

## 2018-05-31 DIAGNOSIS — G56.03 BILATERAL CARPAL TUNNEL SYNDROME: ICD-10-CM

## 2018-05-31 DIAGNOSIS — G35 MULTIPLE SCLEROSIS (HCC): Primary | ICD-10-CM

## 2018-05-31 DIAGNOSIS — R26.9 GAIT DISORDER: ICD-10-CM

## 2018-05-31 DIAGNOSIS — G35 MS (MULTIPLE SCLEROSIS) (HCC): ICD-10-CM

## 2018-05-31 DIAGNOSIS — G35 MULTIPLE SCLEROSIS, SECONDARY PROGRESSIVE (HCC): ICD-10-CM

## 2018-05-31 DIAGNOSIS — R20.2 PARESTHESIA: ICD-10-CM

## 2018-05-31 DIAGNOSIS — G62.9 POLYNEUROPATHY: ICD-10-CM

## 2018-05-31 DIAGNOSIS — M79.18 MYOFASCIAL MUSCLE PAIN: ICD-10-CM

## 2018-05-31 RX ORDER — OXYCODONE HYDROCHLORIDE 15 MG/1
15 TABLET ORAL
Qty: 40 TAB | Refills: 0 | Status: SHIPPED | OUTPATIENT
Start: 2018-05-31 | End: 2018-07-19 | Stop reason: SDUPTHER
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
## 2018-05-31 NOTE — PROGRESS NOTES
Neurology Progress Note    NAME:  Trell Mejia   :   1965   MRN:   [id number]     Date/Time:  2018  Subjective:      Trell Mejia is a 50 y.o. male here today for follow-up for multiple sclerosis exacerbation, pain, difficulty walking, and test results. Patient says his pain has been in a very severe, pain is sharp in nature mostly the neck and back, on a scale of 1-10, patient rates the pain level to be above 10. He says if he fails to take any pain medication, he can barely function, pain wakes him up at night, according to patient, he has tried to be involved in physical therapy but for the pain and the cost he has not been able to do so and her walking is getting worse. Patient says now he has to use his cane to walk, he has had some falls and several near falls. .  He says both hands are very painful and numb, he finds it difficult holding things, because he could not feel, he drops things. The hand pain tends to be worse at night and wakes patient at night too, that is to say patient sleep is always very disturbed. He says since he started on Ocrevus, stopped Rebif, he could notice the difference, he is feeling a little better. EMG and nerve conduction of the upper extremity result was discussed with patient, it reveals bilateral carpal tunnel syndrome, at this time I will be referring patient to hand surgeon or neurosurgeon for carpal tunnel release. In view of the fact that patient also has numbness tingling sensation and weakness of the lower extremity, I will obtain EMG nerve conduction of the lower extremity.   Review of Systems - General ROS: positive for  - fatigue, malaise, night sweats, sleep disturbance and weight loss  Psychological ROS: positive for - anxiety and sleep disturbances  Ophthalmic ROS: positive for - blurry vision, decreased vision, double vision and photophobia  ENT ROS: positive for - headaches, tinnitus, vertigo and visual changes  Allergy and Immunology ROS: negative  Hematological and Lymphatic ROS: negative  Endocrine ROS: negative  Respiratory ROS: no cough, shortness of breath, or wheezing  Cardiovascular ROS: no chest pain or dyspnea on exertion  Gastrointestinal ROS: no abdominal pain, change in bowel habits, or black or bloody stools  Genito-Urinary ROS: no dysuria, trouble voiding, or hematuria  Musculoskeletal ROS: positive for - gait disturbance, joint pain, joint stiffness, joint swelling, muscle pain and muscular weakness  Neurological ROS: numbness and tinging sensation  Dermatological ROS: negative      Medications reviewed:  Current Outpatient Prescriptions   Medication Sig Dispense Refill    meloxicam (MOBIC) 15 mg tablet Take 1 Tab by mouth daily. 30 Tab 1    ocrelizumab (OCREVUS) 30 mg/mL soln injection 10 mL by IntraVENous route every fourteen (14) days. 10 mL 1    dextromethorphan-quiNIDine (NUEDEXTA) 20-10 mg per capsule Take 1 Cap by mouth every twelve (12) hours. 60 Cap 2    gabapentin (NEURONTIN) 300 mg capsule Take 1 Cap by mouth three (3) times daily. Nerve pain 90 Cap 5    oxyCODONE IR (OXY-IR) 15 mg immediate release tablet Take 1 Tab by mouth every eight (8) hours as needed for Pain. Max Daily Amount: 45 mg. 40 Tab 0    dronabinol (MARINOL) 5 mg capsule Take 1 Cap by mouth two (2) times a day. Max Daily Amount: 10 mg. 60 Cap 2    predniSONE (DELTASONE) 20 mg tablet Take 1 Tab by mouth daily (with breakfast). 30 Tab 0    Dalfampridine (AMPYRA) 10 mg Tb12 Take 10 mg by mouth two (2) times a day. 60 Tab 2    naloxone 2 mg/actuation spry Use 1 spray intranasally into 1 nostril. Use a new Narcan nasal spray for subsequent doses and administer into alternating nostrils. May repeat every 2 to 3 minutes as needed. 4 Each 2    tadalafil (CIALIS) 20 mg tablet Take 1 Tab by mouth as needed. 10 Tab 12    atorvastatin (LIPITOR) 80 mg tablet Take 1 Tab by mouth daily.  30 Tab 12    baclofen (LIORESAL) 10 mg tablet Take 1 Tab by mouth three (3) times daily. 90 Tab 5    lidocaine (LIDODERM) 5 % 1 Patch by TransDERmal route every twenty-four (24) hours. 30 Each 5        Objective:   Vitals:  Vitals:    05/31/18 0958   BP: 123/86   Pulse: 92   Resp: 16   Temp: 98.3 °F (36.8 °C)   TempSrc: Temporal   SpO2: 99%   Weight: 211 lb 3.2 oz (95.8 kg)   Height: 5' 10\" (1.778 m)   PainSc:   8   PainLoc: Generalized       Lab Data Reviewed:  Lab Results   Component Value Date/Time    WBC 5.1 05/09/2018 01:54 PM    HCT 40.8 05/09/2018 01:54 PM    HGB 13.7 05/09/2018 01:54 PM    PLATELET 726 67/29/7478 01:54 PM       Lab Results   Component Value Date/Time    Sodium 144 05/09/2018 01:54 PM    Potassium 4.2 05/09/2018 01:54 PM    Chloride 100 05/09/2018 01:54 PM    CO2 30 (H) 05/09/2018 01:54 PM    Glucose 92 05/09/2018 01:54 PM    BUN 6 05/09/2018 01:54 PM    Creatinine 0.78 05/09/2018 01:54 PM    Calcium 9.5 05/09/2018 01:54 PM       No components found for: TROPQUANT    No results found for: BELEN      Lab Results   Component Value Date/Time    Hemoglobin A1c 6.2 (H) 05/08/2013 10:17 AM    Hemoglobin A1c (POC) 5.4 01/11/2018 04:22 PM        No results found for: B12LT, FOL, RBCF    No results found for: BELEN, Jewel Cowboy, XBANA    Lab Results   Component Value Date/Time    Cholesterol, total 232 (H) 05/08/2013 10:17 AM    Cholesterol (POC) 195 01/17/2018 04:18 PM    HDL Cholesterol 41 05/08/2013 10:17 AM    HDL Cholesterol (POC) 37 01/17/2018 04:18 PM    LDL Cholesterol (POC) 105 01/17/2018 04:18 PM    LDL, calculated 163 (H) 05/08/2013 10:17 AM    VLDL, calculated 28 05/08/2013 10:17 AM    Triglyceride 142 05/08/2013 10:17 AM    Triglycerides (POC) 266 01/17/2018 04:18 PM    CHOL/HDL Ratio 7.3 (H) 10/08/2010 01:10 PM         CT Results (recent):  No results found for this or any previous visit.     MRI Results (recent):    Results from [facility] encounter on 04/13/18   MRI [facility] SPINE WO CONT   Narrative EXAM:  MRI [facility] SPINE WO CONT    INDICATION: ms    COMPARISON: 6/6/2013    TECHNIQUE:   MR imaging of the thoracic spine was performed with the following sequences:  sagittal T1, T2, stir; axial T1, T2. The study was performed without IV  contrast, as requested. The study was obtained on the 0.7 Marily open MRI unit  because of the patient's claustrophobia. Rafael Heard FINDINGS:  The thoracic spinal cord appears somewhat atrophic. Because of limited spatial  and contrast resolution of the study and mild motion artifact, discrete lesions  are difficult to identify. Axial T2-weighted images suggest multiple scattered  foci of T2 hyperintensity peripherally in the spinal cord. These appear more  prominent currently than on the previous study. There is unchanged mild central disc protrusion at T7-8, with mild indentation  of the ventral cord. There is mild to moderate central disc protrusion at T8-9, with mild indentation  of the ventral cord, slightly increased. There is marked left facet hypertrophy at T9-10 with right anterior displacement  and moderate compression of the spinal cord, unchanged. Impression IMPRESSION:      1. Study somewhat limited by motion, spatial resolution, and contrast  resolution. Atrophic spinal cord with multiple small focal lesions, more clearly  demonstrated currently than on previous study. 2. Unchanged T9-10 cord compression related to left facet hypertrophy. Slight  increase in mild to moderate central disc protrusion at T8-9 and mild central  disc protrusion at T7-8. IR Results (recent):  No results found for this or any previous visit. VAS/US Results (recent):  No results found for this or any previous visit. PHYSICAL EXAM:  General:    Alert, cooperative, no distress, appears stated age. Head:   Normocephalic, without obvious abnormality, atraumatic. Eyes:   Conjunctivae/corneas clear. PERRLA  Nose:  Nares normal. No drainage or sinus tenderness.   Throat:    Lips, mucosa, and tongue normal.  No Thrush  Neck:  Supple, symmetrical,  no adenopathy, thyroid: non tender    no carotid bruit and no JVD. Paraspinal tenderness. Back:    Symmetric,  Bilateral tenderness. Lungs:   Clear to auscultation bilaterally. No Wheezing or Rhonchi. No rales. Chest wall:  No tenderness or deformity. No Accessory muscle use. Heart:   Regular rate and rhythm,  no murmur, rub or gallop. Abdomen:   Soft, non-tender. Not distended. Bowel sounds normal. No masses  Extremities: Extremities normal, atraumatic, No cyanosis. No edema. No clubbing  Skin:     Texture, turgor normal. No rashes or lesions. Not Jaundiced  Lymph nodes: Cervical, supraclavicular normal.  Psych:  Good insight. Depressed. Not anxious or agitated. NEUROLOGICAL EXAM:  Appearance: The patient is well developed, well nourished, provides a coherent history and is in no acute distress. Mental Status: Oriented to time, place and person. Mood and affect appropriate. Cranial Nerves:   Intact visual fields. Fundi are benign. JUS, EOM's full, no nystagmus, no ptosis. Facial sensation is normal. Corneal reflexes are intact. Facial movement is symmetric. Hearing is normal bilaterally. Palate is midline with normal sternocleidomastoid and trapezius muscles are normal. Tongue is midline. Motor:  5-/5 strength in upper and 4+/5 lower proximal and distal muscles. Normal bulk and tone. No fasciculations. Reflexes:   Deep tendon reflexes 2+/4 and symmetrical.   Sensory:   Dyesesthesia to touch, pinprick and vibration. Gait:  Abnormal gait. Ataxic   Tremor:   Mild tremor noted. Cerebellar:  No cerebellar signs present. Neurovascular:  Normal heart sounds and regular rhythm, peripheral pulses intact, and no carotid bruits. Assesment  1. Multiple sclerosis ([address]  Continue management    2. Chronic pain syndrome  Continue management    3. Paresthesia  Stable    4. Bilateral carpal tunnel syndrome  Refer to hand surgeon    5.  Gait disorder  Physical therapy    6. Multiple sclerosis, secondary progressive ([facility]  Continue with Ocrevus    7. Myofascial muscle pain  Continue management    ___________________________________________________  PLAN:Medication reviewed with patient      ICD-10-CM ICD-9-CM    1. Multiple sclerosis ([facility] G35 340    2. Chronic pain syndrome G89.4 338.4    3. Paresthesia R20.2 782.0    4. Bilateral carpal tunnel syndrome G56.03 354.0    5. Gait disorder R26.9 781.2    6. Multiple sclerosis, secondary progressive ([facility] G35 340    7. Myofascial muscle pain M79.1 729.1      Follow-up Disposition:  Return in about 2 months (around 7/31/2018).          ___________________________________________________    Total time spent with patient:  []15   []25   []35   [] __ minutes    Care Plan discussed with:    [x]Patient   []Family    []Care Manager   []Consultant/Specialist :    ___________________________________________________    Attending Physician: Chuck Guzman MD

## 2018-05-31 NOTE — MR AVS SNAPSHOT
[address] [place] [address] 
 
 
 [facility] 13 
[phone] Patient: Diane Youssef MRN: [id number] :1965 Visit Information Date & Time Provider Department Dept. Phone Encounter #  
 2018 10:00 AM Syed Denson MD [facility] at [facility] [phone] [id number] Follow-up Instructions Return in about 6 weeks (around 2018). Your Appointments 2018 11:00 AM  
PROCEDURE with EMG SCHEDULE 2 [facility] at [facility]) Appt Note: emg ida lower exts; emg ida lower exts; emg ida lower exts Harmony Hunt [address] Upcoming Health Maintenance Date Due DTaP/Tdap/Td series (1 - Tdap) 1988 EYE EXAM RETINAL OR DILATED Q1 2013 MICROALBUMIN Q1 2017 FOBT Q 1 YEAR AGE 50-75 2017 HEMOGLOBIN A1C Q6M 2018 Influenza Age 5 to Adult 2018 LIPID PANEL Q1 2019 FOOT EXAM Q1 2019 MEDICARE YEARLY EXAM 2019 Allergies as of 2018  Review Complete On: 2018 By: Rosanne Martines MD  
 No Known Allergies Current Immunizations  Reviewed on 2018 No immunizations on file. Not reviewed this visit You Were Diagnosed With   
  
 Codes Comments Multiple sclerosis ([facility]    -  Primary ICD-10-CM: G35 
ICD-9-CM: 839 Chronic pain syndrome     ICD-10-CM: G89.4 ICD-9-CM: 338.4 Paresthesia     ICD-10-CM: R20.2 ICD-9-CM: 782.0 Bilateral carpal tunnel syndrome     ICD-10-CM: G56.03 
ICD-9-CM: 354.0 Gait disorder     ICD-10-CM: R26.9 ICD-9-CM: 781.2 Multiple sclerosis, secondary progressive ([facility]     ICD-10-CM: G35 
ICD-9-CM: 283 Myofascial muscle pain     ICD-10-CM: M79.1 ICD-9-CM: 729.1 Polyneuropathy     ICD-10-CM: G62.9 ICD-9-CM: 356.9 MS (multiple sclerosis) ([facility]     ICD-10-CM: G35 
ICD-9-CM: 803 Vitals BP Pulse Temp Resp Height(growth percentile) 123/86 (BP 1 Location: Right arm, BP Patient Position: Sitting) 92 98.3 °F (36.8 °C) (Temporal) 16 5' 10\" (1.778 m) Weight(growth percentile) SpO2 BMI Smoking Status 211 lb 3.2 oz (95.8 kg) 99% 30.3 kg/m2 Current Every Day Smoker Vitals History BMI and BSA Data Body Mass Index Body Surface Area  
 30.3 kg/m 2 2.18 m 2 Preferred Pharmacy Pharmacy Name Phone [address] [facility] [phone] Your Updated Medication List  
  
   
This list is accurate as of 5/31/18 11:07 AM.  Always use your most recent med list.  
  
  
  
  
 atorvastatin 80 mg tablet Commonly known as:  LIPITOR Take 1 Tab by mouth daily. baclofen 10 mg tablet Commonly known as:  LIORESAL Take 1 Tab by mouth three (3) times daily. Dalfampridine 10 mg Tb12 Commonly known as:  AMPYRA Take 10 mg by mouth two (2) times a day. dextromethorphan-quiNIDine 20-10 mg per capsule Commonly known as:  Smita Pellant Take 1 Cap by mouth every twelve (12) hours. dronabinol 5 mg capsule Commonly known as:  Max Buckle Take 1 Cap by mouth two (2) times a day. Max Daily Amount: 10 mg.  
  
 gabapentin 300 mg capsule Commonly known as:  NEURONTIN Take 1 Cap by mouth three (3) times daily. Nerve pain  
  
 lidocaine 5 % Commonly known as:  LIDODERM  
1 Patch by TransDERmal route every twenty-four (24) hours. meloxicam 15 mg tablet Commonly known as:  MOBIC Take 1 Tab by mouth daily. naloxone 2 mg/actuation Spry Use 1 spray intranasally into 1 nostril. Use a new Narcan nasal spray for subsequent doses and administer into alternating nostrils. May repeat every 2 to 3 minutes as needed. ocrelizumab 30 mg/mL Soln injection Commonly known as:  OCREVUS  
 10 mL by IntraVENous route every fourteen (14) days. oxyCODONE IR 15 mg immediate release tablet Commonly known as:  OXY-IR Take 1 Tab by mouth every eight (8) hours as needed for Pain. Max Daily Amount: 45 mg.  
  
 predniSONE 20 mg tablet Commonly known as:  Viky Leah Take 1 Tab by mouth daily (with breakfast). tadalafil 20 mg tablet Commonly known as:  CIALIS Take 1 Tab by mouth as needed. Prescriptions Printed Refills  
 oxyCODONE IR (OXY-IR) 15 mg immediate release tablet 0 Sig: Take 1 Tab by mouth every eight (8) hours as needed for Pain. Max Daily Amount: 45 mg.  
 Class: Print Route: Oral  
  
We Performed the Following REFERRAL TO HAND SURGERY [REF31 Custom] REFERRAL TO PHYSICAL THERAPY [GZO82 Custom] Comments: [facility] Follow-up Instructions Return in about 6 weeks (around 7/12/2018). To-Do List   
 05/31/2018 Neurology:  EMG NCV MOTOR WITH F/WAVE PER NERVE   
  
 11/23/2018 11:30 AM  
  Appointment with [address] [place] [address] [address] ([phone]) Referral Information Referral ID Referred By Referred To  
  
 [id number] Claire TEMPLE Not Available Visits Status Start Date End Date 1 New Request 5/31/18 5/31/19 If your referral has a status of pending review or denied, additional information will be sent to support the outcome of this decision. Referral ID Referred By Referred To  
 [id number] Solomon Sheehan MD  
   [address] [place] [address] [address] [place] [address] Phone: [phone] Fax: [phone] Visits Status Start Date End Date 1 New Request 5/31/18 5/31/19 If your referral has a status of pending review or denied, additional information will be sent to support the outcome of this decision. Introducing [address]! Dear Kenneth Scale: 
Thank you for requesting a [employer] account.   Our records indicate that you already have an active [employer] account. You can access your account anytime at [email or web address] Did you know that you can access your hospital and ER discharge instructions at any time in [employer]? You can also review all of your test results from your hospital stay or ER visit. Additional Information If you have questions, please visit the Frequently Asked Questions section of the [employer] website at [email or web address] Remember, [employer] is NOT to be used for urgent needs. For medical emergencies, dial 911. Now available from your iPhone and Android! Please provide this summary of care documentation to your next provider. Your primary care clinician is listed as Addison Osman. If you have any questions after today's visit, please call [phone].

## 2018-07-19 ENCOUNTER — OFFICE VISIT (OUTPATIENT)
Dept: NEUROLOGY | Age: 53
End: 2018-07-19

## 2018-07-19 VITALS
SYSTOLIC BLOOD PRESSURE: 101 MMHG | HEART RATE: 80 BPM | DIASTOLIC BLOOD PRESSURE: 76 MMHG | OXYGEN SATURATION: 99 % | WEIGHT: 203.4 LBS | HEIGHT: 70 IN | BODY MASS INDEX: 29.12 KG/M2 | TEMPERATURE: 98.6 F | RESPIRATION RATE: 16 BRPM

## 2018-07-19 DIAGNOSIS — G89.4 CHRONIC PAIN SYNDROME: ICD-10-CM

## 2018-07-19 DIAGNOSIS — R63.4 WEIGHT LOSS: ICD-10-CM

## 2018-07-19 DIAGNOSIS — R20.2 PARESTHESIA: ICD-10-CM

## 2018-07-19 DIAGNOSIS — R63.0 DECREASE IN APPETITE: ICD-10-CM

## 2018-07-19 DIAGNOSIS — G35 MS (MULTIPLE SCLEROSIS) (HCC): Primary | ICD-10-CM

## 2018-07-19 DIAGNOSIS — G56.03 BILATERAL CARPAL TUNNEL SYNDROME: ICD-10-CM

## 2018-07-19 DIAGNOSIS — R26.9 GAIT DISORDER: ICD-10-CM

## 2018-07-19 DIAGNOSIS — G62.9 POLYNEUROPATHY: ICD-10-CM

## 2018-07-19 RX ORDER — MEGESTROL ACETATE 40 MG/1
40 TABLET ORAL 3 TIMES DAILY
Qty: 90 TAB | Refills: 1 | Status: SHIPPED | OUTPATIENT
Start: 2018-07-19 | End: 2021-11-12

## 2018-07-19 RX ORDER — OXYCODONE HYDROCHLORIDE 15 MG/1
15 TABLET ORAL
Qty: 60 TAB | Refills: 0 | Status: SHIPPED | OUTPATIENT
Start: 2018-08-19 | End: 2018-09-14 | Stop reason: SDUPTHER

## 2018-07-19 RX ORDER — OXYCODONE HYDROCHLORIDE 15 MG/1
15 TABLET ORAL
Qty: 60 TAB | Refills: 0 | Status: SHIPPED | OUTPATIENT
Start: 2018-07-19 | End: 2018-07-19 | Stop reason: SDUPTHER

## 2018-07-19 NOTE — PROGRESS NOTES
Neurology Progress Note    NAME:  Darius Allen   :   1965   MRN:   [id number]     Date/Time:  2018  Subjective:      Darius Allen is a 48 y.o. male here today for follow-up for MS, pain, weakness and difficulty walking. Patient says is having a lot of pain, pain is sharp in nature, stabbing,  and burning sensation, it is continuous activity makes it worse. He says pain is generalized, on a scale of 1-10, he rates the level of pain to be between 6 and 7. Pain medication patient is helping, making it possible for him to be able to do things and participate in exercise program however, he has difficulty getting pain medication because she has not found any pain management. I told patient that at this time I will have him with the pain medication on until he finds a pain management clinic if the pain is getting worse. He says his  upper extremity is getting weak and sometimes it dropped things and also patient says he want to continue to strengthen his muscles. His has been worried about weight loss, he has been rapidly losing weight. Patient states smoking, and I told patient that smoking is not good for him with his condition multiple sclerosis. Patient was scheduled to have EMG nerve conduction of the lower extremities but he declined to do it.   Review of Systems - General ROS: positive for  - fatigue, malaise, night sweats, sleep disturbance and weight loss  Psychological ROS: positive for - anxiety, concentration difficulties, depression, mood swings and sleep disturbances  Ophthalmic ROS: positive for - blurry vision, decreased vision and photophobia  ENT ROS: positive for - headaches, tinnitus, vertigo and visual changes  Allergy and Immunology ROS: negative  Hematological and Lymphatic ROS: negative  Endocrine ROS: negative  Respiratory ROS: no cough, shortness of breath, or wheezing  Cardiovascular ROS: no chest pain or dyspnea on exertion  Gastrointestinal ROS: no abdominal pain, change in bowel habits, or black or bloody stools  Genito-Urinary ROS: no dysuria, trouble voiding, or hematuria  Musculoskeletal ROS: positive for - gait disturbance, joint pain, joint stiffness, muscle pain and muscular weakness  Neurological ROS: positive for - dizziness, gait disturbance, headaches, impaired coordination/balance, numbness/tingling, tremors, visual changes and weakness  Dermatological ROS: negative    Medications reviewed:  Current Outpatient Prescriptions   Medication Sig Dispense Refill    megestrol (MEGACE) 40 mg tablet Take 1 Tab by mouth three (3) times daily. 90 Tab 1    [START ON 8/19/2018] oxyCODONE IR (OXY-IR) 15 mg immediate release tablet Take 1 Tab by mouth every eight (8) hours as needed for Pain. Max Daily Amount: 45 mg. 60 Tab 0    meloxicam (MOBIC) 15 mg tablet Take 1 Tab by mouth daily. 30 Tab 1    ocrelizumab (OCREVUS) 30 mg/mL soln injection 10 mL by IntraVENous route every fourteen (14) days. 10 mL 1    dextromethorphan-quiNIDine (NUEDEXTA) 20-10 mg per capsule Take 1 Cap by mouth every twelve (12) hours. 60 Cap 2    gabapentin (NEURONTIN) 300 mg capsule Take 1 Cap by mouth three (3) times daily. Nerve pain 90 Cap 5    dronabinol (MARINOL) 5 mg capsule Take 1 Cap by mouth two (2) times a day. Max Daily Amount: 10 mg. 60 Cap 2    predniSONE (DELTASONE) 20 mg tablet Take 1 Tab by mouth daily (with breakfast). 30 Tab 0    Dalfampridine (AMPYRA) 10 mg Tb12 Take 10 mg by mouth two (2) times a day. 60 Tab 2    naloxone 2 mg/actuation spry Use 1 spray intranasally into 1 nostril. Use a new Narcan nasal spray for subsequent doses and administer into alternating nostrils. May repeat every 2 to 3 minutes as needed. 4 Each 2    tadalafil (CIALIS) 20 mg tablet Take 1 Tab by mouth as needed. 10 Tab 12    atorvastatin (LIPITOR) 80 mg tablet Take 1 Tab by mouth daily. 30 Tab 12    baclofen (LIORESAL) 10 mg tablet Take 1 Tab by mouth three (3) times daily.  90 Tab 5    lidocaine (LIDODERM) 5 % 1 Patch by TransDERmal route every twenty-four (24) hours. 30 Each 5        Objective:   Vitals:  Vitals:    07/19/18 1112   BP: 101/76   Pulse: 80   Resp: 16   Temp: 98.6 °F (37 °C)   TempSrc: Temporal   SpO2: 99%   Weight: 203 lb 6.4 oz (92.3 kg)   Height: 5' 10\" (1.778 m)   PainSc:   6   PainLoc: Generalized         Lab Data Reviewed:  Lab Results   Component Value Date/Time    WBC 5.1 05/09/2018 01:54 PM    HCT 40.8 05/09/2018 01:54 PM    HGB 13.7 05/09/2018 01:54 PM    PLATELET 953 51/53/5632 01:54 PM       Lab Results   Component Value Date/Time    Sodium 144 05/09/2018 01:54 PM    Potassium 4.2 05/09/2018 01:54 PM    Chloride 100 05/09/2018 01:54 PM    CO2 30 (H) 05/09/2018 01:54 PM    Glucose 92 05/09/2018 01:54 PM    BUN 6 05/09/2018 01:54 PM    Creatinine 0.78 05/09/2018 01:54 PM    Calcium 9.5 05/09/2018 01:54 PM       No components found for: TROPQUANT    No results found for: BELEN      Lab Results   Component Value Date/Time    Hemoglobin A1c 6.2 (H) 05/08/2013 10:17 AM    Hemoglobin A1c (POC) 5.4 01/11/2018 04:22 PM        No results found for: B12LT, FOL, RBCF    No results found for: Onelia GLOVER XBANA    Lab Results   Component Value Date/Time    Cholesterol, total 232 (H) 05/08/2013 10:17 AM    Cholesterol (POC) 195 01/17/2018 04:18 PM    HDL Cholesterol 41 05/08/2013 10:17 AM    HDL Cholesterol (POC) 37 01/17/2018 04:18 PM    LDL Cholesterol (POC) 105 01/17/2018 04:18 PM    LDL, calculated 163 (H) 05/08/2013 10:17 AM    VLDL, calculated 28 05/08/2013 10:17 AM    Triglyceride 142 05/08/2013 10:17 AM    Triglycerides (POC) 266 01/17/2018 04:18 PM    CHOL/HDL Ratio 7.3 (H) 10/08/2010 01:10 PM         CT Results (recent):  No results found for this or any previous visit.     MRI Results (recent):    Results from [place] encounter on 04/13/18   MRI [facility] SPINE WO CONT   Narrative EXAM:  MRI [facility] SPINE WO CONT    INDICATION: ms    COMPARISON: 6/6/2013    TECHNIQUE:   MR imaging of the thoracic spine was performed with the following sequences:  sagittal T1, T2, stir; axial T1, T2. The study was performed without IV  contrast, as requested. The study was obtained on the 0.7 Marily open MRI unit  because of the patient's claustrophobia. Mliss Daniels FINDINGS:  The thoracic spinal cord appears somewhat atrophic. Because of limited spatial  and contrast resolution of the study and mild motion artifact, discrete lesions  are difficult to identify. Axial T2-weighted images suggest multiple scattered  foci of T2 hyperintensity peripherally in the spinal cord. These appear more  prominent currently than on the previous study. There is unchanged mild central disc protrusion at T7-8, with mild indentation  of the ventral cord. There is mild to moderate central disc protrusion at T8-9, with mild indentation  of the ventral cord, slightly increased. There is marked left facet hypertrophy at T9-10 with right anterior displacement  and moderate compression of the spinal cord, unchanged. Impression IMPRESSION:      1. Study somewhat limited by motion, spatial resolution, and contrast  resolution. Atrophic spinal cord with multiple small focal lesions, more clearly  demonstrated currently than on previous study. 2. Unchanged T9-10 cord compression related to left facet hypertrophy. Slight  increase in mild to moderate central disc protrusion at T8-9 and mild central  disc protrusion at T7-8. IR Results (recent):  No results found for this or any previous visit. VAS/US Results (recent):  No results found for this or any previous visit. PHYSICAL EXAM:  General:    Alert, cooperative, no distress, appears stated age. Head:   Normocephalic, without obvious abnormality, atraumatic. Eyes:   Conjunctivae/corneas clear. PERRLA  Nose:  Nares normal. No drainage or sinus tenderness.   Throat:    Lips, mucosa, and tongue normal.  No Thrush  Neck:  Supple, symmetrical,  no adenopathy, thyroid: non tender    no carotid bruit and no JVD. Paraspinal tenderness  Back:    Symmetric, bilateral tenderness. Lungs:   Clear to auscultation bilaterally. No Wheezing or Rhonchi. No rales. Chest wall:  No tenderness or deformity. No Accessory muscle use. Heart:   Regular rate and rhythm,  no murmur, rub or gallop. Abdomen:   Soft, non-tender. Not distended. Bowel sounds normal. No masses  Extremities: Extremities normal, atraumatic, No cyanosis. No edema. No clubbing  Skin:     Texture, turgor normal. No rashes or lesions. Not Jaundiced  Lymph nodes: Cervical, supraclavicular normal.  Psych:  Good insight. Depressed. Anxious . NEUROLOGICAL EXAM:  Appearance: The patient is well developed, well nourished, provides a coherent history and is in no acute distress. Mental Status: Oriented to time, place and person. Mood and affect appropriate. Cranial Nerves:   Intact visual fields. Fundi are benign. JUS, EOM's full, no nystagmus, no ptosis. Facial sensation is normal. Corneal reflexes are intact. Facial movement is symmetric. Hearing is normal bilaterally. Palate is midline with normal sternocleidomastoid and trapezius muscles are normal. Tongue is midline. Motor:  4+/5 strength in upper and 4/5 lower proximal and distal muscles. Normal bulk and tone. No fasciculations. Reflexes:   Deep tendon reflexes 3+/4 and symmetrical.  Clonus   Sensory:    Dysesthesia to touch, pinprick and vibration. Gait:  Abnormal gait. Ambulates with cane   Tremor:   No tremor noted. Cerebellar:  No cerebellar signs present. Neurovascular:  Normal heart sounds and regular rhythm, peripheral pulses intact, and no carotid bruits. Assesment  1. MS (multiple sclerosis) ([facility]  Continue management    2. Chronic pain syndrome  Continue management  Referred to pain management    3. Polyneuropathy  Continue management    4. Gait disorder  Physical therapy    5.  Bilateral carpal tunnel syndrome  Carpal tunnel release    6. Paresthesia  Stable    ___________________________________________________  PLAN: Medication reviewed with patient  Advised on the dangers of tobacco abuse  Advised on the benefits of discontinuation   Advised on available treatments. 10 minutes spent on counseling. ICD-10-CM ICD-9-CM    1. MS (multiple sclerosis) ([facility]) G35 340 oxyCODONE IR (OXY-IR) 15 mg immediate release tablet      DISCONTINUED: oxyCODONE IR (OXY-IR) 15 mg immediate release tablet   2. Chronic pain syndrome G89.4 338.4    3. Polyneuropathy G62.9 356.9    4. Gait disorder R26.9 781.2    5. Bilateral carpal tunnel syndrome G56.03 354.0    6. Paresthesia R20.2 782.0    7. Weight loss R63.4 783.21 megestrol (MEGACE) 40 mg tablet   8. Decrease in appetite R63.0 783.0 megestrol (MEGACE) 40 mg tablet     Follow-up Disposition:  Return in about 2 months (around 9/19/2018).           ___________________________________________________    Total time spent with patient:  []15   []25   []35   [] __ minutes    Care Plan discussed with:    [x]Patient   []Family    []Care Manager   []Consultant/Specialist :    ___________________________________________________    Attending Physician: Ricki Libman, MD

## 2018-07-19 NOTE — PROGRESS NOTES
Chief Complaint   Patient presents with    Multiple Sclerosis    Decreased Appetite     1. Have you been to the ER, urgent care clinic since your last visit? Hospitalized since your last visit? no    2. Have you seen or consulted any other health care providers outside of the [address] [place] [address] since your last visit? Include any pap smears or colon screening.  no    Pill count not performed patient did not bring medications        Pill count not verified with patient  Patient reports taking medication    UDS obtained and 3/201/8  Pain contract on file   not available  Script given for Oxycodone (July and August)

## 2018-07-19 NOTE — MR AVS SNAPSHOT
[place] 
 
 
 [facility] [address] [address] 
[phone] Patient: Honorio Barron MRN: [id number] :1965 Visit Information Date & Time Provider Department Dept. Phone Encounter #  
 2018 11:20 AM Syed Jonas MD [facility] at [address] [place] [address] Dr [id number] Follow-up Instructions Return in about 2 months (around 2018). Upcoming Health Maintenance Date Due DTaP/Tdap/Td series (1 - Tdap) 1988 EYE EXAM RETINAL OR DILATED Q1 2013 MICROALBUMIN Q1 2017 FOBT Q 1 YEAR AGE 50-75 2017 HEMOGLOBIN A1C Q6M 2018 Influenza Age 5 to Adult 2018 LIPID PANEL Q1 2019 FOOT EXAM Q1 2019 MEDICARE YEARLY EXAM 2019 Allergies as of 2018  Review Complete On: 2018 By: Severo Hall MD  
 No Known Allergies Current Immunizations  Reviewed on 2018 No immunizations on file. Not reviewed this visit You Were Diagnosed With   
  
 Codes Comments [facility]    -  Primary ICD-10-CM: G35 
ICD-9-CM: 695 Chronic pain syndrome     ICD-10-CM: G89.4 ICD-9-CM: 338.4 Polyneuropathy     ICD-10-CM: G62.9 ICD-9-CM: 356.9 Gait disorder     ICD-10-CM: R26.9 ICD-9-CM: 170. 2 Bilateral carpal tunnel syndrome     ICD-10-CM: G56.03 
ICD-9-CM: 354.0 Paresthesia     ICD-10-CM: R20.2 ICD-9-CM: 782.0 Weight loss     ICD-10-CM: R63.4 ICD-9-CM: 783.21 Decrease in appetite     ICD-10-CM: R63.0 ICD-9-CM: 600. 0 Vitals BP Pulse Temp Resp Height(growth percentile) Weight(growth percentile) 101/76 (BP 1 Location: Right arm, BP Patient Position: Sitting) 80 98.6 °F (37 °C) (Temporal) 16 5' 10\" (1.778 m) 203 lb 6.4 oz (92.3 kg) SpO2 BMI Smoking Status 99% 29.18 kg/m2 Current Every Day Smoker Vitals History BMI and BSA Data Body Mass Index Body Surface Area  
 29.18 kg/m 2 2.14 m 2 Preferred Pharmacy Pharmacy Name Phone [facility], [address] Darlene Schaeffer [phone] Your Updated Medication List  
  
   
This list is accurate as of 7/19/18 11:57 AM.  Always use your most recent med list.  
  
  
  
  
 atorvastatin 80 mg tablet Commonly known as:  LIPITOR Take 1 Tab by mouth daily. baclofen 10 mg tablet Commonly known as:  LIORESAL Take 1 Tab by mouth three (3) times daily. Dalfampridine 10 mg Tb12 Commonly known as:  AMPYRA Take 10 mg by mouth two (2) times a day. dextromethorphan-quiNIDine 20-10 mg per capsule Commonly known as:  Gideon Lav Take 1 Cap by mouth every twelve (12) hours. dronabinol 5 mg capsule Commonly known as:  Lenon Price Take 1 Cap by mouth two (2) times a day. Max Daily Amount: 10 mg.  
  
 gabapentin 300 mg capsule Commonly known as:  NEURONTIN Take 1 Cap by mouth three (3) times daily. Nerve pain  
  
 lidocaine 5 % Commonly known as:  LIDODERM  
1 Patch by TransDERmal route every twenty-four (24) hours. megestrol 40 mg tablet Commonly known as:  MEGACE Take 1 Tab by mouth three (3) times daily. meloxicam 15 mg tablet Commonly known as:  MOBIC Take 1 Tab by mouth daily. naloxone 2 mg/actuation Spry Use 1 spray intranasally into 1 nostril. Use a new Narcan nasal spray for subsequent doses and administer into alternating nostrils. May repeat every 2 to 3 minutes as needed. ocrelizumab 30 mg/mL Soln injection Commonly known as:  OCREVUS  
10 mL by IntraVENous route every fourteen (14) days. oxyCODONE IR 15 mg immediate release tablet Commonly known as:  OXY-IR Take 1 Tab by mouth every eight (8) hours as needed for Pain. Max Daily Amount: 45 mg. Start taking on:  8/19/2018  
  
 predniSONE 20 mg tablet Commonly known as:  Andrés Mota  
 Take 1 Tab by mouth daily (with breakfast). tadalafil 20 mg tablet Commonly known as:  CIALIS Take 1 Tab by mouth as needed. Prescriptions Printed Refills  
 oxyCODONE IR (OXY-IR) 15 mg immediate release tablet 0 Starting on: 8/19/2018 Sig: Take 1 Tab by mouth every eight (8) hours as needed for Pain. Max Daily Amount: 45 mg.  
 Class: Print Route: Oral  
  
Prescriptions Sent to Pharmacy Refills  
 megestrol (MEGACE) 40 mg tablet 1 Sig: Take 1 Tab by mouth three (3) times daily. Class: Normal  
 Pharmacy: [facility] [address] [facility] [address], [address] [facility] #: [phone] Route: Oral  
  
Follow-up Instructions Return in about 2 months (around 9/19/2018). To-Do List   
 11/23/2018 11:30 AM  
  Appointment with 0 [facility] 2 at [address] [facility] ([phone]) Introducing [facility]! Dear Aiden Vides: 
Thank you for requesting a [employer] account. Our records indicate that you already have an active [employer] account. You can access your account anytime at [email or web address] Did you know that you can access your hospital and ER discharge instructions at any time in [employer]? You can also review all of your test results from your hospital stay or ER visit. Additional Information If you have questions, please visit the Frequently Asked Questions section of the [employer] website at [email or web address] Remember, [employer] is NOT to be used for urgent needs. For medical emergencies, dial 911. Now available from your iPhone and Android! Please provide this summary of care documentation to your next provider. Your primary care clinician is listed as Claude Fabry. If you have any questions after today's visit, please call [phone].

## 2018-09-14 ENCOUNTER — OFFICE VISIT (OUTPATIENT)
Dept: NEUROLOGY | Age: 53
End: 2018-09-14

## 2018-09-14 VITALS
HEIGHT: 70 IN | TEMPERATURE: 98.3 F | BODY MASS INDEX: 30.49 KG/M2 | DIASTOLIC BLOOD PRESSURE: 90 MMHG | HEART RATE: 72 BPM | OXYGEN SATURATION: 99 % | SYSTOLIC BLOOD PRESSURE: 141 MMHG | RESPIRATION RATE: 16 BRPM | WEIGHT: 213 LBS

## 2018-09-14 DIAGNOSIS — H92.01 RIGHT EAR PAIN: ICD-10-CM

## 2018-09-14 DIAGNOSIS — G89.4 CHRONIC PAIN SYNDROME: ICD-10-CM

## 2018-09-14 DIAGNOSIS — G35 MS (MULTIPLE SCLEROSIS) (HCC): ICD-10-CM

## 2018-09-14 DIAGNOSIS — G95.9 CERVICAL MYELOPATHY (HCC): Primary | ICD-10-CM

## 2018-09-14 DIAGNOSIS — R26.9 GAIT DISORDER: ICD-10-CM

## 2018-09-14 DIAGNOSIS — G62.9 NEUROPATHY: ICD-10-CM

## 2018-09-14 RX ORDER — OXYCODONE HYDROCHLORIDE 15 MG/1
15 TABLET ORAL
Qty: 60 TAB | Refills: 0 | Status: SHIPPED | OUTPATIENT
Start: 2018-10-14 | End: 2018-12-07 | Stop reason: SDUPTHER

## 2018-09-14 RX ORDER — OXYCODONE HYDROCHLORIDE 15 MG/1
15 TABLET ORAL
Qty: 60 TAB | Refills: 0 | Status: SHIPPED | OUTPATIENT
Start: 2018-09-14 | End: 2018-09-14 | Stop reason: SDUPTHER

## 2018-09-14 NOTE — PROGRESS NOTES
Chief Complaint   Patient presents with    Multiple Sclerosis     1. Have you been to the ER, urgent care clinic since your last visit? Hospitalized since your last visit? no    2. Have you seen or consulted any other health care providers outside of the [facility] since your last visit? Include any pap smears or colon screening.  no

## 2018-09-14 NOTE — MR AVS SNAPSHOT
[address] [place] [address] 
[phone] Patient: Bebeto Flores MRN: [id number] :1965 Visit Information Date & Time Provider Department Dept. Phone Encounter #  
 2018  1:40 PM Syed Gonzalez MD [facility] Neurology Clinic at [address] [place] [address] [id number] Follow-up Instructions Return in about 2 months (around 2018). Upcoming Health Maintenance Date Due DTaP/Tdap/Td series (1 - Tdap) 1988 EYE EXAM RETINAL OR DILATED Q1 2013 MICROALBUMIN Q1 2017 FOBT Q 1 YEAR AGE 50-75 2017 HEMOGLOBIN A1C Q6M 2018 Influenza Age 5 to Adult 2018 LIPID PANEL Q1 2019 FOOT EXAM Q1 2019 MEDICARE YEARLY EXAM 2019 Allergies as of 2018  Review Complete On: 2018 By: Vivi Hakns MD  
 No Known Allergies Current Immunizations  Reviewed on 2018 No immunizations on file. Not reviewed this visit You Were Diagnosed With   
  
 Codes Comments Multiple sclerosis [facility]    -  Primary ICD-10-CM: G35 
ICD-9-CM: 469 Cervical myelopathy (HCC)     ICD-10-CM: G95.9 ICD-9-CM: 721.1 Chronic pain syndrome     ICD-10-CM: G89.4 ICD-9-CM: 338.4 Gait disorder     ICD-10-CM: R26.9 ICD-9-CM: 781.2 Neuropathy     ICD-10-CM: G62.9 ICD-9-CM: 355.9 Right ear pain     ICD-10-CM: H92.01 
ICD-9-CM: 388.70   
 MS (multiple sclerosis) ([facility]     ICD-10-CM: G35 
ICD-9-CM: 308 Vitals BP Pulse Temp Resp Height(growth percentile) 141/90 (BP 1 Location: Right arm, BP Patient Position: Sitting) 72 98.3 °F (36.8 °C) (Temporal) 16 5' 10\" (1.778 m) Weight(growth percentile) SpO2 BMI Smoking Status 213 lb (96.6 kg) 99% 30.56 kg/m2 Current Every Day Smoker BMI and BSA Data Body Mass Index Body Surface Area 30.56 kg/m 2 2.18 m 2 Preferred Pharmacy Pharmacy Name Phone [facility] [address], [address] Darlene Schaeffer [phone] Your Updated Medication List  
  
   
This list is accurate as of 9/14/18  2:04 PM.  Always use your most recent med list.  
  
  
  
  
 atorvastatin 80 mg tablet Commonly known as:  LIPITOR Take 1 Tab by mouth daily. baclofen 10 mg tablet Commonly known as:  LIORESAL Take 1 Tab by mouth three (3) times daily. Dalfampridine 10 mg Tb12 Commonly known as:  AMPYRA Take 10 mg by mouth two (2) times a day. dextromethorphan-quiNIDine 20-10 mg per capsule Commonly known as:  Dash Cabot Take 1 Cap by mouth every twelve (12) hours. dronabinol 5 mg capsule Commonly known as:  Lorre Mourning Take 1 Cap by mouth two (2) times a day. Max Daily Amount: 10 mg.  
  
 gabapentin 300 mg capsule Commonly known as:  NEURONTIN Take 1 Cap by mouth three (3) times daily. Nerve pain  
  
 lidocaine 5 % Commonly known as:  LIDODERM  
1 Patch by TransDERmal route every twenty-four (24) hours. megestrol 40 mg tablet Commonly known as:  MEGACE Take 1 Tab by mouth three (3) times daily. meloxicam 15 mg tablet Commonly known as:  MOBIC Take 1 Tab by mouth daily. naloxone 2 mg/actuation Spry Use 1 spray intranasally into 1 nostril. Use a new Narcan nasal spray for subsequent doses and administer into alternating nostrils. May repeat every 2 to 3 minutes as needed. ocrelizumab 30 mg/mL Soln injection Commonly known as:  OCREVUS  
10 mL by IntraVENous route every fourteen (14) days. oxyCODONE IR 15 mg immediate release tablet Commonly known as:  OXY-IR Take 1 Tab by mouth every eight (8) hours as needed for Pain. Max Daily Amount: 45 mg. Start taking on:  10/14/2018  
  
 predniSONE 20 mg tablet Commonly known as:  Nima Dally Take 1 Tab by mouth daily (with breakfast). tadalafil 20 mg tablet Commonly known as:  CIALIS  
 Take 1 Tab by mouth as needed. Prescriptions Printed Refills  
 oxyCODONE IR (OXY-IR) 15 mg immediate release tablet 0 Starting on: 10/14/2018 Sig: Take 1 Tab by mouth every eight (8) hours as needed for Pain. Max Daily Amount: 45 mg.  
 Class: Print Route: Oral  
  
Follow-up Instructions Return in about 2 months (around 11/14/2018). To-Do List   
 11/23/2018 11:30 AM  
  Appointment with 0 [facility] 2 at [address] [facility] ([phone]) Introducing [facility]! Dear Hector Figueredo: 
Thank you for requesting a [employer] account. Our records indicate that you already have an active [employer] account. You can access your account anytime at [email or web address] Did you know that you can access your hospital and ER discharge instructions at any time in [employer]? You can also review all of your test results from your hospital stay or ER visit. Additional Information If you have questions, please visit the Frequently Asked Questions section of the [employer] website at [email or web address] Remember, [employer] is NOT to be used for urgent needs. For medical emergencies, dial 911. Now available from your iPhone and Android! Please provide this summary of care documentation to your next provider. Your primary care clinician is listed as Court Mendez. If you have any questions after today's visit, please call [phone].

## 2018-09-14 NOTE — PROGRESS NOTES
Neurology Progress Note    NAME:  Tessa Elliott   :   1965   MRN:   [id number]     Date/Time:  2018  Subjective:      Tessa Elliott is a 48 y.o. male here today for follow-up for multiple sclerosis, pain, weakness. Patient says having a lot of pain, and recently developed pain in the right ear, pain is sharp and constant, he says his primary care physician usually gives something for the ear pain. Neck pain is sharp in nature, continuous, burning sensation that goes around to the arms, making the arms weak. He says he drops things at times. Says recently has joined the gym which is also helping to strengthen the extremity. Back pain is also sharp and constant, goes on to the legs causing numbness and tingling sensation of the legs with weakness, he says he is working on the right side. Using his cane, has not had falls, but he has had near falls. Patient is advised to quit smoking as this is making multiple sclerosis worse. Denies loss of consciousness, dysphagia, odynophagia. This patient is yet to find a pain management specialist, I will continue to manage patient's pain as this is part of multiple sclerosis syndrome.   Review of Systems - General ROS: positive for  - fatigue, night sweats and sleep disturbance  Psychological ROS: positive for - anxiety, concentration difficulties, depression, mood swings and sleep disturbances  Ophthalmic ROS: positive for - blurry vision, decreased vision and photophobia  ENT ROS: positive for - headaches, tinnitus, vertigo and visual changes  Allergy and Immunology ROS: negative  Hematological and Lymphatic ROS: negative  Endocrine ROS: negative  Respiratory ROS: no cough, shortness of breath, or wheezing  Cardiovascular ROS: no chest pain or dyspnea on exertion  Gastrointestinal ROS: no abdominal pain, change in bowel habits, or black or bloody stools  Genito-Urinary ROS: no dysuria, trouble voiding, or hematuria  Musculoskeletal ROS: positive for - gait disturbance, joint pain, joint stiffness, joint swelling, muscle pain and muscular weakness  Neurological ROS: positive for - dizziness, gait disturbance, headaches, impaired coordination/balance, memory loss, numbness/tingling, visual changes and weakness  Dermatological ROS: negative    Medications reviewed:  Current Outpatient Prescriptions   Medication Sig Dispense Refill    megestrol (MEGACE) 40 mg tablet Take 1 Tab by mouth three (3) times daily. 90 Tab 1    oxyCODONE IR (OXY-IR) 15 mg immediate release tablet Take 1 Tab by mouth every eight (8) hours as needed for Pain. Max Daily Amount: 45 mg. 60 Tab 0    meloxicam (MOBIC) 15 mg tablet Take 1 Tab by mouth daily. 30 Tab 1    ocrelizumab (OCREVUS) 30 mg/mL soln injection 10 mL by IntraVENous route every fourteen (14) days. 10 mL 1    gabapentin (NEURONTIN) 300 mg capsule Take 1 Cap by mouth three (3) times daily. Nerve pain 90 Cap 5    dronabinol (MARINOL) 5 mg capsule Take 1 Cap by mouth two (2) times a day. Max Daily Amount: 10 mg. 60 Cap 2    predniSONE (DELTASONE) 20 mg tablet Take 1 Tab by mouth daily (with breakfast). 30 Tab 0    Dalfampridine (AMPYRA) 10 mg Tb12 Take 10 mg by mouth two (2) times a day. 60 Tab 2    naloxone 2 mg/actuation spry Use 1 spray intranasally into 1 nostril. Use a new Narcan nasal spray for subsequent doses and administer into alternating nostrils. May repeat every 2 to 3 minutes as needed. 4 Each 2    tadalafil (CIALIS) 20 mg tablet Take 1 Tab by mouth as needed. 10 Tab 12    atorvastatin (LIPITOR) 80 mg tablet Take 1 Tab by mouth daily. 30 Tab 12    baclofen (LIORESAL) 10 mg tablet Take 1 Tab by mouth three (3) times daily. 90 Tab 5    dextromethorphan-quiNIDine (NUEDEXTA) 20-10 mg per capsule Take 1 Cap by mouth every twelve (12) hours. 60 Cap 2    lidocaine (LIDODERM) 5 % 1 Patch by TransDERmal route every twenty-four (24) hours.  30 Each 5        Objective:   Vitals:  Vitals:    09/14/18 1340   BP: 141/90 Pulse: 72   Resp: 16   Temp: 98.3 °F (36.8 °C)   TempSrc: Temporal   SpO2: 99%   Weight: 213 lb (96.6 kg)   Height: 5' 10\" (1.778 m)   PainSc:   8   PainLoc: Ear       Lab Data Reviewed:  Lab Results   Component Value Date/Time    WBC 5.1 05/09/2018 01:54 PM    HCT 40.8 05/09/2018 01:54 PM    HGB 13.7 05/09/2018 01:54 PM    PLATELET 859 91/96/2102 01:54 PM       Lab Results   Component Value Date/Time    Sodium 144 05/09/2018 01:54 PM    Potassium 4.2 05/09/2018 01:54 PM    Chloride 100 05/09/2018 01:54 PM    CO2 30 (H) 05/09/2018 01:54 PM    Glucose 92 05/09/2018 01:54 PM    BUN 6 05/09/2018 01:54 PM    Creatinine 0.78 05/09/2018 01:54 PM    Calcium 9.5 05/09/2018 01:54 PM       No components found for: TROPQUANT    No results found for: BELEN      Lab Results   Component Value Date/Time    Hemoglobin A1c 6.2 (H) 05/08/2013 10:17 AM    Hemoglobin A1c (POC) 5.4 01/11/2018 04:22 PM        No results found for: B12LT, FOL, RBCF    No results found for: BELEN, Lucredawna Simones, XBANA    Lab Results   Component Value Date/Time    Cholesterol, total 232 (H) 05/08/2013 10:17 AM    Cholesterol (POC) 195 01/17/2018 04:18 PM    HDL Cholesterol 41 05/08/2013 10:17 AM    HDL Cholesterol (POC) 37 01/17/2018 04:18 PM    LDL Cholesterol (POC) 105 01/17/2018 04:18 PM    LDL, calculated 163 (H) 05/08/2013 10:17 AM    VLDL, calculated 28 05/08/2013 10:17 AM    Triglyceride 142 05/08/2013 10:17 AM    Triglycerides (POC) 266 01/17/2018 04:18 PM    CHOL/HDL Ratio 7.3 (H) 10/08/2010 01:10 PM         CT Results (recent):  No results found for this or any previous visit. MRI Results (recent):    Results from [place] encounter on 04/13/18   MRI [facility] SPINE WO CONT   Narrative EXAM:  MRI [facility] SPINE WO CONT    INDICATION: ms    COMPARISON: 6/6/2013    TECHNIQUE:   MR imaging of the thoracic spine was performed with the following sequences:  sagittal T1, T2, stir; axial T1, T2.   The study was performed without IV  contrast, as requested. The study was obtained on the 0.7 [facility] open MRI unit  because of the patient's claustrophobia. Candance Huger FINDINGS:  The thoracic spinal cord appears somewhat atrophic. Because of limited spatial  and contrast resolution of the study and mild motion artifact, discrete lesions  are difficult to identify. Axial T2-weighted images suggest multiple scattered  foci of T2 hyperintensity peripherally in the spinal cord. These appear more  prominent currently than on the previous study. There is unchanged mild central disc protrusion at T7-8, with mild indentation  of the ventral cord. There is mild to moderate central disc protrusion at T8-9, with mild indentation  of the ventral cord, slightly increased. There is marked left facet hypertrophy at T9-10 with right anterior displacement  and moderate compression of the spinal cord, unchanged. Impression IMPRESSION:      1. Study somewhat limited by motion, spatial resolution, and contrast  resolution. Atrophic spinal cord with multiple small focal lesions, more clearly  demonstrated currently than on previous study. 2. Unchanged T9-10 cord compression related to left facet hypertrophy. Slight  increase in mild to moderate central disc protrusion at T8-9 and mild central  disc protrusion at T7-8. IR Results (recent):  No results found for this or any previous visit. VAS/US Results (recent):  No results found for this or any previous visit. PHYSICAL EXAM:  General:    Alert, cooperative, no distress, appears stated age. Head:   Normocephalic, without obvious abnormality, atraumatic. Eyes:   Conjunctivae/corneas clear. PERRLA  Nose:  Nares normal. No drainage or sinus tenderness. Throat:    Lips, mucosa, and tongue normal.  No Thrush  Neck:  Supple, symmetrical,  no adenopathy, thyroid: non tender    no carotid bruit and no JVD. Paraspinal tendon  Back:    Symmetric, bilateral tenderness.   Lungs:   Clear to auscultation bilaterally. No Wheezing or Rhonchi. No rales. Chest wall:  No tenderness or deformity. No Accessory muscle use. Heart:   Regular rate and rhythm,  no murmur, rub or gallop. Abdomen:   Soft, non-tender. Not distended. Bowel sounds normal. No masses  Extremities: Extremities normal, atraumatic, No cyanosis. No edema. No clubbing  Skin:     Texture, turgor normal. No rashes or lesions. Not Jaundiced  Lymph nodes: Cervical, supraclavicular normal.  Psych:  Good insight. Not depressed. Not anxious or agitated. NEUROLOGICAL EXAM:  Appearance: The patient is well developed, well nourished, provides a coherent history and is in no acute distress. Mental Status: Oriented to time, place and person. Mood and affect appropriate. Cranial Nerves:   Intact visual fields. Fundi are benign. JUS, EOM's full, no nystagmus, no ptosis. Facial sensation is normal. Corneal reflexes are intact. Facial movement is symmetric. Hearing is normal bilaterally. Palate is midline with normal sternocleidomastoid and trapezius muscles are normal. Tongue is midline. Motor:  4+/5 strength in right upper and 3+/5 lower proximal and distal muscles. Normal bulk and tone. No fasciculations. Reflexes:   Deep tendon reflexes 2+/4 and symmetrical.   Sensory:    Dysesthesia to touch, pinprick and vibration. Gait:  Abnormal gait. Ambulates with cane   Tremor:   No tremor noted. Cerebellar:  No cerebellar signs present. Neurovascular:  Normal heart sounds and regular rhythm, peripheral pulses intact, and no carotid bruits. Assesment  1. Multiple sclerosis ([address]  Continue management    2. Cervical myelopathy (HCC)  Physical therapy    3. Chronic pain syndrome  Continue management    4. Gait disorder  Continue physical therapy exercise program    5. Neuropathy  Continue management    6.  Right ear pain  Follow-up with PCP    ___________________________________________________  PLAN: Medication and plan discussed with patient      ICD-10-CM ICD-9-CM    1. Multiple sclerosis ([address] G35 340    2. Cervical myelopathy (HCC) G95.9 721.1    3. Chronic pain syndrome G89.4 338.4    4. Gait disorder R26.9 781.2    5. Neuropathy G62.9 355.9    6. Right ear pain H92.01 388.70    Advised on the dangers of tobacco abuse  Advised on the benefits of discontinuation   Advised on available treatments. 10 minutes spent on counseling. Follow-up Disposition:  Return in about 2 months (around 11/14/2018).            ___________________________________________________    Total time spent with patient:  []15   []25   []35   [] __ minutes    Care Plan discussed with:    [x]Patient   []Family    []Care Manager   []Consultant/Specialist :    ___________________________________________________    Attending Physician: Vannesa Venegas MD

## 2018-11-23 ENCOUNTER — HOSPITAL ENCOUNTER (OUTPATIENT)
Dept: INFUSION THERAPY | Age: 53
End: 2018-11-23

## 2018-12-03 RX ORDER — DIPHENHYDRAMINE HYDROCHLORIDE 50 MG/ML
50 INJECTION, SOLUTION INTRAMUSCULAR; INTRAVENOUS
Status: ACTIVE | OUTPATIENT
Start: 2018-12-05 | End: 2018-12-05

## 2018-12-03 RX ORDER — ACETAMINOPHEN 325 MG/1
650 TABLET ORAL
Status: ACTIVE | OUTPATIENT
Start: 2018-12-05 | End: 2018-12-05

## 2018-12-03 RX ORDER — ACETAMINOPHEN 325 MG/1
650 TABLET ORAL ONCE
Status: COMPLETED | OUTPATIENT
Start: 2018-12-05 | End: 2018-12-05

## 2018-12-03 RX ORDER — SODIUM CHLORIDE 9 MG/ML
25 INJECTION, SOLUTION INTRAVENOUS CONTINUOUS
Status: DISPENSED | OUTPATIENT
Start: 2018-12-05 | End: 2018-12-05

## 2018-12-03 RX ORDER — DIPHENHYDRAMINE HCL 25 MG
50 CAPSULE ORAL ONCE
Status: COMPLETED | OUTPATIENT
Start: 2018-12-05 | End: 2018-12-05

## 2018-12-04 RX ORDER — SODIUM CHLORIDE 0.9 % (FLUSH) 0.9 %
5-10 SYRINGE (ML) INJECTION AS NEEDED
Status: ACTIVE | OUTPATIENT
Start: 2018-12-05 | End: 2018-12-05

## 2018-12-05 ENCOUNTER — HOSPITAL ENCOUNTER (OUTPATIENT)
Dept: INFUSION THERAPY | Age: 53
Discharge: HOME | End: 2018-12-05
Payer: MEDICARE

## 2018-12-05 VITALS
HEART RATE: 72 BPM | RESPIRATION RATE: 18 BRPM | SYSTOLIC BLOOD PRESSURE: 129 MMHG | OXYGEN SATURATION: 100 % | DIASTOLIC BLOOD PRESSURE: 89 MMHG | TEMPERATURE: 97.8 F

## 2018-12-05 LAB
ALBUMIN SERPL-MCNC: 3.7 G/DL (ref 3.5–5)
ALBUMIN/GLOB SERPL: 0.9 {RATIO} (ref 1.1–2.2)
ALP SERPL-CCNC: 41 U/L (ref 45–117)
ALT SERPL-CCNC: 21 U/L (ref 12–78)
ANION GAP SERPL CALC-SCNC: 6 MMOL/L (ref 5–15)
AST SERPL-CCNC: 15 U/L (ref 15–37)
BASOPHILS # BLD: 0 K/UL (ref 0–0.1)
BASOPHILS NFR BLD: 1 % (ref 0–1)
BILIRUB SERPL-MCNC: 0.4 MG/DL (ref 0.2–1)
BUN SERPL-MCNC: 8 MG/DL (ref 6–20)
BUN/CREAT SERPL: 8 (ref 12–20)
CALCIUM SERPL-MCNC: 9.2 MG/DL (ref 8.5–10.1)
CHLORIDE SERPL-SCNC: 103 MMOL/L (ref 97–108)
CO2 SERPL-SCNC: 31 MMOL/L (ref 21–32)
CREAT SERPL-MCNC: 1.03 MG/DL (ref 0.7–1.3)
DIFFERENTIAL METHOD BLD: NORMAL
EOSINOPHIL # BLD: 0.3 K/UL (ref 0–0.4)
EOSINOPHIL NFR BLD: 5 % (ref 0–7)
ERYTHROCYTE [DISTWIDTH] IN BLOOD BY AUTOMATED COUNT: 12.5 % (ref 11.5–14.5)
GLOBULIN SER CALC-MCNC: 4 G/DL (ref 2–4)
GLUCOSE SERPL-MCNC: 91 MG/DL (ref 65–100)
HCT VFR BLD AUTO: 44.4 % (ref 36.6–50.3)
HGB BLD-MCNC: 14.4 G/DL (ref 12.1–17)
IMM GRANULOCYTES # BLD: 0 K/UL (ref 0–0.04)
IMM GRANULOCYTES NFR BLD AUTO: 0 % (ref 0–0.5)
LYMPHOCYTES # BLD: 2.4 K/UL (ref 0.8–3.5)
LYMPHOCYTES NFR BLD: 41 % (ref 12–49)
MCH RBC QN AUTO: 28.9 PG (ref 26–34)
MCHC RBC AUTO-ENTMCNC: 32.4 G/DL (ref 30–36.5)
MCV RBC AUTO: 89.2 FL (ref 80–99)
MONOCYTES # BLD: 0.6 K/UL (ref 0–1)
MONOCYTES NFR BLD: 10 % (ref 5–13)
NEUTS SEG # BLD: 2.6 K/UL (ref 1.8–8)
NEUTS SEG NFR BLD: 44 % (ref 32–75)
NRBC # BLD: 0 K/UL (ref 0–0.01)
NRBC BLD-RTO: 0 PER 100 WBC
PLATELET # BLD AUTO: 206 K/UL (ref 150–400)
PMV BLD AUTO: 9.5 FL (ref 8.9–12.9)
POTASSIUM SERPL-SCNC: 3.5 MMOL/L (ref 3.5–5.1)
PROT SERPL-MCNC: 7.7 G/DL (ref 6.4–8.2)
RBC # BLD AUTO: 4.98 M/UL (ref 4.1–5.7)
SODIUM SERPL-SCNC: 140 MMOL/L (ref 136–145)
WBC # BLD AUTO: 5.9 K/UL (ref 4.1–11.1)

## 2018-12-05 PROCEDURE — 96415 CHEMO IV INFUSION ADDL HR: CPT

## 2018-12-05 PROCEDURE — 80053 COMPREHEN METABOLIC PANEL: CPT

## 2018-12-05 PROCEDURE — 36415 COLL VENOUS BLD VENIPUNCTURE: CPT

## 2018-12-05 PROCEDURE — 85025 COMPLETE CBC W/AUTO DIFF WBC: CPT

## 2018-12-05 PROCEDURE — 96375 TX/PRO/DX INJ NEW DRUG ADDON: CPT

## 2018-12-05 PROCEDURE — 74011250637 HC RX REV CODE- 250/637: Performed by: PSYCHIATRY & NEUROLOGY

## 2018-12-05 PROCEDURE — 74011250636 HC RX REV CODE- 250/636: Performed by: PSYCHIATRY & NEUROLOGY

## 2018-12-05 PROCEDURE — 96413 CHEMO IV INFUSION 1 HR: CPT

## 2018-12-05 RX ADMIN — SODIUM CHLORIDE 25 ML/HR: 900 INJECTION, SOLUTION INTRAVENOUS at 09:10

## 2018-12-05 RX ADMIN — DIPHENHYDRAMINE HYDROCHLORIDE 50 MG: 25 CAPSULE ORAL at 09:10

## 2018-12-05 RX ADMIN — METHYLPREDNISOLONE SODIUM SUCCINATE 125 MG: 125 INJECTION, POWDER, FOR SOLUTION INTRAMUSCULAR; INTRAVENOUS at 09:17

## 2018-12-05 RX ADMIN — ACETAMINOPHEN 650 MG: 325 TABLET ORAL at 09:10

## 2018-12-05 RX ADMIN — OCRELIZUMAB 600 MG: 300 INJECTION INTRAVENOUS at 09:50

## 2018-12-05 NOTE — PROGRESS NOTES
Problem: Knowledge Deficit Goal: *Verbalizes understanding of procedures and medications Outcome: Progressing Towards Goal 
Ocrevus q 6 month Reviewed purpose and possible side effects. Pt verbalizes understanding.

## 2018-12-05 NOTE — PROGRESS NOTES
\[facility]\"" Progress Note Date: 2018 Name: Evon Miranda MRN: [id number] : 1965 Mr. Catalino Galeana Arrived ambulatory and in no distress for 6 month Ocrevus. Assessment was completed, no acute issues at this time, no new complaints voiced. PIV started R AC without difficulty, labs drawn and in process. Chemotherapy Flowsheet 2018 Cycle Week 2 Date 2018 Drug / Regimen Ocrevus Pre Meds given Notes given Mr. Sidhu's vitals were reviewed. Lab results were obtained and reviewed. Recent Results (from the past 12 hour(s)) CBC WITH AUTOMATED DIFF Collection Time: 18  8:55 AM  
Result Value Ref Range WBC 5.9 4.1 - 11.1 K/uL  
 RBC 4.98 4.10 - 5.70 M/uL  
 HGB 14.4 12.1 - 17.0 g/dL HCT 44.4 36.6 - 50.3 % MCV 89.2 80.0 - 99.0 FL  
 MCH 28.9 26.0 - 34.0 PG  
 MCHC 32.4 30.0 - 36.5 g/dL  
 RDW 12.5 11.5 - 14.5 % PLATELET 677 581 - 613 K/uL MPV 9.5 8.9 - 12.9 FL  
 NRBC 0.0 0  WBC ABSOLUTE NRBC 0.00 0.00 - 0.01 K/uL NEUTROPHILS 44 32 - 75 % LYMPHOCYTES 41 12 - 49 % MONOCYTES 10 5 - 13 % EOSINOPHILS 5 0 - 7 % BASOPHILS 1 0 - 1 % IMMATURE GRANULOCYTES 0 0.0 - 0.5 % ABS. NEUTROPHILS 2.6 1.8 - 8.0 K/UL  
 ABS. LYMPHOCYTES 2.4 0.8 - 3.5 K/UL  
 ABS. MONOCYTES 0.6 0.0 - 1.0 K/UL  
 ABS. EOSINOPHILS 0.3 0.0 - 0.4 K/UL  
 ABS. BASOPHILS 0.0 0.0 - 0.1 K/UL  
 ABS. IMM. GRANS. 0.0 0.00 - 0.04 K/UL  
 DF AUTOMATED METABOLIC PANEL, COMPREHENSIVE Collection Time: 18  8:55 AM  
Result Value Ref Range Sodium 140 136 - 145 mmol/L Potassium 3.5 3.5 - 5.1 mmol/L Chloride 103 97 - 108 mmol/L  
 CO2 31 21 - 32 mmol/L Anion gap 6 5 - 15 mmol/L Glucose 91 65 - 100 mg/dL BUN 8 6 - 20 MG/DL Creatinine 1.03 0.70 - 1.30 MG/DL  
 BUN/Creatinine ratio 8 (L) 12 - 20 GFR est AA >60 >60 ml/min/1.73m2 GFR est non-AA >60 >60 ml/min/1.73m2  Calcium 9.2 8.5 - 10.1 MG/DL  
 Bilirubin, total 0.4 0.2 - 1.0 MG/DL  
 ALT (SGPT) 21 12 - 78 U/L  
 AST (SGOT) 15 15 - 37 U/L Alk. phosphatase 41 (L) 45 - 117 U/L Protein, total 7.7 6.4 - 8.2 g/dL Albumin 3.7 3.5 - 5.0 g/dL Globulin 4.0 2.0 - 4.0 g/dL A-G Ratio 0.9 (L) 1.1 - 2.2 Pre-medications  were administered as ordered and chemotherapy was initiated. Tylenol 650mg po Benadryl 50mg po Solumedrol 125mg IV Ocrevus 600mg IV infusion Patient Vitals for the past 12 hrs: 
 Temp Pulse Resp BP SpO2  
12/05/18 1405  72  129/89   
12/05/18 1300  78  126/79   
12/05/18 1226  74  124/82   
12/05/18 1153  76 18 128/89   
12/05/18 1120  69 18 112/80   
12/05/18 1052  69 18 124/82   
12/05/18 1027  65 18 124/83   
12/05/18 0901 97.8 °F (36.6 °C) 67 18 (!) 139/97 100 % Mr. Sidhu tolerated treatment well and was discharged from [place] [id number] in stable condition at 1410. Pt. Declined full 1 hr post infusion observation. He is to return on June 5 at 0900 for his next appointment. Lauri Reason December 5, 2018

## 2018-12-07 ENCOUNTER — OFFICE VISIT (OUTPATIENT)
Dept: NEUROLOGY | Age: 53
End: 2018-12-07

## 2018-12-07 VITALS
RESPIRATION RATE: 16 BRPM | WEIGHT: 222.6 LBS | SYSTOLIC BLOOD PRESSURE: 129 MMHG | DIASTOLIC BLOOD PRESSURE: 98 MMHG | BODY MASS INDEX: 31.87 KG/M2 | HEIGHT: 70 IN | OXYGEN SATURATION: 99 % | TEMPERATURE: 98.1 F | HEART RATE: 66 BPM

## 2018-12-07 DIAGNOSIS — F51.01 PRIMARY INSOMNIA: ICD-10-CM

## 2018-12-07 DIAGNOSIS — G35 MS (MULTIPLE SCLEROSIS) (HCC): ICD-10-CM

## 2018-12-07 DIAGNOSIS — R11.0 NAUSEA: ICD-10-CM

## 2018-12-07 DIAGNOSIS — G35 MULTIPLE SCLEROSIS, SECONDARY PROGRESSIVE (HCC): ICD-10-CM

## 2018-12-07 DIAGNOSIS — Z79.891 USE OF OPIATES FOR THERAPEUTIC PURPOSES: ICD-10-CM

## 2018-12-07 DIAGNOSIS — G89.4 CHRONIC PAIN SYNDROME: Primary | ICD-10-CM

## 2018-12-07 RX ORDER — DRONABINOL 5 MG/1
5 CAPSULE ORAL 2 TIMES DAILY
Qty: 60 CAP | Refills: 2 | Status: SHIPPED | OUTPATIENT
Start: 2018-12-07 | End: 2019-02-07 | Stop reason: SDUPTHER

## 2018-12-07 RX ORDER — DRONABINOL 5 MG/1
5 CAPSULE ORAL 2 TIMES DAILY
Qty: 60 CAP | Refills: 2 | Status: CANCELLED | OUTPATIENT
Start: 2018-12-07

## 2018-12-07 RX ORDER — DALFAMPRIDINE 10 MG/1
10 TABLET, FILM COATED, EXTENDED RELEASE ORAL 2 TIMES DAILY
Qty: 60 TAB | Refills: 2 | Status: SHIPPED | OUTPATIENT
Start: 2018-12-07 | End: 2021-11-12

## 2018-12-07 RX ORDER — OXYCODONE HYDROCHLORIDE 15 MG/1
15 TABLET ORAL
Qty: 60 TAB | Refills: 0 | Status: SHIPPED | OUTPATIENT
Start: 2018-12-07 | End: 2018-12-07 | Stop reason: SDUPTHER

## 2018-12-07 RX ORDER — OXYCODONE HYDROCHLORIDE 15 MG/1
15 TABLET ORAL
Qty: 60 TAB | Refills: 0 | Status: SHIPPED | OUTPATIENT
Start: 2019-01-07 | End: 2019-02-07 | Stop reason: SDUPTHER

## 2018-12-07 NOTE — PROGRESS NOTES
Chief Complaint   Patient presents with    Multiple Sclerosis     1. Have you been to the ER, urgent care clinic since your last visit? Hospitalized since your last visit? no    2. Have you seen or consulted any other health care providers outside of the [address] [place] [address] since your last visit? Include any pap smears or colon screening. no      Pill count not performed patient did not bring medications        Pill count not verified with patient  Patient reports taking medication    UDS obtained and sent   Pain contract on file and referred to pain management    made available for  Dr. Gail Arango   review  Script given for Marinol.  Oxycodone

## 2018-12-07 NOTE — PROGRESS NOTES
Neurology Progress Note    NAME:  Thu Rice   :   1965   MRN:   [id number]     Date/Time:  2018  Subjective:   Thu Rice is a 48 y.o. male here today for follow-up for multiple sclerosis, pain, weakness difficulty ambulating. Neck pain is sharp in nature, continuous, burning sensation that goes around to the arms, making the arms weak. He says he drops things at times. Says recently has joined the gym which is also helping to strengthen the extremity. Back pain is also sharp and constant, goes on to the legs causing numbness and tingling sensation of the legs with weakness, he says he is working on the right side. Using his cane, has not had falls, but he has had near falls. Patient is advised to quit smoking as this is making multiple sclerosis worse. He says he has been having a lot of nausea lately. On a scale of 1-10, patient rates his pain level to be about 8. Denies loss of consciousness, dysphagia, odynophagia. This patient is yet to find a pain management specialist, I will continue to manage patient's pain as this is part of multiple sclerosis syndrome.   Review of Systems - General ROS: positive for  - fatigue, night sweats and sleep disturbance  Psychological ROS: positive for - anxiety, concentration difficulties, depression, mood swings and sleep disturbances  Ophthalmic ROS: positive for - blurry vision, decreased vision and photophobia  ENT ROS: positive for - headaches, tinnitus, vertigo and visual changes  Allergy and Immunology ROS: negative  Hematological and Lymphatic ROS: negative  Endocrine ROS: negative  Respiratory ROS: no cough, shortness of breath, or wheezing  Cardiovascular ROS: no chest pain or dyspnea on exertion  Gastrointestinal ROS: no abdominal pain, change in bowel habits, or black or bloody stools  Genito-Urinary ROS: no dysuria, trouble voiding, or hematuria  Musculoskeletal ROS: positive for - gait disturbance, joint pain, joint stiffness, joint swelling, muscle pain and muscular weakness  Neurological ROS: positive for - dizziness, gait disturbance, headaches, impaired coordination/balance, memory loss, numbness/tingling, visual changes and weakness  Dermatological ROS: negative     Medications reviewed:  Current Outpatient Medications   Medication Sig Dispense Refill    oxyCODONE IR (OXY-IR) 15 mg immediate release tablet Take 1 Tab by mouth every eight (8) hours as needed for Pain. Max Daily Amount: 45 mg. 60 Tab 0    megestrol (MEGACE) 40 mg tablet Take 1 Tab by mouth three (3) times daily. 90 Tab 1    meloxicam (MOBIC) 15 mg tablet Take 1 Tab by mouth daily. 30 Tab 1    dronabinol (MARINOL) 5 mg capsule Take 1 Cap by mouth two (2) times a day. Max Daily Amount: 10 mg. 60 Cap 2    ocrelizumab (OCREVUS) 30 mg/mL soln injection 10 mL by IntraVENous route every fourteen (14) days. 10 mL 1    gabapentin (NEURONTIN) 300 mg capsule Take 1 Cap by mouth three (3) times daily. Nerve pain 90 Cap 5    predniSONE (DELTASONE) 20 mg tablet Take 1 Tab by mouth daily (with breakfast). 30 Tab 0    Dalfampridine (AMPYRA) 10 mg Tb12 Take 10 mg by mouth two (2) times a day. 60 Tab 2    naloxone 2 mg/actuation spry Use 1 spray intranasally into 1 nostril. Use a new Narcan nasal spray for subsequent doses and administer into alternating nostrils. May repeat every 2 to 3 minutes as needed. 4 Each 2    tadalafil (CIALIS) 20 mg tablet Take 1 Tab by mouth as needed. 10 Tab 12    atorvastatin (LIPITOR) 80 mg tablet Take 1 Tab by mouth daily. 30 Tab 12    baclofen (LIORESAL) 10 mg tablet Take 1 Tab by mouth three (3) times daily. 90 Tab 5    dextromethorphan-quiNIDine (NUEDEXTA) 20-10 mg per capsule Take 1 Cap by mouth every twelve (12) hours. 60 Cap 2    lidocaine (LIDODERM) 5 % 1 Patch by TransDERmal route every twenty-four (24) hours.  30 Each 5        Objective:   Vitals:  Vitals:    12/07/18 1351 12/07/18 1355   BP: (!) 128/101 (!) 129/98   Pulse: 66    Resp: 16 Temp: 98.1 °F (36.7 °C)    TempSrc: Temporal    SpO2: 99%    Weight: 222 lb 9.6 oz (101 kg)    Height: 5' 10\" (1.778 m)    PainSc:   8    PainLoc: Generalized        Lab Data Reviewed:  Lab Results   Component Value Date/Time    WBC 5.9 12/05/2018 08:55 AM    HCT 44.4 12/05/2018 08:55 AM    HGB 14.4 12/05/2018 08:55 AM    PLATELET 604 77/84/8016 08:55 AM       Lab Results   Component Value Date/Time    Sodium 140 12/05/2018 08:55 AM    Potassium 3.5 12/05/2018 08:55 AM    Chloride 103 12/05/2018 08:55 AM    CO2 31 12/05/2018 08:55 AM    Glucose 91 12/05/2018 08:55 AM    BUN 8 12/05/2018 08:55 AM    Creatinine 1.03 12/05/2018 08:55 AM    Calcium 9.2 12/05/2018 08:55 AM       No components found for: TROPQUANT    No results found for: BELEN      Lab Results   Component Value Date/Time    Hemoglobin A1c 6.2 (H) 05/08/2013 10:17 AM    Hemoglobin A1c (POC) 5.4 01/11/2018 04:22 PM        No results found for: B12LT, FOL, RBCF    No results found for: BELEN, Magdi Ramesh, REECEANA    Lab Results   Component Value Date/Time    Cholesterol, total 232 (H) 05/08/2013 10:17 AM    Cholesterol (POC) 195 01/17/2018 04:18 PM    HDL Cholesterol 41 05/08/2013 10:17 AM    HDL Cholesterol (POC) 37 01/17/2018 04:18 PM    LDL Cholesterol (POC) 105 01/17/2018 04:18 PM    LDL, calculated 163 (H) 05/08/2013 10:17 AM    VLDL, calculated 28 05/08/2013 10:17 AM    Triglyceride 142 05/08/2013 10:17 AM    Triglycerides (POC) 266 01/17/2018 04:18 PM    CHOL/HDL Ratio 7.3 (H) 10/08/2010 01:10 PM         CT Results (recent):  No results found for this or any previous visit. MRI Results (recent):  Results from [facility] encounter on 04/13/18   [facility] [place] SPINE WO CONT    Narrative EXAM:  [facility] [place] SPINE WO CONT    INDICATION: ms    COMPARISON: 6/6/2013    TECHNIQUE:   MR imaging of the thoracic spine was performed with the following sequences:  sagittal T1, T2, stir; axial T1, T2. The study was performed without IV  contrast, as requested. The study was obtained on the 0.7 Marily open MRI unit  because of the patient's claustrophobia. Brandonace Suyapa FINDINGS:  The thoracic spinal cord appears somewhat atrophic. Because of limited spatial  and contrast resolution of the study and mild motion artifact, discrete lesions  are difficult to identify. Axial T2-weighted images suggest multiple scattered  foci of T2 hyperintensity peripherally in the spinal cord. These appear more  prominent currently than on the previous study. There is unchanged mild central disc protrusion at T7-8, with mild indentation  of the ventral cord. There is mild to moderate central disc protrusion at T8-9, with mild indentation  of the ventral cord, slightly increased. There is marked left facet hypertrophy at T9-10 with right anterior displacement  and moderate compression of the spinal cord, unchanged. Impression IMPRESSION:      1. Study somewhat limited by motion, spatial resolution, and contrast  resolution. Atrophic spinal cord with multiple small focal lesions, more clearly  demonstrated currently than on previous study. 2. Unchanged T9-10 cord compression related to left facet hypertrophy. Slight  increase in mild to moderate central disc protrusion at T8-9 and mild central  disc protrusion at T7-8. IR Results (recent):  No results found for this or any previous visit. VAS/US Results (recent):  No results found for this or any previous visit. PHYSICAL EXAM:  General:    Alert, cooperative, no distress, appears stated age. Head:   Normocephalic, without obvious abnormality, atraumatic. Eyes:   Conjunctivae/corneas clear. PERRLA  Nose:  Nares normal. No drainage or sinus tenderness. Throat:    Lips, mucosa, and tongue normal.  No Thrush  Neck:  Supple, symmetrical,  no adenopathy, thyroid: non tender    no carotid bruit and no JVD. Paraspinal tenderness  Back:    Symmetric, bilateral tenderness. Lungs:   Clear to auscultation bilaterally.   No Wheezing or Rhonchi. No rales. Chest wall:  No tenderness or deformity. No Accessory muscle use. Heart:   Regular rate and rhythm,  no murmur, rub or gallop. Abdomen:   Soft, non-tender. Not distended. Bowel sounds normal. No masses  Extremities: Extremities normal, atraumatic, No cyanosis. No edema. No clubbing  Skin:     Texture, turgor normal. No rashes or lesions. Not Jaundiced  Lymph nodes: Cervical, supraclavicular normal.  Psych:  Good insight. Depressed. Not anxious or agitated. NEUROLOGICAL EXAM:  Appearance: The patient is well developed, well nourished, provides a coherent history and is in no acute distress. Mental Status: Oriented to time, place and person. Mood and affect appropriate. Cranial Nerves:   Intact visual fields. Fundi are benign. JUS, EOM's full, no nystagmus, no ptosis. Facial sensation is normal. Corneal reflexes are intact. Facial movement is symmetric. Hearing is normal bilaterally. Palate is midline within normal sternocleidomastoid and trapezius muscles are tender. Tongue is midline. Motor:  5/5 strength in upper and lower proximal and distal muscles. Normal bulk and tone. No fasciculations. Reflexes:   Deep tendon reflexes 2+/4 and symmetrical.   Sensory:    Dysesthesia to touch, pinprick and vibration. Gait:   Unsteady gait. Ambulates with cane   Tremor:   No tremor noted. Cerebellar:  No cerebellar signs present. Neurovascular:  Normal heart sounds and regular rhythm, peripheral pulses intact, and no carotid bruits. Assesment  1. MS (multiple sclerosis) (HCC)    - oxyCODONE IR (OXY-IR) 15 mg immediate release tablet; Take 1 Tab by mouth every eight (8) hours as needed for Pain. Max Daily Amount: 45 mg. Dispense: 60 Tab; Refill: 0    2. Primary insomnia    - dronabinol (MARINOL) 5 mg capsule; Take 1 Cap by mouth two (2) times a day. Max Daily Amount: 10 mg. Dispense: 60 Cap; Refill: 2    3. Nausea    - dronabinol (MARINOL) 5 mg capsule;  Take 1 Cap by mouth two (2) times a day. Max Daily Amount: 10 mg. Dispense: 60 Cap; Refill: 2    4. Multiple sclerosis, secondary progressive (HCC)    - dronabinol (MARINOL) 5 mg capsule; Take 1 Cap by mouth two (2) times a day. Max Daily Amount: 10 mg. Dispense: 60 Cap; Refill: 2    ___________________________________________________  PLAN: Medication and plan discussed with patient      ICD-10-CM ICD-9-CM    1. MS (multiple sclerosis) ([facility] G35 340 oxyCODONE IR (OXY-IR) 15 mg immediate release tablet   2. Primary insomnia F51.01 307.42 dronabinol (MARINOL) 5 mg capsule   3. Nausea R11.0 787.02 dronabinol (MARINOL) 5 mg capsule   4. Multiple sclerosis, secondary progressive (HCC) G35 340 dronabinol (MARINOL) 5 mg capsule   Advised on the dangers of tobacco abuse  Advised on the benefits of discontinuation   Advised on available treatments. 10 minutes spent on counseling.    Follow-up Disposition:  Return in about 2 months (around 2/7/2019).         ___________________________________________________    Total time spent with patient:  []15   []25   []35   [] __ minutes    Care Plan discussed with:    []Patient   []Family    []Care Manager   []Consultant/Specialist :    ___________________________________________________    Attending Physician: Bessy Harrington MD

## 2018-12-13 LAB — DRUGS UR: NORMAL

## 2019-02-07 ENCOUNTER — OFFICE VISIT (OUTPATIENT)
Dept: NEUROLOGY | Age: 54
End: 2019-02-07

## 2019-02-07 VITALS
OXYGEN SATURATION: 99 % | WEIGHT: 233.4 LBS | RESPIRATION RATE: 16 BRPM | TEMPERATURE: 97.7 F | BODY MASS INDEX: 33.41 KG/M2 | HEART RATE: 77 BPM | DIASTOLIC BLOOD PRESSURE: 80 MMHG | SYSTOLIC BLOOD PRESSURE: 118 MMHG | HEIGHT: 70 IN

## 2019-02-07 DIAGNOSIS — R11.0 NAUSEA: ICD-10-CM

## 2019-02-07 DIAGNOSIS — G35 MS (MULTIPLE SCLEROSIS) (HCC): ICD-10-CM

## 2019-02-07 DIAGNOSIS — G89.4 CHRONIC PAIN SYNDROME: Primary | ICD-10-CM

## 2019-02-07 DIAGNOSIS — G35 MULTIPLE SCLEROSIS, SECONDARY PROGRESSIVE (HCC): ICD-10-CM

## 2019-02-07 DIAGNOSIS — F51.01 PRIMARY INSOMNIA: ICD-10-CM

## 2019-02-07 RX ORDER — GABAPENTIN 600 MG/1
600 TABLET ORAL 3 TIMES DAILY
Qty: 90 TAB | Refills: 2 | Status: SHIPPED | OUTPATIENT
Start: 2019-02-07 | End: 2019-02-12 | Stop reason: DRUGHIGH

## 2019-02-07 RX ORDER — OXYCODONE HYDROCHLORIDE 15 MG/1
15 TABLET ORAL
Qty: 60 TAB | Refills: 0 | Status: SHIPPED | OUTPATIENT
Start: 2019-02-07 | End: 2019-02-07 | Stop reason: SDUPTHER

## 2019-02-07 RX ORDER — OXYCODONE HYDROCHLORIDE 15 MG/1
15 TABLET ORAL
Qty: 60 TAB | Refills: 0 | Status: SHIPPED | OUTPATIENT
Start: 2019-03-07 | End: 2019-04-10 | Stop reason: SDUPTHER

## 2019-02-07 RX ORDER — DRONABINOL 5 MG/1
5 CAPSULE ORAL 2 TIMES DAILY
Qty: 60 CAP | Refills: 2 | Status: SHIPPED | OUTPATIENT
Start: 2019-02-07 | End: 2020-01-15 | Stop reason: SDUPTHER

## 2019-02-07 NOTE — PROGRESS NOTES
Chief Complaint   Patient presents with    Multiple Sclerosis    Tremors     right hand    Medication Refill     1. Have you been to the ER, urgent care clinic since your last visit? Hospitalized since your last visit? no    2. Have you seen or consulted any other health care providers outside of the [address] [place] [address] since your last visit? Include any pap smears or colon screening.  No      Pill count not performed patient did not bring medications      Pill count not verified with patient  Patient reports taking medication    UDS obtained and sent 12/7/18  Pain contract on file, but referred to pain management   made available for Dr. Paolo Koenig  review  Script given for Marinol and Oxycodone

## 2019-02-07 NOTE — PROGRESS NOTES
Neurology Progress Note    NAME:  Vivi Stiles   :   1965   MRN:   [id number]     Date/Time:  2019  Subjective:     Vivi Stiles is a 48 y.o. male here today for follow-up for multiple sclerosis, pain, weakness difficulty ambulating. Neck pain is sharp in nature, continuous, burning sensation that goes around to the arms, making the arms weak. He says he drops things at times. Says he is still going to the gym which is also helping to strengthen the extremity. However, he says he has been having shaking of the hands which has increased recently. Back pain is also sharp and constant, goes on to the legs causing numbness and tingling sensation of the legs with weakness, he says he is weaker on the right side. He is using his cane on the prevents him from falling,  but he has had near falls. Patient is advised to quit smoking as this is making multiple sclerosis worse. He says he is having a lot of nausea lately. On a scale of 1-10, patient rates his pain level to be between 7 and 8. Denies loss of consciousness, dysphagia, odynophagia. This patient is yet to find a pain management specialist, I will continue to manage patient's pain as this is part of multiple sclerosis syndrome,until he finds a pain specialist.  We will increase the Neurontin to 800 mg p.o. 3 times daily.   Review of Systems - General ROS: positive for  - fatigue, night sweats and sleep disturbance  Psychological ROS: positive for - anxiety, concentration difficulties, depression, mood swings and sleep disturbances  Ophthalmic ROS: positive for - blurry vision, decreased vision and photophobia  ENT ROS: positive for - headaches, tinnitus, vertigo and visual changes  Allergy and Immunology ROS: negative  Hematological and Lymphatic ROS: negative  Endocrine ROS: negative  Respiratory ROS: no cough, shortness of breath, or wheezing  Cardiovascular ROS: no chest pain or dyspnea on exertion  Gastrointestinal ROS: no abdominal pain, change in bowel habits, or black or bloody stools  Genito-Urinary ROS: no dysuria, trouble voiding, or hematuria  Musculoskeletal ROS: positive for - gait disturbance, joint pain, joint stiffness, joint swelling, muscle pain and muscular weakness  Neurological ROS: positive for - dizziness, gait disturbance, headaches, impaired coordination/balance, memory loss, numbness/tingling, visual changes and weakness  Dermatological ROS: negative     Medications reviewed:  Current Outpatient Medications   Medication Sig Dispense Refill    dronabinol (MARINOL) 5 mg capsule Take 1 Cap by mouth two (2) times a day. Max Daily Amount: 10 mg. 60 Cap 2    [START ON 3/7/2019] oxyCODONE IR (OXY-IR) 15 mg immediate release tablet Take 1 Tab by mouth every eight (8) hours as needed for Pain. Max Daily Amount: 45 mg. 60 Tab 0    Dalfampridine (AMPYRA) 10 mg Tb12 Take 10 mg by mouth two (2) times a day. 60 Tab 2    megestrol (MEGACE) 40 mg tablet Take 1 Tab by mouth three (3) times daily. 90 Tab 1    meloxicam (MOBIC) 15 mg tablet Take 1 Tab by mouth daily. 30 Tab 1    ocrelizumab (OCREVUS) 30 mg/mL soln injection 10 mL by IntraVENous route every fourteen (14) days. 10 mL 1    gabapentin (NEURONTIN) 800 mg tablet Take 1 Tab by mouth three (3) times daily. 90 Tab 1    predniSONE (DELTASONE) 20 mg tablet Take 1 Tab by mouth daily (with breakfast). 30 Tab 0    naloxone 2 mg/actuation spry Use 1 spray intranasally into 1 nostril. Use a new Narcan nasal spray for subsequent doses and administer into alternating nostrils. May repeat every 2 to 3 minutes as needed. 4 Each 2    tadalafil (CIALIS) 20 mg tablet Take 1 Tab by mouth as needed. 10 Tab 12    atorvastatin (LIPITOR) 80 mg tablet Take 1 Tab by mouth daily. 30 Tab 12    baclofen (LIORESAL) 10 mg tablet Take 1 Tab by mouth three (3) times daily. 90 Tab 5    dextromethorphan-quiNIDine (NUEDEXTA) 20-10 mg per capsule Take 1 Cap by mouth every twelve (12) hours.  60 Cap 2  lidocaine (LIDODERM) 5 % 1 Patch by TransDERmal route every twenty-four (24) hours. 30 Each 5        Objective:   Vitals:  Vitals:    02/07/19 1129   BP: 118/80   Pulse: 77   Resp: 16   Temp: 97.7 °F (36.5 °C)   TempSrc: Temporal   SpO2: 99%   Weight: 233 lb 6.4 oz (105.9 kg)   Height: 5' 10\" (1.778 m)   PainSc:   7   PainLoc: Generalized       Lab Data Reviewed:  Lab Results   Component Value Date/Time    WBC 5.9 12/05/2018 08:55 AM    HCT 44.4 12/05/2018 08:55 AM    HGB 14.4 12/05/2018 08:55 AM    PLATELET 824 92/38/7923 08:55 AM       Lab Results   Component Value Date/Time    Sodium 140 12/05/2018 08:55 AM    Potassium 3.5 12/05/2018 08:55 AM    Chloride 103 12/05/2018 08:55 AM    CO2 31 12/05/2018 08:55 AM    Glucose 91 12/05/2018 08:55 AM    BUN 8 12/05/2018 08:55 AM    Creatinine 1.03 12/05/2018 08:55 AM    Calcium 9.2 12/05/2018 08:55 AM       No components found for: TROPQUANT    No results found for: BELEN      Lab Results   Component Value Date/Time    Hemoglobin A1c 6.2 (H) 05/08/2013 10:17 AM    Hemoglobin A1c (POC) 5.4 01/11/2018 04:22 PM        No results found for: B12LT, FOL, RBCF    No results found for: BELEN, Arben Plaster, XBANA    Lab Results   Component Value Date/Time    Cholesterol, total 232 (H) 05/08/2013 10:17 AM    Cholesterol (POC) 195 01/17/2018 04:18 PM    HDL Cholesterol 41 05/08/2013 10:17 AM    HDL Cholesterol (POC) 37 01/17/2018 04:18 PM    LDL Cholesterol (POC) 105 01/17/2018 04:18 PM    LDL, calculated 163 (H) 05/08/2013 10:17 AM    VLDL, calculated 28 05/08/2013 10:17 AM    Triglyceride 142 05/08/2013 10:17 AM    Triglycerides (POC) 266 01/17/2018 04:18 PM    CHOL/HDL Ratio 7.3 (H) 10/08/2010 01:10 PM         CT Results (recent):  No results found for this or any previous visit.     MRI Results (recent):  [facility]18   MRI [facility] SPINE WO CONT    Narrative EXAM:  MRI THORAC SPINE WO CONT    INDICATION: ms    COMPARISON: 6/6/2013    TECHNIQUE:   MR imaging of the thoracic spine was performed with the following sequences:  sagittal T1, T2, stir; axial T1, T2. The study was performed without IV  contrast, as requested. The study was obtained on the 0.7 Marily open MRI unit  because of the patient's claustrophobia. Tripp Pedraza FINDINGS:  The thoracic spinal cord appears somewhat atrophic. Because of limited spatial  and contrast resolution of the study and mild motion artifact, discrete lesions  are difficult to identify. Axial T2-weighted images suggest multiple scattered  foci of T2 hyperintensity peripherally in the spinal cord. These appear more  prominent currently than on the previous study. There is unchanged mild central disc protrusion at T7-8, with mild indentation  of the ventral cord. There is mild to moderate central disc protrusion at T8-9, with mild indentation  of the ventral cord, slightly increased. There is marked left facet hypertrophy at T9-10 with right anterior displacement  and moderate compression of the spinal cord, unchanged. Impression IMPRESSION:      1. Study somewhat limited by motion, spatial resolution, and contrast  resolution. Atrophic spinal cord with multiple small focal lesions, more clearly  demonstrated currently than on previous study. 2. Unchanged T9-10 cord compression related to left facet hypertrophy. Slight  increase in mild to moderate central disc protrusion at T8-9 and mild central  disc protrusion at T7-8. IR Results (recent):  No results found for this or any previous visit. VAS/US Results (recent):  No results found for this or any previous visit. PHYSICAL EXAM:  General:    Alert, cooperative, no distress, appears stated age. Head:   Normocephalic, without obvious abnormality, atraumatic. Eyes:   Conjunctivae/corneas clear. PERRLA  Nose:  Nares normal. No drainage or sinus tenderness.   Throat:    Lips, mucosa, and tongue normal.  No Thrush  Neck:  Supple, symmetrical,  no adenopathy, thyroid: non tender    no carotid bruit and no JVD. Paraspinal tenderness  Back:    Symmetric,  diffuse tenderness. Lungs:   Clear to auscultation bilaterally. No Wheezing or Rhonchi. No rales. Chest wall:  No tenderness or deformity. No Accessory muscle use. Heart:   Regular rate and rhythm,  no murmur, rub or gallop. Abdomen:   Soft, non-tender. Not distended. Bowel sounds normal. No masses  Extremities: Extremities normal, atraumatic, No cyanosis. No edema. No clubbing  Skin:     Texture, turgor normal. No rashes or lesions. Not Jaundiced  Lymph nodes: Cervical, supraclavicular normal.  Psych:  Good insight. Not depressed. Not anxious or agitated. NEUROLOGICAL EXAM:  Appearance: The patient is well developed, well nourished, provides a coherent history and is in no acute distress. Mental Status: Oriented to time, place and person. Mood and affect appropriate. Cranial Nerves:   Intact visual fields. Fundi are benign. JUS, EOM's full, no nystagmus, no ptosis. Facial sensation is normal. Corneal reflexes are intact. Facial movement is symmetric. Hearing is normal bilaterally. Palate is midline with normal sternocleidomastoid and trapezius muscles are normal. Tongue is midline. Motor:  4+/5 strength in upper and lower proximal and distal muscles. Normal bulk and tone. No fasciculations. Reflexes:   Deep tendon reflexes 2+/4 and symmetrical.   Sensory:   Dysesthesia to touch, pinprick and vibration. Gait:  Abnormal gait. Ambulates with cane   Tremor:   Tremor noted. Cerebellar:  No cerebellar signs present. Neurovascular:  Normal heart sounds and regular rhythm, peripheral pulses intact, and no carotid bruits. Assesment  1. MS (multiple sclerosis) (HCC)    - oxyCODONE IR (OXY-IR) 15 mg immediate release tablet; Take 1 Tab by mouth every eight (8) hours as needed for Pain. Max Daily Amount: 45 mg. Dispense: 60 Tab; Refill: 0    2.  Primary insomnia    - dronabinol (MARINOL) 5 mg capsule; Take 1 Cap by mouth two (2) times a day. Max Daily Amount: 10 mg. Dispense: 60 Cap; Refill: 2    3. Nausea    - dronabinol (MARINOL) 5 mg capsule; Take 1 Cap by mouth two (2) times a day. Max Daily Amount: 10 mg. Dispense: 60 Cap; Refill: 2    4. Multiple sclerosis, secondary progressive (HCC)    - dronabinol (MARINOL) 5 mg capsule; Take 1 Cap by mouth two (2) times a day. Max Daily Amount: 10 mg. Dispense: 60 Cap; Refill: 2    ___________________________________________________  PLAN:Medication and treatment discussed with patient      ICD-10-CM ICD-9-CM    1. Chronic pain syndrome G89.4 338.4 REFERRAL TO PAIN MANAGEMENT   2. MS (multiple sclerosis) (HCC) G35 340 oxyCODONE IR (OXY-IR) 15 mg immediate release tablet      DISCONTINUED: oxyCODONE IR (OXY-IR) 15 mg immediate release tablet   3. Primary insomnia F51.01 307.42 dronabinol (MARINOL) 5 mg capsule   4. Nausea R11.0 787.02 dronabinol (MARINOL) 5 mg capsule   5.  Multiple sclerosis, secondary progressive (HCC) G35 340 dronabinol (MARINOL) 5 mg capsule     Follow-up Disposition:  Return in about 3 months (around 5/7/2019).           ___________________________________________________    Total time spent with patient:  []15   []25   []35   [] __ minutes    Care Plan discussed with:    []Patient   []Family    []Care Manager   []Consultant/Specialist :    ___________________________________________________    Attending Physician: Ross Kam MD

## 2019-02-11 ENCOUNTER — TELEPHONE (OUTPATIENT)
Dept: NEUROLOGY | Age: 54
End: 2019-02-11

## 2019-02-11 NOTE — TELEPHONE ENCOUNTER
Patient states be believes the dosage for his Gabapentin was to low that he got.   He needs corrected

## 2019-02-12 RX ORDER — GABAPENTIN 800 MG/1
800 TABLET ORAL 3 TIMES DAILY
Qty: 90 TAB | Refills: 1 | Status: SHIPPED | OUTPATIENT
Start: 2019-02-12 | End: 2020-04-14

## 2019-02-18 ENCOUNTER — OFFICE VISIT (OUTPATIENT)
Dept: INTERNAL MEDICINE CLINIC | Age: 54
End: 2019-02-18

## 2019-02-18 VITALS
DIASTOLIC BLOOD PRESSURE: 86 MMHG | WEIGHT: 236 LBS | SYSTOLIC BLOOD PRESSURE: 100 MMHG | HEART RATE: 72 BPM | BODY MASS INDEX: 33.79 KG/M2 | RESPIRATION RATE: 16 BRPM | TEMPERATURE: 97.9 F | OXYGEN SATURATION: 99 % | HEIGHT: 70 IN

## 2019-02-18 DIAGNOSIS — G35 MS (MULTIPLE SCLEROSIS) (HCC): ICD-10-CM

## 2019-02-18 DIAGNOSIS — E11.9 TYPE 2 DIABETES MELLITUS WITHOUT COMPLICATION, WITHOUT LONG-TERM CURRENT USE OF INSULIN (HCC): ICD-10-CM

## 2019-02-18 DIAGNOSIS — E78.00 HYPERCHOLESTEROLEMIA: ICD-10-CM

## 2019-02-18 DIAGNOSIS — G89.4 CHRONIC PAIN SYNDROME: Primary | ICD-10-CM

## 2019-02-18 NOTE — PROGRESS NOTES
Murphy Somers is a 48 y.o. male and presents with Multiple Sclerosis  . Subjective:  He has a history of MS   He states he recently had his medication changed. He also states he has had some progressive weakness recently  He is followed by neurology and states he needs pain management referral as suggested by his neurology for chronic pain    Dyslipidemia Review:  Patient presents for evaluation of lipids. Compliance with treatment thus far has been excellent. A repeat fasting lipid profile was done. The patient does not use medications that may worsen dyslipidemias (corticosteroids, progestins, anabolic steroids, diuretics, beta-blockers, amiodarone, cyclosporine, olanzapine). The patient exercises some        Review of Systems  Constitutional: negative for fevers, chills, anorexia and weight loss  Eyes:   negative for visual disturbance and irritation  ENT:   negative for tinnitus,sore throat,nasal congestion,ear pains. hoarseness  Respiratory:  negative for cough, hemoptysis, dyspnea,wheezing  CV:   negative for chest pain, palpitations, lower extremity edema  GI:   negative for nausea, vomiting, diarrhea, abdominal pain,melena  Endo:               negative for polyuria,polydipsia,polyphagia,heat intolerance  Genitourinary: negative for frequency, dysuria and hematuria  Integument:  negative for rash and pruritus  Hematologic:  negative for easy bruising and gum/nose bleeding  Musculoskel: myalgias, arthralgias, back pain, muscle weakness, joint pain  Neurological:  negative for headaches, dizziness, vertigo, memory problems and gait   Behavl/Psych: negative for feelings of anxiety, depression, mood changes    Past Medical History:   Diagnosis Date    Back pain 10/8/2010    Back pain 10/8/2010    Depression     Diabetes ([facility]     Fatigue 8/24/2012    Hearing loss 1/25/2013    Memory loss 1/25/2013    [facility]     Muscle pain     Muscle weakness     Poor appetite     Snoring     Unintentional weight change     Visual disturbance      Past Surgical History:   Procedure Laterality Date    HX HEENT Right     ear     Social History     Socioeconomic History    Marital status: SINGLE     Spouse name: Not on file    Number of children: Not on file    Years of education: Not on file    Highest education level: Not on file   Tobacco Use    Smoking status: Current Every Day Smoker     Packs/day: 0.50     Years: 21.00     Pack years: 10.50     Types: Cigarettes    Smokeless tobacco: Never Used   Substance and Sexual Activity    Alcohol use: No    Drug use: No    Sexual activity: Yes     Partners: Female     Birth control/protection: Condom     Family History   Problem Relation Age of Onset    Hypertension Father     Cancer Father         prostate    Hypertension Sister     Diabetes Maternal Grandmother     Cancer Paternal Uncle         prostate    Dementia Paternal Uncle      Current Outpatient Medications   Medication Sig Dispense Refill    gabapentin (NEURONTIN) 800 mg tablet Take 1 Tab by mouth three (3) times daily. 90 Tab 1    dronabinol (MARINOL) 5 mg capsule Take 1 Cap by mouth two (2) times a day. Max Daily Amount: 10 mg. 60 Cap 2    [START ON 3/7/2019] oxyCODONE IR (OXY-IR) 15 mg immediate release tablet Take 1 Tab by mouth every eight (8) hours as needed for Pain. Max Daily Amount: 45 mg. 60 Tab 0    Dalfampridine (AMPYRA) 10 mg Tb12 Take 10 mg by mouth two (2) times a day. 60 Tab 2    megestrol (MEGACE) 40 mg tablet Take 1 Tab by mouth three (3) times daily. 90 Tab 1    meloxicam (MOBIC) 15 mg tablet Take 1 Tab by mouth daily. 30 Tab 1    ocrelizumab (OCREVUS) 30 mg/mL soln injection 10 mL by IntraVENous route every fourteen (14) days. 10 mL 1    predniSONE (DELTASONE) 20 mg tablet Take 1 Tab by mouth daily (with breakfast). 30 Tab 0    naloxone 2 mg/actuation spry Use 1 spray intranasally into 1 nostril.  Use a new Narcan nasal spray for subsequent doses and administer into alternating nostrils. May repeat every 2 to 3 minutes as needed. 4 Each 2    tadalafil (CIALIS) 20 mg tablet Take 1 Tab by mouth as needed. 10 Tab 12    atorvastatin (LIPITOR) 80 mg tablet Take 1 Tab by mouth daily. 30 Tab 12    baclofen (LIORESAL) 10 mg tablet Take 1 Tab by mouth three (3) times daily. 90 Tab 5    dextromethorphan-quiNIDine (NUEDEXTA) 20-10 mg per capsule Take 1 Cap by mouth every twelve (12) hours. 60 Cap 2    lidocaine (LIDODERM) 5 % 1 Patch by TransDERmal route every twenty-four (24) hours. 30 Each 5     No Known Allergies    Objective:  Visit Vitals  /86   Pulse 72   Temp 97.9 °F (36.6 °C) (Oral)   Resp 16   Ht 5' 10\" (1.778 m)   Wt 236 lb (107 kg)   SpO2 99%   BMI 33.86 kg/m²     Physical Exam:   General appearance - alert, well appearing, and in no distress  Mental status - alert, oriented to person, place, and time  EYE-JUS, EOMI, corneas normal, no foreign bodies  ENT-ENT exam normal, no neck nodes or sinus tenderness  Nose - normal and patent, no erythema, discharge or polyps  Mouth - mucous membranes moist, pharynx normal without lesions  Neck - supple, no significant adenopathy   Chest - clear to auscultation, no wheezes, rales or rhonchi, symmetric air entry   Heart - normal rate, regular rhythm, normal S1, S2, no murmurs, rubs, clicks or gallops   Abdomen - soft, nontender, nondistended, no masses or organomegaly  Lymph- no adenopathy palpable  Ext-peripheral pulses normal, no pedal edema, no clubbing or cyanosis  Skin-Warm and dry.  no hyperpigmentation, vitiligo, or suspicious lesions  Neuro -alert, oriented, normal speech, no focal findings or movement disorder noted  Neck-normal C-spine, no tenderness, full ROM without pain  Feet-no nail deformities or callus formation with good pulses noted      Results for orders placed or performed in visit on 12/07/18   COMPLIANCE DRUG SCREEN/PRESCRIPTION MONITORING   Result Value Ref Range    Summary FINAL Assessment/Plan:    ICD-10-CM ICD-9-CM    1. Chronic pain syndrome G89.4 338.4 REFERRAL TO PAIN MANAGEMENT   2. MS (multiple sclerosis) ([facility]) G35 340    3. Type 2 diabetes mellitus without complication, without long-term current use of insulin ([facility]) E11.9 250.00    4. Hypercholesterolemia E78.00 272.0      Orders Placed This Encounter    REFERRAL TO PAIN MANAGEMENT     Referral Priority:   Routine     Referral Type:   Consultation     Referral Reason:   Specialty Services Required     Requested Specialty:   Pain Management     call if any problems  There are no Patient Instructions on file for this visit. Follow-up Disposition: Not on File    I have reviewed with the patient details of the assessment and plan and all questions were answered. Relevent patient education was performed    An After Visit Summary was printed and given to the patient. 1. Have you been to the ER, urgent care clinic since your last visit? Hospitalized since your last visit?no    2. Have you seen or consulted any other health care providers outside of the [address] [place] [address] since your last visit? Include any pap smears or colon screening. No    3 most recent PHQ Screens 12/20/2017   PHQ Not Done -   Little interest or pleasure in doing things Not at all   Feeling down, depressed, irritable, or hopeless Not at all   Total Score PHQ 2 0       Chief Complaint   Patient presents with    Multiple Sclerosis     Per Dr. Patrick Spencer.,  verbal order given for needed amb poc labs.

## 2019-04-10 ENCOUNTER — OFFICE VISIT (OUTPATIENT)
Dept: NEUROLOGY | Age: 54
End: 2019-04-10

## 2019-04-10 VITALS
RESPIRATION RATE: 16 BRPM | WEIGHT: 226 LBS | TEMPERATURE: 99.4 F | HEART RATE: 73 BPM | DIASTOLIC BLOOD PRESSURE: 80 MMHG | SYSTOLIC BLOOD PRESSURE: 110 MMHG | HEIGHT: 70 IN | BODY MASS INDEX: 32.35 KG/M2 | OXYGEN SATURATION: 99 %

## 2019-04-10 DIAGNOSIS — G35 MS (MULTIPLE SCLEROSIS) (HCC): ICD-10-CM

## 2019-04-10 DIAGNOSIS — R26.9 GAIT DISORDER: ICD-10-CM

## 2019-04-10 DIAGNOSIS — G62.9 POLYNEUROPATHY: ICD-10-CM

## 2019-04-10 DIAGNOSIS — G31.84 MCI (MILD COGNITIVE IMPAIRMENT): ICD-10-CM

## 2019-04-10 DIAGNOSIS — M54.50 CHRONIC BILATERAL LOW BACK PAIN WITHOUT SCIATICA: ICD-10-CM

## 2019-04-10 DIAGNOSIS — M54.16 LUMBAR RADICULOPATHY: ICD-10-CM

## 2019-04-10 DIAGNOSIS — G89.4 CHRONIC PAIN SYNDROME: ICD-10-CM

## 2019-04-10 DIAGNOSIS — G89.29 CHRONIC BILATERAL LOW BACK PAIN WITHOUT SCIATICA: ICD-10-CM

## 2019-04-10 DIAGNOSIS — G35 MULTIPLE SCLEROSIS, SECONDARY PROGRESSIVE (HCC): Primary | ICD-10-CM

## 2019-04-10 DIAGNOSIS — R29.6 FREQUENT FALLS: ICD-10-CM

## 2019-04-10 RX ORDER — OXYCODONE HYDROCHLORIDE 15 MG/1
15 TABLET ORAL
Qty: 30 TAB | Refills: 0 | Status: SHIPPED | OUTPATIENT
Start: 2019-04-10 | End: 2019-05-10

## 2019-04-10 NOTE — PROGRESS NOTES
Chief Complaint   Patient presents with    Multiple Sclerosis    Medication Refill     1. Have you been to the ER, urgent care clinic since your last visit? Hospitalized since your last visit? no    2. Have you seen or consulted any other health care providers outside of the [address] [place] [address] since your last visit? Include any pap smears or colon screening.  No    Pill count not performed patient did not bring medications        Pill count not verified with patient  Patient reports taking medication   UDS obtained 12/7/18  Pain contract on file   made available for Dr. Porsche Alvarez  review  Script given for Oxycodone

## 2019-04-10 NOTE — PROGRESS NOTES
Neurology Progress Note    NAME:  Stephanie Bird   :   1965   MRN:   [id number]     Date/Time:  4/10/2019  Subjective:     Stephanie Bird is a 48 y.o. male here today for follow-up for multiple sclerosis, pain, weakness difficulty ambulating. Neck pain is sharp in nature, continuous, burning sensation that goes around to the arms, making the arms weak. He says he drops things at times. Says he is still going to the gym which is also helping to strengthen the extremity. However, he says he has been having shaking of the hands which has increased recently. Had a fall x1 since last visit  Back pain is also sharp and constant, goes on to the legs causing numbness and tingling sensation of the legs with weakness, he says he is weaker on the right side. He is using his cane on the prevents him from falling,  but he has had near falls. Patient is advised to quit smoking as this is making multiple sclerosis worse. I will obtain MRI of the brain and the lumbosacral spine, to evaluate for multiple sclerosis relapse and radiculopathy. He says he is having a lot of nausea lately. On a scale of 1-10, patient rates his pain level to be between 8 and 9. Denies loss of consciousness, dysphagia, odynophagia. .  Review of Systems - General ROS: positive for  - fatigue, night sweats and sleep disturbance  Psychological ROS: positive for - anxiety, concentration difficulties, depression, mood swings and sleep disturbances  Ophthalmic ROS: positive for - blurry vision, decreased vision and photophobia  ENT ROS: positive for - headaches, tinnitus, vertigo and visual changes  Allergy and Immunology ROS: negative  Hematological and Lymphatic ROS: negative  Endocrine ROS: negative  Respiratory ROS: no cough, shortness of breath, or wheezing  Cardiovascular ROS: no chest pain or dyspnea on exertion  Gastrointestinal ROS: no abdominal pain, change in bowel habits, or black or bloody stools  Genito-Urinary ROS: no dysuria, trouble voiding, or hematuria  Musculoskeletal ROS: positive for - gait disturbance, joint pain, joint stiffness, joint swelling, muscle pain and muscular weakness  Neurological ROS: positive for - dizziness, gait disturbance, headaches, impaired coordination/balance, memory loss, numbness/tingling, visual changes and weakness  Dermatological ROS: negative        Medications reviewed:  Current Outpatient Medications   Medication Sig Dispense Refill    gabapentin (NEURONTIN) 800 mg tablet Take 1 Tab by mouth three (3) times daily. 90 Tab 1    dronabinol (MARINOL) 5 mg capsule Take 1 Cap by mouth two (2) times a day. Max Daily Amount: 10 mg. 60 Cap 2    oxyCODONE IR (OXY-IR) 15 mg immediate release tablet Take 1 Tab by mouth every eight (8) hours as needed for Pain. Max Daily Amount: 45 mg. 60 Tab 0    Dalfampridine (AMPYRA) 10 mg Tb12 Take 10 mg by mouth two (2) times a day. 60 Tab 2    ocrelizumab (OCREVUS) 30 mg/mL soln injection 10 mL by IntraVENous route every fourteen (14) days. 10 mL 1    megestrol (MEGACE) 40 mg tablet Take 1 Tab by mouth three (3) times daily. 90 Tab 1    meloxicam (MOBIC) 15 mg tablet Take 1 Tab by mouth daily. 30 Tab 1    predniSONE (DELTASONE) 20 mg tablet Take 1 Tab by mouth daily (with breakfast). 30 Tab 0    naloxone 2 mg/actuation spry Use 1 spray intranasally into 1 nostril. Use a new Narcan nasal spray for subsequent doses and administer into alternating nostrils. May repeat every 2 to 3 minutes as needed. 4 Each 2    tadalafil (CIALIS) 20 mg tablet Take 1 Tab by mouth as needed. 10 Tab 12    atorvastatin (LIPITOR) 80 mg tablet Take 1 Tab by mouth daily. 30 Tab 12    baclofen (LIORESAL) 10 mg tablet Take 1 Tab by mouth three (3) times daily. 90 Tab 5    dextromethorphan-quiNIDine (NUEDEXTA) 20-10 mg per capsule Take 1 Cap by mouth every twelve (12) hours. 60 Cap 2    lidocaine (LIDODERM) 5 % 1 Patch by TransDERmal route every twenty-four (24) hours.  30 Each 5 Objective:   Vitals:  Vitals:    04/10/19 1402   BP: 110/80   Pulse: 73   Resp: 16   Temp: 99.4 °F (37.4 °C)   TempSrc: Temporal   SpO2: 99%   Weight: 226 lb (102.5 kg)   Height: 5' 10\" (1.778 m)   PainSc:   8   PainLoc: Back       Lab Data Reviewed:  Lab Results   Component Value Date/Time    WBC 5.9 12/05/2018 08:55 AM    HCT 44.4 12/05/2018 08:55 AM    HGB 14.4 12/05/2018 08:55 AM    PLATELET 489 57/08/7259 08:55 AM       Lab Results   Component Value Date/Time    Sodium 140 12/05/2018 08:55 AM    Potassium 3.5 12/05/2018 08:55 AM    Chloride 103 12/05/2018 08:55 AM    CO2 31 12/05/2018 08:55 AM    Glucose 91 12/05/2018 08:55 AM    BUN 8 12/05/2018 08:55 AM    Creatinine 1.03 12/05/2018 08:55 AM    Calcium 9.2 12/05/2018 08:55 AM       No components found for: TROPQUANT    No results found for: BELEN      Lab Results   Component Value Date/Time    Hemoglobin A1c 6.2 (H) 05/08/2013 10:17 AM    Hemoglobin A1c (POC) 5.4 01/11/2018 04:22 PM        No results found for: B12LT, FOL, RBCF    No results found for: BELEN, Shiela Rojas, XBANA    Lab Results   Component Value Date/Time    Cholesterol, total 232 (H) 05/08/2013 10:17 AM    Cholesterol (POC) 195 01/17/2018 04:18 PM    HDL Cholesterol 41 05/08/2013 10:17 AM    HDL Cholesterol (POC) 37 01/17/2018 04:18 PM    LDL Cholesterol (POC) 105 01/17/2018 04:18 PM    LDL, calculated 163 (H) 05/08/2013 10:17 AM    VLDL, calculated 28 05/08/2013 10:17 AM    Triglyceride 142 05/08/2013 10:17 AM    Triglycerides (POC) 266 01/17/2018 04:18 PM    CHOL/HDL Ratio 7.3 (H) 10/08/2010 01:10 PM         CT Results (recent):  No results found for this or any previous visit.     MRI Results (recent):  Results from [place] encounter on 04/13/18   MRI [facility] SPINE WO CONT    Narrative EXAM:  MRI [facility] SPINE WO CONT    INDICATION: ms    COMPARISON: 6/6/2013    TECHNIQUE:   MR imaging of the thoracic spine was performed with the following sequences:  sagittal T1, T2, stir; axial T1, T2. The study was performed without IV  contrast, as requested. The study was obtained on the 0.7 [facility] open MRI unit  because of the patient's claustrophobia. Alfred Escalona FINDINGS:  The thoracic spinal cord appears somewhat atrophic. Because of limited spatial  and contrast resolution of the study and mild motion artifact, discrete lesions  are difficult to identify. Axial T2-weighted images suggest multiple scattered  foci of T2 hyperintensity peripherally in the spinal cord. These appear more  prominent currently than on the previous study. There is unchanged mild central disc protrusion at T7-8, with mild indentation  of the ventral cord. There is mild to moderate central disc protrusion at T8-9, with mild indentation  of the ventral cord, slightly increased. There is marked left facet hypertrophy at T9-10 with right anterior displacement  and moderate compression of the spinal cord, unchanged. Impression IMPRESSION:      1. Study somewhat limited by motion, spatial resolution, and contrast  resolution. Atrophic spinal cord with multiple small focal lesions, more clearly  demonstrated currently than on previous study. 2. Unchanged T9-10 cord compression related to left facet hypertrophy. Slight  increase in mild to moderate central disc protrusion at T8-9 and mild central  disc protrusion at T7-8. IR Results (recent):  No results found for this or any previous visit. VAS/US Results (recent):  No results found for this or any previous visit. PHYSICAL EXAM:  General:    Alert, cooperative, no distress, appears stated age. Head:   Normocephalic, without obvious abnormality, atraumatic. Eyes:   Conjunctivae/corneas clear. PERRLA  Nose:  Nares normal. No drainage or sinus tenderness. Throat:    Lips, mucosa, and tongue normal.  No Thrush  Neck:  Supple, symmetrical,  no adenopathy, thyroid: non tender    no carotid bruit and no JVD.   Back:    Symmetric,  bilateral tenderness. Lungs:   Clear to auscultation bilaterally. No Wheezing or Rhonchi. No rales. Chest wall:  No tenderness or deformity. No Accessory muscle use. Heart:   Regular rate and rhythm,  no murmur, rub or gallop. Abdomen:   Soft, non-tender. Not distended. Bowel sounds normal. No masses  Extremities: Extremities normal, atraumatic, No cyanosis. No edema. No clubbing  Skin:     Texture, turgor normal. No rashes or lesions. Not Jaundiced  Lymph nodes: Cervical, supraclavicular normal.  Psych:  Good insight. Depressed. Not anxious or agitated. NEUROLOGICAL EXAM:  Appearance: The patient is well developed, well nourished, provides a coherent history and is in no acute distress. Mental Status: Oriented to time, place and person. Mood and affect appropriate. Cranial Nerves:   Intact visual fields. Fundi are benign. JUS, EOM's full, no nystagmus, no ptosis. Facial sensation is normal. Corneal reflexes are intact. Facial movement is symmetric. Hearing is normal bilaterally. Palate is midline with normal sternocleidomastoid and trapezius muscles are normal. Tongue is midline. Motor:  4+/5 strength in left upper and lower proximal and distal muscles. Normal bulk and tone. No fasciculations. Reflexes:   Deep tendon reflexes 2+/4 and symmetrical.   Sensory:   Decrease sensation to touch, pinprick and vibration. Gait:  Unsteady gait. Tremor:   Tremor noted. Cerebellar:  No cerebellar signs present. Neurovascular:  Normal heart sounds and regular rhythm, peripheral pulses intact, and no carotid bruits. Assesment  1. Multiple sclerosis, secondary progressive ([address]  Continue with management     2. Gait disorder  Physical therapy    3. Chronic pain syndrome  Referred to pain management    4. MCI (mild cognitive impairment)  Referred to neuropsychological evaluation    5. Polyneuropathy  Continue management    6. Frequent falls  Physical therapy    7.  Chronic bilateral low back pain without sciatica  Physical therapy    ___________________________________________________  PLAN: Medication and plan discussed with patient      ICD-10-CM ICD-9-CM    1. Multiple sclerosis, secondary progressive ([address] G35 340    2. Gait disorder R26.9 781.2    3. Chronic pain syndrome G89.4 338.4    4. MCI (mild cognitive impairment) G31.84 331.83    5. Polyneuropathy G62.9 356.9    6. Frequent falls R29.6 V15.88    7.  Chronic bilateral low back pain without sciatica M54.5 724.2     G89.29 338.29      Follow-up and Dispositions    · Return in about 3 months (around 7/10/2019).              ___________________________________________________    Total time spent with patient:  []15   []25   []35   [] __ minutes    Care Plan discussed with:    []Patient   []Family    []Care Manager   []Consultant/Specialist :    ___________________________________________________    Attending Physician: Emil Scott MD

## 2019-04-10 NOTE — PATIENT INSTRUCTIONS
All test results will be discussed at the next appointment. MRI of the Head: About This Test  What is it? MRI (magnetic resonance imaging) is a test that uses a magnetic field and pulses of radio wave energy to make pictures of the organs and structures inside the body. An MRI of the head can give your doctor information about your brain, eyes, ears, and nerves. When you have an MRI, you lie on a table and the table moves into the MRI machine. Why is this test done? An MRI of the head can help find problems such as tumors and infection. It also can check symptoms of a head injury or of diseases such as multiple sclerosis (MS) and stroke. How can you prepare for the test?  Talk to your doctor about all your health conditions before the test. For example, tell your doctor if:  · You are allergic to any medicines. · You are or might be pregnant. · You have a pacemaker, an artificial limb, any metal pins or metal parts in your body, metal heart valves, metal clips in your brain, metal implants in your ears, or any other implanted or prosthetic medical device. · You have an intrauterine device (IUD) in place. · You get nervous in confined spaces. You may need medicine to help you relax. · You wear a patch that contains medicine. · You have kidney disease. What happens before the test?  · You will remove all metal objects, such as hearing aids, dentures, jewelry, watches, and hairpins. · You may need to take off some of your clothes. You will be given a gown to wear during the test. If you do leave some clothes on, make sure you take everything out of your pockets. · You may have contrast material (dye) put into your arm through a tube called an IV. Contrast material helps doctors see specific organs, blood vessels, and most tumors. What happens during the test?  · You will lie on your back on a table that is part of the MRI scanner.  Your head, chest, and arms may be held with straps to help you lie still.  · The table will slide into the space that contains the magnet. A device called a coil may be placed over or wrapped around your head. · Inside the scanner you will hear a fan and feel air moving. You may hear tapping, thumping, or snapping noises. You may be given earplugs or headphones to reduce the noise. · You will be asked to hold still during the scan. You may be asked to hold your breath for short periods. · You may be alone in the scanning room, but a technologist will be watching you through a window and talking with you during the test.  What else should you know about the test?  · An MRI does not hurt. · You may feel warmth in the area being examined. This is normal.  · A dye (contrast material) that contains gadolinium may be used in this test. Be sure to tell your doctor if:  ? You are pregnant or think you may be pregnant. ? You have kidney problems. ? You've had more than one test that used gadolinium. · The U.S. Food and Drug Administration (FDA) has safety warnings about gadolinium. But for most people, the benefit of its use in this test outweighs the risk. · If a dye is used, you may feel a quick sting or pinch and some coolness when the IV is started. The dye may give you a metallic taste in your mouth. Some people feel sick to their stomach or get a headache. · If you breastfeed and are concerned about whether the dye used in this test is safe, talk to your doctor. Most experts believe that very little dye passes into breast milk and even less is passed on to the baby. But if you prefer, you can store some of your breast milk ahead of time and use it for a day or two after the test.  How long does the test take? · The test usually takes 30 to 60 minutes but can take as long as 2 hours. What happens after the test?  · You will probably be able to go home right away, depending on the reason for the test.  · You can go back to your usual activities right away.   Follow-up care is a key part of your treatment and safety. Be sure to make and go to all appointments, and call your doctor if you are having problems. It's also a good idea to keep a list of the medicines you take. Ask your doctor when you can expect to have your test results. Where can you learn more? Go to http://concepcion-deepika.info/. Enter L454 in the search box to learn more about \"MRI of the Head: About This Test.\"  Current as of: June 25, 2018  Content Version: 11.9  © 8583-6030 [employer]. Care instructions adapted under license by [employer] (which disclaims liability or warranty for this information). If you have questions about a medical condition or this instruction, always ask your healthcare professional. [address] any warranty or liability for your use of this information. MRI of the Lumbar Spine: About This Test  What is it? MRI (magnetic resonance imaging) is a test that uses a magnetic field and pulses of radio wave energy to make pictures of the organs and structures inside the body. An MRI can give your doctor information about the spine in your lower back (the lumbar spine). This can include the spine, the space around the spinal cord, and the vertebrae in your lower back. When you have an MRI, you lie on a table that moves into the MRI machine. Why is this test done? An MRI of the lumbar spine can help find the cause of symptoms like back pain or leg pain, weakness, or numbness. It can help find problems such as a herniated disc, a tumor, or an infection. How can you prepare for the test?  Talk to your doctor about all your health conditions before the test. For example, tell your doctor if:  · You are allergic to any medicines. · You are or might be pregnant.   · You have a pacemaker, an artificial limb, any metal pins or metal parts in your body, metal heart valves, metal clips in your brain, metal implants in your ears, or any other implanted or prosthetic medical device. · You have an intrauterine device (IUD) in place. · You get nervous in confined spaces. You may need medicine to help you relax. · You wear a patch that contains medicine. · You have kidney disease. What happens before the test?  · You will remove all metal objects, such as hearing aids, dentures, jewelry, watches, and hairpins. · You will take off all or most of your clothes and change into a gown. · You may have contrast material (dye) put into your arm through a tube called an IV. Contrast material helps doctors see specific organs, blood vessels, and most tumors. What happens during the test?  · You will lie on your back on a table that is part of the MRI scanner. Your head, chest, and arms may be held with straps to help you stay still. · The table will slide into the space that contains the magnet. · Inside the scanner you will hear a fan and feel air moving. You may hear tapping, thumping, or snapping noises. You may be given earplugs or headphones to reduce the noise. · You will be asked to hold still during the scan. You may be asked to hold your breath for short periods. · You may be alone in the scanning room, but a technologist will watch you through a window and talk with you during the test.  What else should you know about the test?  · An MRI does not hurt. · You may feel warmth in the area being examined. This is normal.  · A dye (contrast material) that contains gadolinium may be used in this test. Be sure to tell your doctor if:  ? You are pregnant or think you may be pregnant. ? You have kidney problems. ? You've had more than one test that used gadolinium. · The U.S. Food and Drug Administration (FDA) has safety warnings about gadolinium. But for most people, the benefit of its use in this test outweighs the risk. · If a dye is used, you may feel a quick sting or pinch and some coolness when the IV is started.  The dye may give you a metallic taste in your mouth. Some people feel sick to their stomach or get a headache. · If you breastfeed and are concerned about whether the dye used in this test is safe, talk to your doctor. Most experts believe that very little dye passes into breast milk and even less is passed on to the baby. But if you prefer, you can store some of your breast milk ahead of time and use it for a day or two after the test.  How long does the test take? · The test usually takes 30 to 60 minutes. What happens after the test?  · You will probably be able to go home right away, depending on the reason for the test.  · You can go back to your usual activities right away. Follow-up care is a key part of your treatment and safety. Be sure to make and go to all appointments, and call your doctor if you are having problems. It's also a good idea to keep a list of the medicines you take. Ask your doctor when you can expect to have your test results. Where can you learn more? Go to http://concepcion-deepika.info/. Enter J968 in the search box to learn more about \"MRI of the Lumbar Spine: About This Test.\"  Current as of: June 25, 2018  Content Version: 11.9  © 0234-4547 [employer]. Care instructions adapted under license by [employer] (which disclaims liability or warranty for this information). If you have questions about a medical condition or this instruction, always ask your healthcare professional. [place] [address] any warranty or liability for your use of this information.

## 2019-06-05 ENCOUNTER — HOSPITAL ENCOUNTER (OUTPATIENT)
Dept: INFUSION THERAPY | Age: 54
End: 2019-06-05
Payer: MEDICARE

## 2019-06-05 RX ORDER — DIPHENHYDRAMINE HYDROCHLORIDE 50 MG/ML
50 INJECTION, SOLUTION INTRAMUSCULAR; INTRAVENOUS ONCE
Status: COMPLETED | OUTPATIENT
Start: 2019-06-07 | End: 2019-06-07

## 2019-06-05 RX ORDER — ACETAMINOPHEN 325 MG/1
650 TABLET ORAL ONCE
Status: COMPLETED | OUTPATIENT
Start: 2019-06-07 | End: 2019-06-07

## 2019-06-05 RX ORDER — DIPHENHYDRAMINE HYDROCHLORIDE 50 MG/ML
50 INJECTION, SOLUTION INTRAMUSCULAR; INTRAVENOUS
Status: ACTIVE | OUTPATIENT
Start: 2019-06-07 | End: 2019-06-07

## 2019-06-05 RX ORDER — ACETAMINOPHEN 325 MG/1
650 TABLET ORAL
Status: DISCONTINUED | OUTPATIENT
Start: 2019-06-07 | End: 2019-06-08 | Stop reason: HOSPADM

## 2019-06-06 ENCOUNTER — OFFICE VISIT (OUTPATIENT)
Dept: INTERNAL MEDICINE CLINIC | Age: 54
End: 2019-06-06

## 2019-06-06 VITALS
HEIGHT: 70 IN | BODY MASS INDEX: 32.64 KG/M2 | RESPIRATION RATE: 16 BRPM | OXYGEN SATURATION: 99 % | HEART RATE: 76 BPM | WEIGHT: 228 LBS | SYSTOLIC BLOOD PRESSURE: 120 MMHG | DIASTOLIC BLOOD PRESSURE: 70 MMHG | TEMPERATURE: 98.2 F

## 2019-06-06 DIAGNOSIS — Z12.11 SCREENING FOR COLON CANCER: Primary | ICD-10-CM

## 2019-06-06 DIAGNOSIS — E11.9 TYPE 2 DIABETES MELLITUS WITHOUT COMPLICATION, WITHOUT LONG-TERM CURRENT USE OF INSULIN (HCC): ICD-10-CM

## 2019-06-06 DIAGNOSIS — G35 MS (MULTIPLE SCLEROSIS) (HCC): ICD-10-CM

## 2019-06-06 DIAGNOSIS — H60.391 INFECTION OF EAR LOBE, RIGHT: ICD-10-CM

## 2019-06-06 RX ORDER — CEPHALEXIN 500 MG/1
500 CAPSULE ORAL 4 TIMES DAILY
Qty: 28 CAP | Refills: 0 | Status: SHIPPED | OUTPATIENT
Start: 2019-06-06 | End: 2020-04-14 | Stop reason: ALTCHOICE

## 2019-06-06 NOTE — PROGRESS NOTES
1. Have you been to the ER, urgent care clinic since your last visit? Hospitalized since your last visit?no    2. Have you seen or consulted any other health care providers outside of the [facility] since your last visit? Include any pap smears or colon screening.  No    3 most recent PHQ Screens 12/20/2017   PHQ Not Done -   Little interest or pleasure in doing things Not at all   Feeling down, depressed, irritable, or hopeless Not at all   Total Score PHQ 2 0       Chief Complaint   Patient presents with    Multiple Sclerosis    Foreign Body in Ear

## 2019-06-06 NOTE — PATIENT INSTRUCTIONS
Info [employer] Activation Thank you for requesting access to [facility]. Please follow the instructions below to securely access and download your online medical record. [facility] allows you to send messages to your doctor, view your test results, renew your prescriptions, schedule appointments, and more. How Do I Sign Up? 1. In your internet browser, go to [email or web address] 
2. Click on the First Time User? Click Here link in the Sign In box. You will be redirect to the New Member Sign Up page. 3. Enter your Cred[employer] Access Code exactly as it appears below. You will not need to use this code after youve completed the sign-up process. If you do not sign up before the expiration date, you must request a new code. [facility] Access Code: Activation code not generated Current [facility] Status: Active (This is the date your [facility] access code will ) 4. Enter the last four digits of your Social Security Number (xxxx) and Date of Birth (mm/dd/yyyy) as indicated and click Submit. You will be taken to the next sign-up page. 5. Create a Creditera ID. This will be your [facility] login ID and cannot be changed, so think of one that is secure and easy to remember. 6. Create a Cred[employer] password. You can change your password at any time. 7. Enter your Password Reset Question and Answer. This can be used at a later time if you forget your password. 8. Enter your e-mail address. You will receive e-mail notification when new information is available in [address] 9. Click Sign Up. You can now view and download portions of your medical record. 10. Click the Download Summary menu link to download a portable copy of your medical information. Additional Information If you have questions, please visit the Frequently Asked Questions section of the Cred[employer] website at [email or web address] Remember, [employer] is NOT to be used for urgent needs. For medical emergencies, dial 911.

## 2019-06-06 NOTE — PROGRESS NOTES
Bebeot Flores is a 48 y.o. male and presents with Multiple Sclerosis and Foreign Body in Ear  . Subjective:  He has a history of MS   He states he does infusion. He also states he has had some progressive weakness recently  He states his balance is off. He is followed by neurology     Dyslipidemia Review:  Patient presents for evaluation of lipids. Compliance with treatment thus far has been excellent. A repeat fasting lipid profile was done. The patient does not use medications that may worsen dyslipidemias (corticosteroids, progestins, anabolic steroids, diuretics, beta-blockers, amiodarone, cyclosporine, olanzapine). The patient exercises some    He has pus from the rt.ear lobe    Review of Systems  Constitutional: negative for fevers, chills, anorexia and weight loss  Eyes:   negative for visual disturbance and irritation  ENT:   negative for tinnitus,sore throat,nasal congestion,ear pains. hoarseness  Respiratory:  negative for cough, hemoptysis, dyspnea,wheezing  CV:   negative for chest pain, palpitations, lower extremity edema  GI:   negative for nausea, vomiting, diarrhea, abdominal pain,melena  Endo:               negative for polyuria,polydipsia,polyphagia,heat intolerance  Genitourinary: negative for frequency, dysuria and hematuria  Integument:  negative for rash and pruritus  Hematologic:  negative for easy bruising and gum/nose bleeding  Musculoskel: myalgias, arthralgias, back pain, muscle weakness, joint pain  Neurological:  negative for headaches, dizziness, vertigo, memory problems and gait   Behavl/Psych: negative for feelings of anxiety, depression, mood changes    Past Medical History:   Diagnosis Date    Back pain 10/8/2010    Back pain 10/8/2010    Depression     Diabetes ([facility] [address]     Fatigue 8/24/2012    Hearing loss 1/25/2013    Memory loss 1/25/2013    [facility]     Muscle pain     Muscle weakness     Poor appetite     Snoring     Unintentional weight change     Visual disturbance      Past Surgical History:   Procedure Laterality Date    HX HEENT Right     ear     Social History     Socioeconomic History    Marital status: SINGLE     Spouse name: Not on file    Number of children: Not on file    Years of education: Not on file    Highest education level: Not on file   Tobacco Use    Smoking status: Current Every Day Smoker     Packs/day: 0.50     Years: 21.00     Pack years: 10.50     Types: Cigarettes    Smokeless tobacco: Never Used   Substance and Sexual Activity    Alcohol use: No    Drug use: No    Sexual activity: Yes     Partners: Female     Birth control/protection: Condom     Family History   Problem Relation Age of Onset    Hypertension Father     Cancer Father         prostate    Hypertension Sister     Diabetes Maternal Grandmother     Cancer Paternal Uncle         prostate    Dementia Paternal Uncle      Current Outpatient Medications   Medication Sig Dispense Refill    gabapentin (NEURONTIN) 800 mg tablet Take 1 Tab by mouth three (3) times daily. 90 Tab 1    baclofen (LIORESAL) 10 mg tablet Take 1 Tab by mouth three (3) times daily. 90 Tab 5    dronabinol (MARINOL) 5 mg capsule Take 1 Cap by mouth two (2) times a day. Max Daily Amount: 10 mg. 60 Cap 2    Dalfampridine (AMPYRA) 10 mg Tb12 Take 10 mg by mouth two (2) times a day. 60 Tab 2    megestrol (MEGACE) 40 mg tablet Take 1 Tab by mouth three (3) times daily. 90 Tab 1    meloxicam (MOBIC) 15 mg tablet Take 1 Tab by mouth daily. 30 Tab 1    ocrelizumab (OCREVUS) 30 mg/mL soln injection 10 mL by IntraVENous route every fourteen (14) days. 10 mL 1    predniSONE (DELTASONE) 20 mg tablet Take 1 Tab by mouth daily (with breakfast). 30 Tab 0    naloxone 2 mg/actuation spry Use 1 spray intranasally into 1 nostril. Use a new Narcan nasal spray for subsequent doses and administer into alternating nostrils. May repeat every 2 to 3 minutes as needed.  4 Each 2    tadalafil (CIALIS) 20 mg tablet Take 1 Tab by mouth as needed. 10 Tab 12    dextromethorphan-quiNIDine (NUEDEXTA) 20-10 mg per capsule Take 1 Cap by mouth every twelve (12) hours. 60 Cap 2    lidocaine (LIDODERM) 5 % 1 Patch by TransDERmal route every twenty-four (24) hours.  30 Each 5     Facility-Administered Medications Ordered in Other Visits   Medication Dose Route Frequency Provider Last Rate Last Dose    [START ON 6/7/2019] ocrelizumab (OCREVUS) 600 mg in 0.9% sodium chloride 500 mL infusion  600 mg IntraVENous ONCE Syed Polo MD        [START ON 6/7/2019] methylPREDNISolone (PF) (Solu-MEDROL) injection 125 mg  125 mg IntraVENous ONCE Syed Polo MD        [START ON 6/7/2019] acetaminophen (TYLENOL) tablet 650 mg  650 mg Oral ONCE Syed Polo MD        [START ON 6/7/2019] diphenhydrAMINE (BENADRYL) injection 50 mg  50 mg IntraVENous Q4H PRN Syed Polo MD        [START ON 6/7/2019] acetaminophen (TYLENOL) tablet 650 mg  650 mg Oral Q4H PRN Syed Polo MD        [START ON 6/7/2019] diphenhydrAMINE (BENADRYL) injection 50 mg  50 mg IntraVENous ONCE Syed Polo MD         No Known Allergies    Objective:  Visit Vitals  /70 (BP 1 Location: Right arm, BP Patient Position: Sitting)   Pulse 76   Temp 98.2 °F (36.8 °C) (Oral)   Resp 16   Ht 5' 10\" (1.778 m)   Wt 228 lb (103.4 kg)   SpO2 99%   BMI 32.71 kg/m²     Physical Exam:   General appearance - alert, well appearing, and in no distress  Mental status - alert, oriented to person, place, and time  EYE-JUS, EOMI, corneas normal, no foreign bodies  ENT-ENT exam normal, no neck nodes or sinus tenderness  Nose - normal and patent, no erythema, discharge or polyps  Mouth - mucous membranes moist, pharynx normal without lesions  Neck - supple, no significant adenopathy   Chest - clear to auscultation, no wheezes, rales or rhonchi, symmetric air entry   Heart - normal rate, regular rhythm, normal S1, S2, no murmurs, rubs, clicks or gallops   Abdomen - soft, nontender, nondistended, no masses or organomegaly  Lymph- no adenopathy palpable  Ext-peripheral pulses normal, no pedal edema, no clubbing or cyanosis  Skin-Warm and dry. no hyperpigmentation, vitiligo, or suspicious lesions  Neuro -alert, oriented, normal speech, no focal findings or movement disorder noted  Neck-normal C-spine, no tenderness, full ROM without pain  Feet-no nail deformities or callus formation with good pulses noted  Rt.ear lobe pus noted    Results for orders placed or performed in visit on 12/07/18   COMPLIANCE DRUG SCREEN/PRESCRIPTION MONITORING   Result Value Ref Range    Summary FINAL        Assessment/Plan:    ICD-10-CM ICD-9-CM    1. Screening for colon cancer Z12.11 V76.51 OCCULT BLOOD IMMUNOASSAY,DIAGNOSTIC   2. MS (multiple sclerosis) ([facility] [address] G35 340    3. Type 2 diabetes mellitus without complication, without long-term current use of insulin (HCC) E11.9 250.00 AMB POC URINE, MICROALBUMIN, SEMIQUANT (3 RESULTS)      CBC W/O DIFF      METABOLIC PANEL, COMPREHENSIVE     Orders Placed This Encounter    OCCULT BLOOD IMMUNOASSAY,DIAGNOSTIC    CBC W/O DIFF    METABOLIC PANEL, COMPREHENSIVE    AMB POC URINE, MICROALBUMIN, SEMIQUANT (3 RESULTS)     call if any problems  There are no Patient Instructions on file for this visit. I have reviewed with the patient details of the assessment and plan and all questions were answered. Relevent patient education was performed    An After Visit Summary was printed and given to the patient. 1. Have you been to the ER, urgent care clinic since your last visit? Hospitalized since your last visit?no    2. Have you seen or consulted any other health care providers outside of the [address] [place] [address] since your last visit? Include any pap smears or colon screening.  No    3 most recent PHQ Screens 6/6/2019   PHQ Not Done -   Little interest or pleasure in doing things Not at all   Feeling down, depressed, irritable, or hopeless Not at all   Total Score PHQ 2 0       Chief Complaint   Patient presents with    Multiple Sclerosis    Foreign Body in Ear     Per Dr. Fred Jackson.,  verbal order given for needed amb poc labs.

## 2019-06-07 ENCOUNTER — HOSPITAL ENCOUNTER (OUTPATIENT)
Dept: INFUSION THERAPY | Age: 54
Discharge: HOME | End: 2019-06-07
Payer: MEDICARE

## 2019-06-07 VITALS
OXYGEN SATURATION: 100 % | TEMPERATURE: 97.9 F | SYSTOLIC BLOOD PRESSURE: 134 MMHG | HEART RATE: 61 BPM | DIASTOLIC BLOOD PRESSURE: 63 MMHG

## 2019-06-07 LAB
ALBUMIN SERPL-MCNC: 3.6 G/DL (ref 3.5–5)
ALBUMIN/GLOB SERPL: 1 {RATIO} (ref 1.1–2.2)
ALP SERPL-CCNC: 38 U/L (ref 45–117)
ALT SERPL-CCNC: 12 U/L (ref 12–78)
ANION GAP SERPL CALC-SCNC: 6 MMOL/L (ref 5–15)
AST SERPL-CCNC: 12 U/L (ref 15–37)
BASOPHILS # BLD: 0 K/UL (ref 0–0.1)
BASOPHILS NFR BLD: 0 % (ref 0–1)
BILIRUB SERPL-MCNC: 0.4 MG/DL (ref 0.2–1)
BUN SERPL-MCNC: 10 MG/DL (ref 6–20)
BUN/CREAT SERPL: 10 (ref 12–20)
CALCIUM SERPL-MCNC: 9.2 MG/DL (ref 8.5–10.1)
CHLORIDE SERPL-SCNC: 104 MMOL/L (ref 97–108)
CO2 SERPL-SCNC: 32 MMOL/L (ref 21–32)
CREAT SERPL-MCNC: 1.01 MG/DL (ref 0.7–1.3)
DIFFERENTIAL METHOD BLD: NORMAL
EOSINOPHIL # BLD: 0.2 K/UL (ref 0–0.4)
EOSINOPHIL NFR BLD: 3 % (ref 0–7)
ERYTHROCYTE [DISTWIDTH] IN BLOOD BY AUTOMATED COUNT: 12.6 % (ref 11.5–14.5)
GLOBULIN SER CALC-MCNC: 3.6 G/DL (ref 2–4)
GLUCOSE SERPL-MCNC: 86 MG/DL (ref 65–100)
HCT VFR BLD AUTO: 41.2 % (ref 36.6–50.3)
HGB BLD-MCNC: 13.4 G/DL (ref 12.1–17)
IMM GRANULOCYTES # BLD AUTO: 0 K/UL (ref 0–0.04)
IMM GRANULOCYTES NFR BLD AUTO: 0 % (ref 0–0.5)
LYMPHOCYTES # BLD: 1.7 K/UL (ref 0.8–3.5)
LYMPHOCYTES NFR BLD: 30 % (ref 12–49)
MCH RBC QN AUTO: 29.3 PG (ref 26–34)
MCHC RBC AUTO-ENTMCNC: 32.5 G/DL (ref 30–36.5)
MCV RBC AUTO: 90 FL (ref 80–99)
MONOCYTES # BLD: 0.6 K/UL (ref 0–1)
MONOCYTES NFR BLD: 11 % (ref 5–13)
NEUTS SEG # BLD: 3 K/UL (ref 1.8–8)
NEUTS SEG NFR BLD: 56 % (ref 32–75)
NRBC # BLD: 0 K/UL (ref 0–0.01)
NRBC BLD-RTO: 0 PER 100 WBC
PLATELET # BLD AUTO: 184 K/UL (ref 150–400)
PMV BLD AUTO: 10.2 FL (ref 8.9–12.9)
POTASSIUM SERPL-SCNC: 4.1 MMOL/L (ref 3.5–5.1)
PROT SERPL-MCNC: 7.2 G/DL (ref 6.4–8.2)
RBC # BLD AUTO: 4.58 M/UL (ref 4.1–5.7)
SODIUM SERPL-SCNC: 142 MMOL/L (ref 136–145)
WBC # BLD AUTO: 5.5 K/UL (ref 4.1–11.1)

## 2019-06-07 PROCEDURE — 85025 COMPLETE CBC W/AUTO DIFF WBC: CPT

## 2019-06-07 PROCEDURE — 74011250636 HC RX REV CODE- 250/636: Performed by: PSYCHIATRY & NEUROLOGY

## 2019-06-07 PROCEDURE — 96415 CHEMO IV INFUSION ADDL HR: CPT

## 2019-06-07 PROCEDURE — 80053 COMPREHEN METABOLIC PANEL: CPT

## 2019-06-07 PROCEDURE — 96413 CHEMO IV INFUSION 1 HR: CPT

## 2019-06-07 PROCEDURE — 36415 COLL VENOUS BLD VENIPUNCTURE: CPT

## 2019-06-07 PROCEDURE — 74011250637 HC RX REV CODE- 250/637: Performed by: PSYCHIATRY & NEUROLOGY

## 2019-06-07 PROCEDURE — 96375 TX/PRO/DX INJ NEW DRUG ADDON: CPT

## 2019-06-07 RX ORDER — SODIUM CHLORIDE 9 MG/ML
25 INJECTION, SOLUTION INTRAVENOUS AS NEEDED
Status: DISCONTINUED | OUTPATIENT
Start: 2019-06-07 | End: 2019-06-08 | Stop reason: HOSPADM

## 2019-06-07 RX ADMIN — OCRELIZUMAB 600 MG: 300 INJECTION INTRAVENOUS at 10:01

## 2019-06-07 RX ADMIN — METHYLPREDNISOLONE SODIUM SUCCINATE 125 MG: 125 INJECTION, POWDER, FOR SOLUTION INTRAMUSCULAR; INTRAVENOUS at 09:20

## 2019-06-07 RX ADMIN — ACETAMINOPHEN 650 MG: 325 TABLET ORAL at 09:17

## 2019-06-07 RX ADMIN — SODIUM CHLORIDE 25 ML/HR: 900 INJECTION, SOLUTION INTRAVENOUS at 09:11

## 2019-06-07 RX ADMIN — DIPHENHYDRAMINE HYDROCHLORIDE 50 MG: 50 INJECTION INTRAMUSCULAR; INTRAVENOUS at 09:17

## 2019-06-07 NOTE — PROGRESS NOTES
[facility] Progress Note    Date: 2019    Name: Pavel Dumont    MRN: [id number]         : 1965    Mr. Josie Harrison Arrived ambulatory and in no distress for 6 month Ocrevus. Assessment was completed, no acute issues at this time, no new complaints voiced. 24 gauge IV started in right forearm without difficulty, labs drawn and in process. Chemotherapy Flowsheet 2018   Cycle Week 2   Date 2018   Drug / Regimen Ocrevus   Pre Meds given   Notes given     Mr. Sidhu's vitals were reviewed. Patient Vitals for the past 12 hrs:   Temp Pulse BP SpO2   19 1417  61 134/63    19 1344  65 123/82    19 1241  65 121/80    19 1217  61 127/74    19 1147  (!) 57 116/81    19 1105  61 113/79    19 1030  63 119/76    19 0852 97.9 °F (36.6 °C) 64 128/80 100 %       Lab results were obtained and reviewed. Recent Results (from the past 12 hour(s))   CBC WITH AUTOMATED DIFF    Collection Time: 19  9:05 AM   Result Value Ref Range    WBC 5.5 4.1 - 11.1 K/uL    RBC 4.58 4.10 - 5.70 M/uL    HGB 13.4 12.1 - 17.0 g/dL    HCT 41.2 36.6 - 50.3 %    MCV 90.0 80.0 - 99.0 FL    MCH 29.3 26.0 - 34.0 PG    MCHC 32.5 30.0 - 36.5 g/dL    RDW 12.6 11.5 - 14.5 %    PLATELET 210 907 - 990 K/uL    MPV 10.2 8.9 - 12.9 FL    NRBC 0.0 0  WBC    ABSOLUTE NRBC 0.00 0.00 - 0.01 K/uL    NEUTROPHILS 56 32 - 75 %    LYMPHOCYTES 30 12 - 49 %    MONOCYTES 11 5 - 13 %    EOSINOPHILS 3 0 - 7 %    BASOPHILS 0 0 - 1 %    IMMATURE GRANULOCYTES 0 0.0 - 0.5 %    ABS. NEUTROPHILS 3.0 1.8 - 8.0 K/UL    ABS. LYMPHOCYTES 1.7 0.8 - 3.5 K/UL    ABS. MONOCYTES 0.6 0.0 - 1.0 K/UL    ABS. EOSINOPHILS 0.2 0.0 - 0.4 K/UL    ABS. BASOPHILS 0.0 0.0 - 0.1 K/UL    ABS. IMM.  GRANS. 0.0 0.00 - 0.04 K/UL    DF AUTOMATED     METABOLIC PANEL, COMPREHENSIVE    Collection Time: 19  9:05 AM   Result Value Ref Range    Sodium 142 136 - 145 mmol/L    Potassium 4.1 3.5 - 5.1 mmol/L    Chloride 104 97 - 108 mmol/L    CO2 32 21 - 32 mmol/L    Anion gap 6 5 - 15 mmol/L    Glucose 86 65 - 100 mg/dL    BUN 10 6 - 20 MG/DL    Creatinine 1.01 0.70 - 1.30 MG/DL    BUN/Creatinine ratio 10 (L) 12 - 20      GFR est AA >60 >60 ml/min/1.73m2    GFR est non-AA >60 >60 ml/min/1.73m2    Calcium 9.2 8.5 - 10.1 MG/DL    Bilirubin, total 0.4 0.2 - 1.0 MG/DL    ALT (SGPT) 12 12 - 78 U/L    AST (SGOT) 12 (L) 15 - 37 U/L    Alk. phosphatase 38 (L) 45 - 117 U/L    Protein, total 7.2 6.4 - 8.2 g/dL    Albumin 3.6 3.5 - 5.0 g/dL    Globulin 3.6 2.0 - 4.0 g/dL    A-G Ratio 1.0 (L) 1.1 - 2.2         Pre-medications  were administered as ordered and chemotherapy was initiated. Tylenol 650mg po  Benadryl 50mg po  Solumedrol 125mg IV  Ocrevus 600mg IV infusion    Patient Vitals for the past 12 hrs:   Temp Pulse BP SpO2   06/07/19 1417  61 134/63    06/07/19 1344  65 123/82    06/07/19 1241  65 121/80    06/07/19 1217  61 127/74    06/07/19 1147  (!) 57 116/81    06/07/19 1105  61 113/79    06/07/19 1030  63 119/76    06/07/19 0852 97.9 °F (36.6 °C) 64 128/80 100 %       Mr. Sidhu tolerated treatment well and was discharged from [place] [address] in stable condition at 1430. Pt. Declined full 1 hr post infusion observation. Pt aware of next appointment on 12/04/19.   James Haddad RN  June 7, 2019  Future Appointments   Date Time Provider Shira Nunes   6/24/2019 10:15 AM [facility]   6/24/2019 11:15 AM [facility]   7/10/2019  1:40 PM Marissa Polo  [facility]   9/6/2019 10:45 AM MD Naty Tijerina [facility]   12/4/2019 10:00 AM [facility] INFUSION NURSE 1 YOUNG QUEVEDO [facility]

## 2019-07-10 ENCOUNTER — TELEPHONE (OUTPATIENT)
Dept: NEUROLOGY | Age: 54
End: 2019-07-10

## 2019-07-10 NOTE — TELEPHONE ENCOUNTER
Patient was called twice today due to he has not had the MRI of the brain and MRI of the lumbar spine appointment was cancelled with Dr. Macario Hamilton. Patient's voicemail is full cannot contact patint.

## 2019-11-26 RX ORDER — DIPHENHYDRAMINE HYDROCHLORIDE 50 MG/ML
50 INJECTION, SOLUTION INTRAMUSCULAR; INTRAVENOUS ONCE
Status: COMPLETED | OUTPATIENT
Start: 2019-12-04 | End: 2019-12-04

## 2019-11-26 RX ORDER — ACETAMINOPHEN 325 MG/1
650 TABLET ORAL
Status: ACTIVE | OUTPATIENT
Start: 2019-12-04 | End: 2019-12-04

## 2019-11-26 RX ORDER — DIPHENHYDRAMINE HYDROCHLORIDE 50 MG/ML
50 INJECTION, SOLUTION INTRAMUSCULAR; INTRAVENOUS
Status: ACTIVE | OUTPATIENT
Start: 2019-12-04 | End: 2019-12-04

## 2019-11-26 RX ORDER — ACETAMINOPHEN 325 MG/1
650 TABLET ORAL ONCE
Status: COMPLETED | OUTPATIENT
Start: 2019-12-04 | End: 2019-12-04

## 2019-12-04 ENCOUNTER — HOSPITAL ENCOUNTER (OUTPATIENT)
Dept: INFUSION THERAPY | Age: 54
Discharge: HOME | End: 2019-12-04
Payer: MEDICARE

## 2019-12-04 VITALS — DIASTOLIC BLOOD PRESSURE: 81 MMHG | SYSTOLIC BLOOD PRESSURE: 120 MMHG | HEART RATE: 66 BPM

## 2019-12-04 LAB
ALBUMIN SERPL-MCNC: 3.6 G/DL (ref 3.5–5)
ALBUMIN/GLOB SERPL: 1 {RATIO} (ref 1.1–2.2)
ALP SERPL-CCNC: 40 U/L (ref 45–117)
ALT SERPL-CCNC: 27 U/L (ref 12–78)
ANION GAP SERPL CALC-SCNC: 6 MMOL/L (ref 5–15)
AST SERPL-CCNC: 14 U/L (ref 15–37)
BASOPHILS # BLD: 0 K/UL (ref 0–0.1)
BASOPHILS NFR BLD: 1 % (ref 0–1)
BILIRUB SERPL-MCNC: 0.4 MG/DL (ref 0.2–1)
BUN SERPL-MCNC: 6 MG/DL (ref 6–20)
BUN/CREAT SERPL: 7 (ref 12–20)
CALCIUM SERPL-MCNC: 8.9 MG/DL (ref 8.5–10.1)
CHLORIDE SERPL-SCNC: 104 MMOL/L (ref 97–108)
CO2 SERPL-SCNC: 32 MMOL/L (ref 21–32)
CREAT SERPL-MCNC: 0.84 MG/DL (ref 0.7–1.3)
DIFFERENTIAL METHOD BLD: NORMAL
EOSINOPHIL # BLD: 0.1 K/UL (ref 0–0.4)
EOSINOPHIL NFR BLD: 2 % (ref 0–7)
ERYTHROCYTE [DISTWIDTH] IN BLOOD BY AUTOMATED COUNT: 13.1 % (ref 11.5–14.5)
GLOBULIN SER CALC-MCNC: 3.7 G/DL (ref 2–4)
GLUCOSE SERPL-MCNC: 112 MG/DL (ref 65–100)
HCT VFR BLD AUTO: 42 % (ref 36.6–50.3)
HGB BLD-MCNC: 13.8 G/DL (ref 12.1–17)
IMM GRANULOCYTES # BLD AUTO: 0 K/UL (ref 0–0.04)
IMM GRANULOCYTES NFR BLD AUTO: 0 % (ref 0–0.5)
LYMPHOCYTES # BLD: 2 K/UL (ref 0.8–3.5)
LYMPHOCYTES NFR BLD: 33 % (ref 12–49)
MCH RBC QN AUTO: 28.9 PG (ref 26–34)
MCHC RBC AUTO-ENTMCNC: 32.9 G/DL (ref 30–36.5)
MCV RBC AUTO: 88.1 FL (ref 80–99)
MONOCYTES # BLD: 0.6 K/UL (ref 0–1)
MONOCYTES NFR BLD: 10 % (ref 5–13)
NEUTS SEG # BLD: 3.3 K/UL (ref 1.8–8)
NEUTS SEG NFR BLD: 54 % (ref 32–75)
NRBC # BLD: 0 K/UL (ref 0–0.01)
NRBC BLD-RTO: 0 PER 100 WBC
PLATELET # BLD AUTO: 221 K/UL (ref 150–400)
PMV BLD AUTO: 10.8 FL (ref 8.9–12.9)
POTASSIUM SERPL-SCNC: 3.6 MMOL/L (ref 3.5–5.1)
PROT SERPL-MCNC: 7.3 G/DL (ref 6.4–8.2)
RBC # BLD AUTO: 4.77 M/UL (ref 4.1–5.7)
SODIUM SERPL-SCNC: 142 MMOL/L (ref 136–145)
WBC # BLD AUTO: 6 K/UL (ref 4.1–11.1)

## 2019-12-04 PROCEDURE — 85025 COMPLETE CBC W/AUTO DIFF WBC: CPT

## 2019-12-04 PROCEDURE — 80053 COMPREHEN METABOLIC PANEL: CPT

## 2019-12-04 PROCEDURE — 74011250636 HC RX REV CODE- 250/636: Performed by: PSYCHIATRY & NEUROLOGY

## 2019-12-04 PROCEDURE — 96415 CHEMO IV INFUSION ADDL HR: CPT

## 2019-12-04 PROCEDURE — 74011250637 HC RX REV CODE- 250/637: Performed by: PSYCHIATRY & NEUROLOGY

## 2019-12-04 PROCEDURE — 36415 COLL VENOUS BLD VENIPUNCTURE: CPT

## 2019-12-04 PROCEDURE — 96413 CHEMO IV INFUSION 1 HR: CPT

## 2019-12-04 PROCEDURE — 96375 TX/PRO/DX INJ NEW DRUG ADDON: CPT

## 2019-12-04 RX ORDER — SODIUM CHLORIDE 0.9 % (FLUSH) 0.9 %
5-10 SYRINGE (ML) INJECTION AS NEEDED
Status: DISCONTINUED | OUTPATIENT
Start: 2019-12-04 | End: 2019-12-05 | Stop reason: HOSPADM

## 2019-12-04 RX ORDER — OXYCODONE HYDROCHLORIDE 15 MG/1
15 TABLET ORAL
Status: ON HOLD | COMMUNITY
End: 2021-11-16 | Stop reason: SDUPTHER

## 2019-12-04 RX ADMIN — DIPHENHYDRAMINE HYDROCHLORIDE 50 MG: 50 INJECTION INTRAMUSCULAR; INTRAVENOUS at 10:57

## 2019-12-04 RX ADMIN — OCRELIZUMAB 600 MG: 300 INJECTION INTRAVENOUS at 11:47

## 2019-12-04 RX ADMIN — METHYLPREDNISOLONE SODIUM SUCCINATE 125 MG: 125 INJECTION, POWDER, FOR SOLUTION INTRAMUSCULAR; INTRAVENOUS at 10:47

## 2019-12-04 RX ADMIN — ACETAMINOPHEN 650 MG: 325 TABLET ORAL at 10:47

## 2019-12-04 NOTE — PROGRESS NOTES
[facility] Progress Note    Date: 2019    Name: Gifty Garcia    MRN: [id number]         : 1965    Mr. Villa Webber Arrived ambulatory and in no distress for Ocrevus q 6months. Assessment was completed, no acute issues at this time, no new complaints voiced. 24 gauge IV started in right hand without difficulty, labs drawn and in process. Patient Vitals for the past 12 hrs:   Pulse BP   19 1522 66 120/81   19 1451 67 123/83   19 1421 61 104/75   19 1354 64 117/75   19 1248 69 112/78   19 1219 65 114/77       Lab results were obtained and reviewed. Recent Results (from the past 12 hour(s))   CBC WITH AUTOMATED DIFF    Collection Time: 19 10:41 AM   Result Value Ref Range    WBC 6.0 4.1 - 11.1 K/uL    RBC 4.77 4.10 - 5.70 M/uL    HGB 13.8 12.1 - 17.0 g/dL    HCT 42.0 36.6 - 50.3 %    MCV 88.1 80.0 - 99.0 FL    MCH 28.9 26.0 - 34.0 PG    MCHC 32.9 30.0 - 36.5 g/dL    RDW 13.1 11.5 - 14.5 %    PLATELET 655 827 - 029 K/uL    MPV 10.8 8.9 - 12.9 FL    NRBC 0.0 0  WBC    ABSOLUTE NRBC 0.00 0.00 - 0.01 K/uL    NEUTROPHILS 54 32 - 75 %    LYMPHOCYTES 33 12 - 49 %    MONOCYTES 10 5 - 13 %    EOSINOPHILS 2 0 - 7 %    BASOPHILS 1 0 - 1 %    IMMATURE GRANULOCYTES 0 0.0 - 0.5 %    ABS. NEUTROPHILS 3.3 1.8 - 8.0 K/UL    ABS. LYMPHOCYTES 2.0 0.8 - 3.5 K/UL    ABS. MONOCYTES 0.6 0.0 - 1.0 K/UL    ABS. EOSINOPHILS 0.1 0.0 - 0.4 K/UL    ABS. BASOPHILS 0.0 0.0 - 0.1 K/UL    ABS. IMM. GRANS. 0.0 0.00 - 0.04 K/UL    DF AUTOMATED         Pre-medications  were administered as ordered and chemotherapy was initiated.      Medications Administered     acetaminophen (TYLENOL) tablet 650 mg     Admin Date  2019 Action  Given Dose  650 mg Route  Oral Administered By  Mel Guo RN          diphenhydrAMINE (BENADRYL) injection 50 mg     Admin Date  2019 Action  Given Dose  50 mg Route  IntraVENous Administered By  Mel Guo RN methylPREDNISolone (PF) (Solu-MEDROL) injection 125 mg     Admin Date  12/04/2019 Action  Given Dose  125 mg Route  IntraVENous Administered By  Brigido Mullins RN          ocrelizumab (OCREVUS) 600 mg in 0.9% sodium chloride 500 mL infusion     Admin Date  12/04/2019 Action  New Bag Dose  600 mg Rate   Route  IntraVENous Administered By  Brigido Mullins RN           Admin Date  12/04/2019 Action  Rate Change Dose   Rate  80 mL/hr Route  IntraVENous Administered By  Jeanie Castanon RN           Admin Date  12/04/2019 Action  Rate Change Dose   Rate  120 mL/hr Route  IntraVENous Administered By  Jeanie Castanon RN           Admin Date  12/04/2019 Action  Rate Change Dose   Rate  200 mL/hr Route  IntraVENous Administered By  Brigido Mullins RN              Mr. Esme Rodarte tolerated treatment well and was discharged from [place] [address] in stable condition at [phone]. He is to return on 06/03/20 at 1000 for his next appointment.     Future Appointments   Date Time Provider Shira Nunes   6/3/2020 10:00  [address], RN  December 4, 2019

## 2020-01-15 ENCOUNTER — OFFICE VISIT (OUTPATIENT)
Dept: NEUROLOGY | Age: 55
End: 2020-01-15

## 2020-01-15 VITALS
DIASTOLIC BLOOD PRESSURE: 82 MMHG | HEART RATE: 66 BPM | SYSTOLIC BLOOD PRESSURE: 160 MMHG | OXYGEN SATURATION: 99 % | TEMPERATURE: 98.1 F | RESPIRATION RATE: 16 BRPM | HEIGHT: 70 IN | BODY MASS INDEX: 27.17 KG/M2 | WEIGHT: 189.8 LBS

## 2020-01-15 DIAGNOSIS — G62.9 NEUROPATHY: ICD-10-CM

## 2020-01-15 DIAGNOSIS — F51.01 PRIMARY INSOMNIA: ICD-10-CM

## 2020-01-15 DIAGNOSIS — R11.0 NAUSEA: ICD-10-CM

## 2020-01-15 DIAGNOSIS — R26.9 GAIT DISORDER: ICD-10-CM

## 2020-01-15 DIAGNOSIS — G89.4 CHRONIC PAIN SYNDROME: ICD-10-CM

## 2020-01-15 DIAGNOSIS — G35 MULTIPLE SCLEROSIS, SECONDARY PROGRESSIVE (HCC): ICD-10-CM

## 2020-01-15 DIAGNOSIS — G35 SECONDARY PROGRESSIVE MULTIPLE SCLEROSIS (HCC): Primary | ICD-10-CM

## 2020-01-15 DIAGNOSIS — Z79.891 USE OF OPIATES FOR THERAPEUTIC PURPOSES: ICD-10-CM

## 2020-01-15 DIAGNOSIS — G35 MULTIPLE SCLEROSIS EXACERBATION (HCC): ICD-10-CM

## 2020-01-15 DIAGNOSIS — R25.1 TREMOR: ICD-10-CM

## 2020-01-15 RX ORDER — PREGABALIN 100 MG/1
100 CAPSULE ORAL 2 TIMES DAILY
Qty: 60 CAP | Refills: 1 | Status: SHIPPED | OUTPATIENT
Start: 2020-01-15 | End: 2020-04-14 | Stop reason: SDUPTHER

## 2020-01-15 RX ORDER — PREGABALIN 75 MG/1
75 CAPSULE ORAL
Qty: 14 CAP | Refills: 0 | Status: SHIPPED | COMMUNITY
Start: 2020-01-15 | End: 2020-06-24 | Stop reason: SDUPTHER

## 2020-01-15 RX ORDER — DRONABINOL 5 MG/1
5 CAPSULE ORAL 2 TIMES DAILY
Qty: 60 CAP | Refills: 2 | Status: SHIPPED | OUTPATIENT
Start: 2020-01-15 | End: 2021-11-12

## 2020-01-15 RX ORDER — GABAPENTIN 800 MG/1
800 TABLET ORAL 3 TIMES DAILY
Qty: 90 TAB | Refills: 1 | Status: CANCELLED | OUTPATIENT
Start: 2020-01-15

## 2020-01-15 RX ORDER — PREDNISONE 20 MG/1
20 TABLET ORAL 2 TIMES DAILY
Qty: 40 TAB | Refills: 1 | Status: SHIPPED | OUTPATIENT
Start: 2020-01-15 | End: 2021-11-12

## 2020-01-15 NOTE — PROGRESS NOTES
Chief Complaint   Patient presents with    Multiple Sclerosis    Gait Problem    Medication Refill     1. Have you been to the ER, urgent care clinic since your last visit? Hospitalized since your last visit? No    2. Have you seen or consulted any other health care providers outside of the [address] [place] [address] since your last visit? Include any pap smears or colon screening.  Dr. Bryan Saenz count not performed patient did not bring medications       Pill count not verified with patient  Patient reports taking medication    UDS lab slip given to patient to go to [employer]  Pain contract on file   made available for Dr. Juan Diehl  review  Script given for Lyrica and Marinol

## 2020-01-15 NOTE — PATIENT INSTRUCTIONS
All test results will be discussed at the next appointment. MRI of the Neck: About This Test 
What is it? MRI (magnetic resonance imaging) is a test that uses a magnetic field and pulses of radio wave energy to make pictures of the organs and structures inside the body. An MRI can give your doctor information about your neck, throat, tongue, voice box (larynx), tonsils, and other structures in the neck area. Why is this test done? An MRI of the neck can help find problems such as infection or tumors. How can you prepare for the test? 
Talk to your doctor about all your health conditions before the test. For example, tell your doctor if: 
· You are allergic to any medicines. · You are or might be pregnant. · You have a pacemaker, an artificial limb, any metal pins or metal parts in your body, metal heart valves, metal clips in your brain, metal implants in your ears, or any other implanted or prosthetic medical device. · You have an intrauterine device (IUD) in place. · You get nervous in confined spaces. You may need medicine to help you relax. · You wear a patch that contains medicine. · You have kidney disease. What happens before the test? 
· You will remove all metal objects, such as hearing aids, dentures, jewelry, watches, and hairpins. · You will take off all or most of your clothes and change into a gown. If you do leave some clothes on, make sure you take everything out of your pockets. · You may have contrast material (dye) put into your arm through a tube called an IV. Contrast material helps doctors see specific organs, blood vessels, and most tumors. What happens during the test? 
· You will lie on your back on a table that is part of the MRI scanner. Your head, chest, and arms may be held with straps to help you remain still. · The table will slide into the space that contains the magnet. A device called a coil may be placed over or wrapped around your neck. · Inside the scanner you will hear a fan and feel air moving. You may hear tapping, thumping, or snapping noises. You may be given earplugs or headphones to reduce the noise. · You will be asked to hold still during the scan. You may be asked to hold your breath for short periods. · You may be alone in the scanning room, but a technologist will be watching you through a window and talking with you during the test. 
What else should you know about the test? 
· An MRI does not hurt. · You may feel warmth in the area being examined. This is normal. 
· A dye (contrast material) that contains gadolinium may be used in this test. Be sure to tell your doctor if: 
? You are pregnant or think you may be pregnant. ? You have kidney problems. ? You've had more than one test that used gadolinium. · The U.S. Food and Drug Administration (FDA) has safety warnings about gadolinium. But for most people, the benefit of its use in this test outweighs the risk. · If a dye is used, you may feel a quick sting or pinch and some coolness when the IV is started. The dye may give you a metallic taste in your mouth. Some people feel sick to their stomach or get a headache. · If you breastfeed and are concerned about whether the dye used in this test is safe, talk to your doctor. Most experts believe that very little dye passes into breast milk and even less is passed on to the baby. But if you prefer, you can store some of your breast milk ahead of time and use it for a day or two after the test. 
How long does the test take? · The test usually takes 30 to 60 minutes but can take as long as 2 hours. What happens after the test? 
· You will probably be able to go home right away, depending on the reason for the test. 
· You can go back to your usual activities right away. Follow-up care is a key part of your treatment and safety.  Be sure to make and go to all appointments, and call your doctor if you are having problems. It's also a good idea to keep a list of the medicines you take. Ask your doctor when you can expect to have your test results. Where can you learn more? Go to http://concepcion-deepika.info/. Enter I153 in the search box to learn more about \"MRI of the Neck: About This Test.\" Current as of: March 28, 2019 Content Version: 12.2 © 0164-2311 [employer]. Care instructions adapted under license by [employer] (which disclaims liability or warranty for this information). If you have questions about a medical condition or this instruction, always ask your healthcare professional. [address] any warranty or liability for your use of this information. MRI of the Head: About This Test 
What is it? MRI (magnetic resonance imaging) is a test that uses a magnetic field and pulses of radio wave energy to make pictures of the organs and structures inside the body. An MRI of the head can give your doctor information about your brain, eyes, ears, and nerves. When you have an MRI, you lie on a table and the table moves into the MRI machine. Why is this test done? An MRI of the head can help find problems such as tumors and infection. It also can check symptoms of a head injury or of diseases such as multiple sclerosis (MS) and stroke. How can you prepare for the test? 
Talk to your doctor about all your health conditions before the test. For example, tell your doctor if: 
· You are allergic to any medicines. · You are or might be pregnant. · You have a pacemaker, an artificial limb, any metal pins or metal parts in your body, metal heart valves, metal clips in your brain, metal implants in your ears, or any other implanted or prosthetic medical device. · You have an intrauterine device (IUD) in place. · You get nervous in confined spaces. You may need medicine to help you relax. · You wear a patch that contains medicine. · You have kidney disease. What happens before the test? 
· You will remove all metal objects, such as hearing aids, dentures, jewelry, watches, and hairpins. · You may need to take off some of your clothes. You will be given a gown to wear during the test. If you do leave some clothes on, make sure you take everything out of your pockets. · You may have contrast material (dye) put into your arm through a tube called an IV. Contrast material helps doctors see specific organs, blood vessels, and most tumors. What happens during the test? 
· You will lie on your back on a table that is part of the MRI scanner. Your head, chest, and arms may be held with straps to help you lie still. · The table will slide into the space that contains the magnet. A device called a coil may be placed over or wrapped around your head. · Inside the scanner you will hear a fan and feel air moving. You may hear tapping, thumping, or snapping noises. You may be given earplugs or headphones to reduce the noise. · You will be asked to hold still during the scan. You may be asked to hold your breath for short periods. · You may be alone in the scanning room, but a technologist will be watching you through a window and talking with you during the test. 
What else should you know about the test? 
· An MRI does not hurt. · You may feel warmth in the area being examined. This is normal. 
· A dye (contrast material) that contains gadolinium may be used in this test. Be sure to tell your doctor if: 
? You are pregnant or think you may be pregnant. ? You have kidney problems. ? You've had more than one test that used gadolinium. · The U.S. Food and Drug Administration (FDA) has safety warnings about gadolinium. But for most people, the benefit of its use in this test outweighs the risk. · If a dye is used, you may feel a quick sting or pinch and some coolness when the IV is started.  The dye may give you a metallic taste in your mouth. Some people feel sick to their stomach or get a headache. · If you breastfeed and are concerned about whether the dye used in this test is safe, talk to your doctor. Most experts believe that very little dye passes into breast milk and even less is passed on to the baby. But if you prefer, you can store some of your breast milk ahead of time and use it for a day or two after the test. 
How long does the test take? · The test usually takes 30 to 60 minutes but can take as long as 2 hours. What happens after the test? 
· You will probably be able to go home right away, depending on the reason for the test. 
· You can go back to your usual activities right away. Follow-up care is a key part of your treatment and safety. Be sure to make and go to all appointments, and call your doctor if you are having problems. It's also a good idea to keep a list of the medicines you take. Ask your doctor when you can expect to have your test results. Where can you learn more? Go to http://concepcion-deepika.info/. Enter W024 in the search box to learn more about \"MRI of the Head: About This Test.\" Current as of: March 28, 2019 Content Version: 12.2 © 6106-8092 [employer]. Care instructions adapted under license by [employer] (which disclaims liability or warranty for this information). If you have questions about a medical condition or this instruction, always ask your healthcare professional. [address] any warranty or liability for your use of this information.

## 2020-01-15 NOTE — PROGRESS NOTES
Neurology Progress Note    NAME:  Kelly Lindsay   :   1965   MRN:   [id number]     Date/Time:  1/15/2020  Subjective:     Kelly Lindsay is a 48 y.o. male here today for follow-up for multiple sclerosis, pain, weakness difficulty ambulating. Patient today says he has been very weak, difficulty walking and frequent fall. He thinks condition is getting worse since the last visit. Patient says he has been on his medication. He continues to have a lot of pain. Patient also has been losing a lot of weight. Patient says his legs tend to give out on him and he will fall even when he still using his cane. He however has not lost consciousness. He noted that he has experience of confusion and been having a lot of nausea. He continues to have neck pain is sharp in nature, continuous, burning sensation that goes around to the arms, making the arms weak. He says he drops things at times. Says he is still going to the gym which is also helping to strengthen the extremity  He says tremors have been getting worse, he has been told that his head also shakes with his hands. Back pain is also sharp and constant, goes on to the legs causing numbness and tingling sensation of the legs with weakness, he says he is weaker on the right side. .  Patient is advised to quit smoking as this is making multiple sclerosis worse. I will start this patient on steroid, prednisone taper. I will obtain MRI of the brain and cervical spine to evaluate the progress of the disease. This patient appears to be going into secondary progressive. I will reevaluate this patient with the studies and possibly change his medications. Patient is referred to physical therapy. As this patient tentatively having more tremors with Neurontin, I will discontinue Neurontin and try him on Lyrica and see if that will help better with neuropathic pain without tremors. He says he continues to have a lot of nausea lately.   On a scale of 1-10, patient rates his pain level to be between 8 and 9. Denies loss of consciousness, dysphagia, odynophagia. .  Review of Systems - General ROS: positive for  - fatigue, night sweats and sleep disturbance  Psychological ROS: positive for - anxiety, concentration difficulties, depression, mood swings and sleep disturbances  Ophthalmic ROS: positive for - blurry vision, decreased vision and photophobia  ENT ROS: positive for - headaches, tinnitus, vertigo and visual changes  Allergy and Immunology ROS: negative  Hematological and Lymphatic ROS: negative  Endocrine ROS: negative  Respiratory ROS: no cough, shortness of breath, or wheezing  Cardiovascular ROS: no chest pain or dyspnea on exertion  Gastrointestinal ROS: no abdominal pain, change in bowel habits, or black or bloody stools  Genito-Urinary ROS: no dysuria, trouble voiding, or hematuria  Musculoskeletal ROS: positive for - gait disturbance, joint pain, joint stiffness, joint swelling, muscle pain and muscular weakness  Neurological ROS: positive for - dizziness, gait disturbance, headaches, impaired coordination/balance, memory loss, numbness/tingling, visual changes and weakness  Dermatological ROS: negative         Medications reviewed:  Current Outpatient Medications   Medication Sig Dispense Refill    oxyCODONE IR (OXY-IR) 15 mg immediate release tablet Take 15 mg by mouth every four (4) hours as needed for Pain.  gabapentin (NEURONTIN) 800 mg tablet Take 1 Tab by mouth three (3) times daily. 90 Tab 1    dronabinol (MARINOL) 5 mg capsule Take 1 Cap by mouth two (2) times a day. Max Daily Amount: 10 mg. 60 Cap 2    ocrelizumab (OCREVUS) 30 mg/mL soln injection 10 mL by IntraVENous route every fourteen (14) days. 10 mL 1    cephALEXin (KEFLEX) 500 mg capsule Take 1 Cap by mouth four (4) times daily. 28 Cap 0    Dalfampridine (AMPYRA) 10 mg Tb12 Take 10 mg by mouth two (2) times a day.  60 Tab 2    megestrol (MEGACE) 40 mg tablet Take 1 Tab by mouth three (3) times daily. 90 Tab 1    meloxicam (MOBIC) 15 mg tablet Take 1 Tab by mouth daily. 30 Tab 1    predniSONE (DELTASONE) 20 mg tablet Take 1 Tab by mouth daily (with breakfast). 30 Tab 0    naloxone 2 mg/actuation spry Use 1 spray intranasally into 1 nostril. Use a new Narcan nasal spray for subsequent doses and administer into alternating nostrils. May repeat every 2 to 3 minutes as needed. 4 Each 2    tadalafil (CIALIS) 20 mg tablet Take 1 Tab by mouth as needed. 10 Tab 12    baclofen (LIORESAL) 10 mg tablet Take 1 Tab by mouth three (3) times daily. 90 Tab 5    dextromethorphan-quiNIDine (NUEDEXTA) 20-10 mg per capsule Take 1 Cap by mouth every twelve (12) hours. 60 Cap 2    lidocaine (LIDODERM) 5 % 1 Patch by TransDERmal route every twenty-four (24) hours.  30 Each 5        Objective:   Vitals:  Vitals:    01/15/20 0809   BP: 160/82   Pulse: 66   Resp: 16   Temp: 98.1 °F (36.7 °C)   TempSrc: Temporal   SpO2: 99%   Weight: 189 lb 12.8 oz (86.1 kg)   Height: 5' 10\" (1.778 m)   PainSc:   5   PainLoc: Back               Lab Data Reviewed:  Lab Results   Component Value Date/Time    WBC 6.0 12/04/2019 10:41 AM    HCT 42.0 12/04/2019 10:41 AM    HGB 13.8 12/04/2019 10:41 AM    PLATELET 008 72/81/2437 10:41 AM       Lab Results   Component Value Date/Time    Sodium 142 12/04/2019 03:58 PM    Potassium 3.6 12/04/2019 03:58 PM    Chloride 104 12/04/2019 03:58 PM    CO2 32 12/04/2019 03:58 PM    Glucose 112 (H) 12/04/2019 03:58 PM    BUN 6 12/04/2019 03:58 PM    Creatinine 0.84 12/04/2019 03:58 PM    Calcium 8.9 12/04/2019 03:58 PM       No components found for: TROPQUANT    No results found for: BELEN      Lab Results   Component Value Date/Time    Hemoglobin A1c 6.2 (H) 05/08/2013 10:17 AM    Hemoglobin A1c (POC) 5.4 01/11/2018 04:22 PM        No results found for: B12LT, FOL, RBCF    No results found for: Thiago GLOVER Lot, XBANA    Lab Results   Component Value Date/Time    Cholesterol, total 232 (H) 05/08/2013 10:17 AM    Cholesterol (POC) 195 01/17/2018 04:18 PM    HDL Cholesterol 41 05/08/2013 10:17 AM    HDL Cholesterol (POC) 37 01/17/2018 04:18 PM    LDL Cholesterol (POC) 105 01/17/2018 04:18 PM    LDL, calculated 163 (H) 05/08/2013 10:17 AM    VLDL, calculated 28 05/08/2013 10:17 AM    Triglyceride 142 05/08/2013 10:17 AM    Triglycerides (POC) 266 01/17/2018 04:18 PM    CHOL/HDL Ratio 7.3 (H) 10/08/2010 01:10 PM         CT Results (recent):  No results found for this or any previous visit. MRI Results (recent):  [facility]   MRI [facility] SPINE WO CONT    Narrative EXAM:  MRI [facility] SPINE WO CONT    INDICATION: ms    COMPARISON: 6/6/2013    TECHNIQUE:   MR imaging of the thoracic spine was performed with the following sequences:  sagittal T1, T2, stir; axial T1, T2. The study was performed without IV  contrast, as requested. The study was obtained on the 0.7 Marily open MRI unit  because of the patient's claustrophobia. [facility] FINDINGS:  The thoracic spinal cord appears somewhat atrophic. Because of limited spatial  and contrast resolution of the study and mild motion artifact, discrete lesions  are difficult to identify. Axial T2-weighted images suggest multiple scattered  foci of T2 hyperintensity peripherally in the spinal cord. These appear more  prominent currently than on the previous study. There is unchanged mild central disc protrusion at T7-8, with mild indentation  of the ventral cord. There is mild to moderate central disc protrusion at T8-9, with mild indentation  of the ventral cord, slightly increased. There is marked left facet hypertrophy at T9-10 with right anterior displacement  and moderate compression of the spinal cord, unchanged. Impression IMPRESSION:      1. Study somewhat limited by motion, spatial resolution, and contrast  resolution.  Atrophic spinal cord with multiple small focal lesions, more clearly  demonstrated currently than on previous study. 2. Unchanged T9-10 cord compression related to left facet hypertrophy. Slight  increase in mild to moderate central disc protrusion at T8-9 and mild central  disc protrusion at T7-8. IR Results (recent):  No results found for this or any previous visit. VAS/US Results (recent):  No results found for this or any previous visit. PHYSICAL EXAM:  General:    Alert, cooperative, no distress, appears stated age. Head:   Normocephalic, without obvious abnormality, atraumatic. Eyes:   Conjunctivae/corneas clear. PERRLA  Nose:  Nares normal. No drainage or sinus tenderness. Throat:    Lips, mucosa, and tongue normal.  No Thrush  Neck:  Supple, symmetrical,  no adenopathy, thyroid: non tender    no carotid bruit and no JVD. Paraspinal tenderness  Back:    Symmetric, diffuse tenderness. Lungs:   Clear to auscultation bilaterally. No Wheezing or Rhonchi. No rales. Chest wall:  No tenderness or deformity. No Accessory muscle use. Heart:   Regular rate and rhythm,  no murmur, rub or gallop. Abdomen:   Soft, non-tender. Not distended. Bowel sounds normal. No masses  Extremities: Extremities normal, atraumatic, No cyanosis. No edema. No clubbing  Skin:     Texture, turgor normal. No rashes or lesions. Not Jaundiced  Lymph nodes: Cervical, supraclavicular normal.  Psych:  Good insight. Not depressed. Anxious. NEUROLOGICAL EXAM:  Appearance: The patient is well developed, well nourished, provides a coherent history and is in no acute distress. Mental Status: Oriented to time, place and person. Mood and affect appropriate. Cranial Nerves:   Intact visual fields. Fundi are benign. UJS, EOM's full, no nystagmus, no ptosis. Facial sensation is normal. Corneal reflexes are intact. Facial movement is symmetric. Hearing is normal bilaterally. Palate is midline with normal sternocleidomastoid and trapezius muscles are normal. Tongue is midline.    Motor:  5-/5 strength in upper extremity and 4+/5 lower extremity proximal and distal muscles. Normal bulk and tone. No fasciculations. Reflexes:   Deep tendon reflexes 2+/4 and symmetrical.   Sensory:    Dysesthesia to touch, pinprick and vibration. Gait:   Unsteady gait. Ambulates with cane   Tremor:    Tremors noted. Cerebellar:  No cerebellar signs present. Neurovascular:  Normal heart sounds and regular rhythm, peripheral pulses intact, and no carotid bruits. Assesment  1. Secondary progressive multiple sclerosis ([facility]  We will consider [employer] versus [employer]    2. Gait disorder  Physical therapy    3. Tremor  Discontinue Neurontin  Lyrica 75 mg p.o. twice daily    4. Chronic pain syndrome  Following pain management    5. Neuropathy  Continue management with Lyrica    ___________________________________________________  PLAN: Medication and plan discussed with patient      ICD-10-CM ICD-9-CM    1. Secondary progressive multiple sclerosis ([facility] G35 340    2. Gait disorder R26.9 781.2    3. Tremor R25.1 781.0    4. Chronic pain syndrome G89.4 338.4    5. Neuropathy G62.9 355.9      Follow-up and Dispositions    · Return in about 2 months (around 3/15/2020).            ___________________________________________________    Attending Physician: Laurent Hammer MD

## 2020-02-20 ENCOUNTER — HOSPITAL ENCOUNTER (OUTPATIENT)
Dept: MRI IMAGING | Age: 55
Discharge: HOME | End: 2020-02-20
Attending: PSYCHIATRY & NEUROLOGY
Payer: MEDICARE

## 2020-02-20 DIAGNOSIS — G35 MULTIPLE SCLEROSIS EXACERBATION (HCC): ICD-10-CM

## 2020-02-20 DIAGNOSIS — R26.9 GAIT DISORDER: ICD-10-CM

## 2020-02-20 DIAGNOSIS — G35 SECONDARY PROGRESSIVE MULTIPLE SCLEROSIS (HCC): ICD-10-CM

## 2020-02-20 PROCEDURE — 72141 MRI NECK SPINE W/O DYE: CPT

## 2020-02-20 PROCEDURE — 70551 MRI BRAIN STEM W/O DYE: CPT

## 2020-04-14 ENCOUNTER — VIRTUAL VISIT (OUTPATIENT)
Dept: NEUROLOGY | Age: 55
End: 2020-04-14

## 2020-04-14 DIAGNOSIS — R26.9 GAIT DISORDER: ICD-10-CM

## 2020-04-14 DIAGNOSIS — G89.4 CHRONIC PAIN SYNDROME: ICD-10-CM

## 2020-04-14 DIAGNOSIS — G62.9 POLYNEUROPATHY: ICD-10-CM

## 2020-04-14 DIAGNOSIS — R25.1 TREMOR: ICD-10-CM

## 2020-04-14 DIAGNOSIS — G35 SECONDARY PROGRESSIVE MULTIPLE SCLEROSIS (HCC): Primary | ICD-10-CM

## 2020-04-14 DIAGNOSIS — G62.9 NEUROPATHY: ICD-10-CM

## 2020-04-14 RX ORDER — PREGABALIN 75 MG/1
75 CAPSULE ORAL
Qty: 14 CAP | Refills: 0 | Status: CANCELLED | OUTPATIENT
Start: 2020-04-14

## 2020-04-14 RX ORDER — PREGABALIN 100 MG/1
100 CAPSULE ORAL 2 TIMES DAILY
Qty: 60 CAP | Refills: 2 | Status: SHIPPED | OUTPATIENT
Start: 2020-04-14 | End: 2021-11-12

## 2020-04-17 NOTE — PROGRESS NOTES
Neurology Progress Note  Reuben Gerard was seen by synchronous (real-time) audio-video technology on 20. Consent:  He and/or his healthcare decision maker is aware that this patient-initiated Telehealth encounter is a billable service, with coverage as determined by his insurance carrier. He is aware that he may receive a bill and has provided verbal consent to proceed: Yes    I was in the office while conducting this encounter. NAME:  Reuben Gerard   :   1965   MRN:   [id number]     Date/Time:  2020  Subjective:     Reuben Gerard is a 48 y.o. male here today for follow-up for multiple sclerosis, pain, weakness difficulty ambulating, test results. Patient today says he is still weak, difficulty walking and frequent fall. He says his condition is getting worse since the last visit. He continues to have a lot of pain. Patient says he has has not been been losing a lot of weight anymore. He says  his legs still tend to give out on him and he will fall even when he still using his cane. Denies loss of consciousness. He noted that he has experience of confusion and been having a lot of nausea. He continues to have neck pain is sharp in nature, continuous, burning sensation that goes around to the arms, making the arms weak. He says he drops things at times. He says tremors have been getting worse, he has been told that his head also shakes with his hands. Back pain is also sharp and constant, goes on to the legs causing numbness and tingling sensation of the legs with weakness, he says he is weaker on the right side. .  Patient is advised to quit smoking as this is making multiple sclerosis worse. He noted that he has been taking his medication as directed. MRI of the brain reviewed with patient showed extensive white matter lesion extending to cerebellum charlie and cerebral hemisphere with diffuse atrophy.   MRI of the cervical spine showed multifocal of cord lesion was at the level of C2, there appeared to be progression and possible new lesions because there is no contrast is difficult to say how old the lesions. With patient's lesions and clinical presentation, we are definitely dealing with secondary progression. At this time, I told patient to stay on Ocrevus and will be reevaluated next 6 months with contrast MRIs, then I will consider using other medications if patient is not showing any improvement. He says he continues to have a lot of nausea lately. On a scale of 1-10, patient rates his pain level to be between 5 and 6. I adjusted Lyrica to 100 mg p.o. twice daily. Denies loss of consciousness, dysphagia, odynophagia. .  Review of Systems - General ROS: positive for  - fatigue, night sweats and sleep disturbance  Psychological ROS: positive for - anxiety, concentration difficulties, depression, mood swings and sleep disturbances  Ophthalmic ROS: positive for - blurry vision, decreased vision and photophobia  ENT ROS: positive for - headaches, tinnitus, vertigo and visual changes  Allergy and Immunology ROS: negative  Hematological and Lymphatic ROS: negative  Endocrine ROS: negative  Respiratory ROS: no cough, shortness of breath, or wheezing  Cardiovascular ROS: no chest pain or dyspnea on exertion  Gastrointestinal ROS: no abdominal pain, change in bowel habits, or black or bloody stools  Genito-Urinary ROS: no dysuria, trouble voiding, or hematuria  Musculoskeletal ROS: positive for - gait disturbance, joint pain, joint stiffness, joint swelling, muscle pain and muscular weakness  Neurological ROS: positive for - dizziness, gait disturbance, headaches, impaired coordination/balance, memory loss, numbness/tingling, visual changes and weakness  Dermatological ROS: negative       Medications reviewed:  Current Outpatient Medications   Medication Sig Dispense Refill    pregabalin (LYRICA) 100 mg capsule Take 1 Cap by mouth two (2) times a day. Max Daily Amount: 200 mg. 60 Cap 2    dronabinol (MARINOL) 5 mg capsule Take 1 Cap by mouth two (2) times a day. Max Daily Amount: 10 mg. 60 Cap 2    pregabalin (LYRICA) 75 mg capsule Take 1 Cap by mouth nightly. Max Daily Amount: 75 mg. 14 Cap 0    oxyCODONE IR (OXY-IR) 15 mg immediate release tablet Take 15 mg by mouth every four (4) hours as needed for Pain.  ocrelizumab (OCREVUS) 30 mg/mL soln injection 10 mL by IntraVENous route every fourteen (14) days. 10 mL 1    predniSONE (DELTASONE) 20 mg tablet Take 20 mg by mouth two (2) times a day. 40 Tab 1    Dalfampridine (AMPYRA) 10 mg Tb12 Take 10 mg by mouth two (2) times a day. 60 Tab 2    megestrol (MEGACE) 40 mg tablet Take 1 Tab by mouth three (3) times daily. 90 Tab 1    meloxicam (MOBIC) 15 mg tablet Take 1 Tab by mouth daily. 30 Tab 1    naloxone 2 mg/actuation spry Use 1 spray intranasally into 1 nostril. Use a new Narcan nasal spray for subsequent doses and administer into alternating nostrils. May repeat every 2 to 3 minutes as needed. 4 Each 2    tadalafil (CIALIS) 20 mg tablet Take 1 Tab by mouth as needed. 10 Tab 12    baclofen (LIORESAL) 10 mg tablet Take 1 Tab by mouth three (3) times daily. 90 Tab 5    dextromethorphan-quiNIDine (NUEDEXTA) 20-10 mg per capsule Take 1 Cap by mouth every twelve (12) hours. 60 Cap 2    lidocaine (LIDODERM) 5 % 1 Patch by TransDERmal route every twenty-four (24) hours.  30 Each 5        Objective:   Vitals:  Vitals:    04/14/20 1313   PainSc:   5   PainLoc: Back       Lab Data Reviewed:  Lab Results   Component Value Date/Time    WBC 6.0 12/04/2019 10:41 AM    HCT 42.0 12/04/2019 10:41 AM    HGB 13.8 12/04/2019 10:41 AM    PLATELET 358 04/71/5390 10:41 AM       Lab Results   Component Value Date/Time    Sodium 142 12/04/2019 03:58 PM    Potassium 3.6 12/04/2019 03:58 PM    Chloride 104 12/04/2019 03:58 PM    CO2 32 12/04/2019 03:58 PM    Glucose 112 (H) 12/04/2019 03:58 PM    BUN 6 12/04/2019 03:58 PM    Creatinine 0.84 12/04/2019 03:58 PM    Calcium 8.9 12/04/2019 03:58 PM       No components found for: TROPQUANT    No results found for: BELEN      Lab Results   Component Value Date/Time    Hemoglobin A1c 6.2 (H) 05/08/2013 10:17 AM    Hemoglobin A1c (POC) 5.4 01/11/2018 04:22 PM        No results found for: B12LT, FOL, RBCF    No results found for: BELEN, [place], XBANA    Lab Results   Component Value Date/Time    Cholesterol, total 232 (H) 05/08/2013 10:17 AM    Cholesterol (POC) 195 01/17/2018 04:18 PM    HDL Cholesterol 41 05/08/2013 10:17 AM    HDL Cholesterol (POC) 37 01/17/2018 04:18 PM    LDL Cholesterol (POC) 105 01/17/2018 04:18 PM    LDL, calculated 163 (H) 05/08/2013 10:17 AM    VLDL, calculated 28 05/08/2013 10:17 AM    Triglyceride 142 05/08/2013 10:17 AM    Triglycerides (POC) 266 01/17/2018 04:18 PM    CHOL/HDL Ratio 7.3 (H) 10/08/2010 01:10 PM         CT Results (recent):  No results found for this or any previous visit. MRI Results (recent):  Results from [place] encounter on 02/20/20   MRI CERV SPINE WO CONT    Narrative EXAM: MRI CERV SPINE WO CONT    INDICATION: ms, myelopathy. Multiple sclerosis    COMPARISON: MRI 4/13/2018    TECHNIQUE: MR imaging of the cervical spine was performed using the following  sequences: sagittal T1, T2, STIR;  axial T2, T1. The study was performed on an  open configuration 0.7 Marily (intermediate) field strength MR imaging system. CONTRAST: Unfortunately, the patient declined IV contrast administration. FINDINGS:    Multiple foci of abnormal cord signal are again shown. In the interval, the  Central-posterior lesion at the C2 level is more extensive measuring 14 mm in  length compared with 7 mm previously. A cervicomedullary junction measuring 16  mm in length is similar to that shown previously. Additional smaller foci are  again noted and appear overall unchanged.  Determination of activity of these  lesions is not possible without IV contrast.    Vertebral body height and bone signal remain normal. There is anatomic  alignment. No paraspinal soft tissue mass or collection is evident. C2-C3: No herniation or stenosis. C3-C4: Small-moderate I lateral uncovertebral osteophytes, greater on the left  and mild central disc bulging. No facet arthropathy. No canal stenosis. Mild  bilateral foraminal narrowing. Findings unchanged. C4-C5: Moderate central disc protrusion superimposed upon mild-moderate diffuse  disc osteophyte formation, with unchanged anterior CSF space effacement and  minimal central anterior cord flattening. No facet arthropathy. Mild bilateral  foraminal narrowing unchanged. C5-C6: Small central disc protrusion superimposed upon mild diffuse disc  osteophyte complex formation. No facet arthropathy or canal-foraminal stenosis. C6-C7: Small bilateral uncovertebral disc osteophyte, larger on the left. No  facet arthropathy or canal-foraminal stenosis. No change. C7-T1: No herniation or stenosis. Upper thoracic segments: Shown on sagittal images only. No herniation or  stenosis demonstrated. Impression IMPRESSION:  Multifocal cervical cord signal alteration again shown, appearing substantially  larger in the interval at the C2 level. Termination of activity of this presumed  MS plaque is not determined as no IV contrast was administered. IR Results (recent):  No results found for this or any previous visit. VAS/US Results (recent):  No results found for this or any previous visit. PHYSICAL EXAM:  General:    Alert, cooperative, no distress, appears stated age. Head:   Normocephalic, without obvious abnormality, atraumatic. Eyes:   Conjunctivae/corneas clear. Nose:  Nares normal.   Throat:    Lips, mucosa, and tongue normal.   Neck:  Symmetrical,  no adenopathy, thyroid     no JVD. Back:    Symmetric, . Lungs:   Deferred. Chest wall:   No Accessory muscle use. Heart:   Deferred.   Abdomen: Not distended. Extremities: Extremities normal, atraumatic. No edema. No clubbing  Skin:      No rashes or lesions. Lymph nodes: Cervical, supraclavicular normal.  Psych:  Good insight. Not depressed. Anxious . NEUROLOGICAL EXAM:  Appearance: The patient is well developed, well nourished, provides a coherent history and is in no acute distress. Mental Status: Oriented to time, place and person. Mood and affect appropriate. Cranial Nerves:   Intact visual fields. EOM's full, no nystagmus, no ptosis. .  Facial movement is symmetric. Hearing is normal bilaterally. Tongue is midline. Motor:  Moves all extremities. No fasciculations. Reflexes:   Deferred. Sensory:   Deferred   Gait:  Unsteady gait. Tremor:   Tremor noted. Cerebellar:  No cerebellar signs present. Assesment  1. Neuropathy    - pregabalin (LYRICA) 100 mg capsule; Take 1 Cap by mouth two (2) times a day. Max Daily Amount: 200 mg. Dispense: 60 Cap; Refill: 2    2. [facility]  We will consider [employer] versus [employer]    3. Gait disorder  Physical therapy when pandemic crisis is over    4. Polyneuropathy  Lyrica 100 mg p.o. twice daily    5. Chronic pain syndrome    - pregabalin (LYRICA) 100 mg capsule; Take 1 Cap by mouth two (2) times a day. Max Daily Amount: 200 mg. Dispense: 60 Cap; Refill: 2    6. Tremor  Continue management    ___________________________________________________  PLAN: Medication plan discussed with patient      ICD-10-CM ICD-9-CM    1. Secondary progressive multiple sclerosis ([facility] G35 340    2. Neuropathy G62.9 355.9 pregabalin (LYRICA) 100 mg capsule   3. Gait disorder R26.9 781.2    4. Polyneuropathy G62.9 356.9    5. Chronic pain syndrome G89.4 338. 4 pregabalin (LYRICA) 100 mg capsule   6. Tremor R25.1 781.0      Follow-up and Dispositions    · Return in about 2 months (around 6/14/2020).            Pursuant to the emergency declaration under the [facility] Act and the [address] waiver authority and the [employer] Act, this Virtual  Visit was conducted, with patient's consent, to reduce the patient's risk of exposure to COVID-19 and provide continuity of care for an established patient.      Services were provided through a video synchronous discussion virtually to substitute for in-person clinic visit.___________________________________________________    Attending Physician: Grace Cohen MD

## 2020-05-06 ENCOUNTER — VIRTUAL VISIT (OUTPATIENT)
Dept: INTERNAL MEDICINE CLINIC | Age: 55
End: 2020-05-06

## 2020-05-06 DIAGNOSIS — G35 MS (MULTIPLE SCLEROSIS) (HCC): ICD-10-CM

## 2020-05-06 DIAGNOSIS — Z00.00 MEDICARE ANNUAL WELLNESS VISIT, INITIAL: Primary | ICD-10-CM

## 2020-05-06 NOTE — PROGRESS NOTES
Terence Cosme is a 48 y.o. male being evaluated by a Virtual Visit (video visit) encounter to address concerns as mentioned above. A caregiver was present when appropriate. Due to this being a TeleHealth encounter (During BBXXD-89 public health emergency), evaluation of the following organ systems was limited: Vitals/Constitutional/EENT/Resp/CV/GI//MS/Neuro/Skin/Heme-Lymph-Imm. Pursuant to the emergency declaration under the [address] [place] [address] and the [employer] Act, this Virtual Visit was conducted with patient's (and/or legal guardian's) consent, to reduce the risk of exposure to COVID-19 and provide necessary medical care. Services were provided through a video synchronous discussion virtually to substitute for in-person encounter. --Magan Angel MD on 5/6/2020 at 2:31 PM    An electronic signature was used to authenticate this note. Terence Cosme is a 48 y.o. male and presents with Annual Wellness Visit and Multiple Sclerosis  . Subjective:    He has MS  He has lost weight. He is doing infusion therapy at the present       Terence Cosme is a 48 y.o. male and presents with Annual Wellness Visit and Multiple Sclerosis  . Subjective:        Terence Cosme is a 48 y.o. male and presents for annual Medicare Wellness Visit. Problem List: Reviewed with patient and discussed risk factors.     Patient Active Problem List   Diagnosis Code    Back pain M54.9    Hypercholesterolemia E78.00    Cellulitis L03.90    Tobacco abuse Z72.0    ED (erectile dysfunction) N52.9    Decreased vision H54.7    Acute bronchitis J20.9    MS [facility]multiple sclerosis) ([facility]) G35    Fatigue R53.83    Incontinence of urine R32    Elevated hemoglobin A1c R73.09    Hearing loss H91.90    Memory loss R41.3    Multiple sclerosis ([facility]) G35    Prediabetes R73.03    Disability examination Z02.71  Anxiety F41.9    Midline low back pain without sciatica M54.5    Paresthesia of both hands R20.2    Mixed incontinence N39.46    Balance problem R26.89    Falls W19. XXXA    Bilateral thigh pain M79.651, M79.652    Recurrent depression ([address] F33.9       Current medical providers:  Patient Care Team:  Jeanie Moreau MD as PCP - General (Internal Medicine)  Jeanie Moreau MD as PCP [facility]    PSH: Reviewed with patient  Past Surgical History:   Procedure Laterality Date    HX HEENT Right     ear        SH: Reviewed with patient  Social History     Tobacco Use    Smoking status: Current Every Day Smoker     Packs/day: 0.50     Years: 21.00     Pack years: 10.50     Types: Cigarettes    Smokeless tobacco: Never Used   Substance Use Topics    Alcohol use: No    Drug use: No       FH: Reviewed with patient  Family History   Problem Relation Age of Onset    Hypertension Father     Cancer Father         prostate    Hypertension Sister     Diabetes Maternal Grandmother     Cancer Paternal Uncle         prostate    Dementia Paternal Uncle        Medications/Allergies: Reviewed with patient  Current Outpatient Medications on File Prior to Visit   Medication Sig Dispense Refill    pregabalin (LYRICA) 100 mg capsule Take 1 Cap by mouth two (2) times a day. Max Daily Amount: 200 mg. 60 Cap 2    predniSONE (DELTASONE) 20 mg tablet Take 20 mg by mouth two (2) times a day. 40 Tab 1    dronabinol (MARINOL) 5 mg capsule Take 1 Cap by mouth two (2) times a day. Max Daily Amount: 10 mg. 60 Cap 2    pregabalin (LYRICA) 75 mg capsule Take 1 Cap by mouth nightly. Max Daily Amount: 75 mg. 14 Cap 0    oxyCODONE IR (OXY-IR) 15 mg immediate release tablet Take 15 mg by mouth every four (4) hours as needed for Pain.  Dalfampridine (AMPYRA) 10 mg Tb12 Take 10 mg by mouth two (2) times a day. 60 Tab 2    megestrol (MEGACE) 40 mg tablet Take 1 Tab by mouth three (3) times daily.  90 Tab 1    meloxicam (MOBIC) 15 mg tablet Take 1 Tab by mouth daily. 30 Tab 1    ocrelizumab (OCREVUS) 30 mg/mL soln injection 10 mL by IntraVENous route every fourteen (14) days. 10 mL 1    naloxone 2 mg/actuation spry Use 1 spray intranasally into 1 nostril. Use a new Narcan nasal spray for subsequent doses and administer into alternating nostrils. May repeat every 2 to 3 minutes as needed. 4 Each 2    tadalafil (CIALIS) 20 mg tablet Take 1 Tab by mouth as needed. 10 Tab 12    baclofen (LIORESAL) 10 mg tablet Take 1 Tab by mouth three (3) times daily. 90 Tab 5    dextromethorphan-quiNIDine (NUEDEXTA) 20-10 mg per capsule Take 1 Cap by mouth every twelve (12) hours. 60 Cap 2    lidocaine (LIDODERM) 5 % 1 Patch by TransDERmal route every twenty-four (24) hours. 30 Each 5     No current facility-administered medications on file prior to visit. No Known Allergies    Objective: There were no vitals taken for this visit. There is no height or weight on file to calculate BMI. Assessment of cognitive impairment: Alert and oriented x 3    Depression Screen:   3 most recent PHQ Screens 5/6/2020   PHQ Not Done -   Little interest or pleasure in doing things Not at all   Feeling down, depressed, irritable, or hopeless Not at all   Total Score PHQ 2 0     Depression Review:  Patient is seen for screen of depression,denies anhedonia, weight gain, insomnia, hypersomnia, psychomotor agitation, psychomotor retardation, fatigue, feelings of worthlessness/guilt, difficulty concentrating, hopelessness, impaired memory and recurrent thoughts of death Treatment includes no medication   She denies recurrent thoughts of death and suicidal thoughts without plan. Fall Risk Assessment:    Fall Risk Assessment, last 12 mths 5/6/2020   Able to walk? Yes   Fall in past 12 months? No       Functional Ability:   Does the patient exhibit a steady gait?   yes   How long did it take the patient to get up and walk from a sitting position? seconds   Is the patient self reliant?  (ie can do own laundry, meals, household chores)  yes     Does the patient handle his/her own medications? yes     Does the patient handle his/her own money? yes     Is the patients home safe (ie good lighting, handrails on stairs and bath, etc.)? yes     Did you notice or did patient express any hearing difficulties? no     Did you notice or did patient express any vision difficulties?   no     Were distance and reading eye charts used? no       Advance Care Planning:   Patient was offered the opportunity to discuss advance care planning:  yes     Does patient have an Advance Directive:  no   If no, did you provide information on Caring Connections? yes       Plan:      No orders of the defined types were placed in this encounter. Health Maintenance   Topic Date Due    Pneumococcal 0-64 years (1 of 1 - PPSV23) 06/13/1973    Eye Exam Retinal or Dilated  06/13/1977    DTaP/Tdap/Td series (1 - Tdap) 06/13/1988    MICROALBUMIN Q1  02/24/2017    Shingrix Vaccine Age 50> (1 of 2) 06/13/2017    FOBT Q1Y Age 50-75  06/13/2017    A1C test (Diabetic or Prediabetic)  01/11/2019    Lipid Screen  01/17/2019    Foot Exam Q1  05/23/2019    Medicare Yearly Exam  05/24/2019    Influenza Age 9 to Adult  08/01/2020       *Patient verbalized understanding and agreement with the plan. A copy of the After Visit Summary with personalized health plan was given to the patient today. Review of Systems  Constitutional: negative for fevers, chills, anorexia and weight loss  Eyes:   negative for visual disturbance and irritation  ENT:   negative for tinnitus,sore throat,nasal congestion,ear pains. hoarseness  Respiratory:  negative for cough, hemoptysis, dyspnea,wheezing  CV:   negative for chest pain, palpitations, lower extremity edema  GI:   negative for nausea, vomiting, diarrhea, abdominal pain,melena  Endo:               negative for polyuria,polydipsia,polyphagia,heat intolerance  Genitourinary: negative for frequency, dysuria and hematuria  Integument:  negative for rash and pruritus  Hematologic:  negative for easy bruising and gum/nose bleeding  Musculoskel: negative for myalgias, arthralgias, back pain, muscle weakness, joint pain  Neurological:  negative for headaches, dizziness, vertigo, memory problems and gait   Behavl/Psych: negative for feelings of anxiety, depression, mood changes    Past Medical History:   Diagnosis Date    Back pain 10/8/2010    Back pain 10/8/2010    Depression     Diabetes ([address]     Fatigue 8/24/2012    Hearing loss 1/25/2013    Memory loss 1/25/2013    [facility])     Muscle pain     Muscle weakness     Poor appetite     Snoring     Unintentional weight change     Visual disturbance      Past Surgical History:   Procedure Laterality Date    HX HEENT Right     ear     Social History     Socioeconomic History    Marital status: SINGLE     Spouse name: Not on file    Number of children: Not on file    Years of education: Not on file    Highest education level: Not on file   Tobacco Use    Smoking status: Current Every Day Smoker     Packs/day: 0.50     Years: 21.00     Pack years: 10.50     Types: Cigarettes    Smokeless tobacco: Never Used   Substance and Sexual Activity    Alcohol use: No    Drug use: No    Sexual activity: Yes     Partners: Female     Birth control/protection: Condom     Family History   Problem Relation Age of Onset    Hypertension Father     Cancer Father         prostate    Hypertension Sister     Diabetes Maternal Grandmother     Cancer Paternal Uncle         prostate    Dementia Paternal Uncle      Current Outpatient Medications   Medication Sig Dispense Refill    pregabalin (LYRICA) 100 mg capsule Take 1 Cap by mouth two (2) times a day.  Max Daily Amount: 200 mg. 60 Cap 2    predniSONE (DELTASONE) 20 mg tablet Take 20 mg by mouth two (2) times a day. 40 Tab 1    dronabinol (MARINOL) 5 mg capsule Take 1 Cap by mouth two (2) times a day. Max Daily Amount: 10 mg. 60 Cap 2    pregabalin (LYRICA) 75 mg capsule Take 1 Cap by mouth nightly. Max Daily Amount: 75 mg. 14 Cap 0    oxyCODONE IR (OXY-IR) 15 mg immediate release tablet Take 15 mg by mouth every four (4) hours as needed for Pain.  Dalfampridine (AMPYRA) 10 mg Tb12 Take 10 mg by mouth two (2) times a day. 60 Tab 2    megestrol (MEGACE) 40 mg tablet Take 1 Tab by mouth three (3) times daily. 90 Tab 1    meloxicam (MOBIC) 15 mg tablet Take 1 Tab by mouth daily. 30 Tab 1    ocrelizumab (OCREVUS) 30 mg/mL soln injection 10 mL by IntraVENous route every fourteen (14) days. 10 mL 1    naloxone 2 mg/actuation spry Use 1 spray intranasally into 1 nostril. Use a new Narcan nasal spray for subsequent doses and administer into alternating nostrils. May repeat every 2 to 3 minutes as needed. 4 Each 2    tadalafil (CIALIS) 20 mg tablet Take 1 Tab by mouth as needed. 10 Tab 12    baclofen (LIORESAL) 10 mg tablet Take 1 Tab by mouth three (3) times daily. 90 Tab 5    dextromethorphan-quiNIDine (NUEDEXTA) 20-10 mg per capsule Take 1 Cap by mouth every twelve (12) hours. 60 Cap 2    lidocaine (LIDODERM) 5 % 1 Patch by TransDERmal route every twenty-four (24) hours. 30 Each 5     No Known Allergies    Objective: There were no vitals taken for this visit.   Physical Exam:   General appearance - alert, well appearing, and in no distress  Mental status - alert, oriented to person, place, and time  EYE-JUS, EOMI, corneas normal, no foreign bodies  ENT-ENT exam normal, no neck nodes or sinus tenderness  Nose - normal and patent, no erythema, discharge or polyps  Mouth - mucous membranes moist, pharynx normal without lesions  Neck - supple, no significant adenopathy   Chest - clear to auscultation, no wheezes, rales or rhonchi, symmetric air entry   Heart - normal rate, regular rhythm, normal S1, S2, no murmurs, rubs, clicks or gallops   Abdomen - soft, nontender, nondistended, no masses or organomegaly  Lymph- no adenopathy palpable  Ext-peripheral pulses normal, no pedal edema, no clubbing or cyanosis  Skin-Warm and dry. no hyperpigmentation, vitiligo, or suspicious lesions  Neuro -alert, oriented, normal speech, no focal findings or movement disorder noted  Neck-normal C-spine, no tenderness, full ROM without pain  Feet-no nail deformities or callus formation with good pulses noted      Results for orders placed or performed during the hospital encounter of 12/04/19   CBC WITH AUTOMATED DIFF   Result Value Ref Range    WBC 6.0 4.1 - 11.1 K/uL    RBC 4.77 4.10 - 5.70 M/uL    HGB 13.8 12.1 - 17.0 g/dL    HCT 42.0 36.6 - 50.3 %    MCV 88.1 80.0 - 99.0 FL    MCH 28.9 26.0 - 34.0 PG    MCHC 32.9 30.0 - 36.5 g/dL    RDW 13.1 11.5 - 14.5 %    PLATELET 297 414 - 808 K/uL    MPV 10.8 8.9 - 12.9 FL    NRBC 0.0 0  WBC    ABSOLUTE NRBC 0.00 0.00 - 0.01 K/uL    NEUTROPHILS 54 32 - 75 %    LYMPHOCYTES 33 12 - 49 %    MONOCYTES 10 5 - 13 %    EOSINOPHILS 2 0 - 7 %    BASOPHILS 1 0 - 1 %    IMMATURE GRANULOCYTES 0 0.0 - 0.5 %    ABS. NEUTROPHILS 3.3 1.8 - 8.0 K/UL    ABS. LYMPHOCYTES 2.0 0.8 - 3.5 K/UL    ABS. MONOCYTES 0.6 0.0 - 1.0 K/UL    ABS. EOSINOPHILS 0.1 0.0 - 0.4 K/UL    ABS. BASOPHILS 0.0 0.0 - 0.1 K/UL    ABS. IMM. GRANS. 0.0 0.00 - 0.04 K/UL    DF AUTOMATED     METABOLIC PANEL, COMPREHENSIVE   Result Value Ref Range    Sodium 142 136 - 145 mmol/L    Potassium 3.6 3.5 - 5.1 mmol/L    Chloride 104 97 - 108 mmol/L    CO2 32 21 - 32 mmol/L    Anion gap 6 5 - 15 mmol/L    Glucose 112 (H) 65 - 100 mg/dL    BUN 6 6 - 20 MG/DL    Creatinine 0.84 0.70 - 1.30 MG/DL    BUN/Creatinine ratio 7 (L) 12 - 20      GFR est AA >60 >60 ml/min/1.73m2    GFR est non-AA >60 >60 ml/min/1.73m2    Calcium 8.9 8.5 - 10.1 MG/DL    Bilirubin, total 0.4 0.2 - 1.0 MG/DL    ALT (SGPT) 27 12 - 78 U/L    AST (SGOT) 14 (L) 15 - 37 U/L    Alk. phosphatase 40 (L) 45 - 117 U/L    Protein, total 7.3 6.4 - 8.2 g/dL    Albumin 3.6 3.5 - 5.0 g/dL    Globulin 3.7 2.0 - 4.0 g/dL    A-G Ratio 1.0 (L) 1.1 - 2.2         Assessment/Plan:    ICD-10-CM ICD-9-CM    1. Medicare annual wellness visit, initial Z00.00 V70.0    2. MS (multiple sclerosis) ([facility] G35 340      No orders of the defined types were placed in this encounter. call if any problems  Patient Instructions   MyChart Activation    Thank you for requesting access to viavoo. Please follow the instructions below to securely access and download your online medical record. viavoo allows you to send messages to your doctor, view your test results, renew your prescriptions, schedule appointments, and more. How Do I Sign Up? 1. In your internet browser, go to [email or web address]  2. Click on the First Time User? Click Here link in the Sign In box. You will be redirect to the New Member Sign Up page. 3. Enter your viavoo Access Code exactly as it appears below. You will not need to use this code after youve completed the sign-up process. If you do not sign up before the expiration date, you must request a new code. MyChart Access Code: Activation code not generated  Current viavoo Status: Active (This is the date your SolarPrintt access code will )    4. Enter the last four digits of your Social Security Number (xxxx) and Date of Birth (mm/dd/yyyy) as indicated and click Submit. You will be taken to the next sign-up page. 5. Create a viavoo ID. This will be your viavoo login ID and cannot be changed, so think of one that is secure and easy to remember. 6. Create a viavoo password. You can change your password at any time. 7. Enter your Password Reset Question and Answer. This can be used at a later time if you forget your password. 8. Enter your e-mail address. You will receive e-mail notification when new information is available in [address] 9. Click Sign Up.  You can now view and download portions of your medical record. 10. Click the Download Summary menu link to download a portable copy of your medical information. Additional Information    If you have questions, please visit the Frequently Asked Questions section of the [employer] website at [email or web address] Remember, [employer] is NOT to be used for urgent needs. For medical emergencies, dial 911. I have reviewed with the patient details of the assessment and plan and all questions were answered. Relevent patient education was performed    An After Visit Summary was printed and given to the patient. Review of Systems  Constitutional: anorexia and weight loss  Eyes:   negative for visual disturbance and irritation  ENT:   negative for tinnitus,sore throat,nasal congestion,ear pains. hoarseness  Respiratory:  negative for cough, hemoptysis, dyspnea,wheezing  CV:   negative for chest pain, palpitations, lower extremity edema  GI:   negative for nausea, vomiting, diarrhea, abdominal pain,melena  Endo:               negative for polyuria,polydipsia,polyphagia,heat intolerance  Genitourinary: negative for frequency, dysuria and hematuria  Integument:  negative for rash and pruritus  Hematologic:  negative for easy bruising and gum/nose bleeding  Musculoskel: myalgias, arthralgias muscle weakness, joint pain  Neurological:  negative for headaches, dizziness, vertigo, memory problems and gait   Behavl/Psych: negative for feelings of anxiety, depression, mood changes    Past Medical History:   Diagnosis Date    Back pain 10/8/2010    Back pain 10/8/2010    Depression     Diabetes ([address]     Fatigue 8/24/2012    Hearing loss 1/25/2013    Memory loss 1/25/2013    [facility]     Muscle pain     Muscle weakness     Poor appetite     Snoring     Unintentional weight change     Visual disturbance      Past Surgical History:   Procedure Laterality Date    HX HEENT Right ear     Social History     Socioeconomic History    Marital status: SINGLE     Spouse name: Not on file    Number of children: Not on file    Years of education: Not on file    Highest education level: Not on file   Tobacco Use    Smoking status: Current Every Day Smoker     Packs/day: 0.50     Years: 21.00     Pack years: 10.50     Types: Cigarettes    Smokeless tobacco: Never Used   Substance and Sexual Activity    Alcohol use: No    Drug use: No    Sexual activity: Yes     Partners: Female     Birth control/protection: Condom     Family History   Problem Relation Age of Onset    Hypertension Father     Cancer Father         prostate    Hypertension Sister     Diabetes Maternal Grandmother     Cancer Paternal Uncle         prostate    Dementia Paternal Uncle      Current Outpatient Medications   Medication Sig Dispense Refill    pregabalin (LYRICA) 100 mg capsule Take 1 Cap by mouth two (2) times a day. Max Daily Amount: 200 mg. 60 Cap 2    predniSONE (DELTASONE) 20 mg tablet Take 20 mg by mouth two (2) times a day. 40 Tab 1    dronabinol (MARINOL) 5 mg capsule Take 1 Cap by mouth two (2) times a day. Max Daily Amount: 10 mg. 60 Cap 2    pregabalin (LYRICA) 75 mg capsule Take 1 Cap by mouth nightly. Max Daily Amount: 75 mg. 14 Cap 0    oxyCODONE IR (OXY-IR) 15 mg immediate release tablet Take 15 mg by mouth every four (4) hours as needed for Pain.  Dalfampridine (AMPYRA) 10 mg Tb12 Take 10 mg by mouth two (2) times a day. 60 Tab 2    megestrol (MEGACE) 40 mg tablet Take 1 Tab by mouth three (3) times daily. 90 Tab 1    meloxicam (MOBIC) 15 mg tablet Take 1 Tab by mouth daily. 30 Tab 1    ocrelizumab (OCREVUS) 30 mg/mL soln injection 10 mL by IntraVENous route every fourteen (14) days. 10 mL 1    naloxone 2 mg/actuation spry Use 1 spray intranasally into 1 nostril. Use a new Narcan nasal spray for subsequent doses and administer into alternating nostrils.  May repeat every 2 to 3 minutes as needed. 4 Each 2    tadalafil (CIALIS) 20 mg tablet Take 1 Tab by mouth as needed. 10 Tab 12    baclofen (LIORESAL) 10 mg tablet Take 1 Tab by mouth three (3) times daily. 90 Tab 5    dextromethorphan-quiNIDine (NUEDEXTA) 20-10 mg per capsule Take 1 Cap by mouth every twelve (12) hours. 60 Cap 2    lidocaine (LIDODERM) 5 % 1 Patch by TransDERmal route every twenty-four (24) hours. 30 Each 5     No Known Allergies    Objective: There were no vitals taken for this visit. Physical Exam:   General appearance - alert, well appearing, and in no distress  Mental status - alert, oriented to person, place, and time  EYE-JUS, EOMI, corneas normal, no foreign bodies  ENT-ENT exam normal, no neck nodes or sinus tenderness  Nose - normal and patent, no erythema, discharge or polyps  Mouth - mucous membranes moist, pharynx normal without lesions  Skin-Warm and dry. no hyperpigmentation, vitiligo, or suspicious lesions  Neuro -alert, oriented, normal speech, no focal findings or movement disorder noted  Neck-normal C-spine, no tenderness, full ROM without pain        Results for orders placed or performed during the hospital encounter of 12/04/19   CBC WITH AUTOMATED DIFF   Result Value Ref Range    WBC 6.0 4.1 - 11.1 K/uL    RBC 4.77 4.10 - 5.70 M/uL    HGB 13.8 12.1 - 17.0 g/dL    HCT 42.0 36.6 - 50.3 %    MCV 88.1 80.0 - 99.0 FL    MCH 28.9 26.0 - 34.0 PG    MCHC 32.9 30.0 - 36.5 g/dL    RDW 13.1 11.5 - 14.5 %    PLATELET 091 155 - 926 K/uL    MPV 10.8 8.9 - 12.9 FL    NRBC 0.0 0  WBC    ABSOLUTE NRBC 0.00 0.00 - 0.01 K/uL    NEUTROPHILS 54 32 - 75 %    LYMPHOCYTES 33 12 - 49 %    MONOCYTES 10 5 - 13 %    EOSINOPHILS 2 0 - 7 %    BASOPHILS 1 0 - 1 %    IMMATURE GRANULOCYTES 0 0.0 - 0.5 %    ABS. NEUTROPHILS 3.3 1.8 - 8.0 K/UL    ABS. LYMPHOCYTES 2.0 0.8 - 3.5 K/UL    ABS. MONOCYTES 0.6 0.0 - 1.0 K/UL    ABS. EOSINOPHILS 0.1 0.0 - 0.4 K/UL    ABS. BASOPHILS 0.0 0.0 - 0.1 K/UL    ABS. IMM.  GRANS. 0.0 0.00 - 0.04 K/UL    DF AUTOMATED     METABOLIC PANEL, COMPREHENSIVE   Result Value Ref Range    Sodium 142 136 - 145 mmol/L    Potassium 3.6 3.5 - 5.1 mmol/L    Chloride 104 97 - 108 mmol/L    CO2 32 21 - 32 mmol/L    Anion gap 6 5 - 15 mmol/L    Glucose 112 (H) 65 - 100 mg/dL    BUN 6 6 - 20 MG/DL    Creatinine 0.84 0.70 - 1.30 MG/DL    BUN/Creatinine ratio 7 (L) 12 - 20      GFR est AA >60 >60 ml/min/1.73m2    GFR est non-AA >60 >60 ml/min/1.73m2    Calcium 8.9 8.5 - 10.1 MG/DL    Bilirubin, total 0.4 0.2 - 1.0 MG/DL    ALT (SGPT) 27 12 - 78 U/L    AST (SGOT) 14 (L) 15 - 37 U/L    Alk. phosphatase 40 (L) 45 - 117 U/L    Protein, total 7.3 6.4 - 8.2 g/dL    Albumin 3.6 3.5 - 5.0 g/dL    Globulin 3.7 2.0 - 4.0 g/dL    A-G Ratio 1.0 (L) 1.1 - 2.2         Assessment/Plan:    ICD-10-CM ICD-9-CM    1. Medicare annual wellness visit, initial Z00.00 V70.0    2. MS (multiple sclerosis) ([facility] [address] G35 340      No orders of the defined types were placed in this encounter. call if any problems,Take 81mg aspirin daily  Patient Instructions   Wahandahart Activation    Thank you for requesting access to K[employer]. Please follow the instructions below to securely access and download your online medical record. K[employer]r allows you to send messages to your doctor, view your test results, renew your prescriptions, schedule appointments, and more. How Do I Sign Up?    11. In your internet browser, go to [email or web address]  12. Click on the First Time User? Click Here link in the Sign In box. You will be redirect to the New Member Sign Up page. 15. Enter your Kwestr Access Code exactly as it appears below. You will not need to use this code after youve completed the sign-up process. If you do not sign up before the expiration date, you must request a new code. Kwestr Access Code: Activation code not generated  Current Kwestr Status: Active (This is the date your Kwestr access code will )    14.  Enter the last four digits of your Social Security Number (xxxx) and Date of Birth (mm/dd/yyyy) as indicated and click Submit. You will be taken to the next sign-up page. 15. Create a Bright [employer]t ID. This will be your Envivio login ID and cannot be changed, so think of one that is secure and easy to remember. 12. Create a Envivio password. You can change your password at any time. 16. Enter your Password Reset Question and Answer. This can be used at a later time if you forget your password. 25. Enter your e-mail address. You will receive e-mail notification when new information is available in [address] 19. Click Sign Up. You can now view and download portions of your medical record. 20. Click the Download Summary menu link to download a portable copy of your medical information. Additional Information    If you have questions, please visit the Frequently Asked Questions section of the [employer] website at [email or web address] Remember, [employer] is NOT to be used for urgent needs. For medical emergencies, dial 911. I have reviewed with the patient details of the assessment and plan and all questions were answered. Relevent patient education was performed. The most recent lab findings were reviewed with the patient. An After Visit Summary was printed and given to the patient.

## 2020-05-06 NOTE — PATIENT INSTRUCTIONS
[employer] SEG Navigation Activation Thank you for requesting access to [facility]. Please follow the instructions below to securely access and download your online medical record. [facility] allows you to send messages to your doctor, view your test results, renew your prescriptions, schedule appointments, and more. How Do I Sign Up? 1. In your internet browser, go to www.[employer] 
2. Click on the First Time User? Click Here link in the Sign In box. You will be redirect to the New Member Sign Up page. 3. Enter your [facility]Fuse Access Code exactly as it appears below. You will not need to use this code after youve completed the sign-up process. If you do not sign up before the expiration date, you must request a new code. LoopFuse Access Code: Activation code not generated Current [facility]Fuse Status: Active (This is the date your LoopFuse access code will ) 4. Enter the last four digits of your Social Security Number (xxxx) and Date of Birth (mm/dd/yyyy) as indicated and click Submit. You will be taken to the next sign-up page. 5. Create a LoopFuse ID. This will be your LoopFuse login ID and cannot be changed, so think of one that is secure and easy to remember. 6. Create a LoopFuse password. You can change your password at any time. 7. Enter your Password Reset Question and Answer. This can be used at a later time if you forget your password. 8. Enter your e-mail address. You will receive e-mail notification when new information is available in [address] 9. Click Sign Up. You can now view and download portions of your medical record. 10. Click the Download Summary menu link to download a portable copy of your medical information. Additional Information If you have questions, please visit the Frequently Asked Questions section of the [employer] website at [email or web address] Remember, [employer]F[facility] is NOT to be used for urgent needs. For medical emergencies, dial 911.
(0) independent

## 2020-06-10 ENCOUNTER — TELEPHONE (OUTPATIENT)
Dept: NEUROLOGY | Age: 55
End: 2020-06-10

## 2020-06-10 NOTE — TELEPHONE ENCOUNTER
----- Message from Rosa Carrasquillo sent at 6/9/2020  2:28 PM EDT -----  Regarding: Dr. Jose Daniel Pate Message/Vendor Calls    Caller's first and last name:  pt    Reason for call:  Requesting to speak with provider    Callback required yes/no and why:  yes    Best contact number(s):  [phone]    Details to clarify the request:  Pt requesting to speak with the provider in regards to medications.      Rosa Carrasquillo

## 2020-06-12 NOTE — TELEPHONE ENCOUNTER
Returned call to patient, ID verified times 2. Patient states he is supposed to start a new medication. He does not know the name of the medication and it is taken twice a year.

## 2020-06-23 ENCOUNTER — TELEPHONE (OUTPATIENT)
Dept: NEUROLOGY | Age: 55
End: 2020-06-23

## 2020-06-23 NOTE — TELEPHONE ENCOUNTER
----- Message from Yeison Vigil sent at 6/23/2020  1:30 PM EDT -----  Regarding: Dr. Nabeel Villalobos  Pt would like a jose back regarding getting his new prescription called to [facility], not sure of the number.  Contact is [phone]

## 2020-06-23 NOTE — TELEPHONE ENCOUNTER
----- Message from Eduin Pleitez sent at 6/23/2020  8:37 AM EDT -----  Regarding: /Telehphone  Pt would like a call back regarding his medication.  Contact is [phone]

## 2020-06-23 NOTE — TELEPHONE ENCOUNTER
Returned call to patient, ID verified times 2. Patient called to inform the office he has 2 profiles at the pharmacy. Patient made aware the pharmacy will have to fix it Patient verbalized understanding.

## 2020-06-24 ENCOUNTER — VIRTUAL VISIT (OUTPATIENT)
Dept: NEUROLOGY | Age: 55
End: 2020-06-24

## 2020-06-24 DIAGNOSIS — R20.9 SENSORY DISTURBANCE: ICD-10-CM

## 2020-06-24 DIAGNOSIS — R29.6 FREQUENT FALLS: ICD-10-CM

## 2020-06-24 DIAGNOSIS — G89.4 CHRONIC PAIN SYNDROME: ICD-10-CM

## 2020-06-24 DIAGNOSIS — G35 MULTIPLE SCLEROSIS, SECONDARY PROGRESSIVE (HCC): Primary | ICD-10-CM

## 2020-06-24 DIAGNOSIS — G95.9 CERVICAL MYELOPATHY (HCC): ICD-10-CM

## 2020-06-24 DIAGNOSIS — R26.9 GAIT DISORDER: ICD-10-CM

## 2020-06-24 DIAGNOSIS — R25.1 TREMOR: ICD-10-CM

## 2020-06-24 DIAGNOSIS — G62.9 POLYNEUROPATHY: ICD-10-CM

## 2020-06-24 DIAGNOSIS — E55.9 VITAMIN D DEFICIENCY: ICD-10-CM

## 2020-06-26 NOTE — PROGRESS NOTES
Neurology Progress Note  Sruthi Rai was seen by synchronous (real-time) audio-video technology on 20. Consent:  He  is aware that this patient-initiated Telehealth encounter is a billable service, with coverage as determined by his insurance carrier. He is aware that he may receive a bill and has provided verbal consent to proceed: Yes    I was in the office while conducting this encounter. NAME:  Sruthi Rai   :   1965   MRN:   [id number]     Date/Time:  2020  Subjective:   Sruthi Rai is a 50 y.o. male here today for follow-up for multiple sclerosis, pain, weakness difficulty ambulating  Patient today says he is still weak, difficulty walking and frequent fall. He says he has noticed any improvement. He continues to have a lot of pain. He says  his legs still tend to give out on him and he will fall mostly when he is not using his cane. Denies loss of consciousness. He noted that he has experience of confusion and been having a lot of nausea. He continues to have neck pain is sharp in nature, continuous, burning sensation that goes around to the arms, making the arms weak. He says he drops things at times. He says tremors have been getting worse, he has been told that his head also shakes with his hands. Back pain is also sharp and constant, goes on to the legs causing numbness and tingling sensation of the legs with weakness, he says he is weaker on the right side. .  Patient is advised to quit smoking as this is making multiple sclerosis worse. He has not picked up the refill for his Lyrica, he says because the pharmacy did not refill it even after authorization but came to find out that they created 2 records for him. According to patient it is corrected.   He says he is cold mostly at night even when the temperature is warm  As per previous discussion with patient, currently he has agreed to switch to [facility], this has been authorized and patient is waiting for delivery. 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 Maureen Ledoris is discontinued because follow-up MRIs showed new lesions  Patient is referred to physical therapy. He says he is still having a lot of nausea lately. On a scale of 1-10, patient rates his pain level to be between 5 and 6. Denies loss of consciousness, dysphagia, odynophagia. .  Review of Systems - General ROS: positive for  - fatigue, night sweats and sleep disturbance  Psychological ROS: positive for - anxiety, concentration difficulties, depression, mood swings and sleep disturbances  Ophthalmic ROS: positive for - blurry vision, decreased vision and photophobia  ENT ROS: positive for - headaches, tinnitus, vertigo and visual changes  Allergy and Immunology ROS: negative  Hematological and Lymphatic ROS: negative  Endocrine ROS: negative  Respiratory ROS: no cough, shortness of breath, or wheezing  Cardiovascular ROS: no chest pain or dyspnea on exertion  Gastrointestinal ROS: no abdominal pain, change in bowel habits, or black or bloody stools  Genito-Urinary ROS: no dysuria, trouble voiding, or hematuria  Musculoskeletal ROS: positive for - gait disturbance, joint pain, joint stiffness, joint swelling, muscle pain and muscular weakness  Neurological ROS: positive for - dizziness, gait disturbance, headaches, impaired coordination/balance, memory loss, numbness/tingling, visual changes and weakness  Dermatological ROS: negative          Medications reviewed:  Current Outpatient Medications   Medication Sig Dispense Refill    pregabalin (LYRICA) 100 mg capsule Take 1 Cap by mouth two (2) times a day. Max Daily Amount: 200 mg. 60 Cap 2    dronabinol (MARINOL) 5 mg capsule Take 1 Cap by mouth two (2) times a day. Max Daily Amount: 10 mg. 60 Cap 2    oxyCODONE IR (OXY-IR) 15 mg immediate release tablet Take 15 mg by mouth every four (4) hours as needed for Pain.       predniSONE (DELTASONE) 20 mg tablet Take 20 mg by mouth two (2) times a day. 40 Tab 1    Dalfampridine (AMPYRA) 10 mg Tb12 Take 10 mg by mouth two (2) times a day. 60 Tab 2    megestrol (MEGACE) 40 mg tablet Take 1 Tab by mouth three (3) times daily. 90 Tab 1    meloxicam (MOBIC) 15 mg tablet Take 1 Tab by mouth daily. 30 Tab 1    ocrelizumab (OCREVUS) 30 mg/mL soln injection 10 mL by IntraVENous route every fourteen (14) days. 10 mL 1    naloxone 2 mg/actuation spry Use 1 spray intranasally into 1 nostril. Use a new Narcan nasal spray for subsequent doses and administer into alternating nostrils. May repeat every 2 to 3 minutes as needed. 4 Each 2    tadalafil (CIALIS) 20 mg tablet Take 1 Tab by mouth as needed. 10 Tab 12    baclofen (LIORESAL) 10 mg tablet Take 1 Tab by mouth three (3) times daily. 90 Tab 5    dextromethorphan-quiNIDine (NUEDEXTA) 20-10 mg per capsule Take 1 Cap by mouth every twelve (12) hours. 60 Cap 2    lidocaine (LIDODERM) 5 % 1 Patch by TransDERmal route every twenty-four (24) hours. 30 Each 5        Objective:   Vitals: There were no vitals filed for this visit.     Lab Data Reviewed:  Lab Results   Component Value Date/Time    WBC 6.0 12/04/2019 10:41 AM    HCT 42.0 12/04/2019 10:41 AM    HGB 13.8 12/04/2019 10:41 AM    PLATELET 077 90/48/6517 10:41 AM       Lab Results   Component Value Date/Time    Sodium 142 12/04/2019 03:58 PM    Potassium 3.6 12/04/2019 03:58 PM    Chloride 104 12/04/2019 03:58 PM    CO2 32 12/04/2019 03:58 PM    Glucose 112 (H) 12/04/2019 03:58 PM    BUN 6 12/04/2019 03:58 PM    Creatinine 0.84 12/04/2019 03:58 PM    Calcium 8.9 12/04/2019 03:58 PM       No components found for: TROPQUANT    No results found for: BELEN      Lab Results   Component Value Date/Time    Hemoglobin A1c 6.2 (H) 05/08/2013 10:17 AM    Hemoglobin A1c (POC) 5.4 01/11/2018 04:22 PM        No results found for: B12LT, FOL, RBCF    No results found for: Abigail GLOVER    Lab Results   Component Value Date/Time    Cholesterol, total 232 (H) 05/08/2013 10:17 AM    Cholesterol (POC) 195 01/17/2018 04:18 PM    HDL Cholesterol 41 05/08/2013 10:17 AM    HDL Cholesterol (POC) 37 01/17/2018 04:18 PM    LDL Cholesterol (POC) 105 01/17/2018 04:18 PM    LDL, calculated 163 (H) 05/08/2013 10:17 AM    VLDL, calculated 28 05/08/2013 10:17 AM    Triglyceride 142 05/08/2013 10:17 AM    Triglycerides (POC) 266 01/17/2018 04:18 PM    CHOL/HDL Ratio 7.3 (H) 10/08/2010 01:10 PM         CT Results (recent):  No results found for this or any previous visit. MRI Results (recent):  Results from [facility] encounter on 02/20/20   MRI CERV SPINE WO CONT    Narrative EXAM: MRI CERV SPINE WO CONT    INDICATION: ms, myelopathy. Multiple sclerosis    COMPARISON: MRI 4/13/2018    TECHNIQUE: MR imaging of the cervical spine was performed using the following  sequences: sagittal T1, T2, STIR;  axial T2, T1. The study was performed on an  open configuration 0.7 Marily (intermediate) field strength MR imaging system. CONTRAST: Unfortunately, the patient declined IV contrast administration. FINDINGS:    Multiple foci of abnormal cord signal are again shown. In the interval, the  Central-posterior lesion at the C2 level is more extensive measuring 14 mm in  length compared with 7 mm previously. A cervicomedullary junction measuring 16  mm in length is similar to that shown previously. Additional smaller foci are  again noted and appear overall unchanged. Determination of activity of these  lesions is not possible without IV contrast.    Vertebral body height and bone signal remain normal. There is anatomic  alignment. No paraspinal soft tissue mass or collection is evident. C2-C3: No herniation or stenosis. C3-C4: Small-moderate I lateral uncovertebral osteophytes, greater on the left  and mild central disc bulging. No facet arthropathy. No canal stenosis. Mild  bilateral foraminal narrowing. Findings unchanged.     C4-C5: Moderate central disc protrusion superimposed upon mild-moderate diffuse  disc osteophyte formation, with unchanged anterior CSF space effacement and  minimal central anterior cord flattening. No facet arthropathy. Mild bilateral  foraminal narrowing unchanged. C5-C6: Small central disc protrusion superimposed upon mild diffuse disc  osteophyte complex formation. No facet arthropathy or canal-foraminal stenosis. C6-C7: Small bilateral uncovertebral disc osteophyte, larger on the left. No  facet arthropathy or canal-foraminal stenosis. No change. C7-T1: No herniation or stenosis. Upper thoracic segments: Shown on sagittal images only. No herniation or  stenosis demonstrated. Impression IMPRESSION:  Multifocal cervical cord signal alteration again shown, appearing substantially  larger in the interval at the C2 level. Termination of activity of this presumed  MS plaque is not determined as no IV contrast was administered. IR Results (recent):  No results found for this or any previous visit. VAS/US Results (recent):  No results found for this or any previous visit. PHYSICAL EXAM:  General:    Alert, cooperative, no distress, appears stated age. Head:   Normocephalic, without obvious abnormality, atraumatic. Eyes:   Conjunctivae/corneas clear. Nose:  Nares normal. .  Throat:    Lips and tongue normal.   Neck:  Symmetrical,  no adenopathy, thyroid: non tender    no JVD. Back:    Symmetric. Lungs:   Deferred  Chest wall:   No Accessory muscle use. Heart:   Regular rate and rhythm,  no murmur, rub or gallop. Abdomen:    Not distended. Extremities: Extremities normal, atraumatic, No cyanosis. No edema. No clubbing  Skin:      No rashes or lesions. Not Jaundiced  Lymph nodes: Cervical, supraclavicular normal.  Psych:  Good insight. Not depressed. Anxious. NEUROLOGICAL EXAM:  Appearance: The patient is well developed, well nourished, provides a coherent history and is in no acute distress. Mental Status: Oriented to time, place and person. Mood and affect appropriate. Cranial Nerves:   Intact visual fields. EOM's full, no nystagmus, no ptosis. . Facial movement is symmetric. Hearing is normal bilaterally. Tongue is midline. Motor:  Moves all extremities. No fasciculations. Reflexes:   Deferred. Sensory:   Deferred. Gait:  Abnormal gait. Ambulates with cane   Tremor:   No tremor noted. Cerebellar:  No cerebellar signs present. Assesment  1. Multiple sclerosis, secondary progressive ([facility]  [place]    2. Polyneuropathy  Lyrica    3. Cervical myelopathy ([facility]  Continue management    4. Gait disorder    - REFERRAL TO PHYSICAL THERAPY    5. Frequent falls    - REFERRAL TO PHYSICAL THERAPY    6. Tremor    - SED RATE (ESR); Future  - TSH 3RD GENERATION  - VITAMIN D, 25 HYDROXY    7. Chronic pain syndrome  Referred to pain management    8. Sensory disturbance    - SED RATE (ESR); Future  - CBC WITH AUTOMATED DIFF  - TSH 3RD GENERATION    9. Vitamin D deficiency    - VITAMIN D, 25 HYDROXY    ___________________________________________________  PLAN:Medication and plan discussed with patient      ICD-10-CM ICD-9-CM    1. Multiple sclerosis, secondary progressive ([facility] G35 340    2. Polyneuropathy G62.9 356.9    3. Cervical myelopathy (H[facility]) G95.9 721.1    4. Gait disorder R26.9 781.2 REFERRAL TO PHYSICAL THERAPY   5. Frequent falls R29.6 V15.88 REFERRAL TO PHYSICAL THERAPY   6. Tremor R25.1 781.0 SED RATE (ESR)      TSH 3RD GENERATION      VITAMIN D, 25 HYDROXY   7. Chronic pain syndrome G89.4 338.4    8. Sensory disturbance R20.9 782.0 SED RATE (ESR)      CBC WITH AUTOMATED DIFF      TSH 3RD GENERATION   9. Vitamin D deficiency E55.9 268.9 VITAMIN D, 25 HYDROXY     Follow-up and Dispositions    · Return in about 2 months (around 8/24/2020).              ___________________________________________________    Attending Physician: Chivo Bruner MD

## 2020-07-01 ENCOUNTER — TELEPHONE (OUTPATIENT)
Dept: NEUROLOGY | Age: 55
End: 2020-07-01

## 2020-07-01 NOTE — TELEPHONE ENCOUNTER
Prior Authorization DENIED for [address] by [facility] [employer]. Denial reason states:     -Patient must have a trial and failure or contraindication to copaxone, glatiramer, gilenya, or tecfidera. Appeal letter from [employer] was included with denial. Completed form and sent with denial to [facility] [employer] for appeal. Denial scanned into media for review.

## 2020-07-08 ENCOUNTER — APPOINTMENT (OUTPATIENT)
Dept: PHYSICAL THERAPY | Age: 55
End: 2020-07-08

## 2020-07-21 ENCOUNTER — TELEPHONE (OUTPATIENT)
Dept: NEUROLOGY | Age: 55
End: 2020-07-21

## 2020-07-21 NOTE — TELEPHONE ENCOUNTER
----- Message from Jorge L Poon sent at 7/21/2020  8:12 AM EDT -----  Regarding: Dr. Ariel Smith Message/Vendor Calls    Caller's first and last name:  [facility]    Reason for call:  Follow up on an appeal      Callback required yes/no and why: yes      Best contact number(s):  [phone]    Details to clarify the request:      Jorge L Poon

## 2020-08-06 NOTE — TELEPHONE ENCOUNTER
Called [employer] to check status and resubmit so patient can continue to get med through [facility]. [employer] rep Adela Leung stated Appeal was never received. Confirmed fax number and had confirmation sheet [employer] received. Appeal resubmitted as URGENT for [address]. Should have response within 72 hours.

## 2020-08-26 ENCOUNTER — OFFICE VISIT (OUTPATIENT)
Dept: NEUROLOGY | Age: 55
End: 2020-08-26
Payer: MEDICARE

## 2020-08-26 DIAGNOSIS — E55.9 VITAMIN D DEFICIENCY: ICD-10-CM

## 2020-08-26 DIAGNOSIS — G89.4 CHRONIC PAIN SYNDROME: ICD-10-CM

## 2020-08-26 DIAGNOSIS — G35 SECONDARY PROGRESSIVE MULTIPLE SCLEROSIS (HCC): Primary | ICD-10-CM

## 2020-08-26 DIAGNOSIS — R29.6 FREQUENT FALLS: ICD-10-CM

## 2020-08-26 DIAGNOSIS — R25.1 TREMOR: ICD-10-CM

## 2020-08-26 DIAGNOSIS — R26.9 GAIT DISORDER: ICD-10-CM

## 2020-08-26 DIAGNOSIS — G62.9 NEUROPATHY: ICD-10-CM

## 2020-08-26 PROCEDURE — 99214 OFFICE O/P EST MOD 30 MIN: CPT | Performed by: PSYCHIATRY & NEUROLOGY

## 2020-08-26 PROCEDURE — 3017F COLORECTAL CA SCREEN DOC REV: CPT | Performed by: PSYCHIATRY & NEUROLOGY

## 2020-08-26 PROCEDURE — G8417 CALC BMI ABV UP PARAM F/U: HCPCS | Performed by: PSYCHIATRY & NEUROLOGY

## 2020-08-26 PROCEDURE — G9717 DOC PT DX DEP/BP F/U NT REQ: HCPCS | Performed by: PSYCHIATRY & NEUROLOGY

## 2020-08-26 PROCEDURE — G8427 DOCREV CUR MEDS BY ELIG CLIN: HCPCS | Performed by: PSYCHIATRY & NEUROLOGY

## 2020-08-26 RX ORDER — INTERFERON BETA-1A 30 UG/.5ML
30 INJECTION, SOLUTION INTRAMUSCULAR
COMMUNITY
End: 2021-11-12

## 2020-08-29 NOTE — PROGRESS NOTES
Neurology Progress Note  Morales Hsieh was seen by synchronous (real-time) audio-video technology on 20     Consent:  He  is aware that this patient-initiated Telehealth encounter is a billable service, with coverage as determined by his insurance carrier. He is aware that he may receive a bill and has provided verbal consent to proceed: Yes    I was in the office while conducting this encounter. Pursuant to the emergency declaration under the [address] [place] [address] [facility] authority and the [employer] Act, this Virtual  Visit was conducted, with patient's consent, to reduce the patient's risk of exposure to COVID-19 and provide continuity of care for an established patient. Services were provided through a video synchronous discussion virtually to substitute for in-person clinic visit. NAME:  Morales Hsieh   :   1965   MRN:   [id number]     Date/Time:  2020  Subjective:     Mroales Hsieh is a 50 y.o. male here today for follow-up for multiple sclerosis, pain, weakness difficulty ambulating. Patient says he still about the same, this patient has chronic progressive multiple sclerosis, for some reason the insurance is not covering the medication that was prescribed for patient that is [address] [place] [address]. He says he was asked to pay $4000 or the cost of the medication. Patient today says he continues to be weak, difficulty walking and frequent fall. He says he has noticed any improvement. He continues to have a lot of pain. He says  his legs still tend to give out on him and he will fall mostly when he is not using his cane. Denies loss of consciousness. He noted that he has experience of confusion and been having a lot of nausea. He continues to have neck pain is sharp in nature, continuous, burning sensation that goes around to the arms, making the arms weak. He says he drops things at times. He says tremors have been getting worse, he has been told that his head also shakes with his hands. Back pain is also sharp and constant, goes on to the legs causing numbness and tingling sensation of the legs with weakness, he says he is weaker on the right side. .  Patient is advised to quit smoking as this is making multiple sclerosis worse. He says he is cold mostly at night even when the temperature is warm  As [place] is for secondary progressive multiple sclerosis, appears to be following this patient is likely to benefit from the medication and compliance is also likely to be better  Patient has been referred to physical therapy for the frequent fall and gait disorder  Denies loss of consciousness, dysphagia, odynophagia. .  Review of Systems - General ROS: positive for  - fatigue, night sweats and sleep disturbance  Psychological ROS: positive for - anxiety, concentration difficulties, depression, mood swings and sleep disturbances  Ophthalmic ROS: positive for - blurry vision, decreased vision and photophobia  ENT ROS: positive for - headaches, tinnitus, vertigo and visual changes  Allergy and Immunology ROS: negative  Hematological and Lymphatic ROS: negative  Endocrine ROS: negative  Respiratory ROS: no cough, shortness of breath, or wheezing  Cardiovascular ROS: no chest pain or dyspnea on exertion  Gastrointestinal ROS: no abdominal pain, change in bowel habits, or black or bloody stools  Genito-Urinary ROS: no dysuria, trouble voiding, or hematuria  Musculoskeletal ROS: positive for - gait disturbance, joint pain, joint stiffness, joint swelling, muscle pain and muscular weakness  Neurological ROS: positive for - dizziness, gait disturbance, headaches, impaired coordination/balance, memory loss, numbness/tingling, visual changes and weakness  Dermatological ROS: negative               Medications reviewed:  Current Outpatient Medications   Medication Sig Dispense Refill    oxyCODONE IR (OXY-IR) 15 mg immediate release tablet Take 15 mg by mouth every four (4) hours as needed for Pain.  interferon beta-1a (AVONEX) 30 mcg/0.5 mL injection 30 mcg.  pregabalin (LYRICA) 100 mg capsule Take 1 Cap by mouth two (2) times a day. Max Daily Amount: 200 mg. 60 Cap 2    predniSONE (DELTASONE) 20 mg tablet Take 20 mg by mouth two (2) times a day. 40 Tab 1    dronabinol (MARINOL) 5 mg capsule Take 1 Cap by mouth two (2) times a day. Max Daily Amount: 10 mg. 60 Cap 2    Dalfampridine (AMPYRA) 10 mg Tb12 Take 10 mg by mouth two (2) times a day. 60 Tab 2    megestrol (MEGACE) 40 mg tablet Take 1 Tab by mouth three (3) times daily. 90 Tab 1    meloxicam (MOBIC) 15 mg tablet Take 1 Tab by mouth daily. 30 Tab 1    ocrelizumab (OCREVUS) 30 mg/mL soln injection 10 mL by IntraVENous route every fourteen (14) days. 10 mL 1    naloxone 2 mg/actuation spry Use 1 spray intranasally into 1 nostril. Use a new Narcan nasal spray for subsequent doses and administer into alternating nostrils. May repeat every 2 to 3 minutes as needed. 4 Each 2    tadalafil (CIALIS) 20 mg tablet Take 1 Tab by mouth as needed. 10 Tab 12    baclofen (LIORESAL) 10 mg tablet Take 1 Tab by mouth three (3) times daily. 90 Tab 5    dextromethorphan-quiNIDine (NUEDEXTA) 20-10 mg per capsule Take 1 Cap by mouth every twelve (12) hours. 60 Cap 2    lidocaine (LIDODERM) 5 % 1 Patch by TransDERmal route every twenty-four (24) hours. 30 Each 5        Objective:   Vitals: There were no vitals filed for this visit.             Lab Data Reviewed:  Lab Results   Component Value Date/Time    WBC 6.0 12/04/2019 10:41 AM    HCT 42.0 12/04/2019 10:41 AM    HGB 13.8 12/04/2019 10:41 AM    PLATELET 028 47/62/1227 10:41 AM       Lab Results   Component Value Date/Time    Sodium 142 12/04/2019 03:58 PM    Potassium 3.6 12/04/2019 03:58 PM    Chloride 104 12/04/2019 03:58 PM    CO2 32 12/04/2019 03:58 PM    Glucose 112 (H) 12/04/2019 03:58 PM    BUN 6 12/04/2019 03:58 PM    Creatinine 0.84 12/04/2019 03:58 PM    Calcium 8.9 12/04/2019 03:58 PM       No components found for: TROPQUANT    No results found for: BELEN      Lab Results   Component Value Date/Time    Hemoglobin A1c 6.2 (H) 05/08/2013 10:17 AM    Hemoglobin A1c (POC) 5.4 01/11/2018 04:22 PM        No results found for: B12LT, FOL, RBCF    No results found for: BELEN, Nelsy Spray, XBANA    Lab Results   Component Value Date/Time    Cholesterol, total 232 (H) 05/08/2013 10:17 AM    Cholesterol (POC) 195 01/17/2018 04:18 PM    HDL Cholesterol 41 05/08/2013 10:17 AM    HDL Cholesterol (POC) 37 01/17/2018 04:18 PM    LDL Cholesterol (POC) 105 01/17/2018 04:18 PM    LDL, calculated 163 (H) 05/08/2013 10:17 AM    VLDL, calculated 28 05/08/2013 10:17 AM    Triglyceride 142 05/08/2013 10:17 AM    Triglycerides (POC) 266 01/17/2018 04:18 PM    CHOL/HDL Ratio 7.3 (H) 10/08/2010 01:10 PM         CT Results (recent):  No results found for this or any previous visit. MRI Results (recent):  Results from [place] encounter on 02/[place]/20   MRI CERV SPINE WO CONT    Narrative EXAM: MRI CERV SPINE WO CONT    INDICATION: ms, myelopathy. Multiple sclerosis    COMPARISON: MRI 4/13/2018    TECHNIQUE: MR imaging of the cervical spine was performed using the following  sequences: sagittal T1, T2, STIR;  axial T2, T1. The study was performed on an  open configuration 0.7 Marily (intermediate) field strength MR imaging system. CONTRAST: Unfortunately, the patient declined IV contrast administration. FINDINGS:    Multiple foci of abnormal cord signal are again shown. In the interval, the  Central-posterior lesion at the C2 level is more extensive measuring 14 mm in  length compared with 7 mm previously. A cervicomedullary junction measuring 16  mm in length is similar to that shown previously. Additional smaller foci are  again noted and appear overall unchanged.  Determination of activity of these  lesions is not possible without IV contrast.    Vertebral body height and bone signal remain normal. There is anatomic  alignment. No paraspinal soft tissue mass or collection is evident. C2-C3: No herniation or stenosis. C3-C4: Small-moderate I lateral uncovertebral osteophytes, greater on the left  and mild central disc bulging. No facet arthropathy. No canal stenosis. Mild  bilateral foraminal narrowing. Findings unchanged. C4-C5: Moderate central disc protrusion superimposed upon mild-moderate diffuse  disc osteophyte formation, with unchanged anterior CSF space effacement and  minimal central anterior cord flattening. No facet arthropathy. Mild bilateral  foraminal narrowing unchanged. C5-C6: Small central disc protrusion superimposed upon mild diffuse disc  osteophyte complex formation. No facet arthropathy or canal-foraminal stenosis. C6-C7: Small bilateral uncovertebral disc osteophyte, larger on the left. No  facet arthropathy or canal-foraminal stenosis. No change. C7-T1: No herniation or stenosis. Upper thoracic segments: Shown on sagittal images only. No herniation or  stenosis demonstrated. Impression IMPRESSION:  Multifocal cervical cord signal alteration again shown, appearing substantially  larger in the interval at the C2 level. Termination of activity of this presumed  MS plaque is not determined as no IV contrast was administered. IR Results (recent):  No results found for this or any previous visit. VAS/US Results (recent):  No results found for this or any previous visit. PHYSICAL EXAM:  General:    Alert, cooperative, no distress, appears stated age. Head:   Normocephalic, without obvious abnormality, atraumatic. Eyes:   Conjunctivae/corneas clear. Nose:  Nares normal.   Throat:    Lips,and tongue normal.  No Thrush  Neck:  Symmetrical,  no adenopathy, thyroid: non tender     no JVD. Back:    Symmetric. Lungs:   Deferred.   Chest wall:   No Accessory muscle use.  Heart:   Deferred. Abdomen:   Not distended. Extremities: Extremities normal, atraumatic, No cyanosis. No edema. No clubbing  Skin:      No rashes or lesions. Not Jaundiced  Lymph nodes: Cervical, supraclavicular normal.  Psych:  Good insight. Not depressed. Not anxious or agitated. NEUROLOGICAL EXAM:  Appearance: The patient is well developed, well nourished, provides a coherent history and is in no acute distress. Mental Status: Oriented to time, place and person. Mood and affect appropriate. Cranial Nerves:   Intact visual fields. EOM's full, no nystagmus, no ptosis. Facial movement is symmetric. Hearing is normal bilaterally. Tongue is midline. Motor:  Moves all extremities. No fasciculations. Reflexes:   Deferred. Sensory:   Deferred. Gait:  Unsteady gait. Tremor:   Tremor noted. Cerebellar:  No cerebellar signs present. Neurovascular:  Normal heart sounds and regular rhythm, peripheral pulses intact, and no carotid bruits. Assesment  1. [facility]  [place]    2. Gait disorder  Physical therapy    3. Frequent falls  Physical therapy    4. Chronic pain syndrome  Referred to pain management    5. Tremor  Continue management  6. Neuropathy  Lyrica    7. Vitamin D deficiency  Replace vitamin D    ___________________________________________________  PLAN: Medication and plan discussed with patient      ICD-10-CM ICD-9-CM    1. Secondary progressive multiple sclerosis ([facility]  G35 340    2. Gait disorder  R26.9 781.2    3. Frequent falls  R29.6 V15.88    4. Chronic pain syndrome  G89.4 338.4    5. Tremor  R25.1 781.0    6. Neuropathy  G62.9 355.9    7. Vitamin D deficiency  E55.9 268.9      Follow-up and Dispositions    · Return in about 3 months (around 11/26/2020).            _________________________________________________  Attending Physician: Juan Villasenor MD

## 2020-09-02 ENCOUNTER — VIRTUAL VISIT (OUTPATIENT)
Dept: INTERNAL MEDICINE CLINIC | Age: 55
End: 2020-09-02
Payer: MEDICARE

## 2020-09-02 DIAGNOSIS — G62.9 NEUROPATHY: ICD-10-CM

## 2020-09-02 DIAGNOSIS — G89.4 CHRONIC PAIN SYNDROME: ICD-10-CM

## 2020-09-02 DIAGNOSIS — H60.501 ACUTE OTITIS EXTERNA OF RIGHT EAR, UNSPECIFIED TYPE: Primary | ICD-10-CM

## 2020-09-02 DIAGNOSIS — G35 MS (MULTIPLE SCLEROSIS) (HCC): ICD-10-CM

## 2020-09-02 PROCEDURE — 99214 OFFICE O/P EST MOD 30 MIN: CPT | Performed by: INTERNAL MEDICINE

## 2020-09-02 PROCEDURE — G8427 DOCREV CUR MEDS BY ELIG CLIN: HCPCS | Performed by: INTERNAL MEDICINE

## 2020-09-02 PROCEDURE — G9717 DOC PT DX DEP/BP F/U NT REQ: HCPCS | Performed by: INTERNAL MEDICINE

## 2020-09-02 PROCEDURE — 3017F COLORECTAL CA SCREEN DOC REV: CPT | Performed by: INTERNAL MEDICINE

## 2020-09-02 NOTE — PROGRESS NOTES
Indira Nathan is a 50 y.o. male being evaluated by a Virtual Visit (video visit) encounter to address concerns as mentioned above. A caregiver was present when appropriate. Due to this being a TeleHealth encounter ([facility]), evaluation of the following organ systems was limited: Vitals/Constitutional/EENT/Resp/CV/GI//MS/Neuro/Skin/Heme-Lymph-Imm. Pursuant to the emergency declaration under the [address] [place] [address] and the [employer] Act, this Virtual Visit was conducted with patient's (and/or legal guardian's) consent, to reduce the risk of exposure to COVID-19 and provide necessary medical care. Services were provided through a video synchronous discussion virtually to substitute for in-person encounter. --Pablo Ramirez MD on 9/2/2020 at 2:31 PM    An electronic signature was used to authenticate this note. Indira Nathan is a 50 y.o. male and presents with Ear Pain  . Subjective:    He has MS  He has lost weight. He is doing infusion therapy at the present       Indira Nathan is a 50 y.o. male and presents with Ear Pain  . Review of Systems  Constitutional: negative for fevers, chills, anorexia and weight loss  Eyes:   negative for visual disturbance and irritation  ENT:   negative for tinnitus,sore throat,nasal congestion,ear pains. hoarseness  Respiratory:  negative for cough, hemoptysis, dyspnea,wheezing  CV:   negative for chest pain, palpitations, lower extremity edema  GI:   negative for nausea, vomiting, diarrhea, abdominal pain,melena  Endo:               negative for polyuria,polydipsia,polyphagia,heat intolerance  Genitourinary: negative for frequency, dysuria and hematuria  Integument:  negative for rash and pruritus  Hematologic:  negative for easy bruising and gum/nose bleeding  Musculoskel: negative for myalgias, arthralgias, back pain, muscle weakness, joint pain  Neurological:  negative for headaches, dizziness, vertigo, memory problems and gait   Behavl/Psych: negative for feelings of anxiety, depression, mood changes    Past Medical History:   Diagnosis Date    Back pain 10/8/2010    Back pain 10/8/2010    Depression     Diabetes ([address]     Fatigue 8/24/2012    Hearing loss 1/25/2013    Memory loss 1/25/2013    [facility]     Muscle pain     Muscle weakness     Poor appetite     Snoring     Unintentional weight change     Visual disturbance      Past Surgical History:   Procedure Laterality Date    HX HEENT Right     ear     Social History     Socioeconomic History    Marital status: SINGLE     Spouse name: Not on file    Number of children: Not on file    Years of education: Not on file    Highest education level: Not on file   Tobacco Use    Smoking status: Current Every Day Smoker     Packs/day: 0.50     Years: 21.00     Pack years: 10.50     Types: Cigarettes    Smokeless tobacco: Never Used   Substance and Sexual Activity    Alcohol use: No    Drug use: No    Sexual activity: Yes     Partners: Female     Birth control/protection: Condom     Family History   Problem Relation Age of Onset    Hypertension Father     Cancer Father         prostate    Hypertension Sister     Diabetes Maternal Grandmother     Cancer Paternal Uncle         prostate    Dementia Paternal Uncle      Current Outpatient Medications   Medication Sig Dispense Refill    ciprofloxacin-hydrocortisone (CIPRO HC OTIC) otic suspension Administer 3 Drops in right ear two (2) times a day for 7 days. 10 mL 0    pregabalin (LYRICA) 100 mg capsule Take 1 Cap by mouth two (2) times a day. Max Daily Amount: 200 mg. 60 Cap 2    oxyCODONE IR (OXY-IR) 15 mg immediate release tablet Take 15 mg by mouth every four (4) hours as needed for Pain.  interferon beta-1a (AVONEX) 30 mcg/0.5 mL injection 30 mcg.       predniSONE (DELTASONE) 20 mg tablet Take 20 mg by mouth two (2) times a day. 40 Tab 1    dronabinol (MARINOL) 5 mg capsule Take 1 Cap by mouth two (2) times a day. Max Daily Amount: 10 mg. 60 Cap 2    Dalfampridine (AMPYRA) 10 mg Tb12 Take 10 mg by mouth two (2) times a day. 60 Tab 2    megestrol (MEGACE) 40 mg tablet Take 1 Tab by mouth three (3) times daily. 90 Tab 1    meloxicam (MOBIC) 15 mg tablet Take 1 Tab by mouth daily. 30 Tab 1    ocrelizumab (OCREVUS) 30 mg/mL soln injection 10 mL by IntraVENous route every fourteen (14) days. 10 mL 1    naloxone 2 mg/actuation spry Use 1 spray intranasally into 1 nostril. Use a new Narcan nasal spray for subsequent doses and administer into alternating nostrils. May repeat every 2 to 3 minutes as needed. 4 Each 2    tadalafil (CIALIS) 20 mg tablet Take 1 Tab by mouth as needed. 10 Tab 12    baclofen (LIORESAL) 10 mg tablet Take 1 Tab by mouth three (3) times daily. 90 Tab 5    dextromethorphan-quiNIDine (NUEDEXTA) 20-10 mg per capsule Take 1 Cap by mouth every twelve (12) hours. 60 Cap 2    lidocaine (LIDODERM) 5 % 1 Patch by TransDERmal route every twenty-four (24) hours. 30 Each 5     No Known Allergies    Objective: There were no vitals taken for this visit.   Physical Exam:   General appearance - alert, well appearing, and in no distress  Mental status - alert, oriented to person, place, and time  EYE-JUS, EOMI, corneas normal, no foreign bodies  ENT-ENT exam normal, no neck nodes or sinus tenderness  Nose - normal and patent, no erythema, discharge or polyps  Mouth - mucous membranes moist, pharynx normal without lesions  Neck - supple, no significant adenopathy   Chest - clear to auscultation, no wheezes, rales or rhonchi, symmetric air entry   Heart - normal rate, regular rhythm, normal S1, S2, no murmurs, rubs, clicks or gallops   Abdomen - soft, nontender, nondistended, no masses or organomegaly  Lymph- no adenopathy palpable  Ext-peripheral pulses normal, no pedal edema, no clubbing or cyanosis  Skin-Warm and dry. no hyperpigmentation, vitiligo, or suspicious lesions  Neuro -alert, oriented, normal speech, no focal findings or movement disorder noted  Neck-normal C-spine, no tenderness, full ROM without pain  Feet-no nail deformities or callus formation with good pulses noted      Results for orders placed or performed during the hospital encounter of 12/04/19   CBC WITH AUTOMATED DIFF   Result Value Ref Range    WBC 6.0 4.1 - 11.1 K/uL    RBC 4.77 4.10 - 5.70 M/uL    HGB 13.8 12.1 - 17.0 g/dL    HCT 42.0 36.6 - 50.3 %    MCV 88.1 80.0 - 99.0 FL    MCH 28.9 26.0 - 34.0 PG    MCHC 32.9 30.0 - 36.5 g/dL    RDW 13.1 11.5 - 14.5 %    PLATELET 379 240 - 153 K/uL    MPV 10.8 8.9 - 12.9 FL    NRBC 0.0 0  WBC    ABSOLUTE NRBC 0.00 0.00 - 0.01 K/uL    NEUTROPHILS 54 32 - 75 %    LYMPHOCYTES 33 12 - 49 %    MONOCYTES 10 5 - 13 %    EOSINOPHILS 2 0 - 7 %    BASOPHILS 1 0 - 1 %    IMMATURE GRANULOCYTES 0 0.0 - 0.5 %    ABS. NEUTROPHILS 3.3 1.8 - 8.0 K/UL    ABS. LYMPHOCYTES 2.0 0.8 - 3.5 K/UL    ABS. MONOCYTES 0.6 0.0 - 1.0 K/UL    ABS. EOSINOPHILS 0.1 0.0 - 0.4 K/UL    ABS. BASOPHILS 0.0 0.0 - 0.1 K/UL    ABS. IMM. GRANS. 0.0 0.00 - 0.04 K/UL    DF AUTOMATED     METABOLIC PANEL, COMPREHENSIVE   Result Value Ref Range    Sodium 142 136 - 145 mmol/L    Potassium 3.6 3.5 - 5.1 mmol/L    Chloride 104 97 - 108 mmol/L    CO2 32 21 - 32 mmol/L    Anion gap 6 5 - 15 mmol/L    Glucose 112 (H) 65 - 100 mg/dL    BUN 6 6 - 20 MG/DL    Creatinine 0.84 0.70 - 1.30 MG/DL    BUN/Creatinine ratio 7 (L) 12 - 20      GFR est AA >60 >60 ml/min/1.73m2    GFR est non-AA >60 >60 ml/min/1.73m2    Calcium 8.9 8.5 - 10.1 MG/DL    Bilirubin, total 0.4 0.2 - 1.0 MG/DL    ALT (SGPT) 27 12 - 78 U/L    AST (SGOT) 14 (L) 15 - 37 U/L    Alk.  phosphatase 40 (L) 45 - 117 U/L    Protein, total 7.3 6.4 - 8.2 g/dL    Albumin 3.6 3.5 - 5.0 g/dL    Globulin 3.7 2.0 - 4.0 g/dL    A-G Ratio 1.0 (L) 1.1 - 2.2 Assessment/Plan:    ICD-10-CM ICD-9-CM    1. Acute otitis externa of right ear, unspecified type  H60.501 380.10 ciprofloxacin-hydrocortisone (CIPRO HC OTIC) otic suspension   2. Neuropathy  G62.9 355.9    3. Chronic pain syndrome  G89.4 338.4    4. MS (multiple sclerosis) ([facility]  G35 340      Orders Placed This Encounter    ciprofloxacin-hydrocortisone (CIPRO HC OTIC) otic suspension     Sig: Administer 3 Drops in right ear two (2) times a day for 7 days. Dispense:  10 mL     Refill:  0     call if any problems  Patient Instructions   MyChart Activation    Thank you for requesting access to Gust. Please follow the instructions below to securely access and download your online medical record. Gust allows you to send messages to your doctor, view your test results, renew your prescriptions, schedule appointments, and more. How Do I Sign Up? 1. In your internet browser, go to [email or web address]  2. Click on the First Time User? Click Here link in the Sign In box. You will be redirect to the New Member Sign Up page. 3. Enter your Gust Access Code exactly as it appears below. You will not need to use this code after youve completed the sign-up process. If you do not sign up before the expiration date, you must request a new code. Gust Access Code: Activation code not generated  Current Gust Status: Active (This is the date your Gust access code will )    4. Enter the last four digits of your Social Security Number (xxxx) and Date of Birth (mm/dd/yyyy) as indicated and click Submit. You will be taken to the next sign-up page. 5. Create a Gust ID. This will be your Gust login ID and cannot be changed, so think of one that is secure and easy to remember. 6. Create a Gust password. You can change your password at any time. 7. Enter your Password Reset Question and Answer. This can be used at a later time if you forget your password. 8. Enter your e-mail address. You will receive e-mail notification when new information is available in [address] 9. Click Sign Up. You can now view and download portions of your medical record. 10. Click the Download Summary menu link to download a portable copy of your medical information. Additional Information    If you have questions, please visit the Frequently Asked Questions section of the [email or web address] website at [email or web address] Remember, [email or web address] is NOT to be used for urgent needs. For medical emergencies, dial 911. Follow-up and Dispositions    · Return in about 3 months (around 12/2/2020), or if symptoms worsen or fail to improve. I have reviewed with the patient details of the assessment and plan and all questions were answered. Relevent patient education was performed    An After Visit Summary was printed and given to the patient. Review of Systems  Constitutional: anorexia and weight loss  Eyes:   negative for visual disturbance and irritation  ENT:   negative for tinnitus,sore throat,nasal congestion,ear pains. hoarseness  Respiratory:  negative for cough, hemoptysis, dyspnea,wheezing  CV:   negative for chest pain, palpitations, lower extremity edema  GI:   negative for nausea, vomiting, diarrhea, abdominal pain,melena  Endo:               negative for polyuria,polydipsia,polyphagia,heat intolerance  Genitourinary: negative for frequency, dysuria and hematuria  Integument:  negative for rash and pruritus  Hematologic:  negative for easy bruising and gum/nose bleeding  Musculoskel: myalgias, arthralgias muscle weakness, joint pain  Neurological:  negative for headaches, dizziness, vertigo, memory problems and gait   Behavl/Psych: negative for feelings of anxiety, depression, mood changes    Past Medical History:   Diagnosis Date    Back pain 10/8/2010    Back pain 10/8/2010    Depression     [facility] ([facility]     Fatigue 8/24/2012    Hearing loss 1/25/2013    Memory loss 1/25/2013    [facility]     Muscle pain     Muscle weakness     Poor appetite     Snoring     Unintentional weight change     Visual disturbance      Past Surgical History:   Procedure Laterality Date    HX HEENT Right     ear     Social History     Socioeconomic History    Marital status: SINGLE     Spouse name: Not on file    Number of children: Not on file    Years of education: Not on file    Highest education level: Not on file   Tobacco Use    Smoking status: Current Every Day Smoker     Packs/day: 0.50     Years: 21.00     Pack years: 10.50     Types: Cigarettes    Smokeless tobacco: Never Used   Substance and Sexual Activity    Alcohol use: No    Drug use: No    Sexual activity: Yes     Partners: Female     Birth control/protection: Condom     Family History   Problem Relation Age of Onset    Hypertension Father     Cancer Father         prostate    Hypertension Sister     Diabetes Maternal Grandmother     Cancer Paternal Uncle         prostate    Dementia Paternal Uncle      Current Outpatient Medications   Medication Sig Dispense Refill    ciprofloxacin-hydrocortisone (CIPRO HC OTIC) otic suspension Administer 3 Drops in right ear two (2) times a day for 7 days. 10 mL 0    pregabalin (LYRICA) 100 mg capsule Take 1 Cap by mouth two (2) times a day. Max Daily Amount: 200 mg. 60 Cap 2    oxyCODONE IR (OXY-IR) 15 mg immediate release tablet Take 15 mg by mouth every four (4) hours as needed for Pain.  interferon beta-1a (AVONEX) 30 mcg/0.5 mL injection 30 mcg.  predniSONE (DELTASONE) 20 mg tablet Take 20 mg by mouth two (2) times a day. 40 Tab 1    dronabinol (MARINOL) 5 mg capsule Take 1 Cap by mouth two (2) times a day. Max Daily Amount: 10 mg. 60 Cap 2    Dalfampridine (AMPYRA) 10 mg Tb12 Take 10 mg by mouth two (2) times a day. 60 Tab 2    megestrol (MEGACE) 40 mg tablet Take 1 Tab by mouth three (3) times daily.  90 Tab 1    meloxicam (MOBIC) 15 mg tablet Take 1 Tab by mouth daily. 30 Tab 1    ocrelizumab (OCREVUS) 30 mg/mL soln injection 10 mL by IntraVENous route every fourteen (14) days. 10 mL 1    naloxone 2 mg/actuation spry Use 1 spray intranasally into 1 nostril. Use a new Narcan nasal spray for subsequent doses and administer into alternating nostrils. May repeat every 2 to 3 minutes as needed. 4 Each 2    tadalafil (CIALIS) 20 mg tablet Take 1 Tab by mouth as needed. 10 Tab 12    baclofen (LIORESAL) 10 mg tablet Take 1 Tab by mouth three (3) times daily. 90 Tab 5    dextromethorphan-quiNIDine (NUEDEXTA) 20-10 mg per capsule Take 1 Cap by mouth every twelve (12) hours. 60 Cap 2    lidocaine (LIDODERM) 5 % 1 Patch by TransDERmal route every twenty-four (24) hours. 30 Each 5     No Known Allergies    Objective: There were no vitals taken for this visit. Physical Exam:   General appearance - alert, well appearing, and in no distress  Mental status - alert, oriented to person, place, and time  EYE-JUS, EOMI, corneas normal, no foreign bodies  ENT-ENT exam normal, no neck nodes or sinus tenderness  Nose - normal and patent, no erythema, discharge or polyps  Mouth - mucous membranes moist, pharynx normal without lesions  Skin-Warm and dry.  no hyperpigmentation, vitiligo, or suspicious lesions  Neuro -alert, oriented, normal speech, no focal findings or movement disorder noted  Neck-normal C-spine, no tenderness, full ROM without pain        Results for orders placed or performed during the hospital encounter of 12/04/19   CBC WITH AUTOMATED DIFF   Result Value Ref Range    WBC 6.0 4.1 - 11.1 K/uL    RBC 4.77 4.10 - 5.70 M/uL    HGB 13.8 12.1 - 17.0 g/dL    HCT 42.0 36.6 - 50.3 %    MCV 88.1 80.0 - 99.0 FL    MCH 28.9 26.0 - 34.0 PG    MCHC 32.9 30.0 - 36.5 g/dL    RDW 13.1 11.5 - 14.5 %    PLATELET 997 683 - 529 K/uL    MPV 10.8 8.9 - 12.9 FL    NRBC 0.0 0  WBC    ABSOLUTE NRBC 0.00 0.00 - 0.01 K/uL    NEUTROPHILS 54 32 - 75 %    LYMPHOCYTES 33 12 - 49 %    MONOCYTES 10 5 - 13 %    EOSINOPHILS 2 0 - 7 %    BASOPHILS 1 0 - 1 %    IMMATURE GRANULOCYTES 0 0.0 - 0.5 %    ABS. NEUTROPHILS 3.3 1.8 - 8.0 K/UL    ABS. LYMPHOCYTES 2.0 0.8 - 3.5 K/UL    ABS. MONOCYTES 0.6 0.0 - 1.0 K/UL    ABS. EOSINOPHILS 0.1 0.0 - 0.4 K/UL    ABS. BASOPHILS 0.0 0.0 - 0.1 K/UL    ABS. IMM. GRANS. 0.0 0.00 - 0.04 K/UL    DF AUTOMATED     METABOLIC PANEL, COMPREHENSIVE   Result Value Ref Range    Sodium 142 136 - 145 mmol/L    Potassium 3.6 3.5 - 5.1 mmol/L    Chloride 104 97 - 108 mmol/L    CO2 32 21 - 32 mmol/L    Anion gap 6 5 - 15 mmol/L    Glucose 112 (H) 65 - 100 mg/dL    BUN 6 6 - 20 MG/DL    Creatinine 0.84 0.70 - 1.30 MG/DL    BUN/Creatinine ratio 7 (L) 12 - 20      GFR est AA >60 >60 ml/min/1.73m2    GFR est non-AA >60 >60 ml/min/1.73m2    Calcium 8.9 8.5 - 10.1 MG/DL    Bilirubin, total 0.4 0.2 - 1.0 MG/DL    ALT (SGPT) 27 12 - 78 U/L    AST (SGOT) 14 (L) 15 - 37 U/L    Alk. phosphatase 40 (L) 45 - 117 U/L    Protein, total 7.3 6.4 - 8.2 g/dL    Albumin 3.6 3.5 - 5.0 g/dL    Globulin 3.7 2.0 - 4.0 g/dL    A-G Ratio 1.0 (L) 1.1 - 2.2         Assessment/Plan:    ICD-10-CM ICD-9-CM    1. Acute otitis externa of right ear, unspecified type  H60.501 380.10 ciprofloxacin-hydrocortisone (CIPRO HC OTIC) otic suspension   2. Neuropathy  G62.9 355.9    3. Chronic pain syndrome  G89.4 338.4    4. [facility]  G35 340      Orders Placed This Encounter    ciprofloxacin-hydrocortisone (CIPRO HC OTIC) otic suspension     Sig: Administer 3 Drops in right ear two (2) times a day for 7 days. Dispense:  10 mL     Refill:  0     call if any problems,Take 81mg aspirin daily  Patient Instructions   [employer] Activation    Thank you for requesting access to [employer]. Please follow the instructions below to securely access and download your online medical record.  [employer] allows you to send messages to your doctor, view your test results, renew your prescriptions, schedule appointments, and more. How Do I Sign Up?    11. In your internet browser, go to [email or web address]  12. Click on the First Time User? Click Here link in the Sign In box. You will be redirect to the New Member Sign Up page. 15. Enter your [employer] Lucky Access Code exactly as it appears below. You will not need to use this code after youve completed the sign-up process. If you do not sign up before the expiration date, you must request a new code. MyChart Access Code: Activation code not generated  Current [employer] Lucky Status: Active (This is the date your [email or web address] access code will )    14. Enter the last four digits of your Social Security Number (xxxx) and Date of Birth (mm/dd/yyyy) as indicated and click Submit. You will be taken to the next sign-up page. 15. Create a [email or web address] ID. This will be your [employer] Lucky login ID and cannot be changed, so think of one that is secure and easy to remember. 12. Create a [employer] Lucky password. You can change your password at any time. 16. Enter your Password Reset Question and Answer. This can be used at a later time if you forget your password. 25. Enter your e-mail address. You will receive e-mail notification when new information is available in [address] 19. Click Sign Up. You can now view and download portions of your medical record. 20. Click the Download Summary menu link to download a portable copy of your medical information. Additional Information    If you have questions, please visit the Frequently Asked Questions section of the [employer] website at [email or web address] Remember, [employer] is NOT to be used for urgent needs. For medical emergencies, dial 911. Follow-up and Dispositions    · Return in about 3 months (around 2020), or if symptoms worsen or fail to improve. I have reviewed with the patient details of the assessment and plan and all questions were answered. Relevent patient education was performed. The most recent lab findings were reviewed with the patient. An After Visit Summary was printed and given to the patient.

## 2020-09-02 NOTE — PATIENT INSTRUCTIONS
[employer] Activation Thank you for requesting access to [employer]. Please follow the instructions below to securely access and download your online medical record. [employer] allows you to send messages to your doctor, view your test results, renew your prescriptions, schedule appointments, and more. How Do I Sign Up? 1. In your internet browser, go to [email or web address] 
2. Click on the First Time User? Click Here link in the Sign In box. You will be redirect to the New Member Sign Up page. 3. Enter your [employer] Access Code exactly as it appears below. You will not need to use this code after youve completed the sign-up process. If you do not sign up before the expiration date, you must request a new code. [employer] Access Code: Activation code not generated Current [employer] Status: Active (This is the date your [employer] access code will ) 4. Enter the last four digits of your Social Security Number (xxxx) and Date of Birth (mm/dd/yyyy) as indicated and click Submit. You will be taken to the next sign-up page. 5. Create a [employer]Locate ID. This will be your [employer] login ID and cannot be changed, so think of one that is secure and easy to remember. 6. Create a [employer]ate password. You can change your password at any time. 7. Enter your Password Reset Question and Answer. This can be used at a later time if you forget your password. 8. Enter your e-mail address. You will receive e-mail notification when new information is available in [address] 9. Click Sign Up. You can now view and download portions of your medical record. 10. Click the Download Summary menu link to download a portable copy of your medical information. Additional Information If you have questions, please visit the Frequently Asked Questions section of the [employer] website at [email or web address] Remember, [employer] is NOT to be used for urgent needs. For medical emergencies, dial 911.

## 2021-02-03 ENCOUNTER — TELEPHONE (OUTPATIENT)
Dept: NEUROLOGY | Age: 56
End: 2021-02-03

## 2021-05-03 ENCOUNTER — TELEPHONE (OUTPATIENT)
Dept: NEUROLOGY | Age: 56
End: 2021-05-03

## 2021-05-07 ENCOUNTER — TELEPHONE (OUTPATIENT)
Dept: NEUROLOGY | Age: 56
End: 2021-05-07

## 2021-05-07 NOTE — TELEPHONE ENCOUNTER
Re: Amanda Escudero call from Ms Jennifer Vasquez about [address] needing a P.A. It is not listed in current meds. If patient is on this, please update the chart and add [employer]    It is not listed in current meds. If patient is on this, please update the chart and add Mavenclad    And discontinue Ocrevus and Avonex - these need to be discontinued from the current med list if [address] is the correct and current MS drug. Patient last seen August 2020. Please advise.

## 2021-05-08 NOTE — TELEPHONE ENCOUNTER
Patient has not been started on [address] because of insurance problem. The supposed to be on Ocrevus, I had planned to switch patient to [address] because he was not doing well on Ocrevus but that did not happen because of insurance. Also, patient has not been seen for about 9 months.

## 2021-05-18 ENCOUNTER — OFFICE VISIT (OUTPATIENT)
Dept: INTERNAL MEDICINE CLINIC | Age: 56
End: 2021-05-18
Payer: MEDICARE

## 2021-05-18 VITALS
HEART RATE: 105 BPM | OXYGEN SATURATION: 98 % | BODY MASS INDEX: 27.23 KG/M2 | RESPIRATION RATE: 20 BRPM | HEIGHT: 70 IN | DIASTOLIC BLOOD PRESSURE: 80 MMHG | TEMPERATURE: 98.7 F | SYSTOLIC BLOOD PRESSURE: 138 MMHG

## 2021-05-18 DIAGNOSIS — G62.9 NEUROPATHY: ICD-10-CM

## 2021-05-18 DIAGNOSIS — G35 MS (MULTIPLE SCLEROSIS) (HCC): Primary | ICD-10-CM

## 2021-05-18 DIAGNOSIS — G89.4 CHRONIC PAIN SYNDROME: ICD-10-CM

## 2021-05-18 DIAGNOSIS — Z91.81 AT HIGH RISK FOR INJURY RELATED TO FALL: ICD-10-CM

## 2021-05-18 DIAGNOSIS — R35.0 FREQUENCY OF URINATION: ICD-10-CM

## 2021-05-18 DIAGNOSIS — Z00.00 MEDICARE ANNUAL WELLNESS VISIT, SUBSEQUENT: ICD-10-CM

## 2021-05-18 DIAGNOSIS — M81.6 LOCALIZED OSTEOPOROSIS WITHOUT CURRENT PATHOLOGICAL FRACTURE: ICD-10-CM

## 2021-05-18 LAB
ALBUMIN SERPL-MCNC: 4.1 G/DL (ref 3.5–5)
ALBUMIN/GLOB SERPL: 1.2 {RATIO} (ref 1.1–2.2)
ALP SERPL-CCNC: 46 U/L (ref 45–117)
ALT SERPL-CCNC: 12 U/L (ref 12–78)
ANION GAP SERPL CALC-SCNC: 6 MMOL/L (ref 5–15)
AST SERPL-CCNC: 7 U/L (ref 15–37)
BILIRUB SERPL-MCNC: 0.7 MG/DL (ref 0.2–1)
BUN SERPL-MCNC: 7 MG/DL (ref 6–20)
BUN/CREAT SERPL: 9 (ref 12–20)
CALCIUM SERPL-MCNC: 9.3 MG/DL (ref 8.5–10.1)
CHLORIDE SERPL-SCNC: 106 MMOL/L (ref 97–108)
CO2 SERPL-SCNC: 30 MMOL/L (ref 21–32)
CREAT SERPL-MCNC: 0.74 MG/DL (ref 0.7–1.3)
ERYTHROCYTE [DISTWIDTH] IN BLOOD BY AUTOMATED COUNT: 13.2 % (ref 11.5–14.5)
GLOBULIN SER CALC-MCNC: 3.3 G/DL (ref 2–4)
GLUCOSE SERPL-MCNC: 94 MG/DL (ref 65–100)
HCT VFR BLD AUTO: 42.7 % (ref 36.6–50.3)
HGB BLD-MCNC: 13.8 G/DL (ref 12.1–17)
MCH RBC QN AUTO: 29.3 PG (ref 26–34)
MCHC RBC AUTO-ENTMCNC: 32.3 G/DL (ref 30–36.5)
MCV RBC AUTO: 90.7 FL (ref 80–99)
NRBC # BLD: 0 K/UL (ref 0–0.01)
NRBC BLD-RTO: 0 PER 100 WBC
PLATELET # BLD AUTO: 221 K/UL (ref 150–400)
PMV BLD AUTO: 10.1 FL (ref 8.9–12.9)
POTASSIUM SERPL-SCNC: 3.5 MMOL/L (ref 3.5–5.1)
PROT SERPL-MCNC: 7.4 G/DL (ref 6.4–8.2)
PSA SERPL-MCNC: 1.6 NG/ML (ref 0.01–4)
RBC # BLD AUTO: 4.71 M/UL (ref 4.1–5.7)
SODIUM SERPL-SCNC: 142 MMOL/L (ref 136–145)
WBC # BLD AUTO: 5.1 K/UL (ref 4.1–11.1)

## 2021-05-18 PROCEDURE — G9717 DOC PT DX DEP/BP F/U NT REQ: HCPCS | Performed by: INTERNAL MEDICINE

## 2021-05-18 PROCEDURE — G8427 DOCREV CUR MEDS BY ELIG CLIN: HCPCS | Performed by: INTERNAL MEDICINE

## 2021-05-18 PROCEDURE — 3017F COLORECTAL CA SCREEN DOC REV: CPT | Performed by: INTERNAL MEDICINE

## 2021-05-18 PROCEDURE — G8419 CALC BMI OUT NRM PARAM NOF/U: HCPCS | Performed by: INTERNAL MEDICINE

## 2021-05-18 PROCEDURE — 99214 OFFICE O/P EST MOD 30 MIN: CPT | Performed by: INTERNAL MEDICINE

## 2021-05-18 NOTE — PATIENT INSTRUCTIONS
[employer] Activation Thank you for requesting access to rumr. Please follow the instructions below to securely access and download your online medical record. rumr allows you to send messages to your doctor, view your test results, renew your prescriptions, schedule appointments, and more. How Do I Sign Up? 1. In your internet browser, go to www.[employer] 
2. Click on the First Time User? Click Here link in the Sign In box. You will be redirect to the New Member Sign Up page. 3. Enter your rumr Access Code exactly as it appears below. You will not need to use this code after youve completed the sign-up process. If you do not sign up before the expiration date, you must request a new code. rumr Access Code: Activation code not generated Current rumr Status: Active (This is the date your rumr access code will ) 4. Enter the last four digits of your Social Security Number (xxxx) and Date of Birth (mm/dd/yyyy) as indicated and click Submit. You will be taken to the next sign-up page. 5. Create a rumr ID. This will be your rumr login ID and cannot be changed, so think of one that is secure and easy to remember. 6. Create a rumr password. You can change your password at any time. 7. Enter your Password Reset Question and Answer. This can be used at a later time if you forget your password. 8. Enter your e-mail address. You will receive e-mail notification when new information is available in [address] 9. Click Sign Up. You can now view and download portions of your medical record. 10. Click the Download Summary menu link to download a portable copy of your medical information. Additional Information If you have questions, please visit the Frequently Asked Questions section of the rumr website at [email or web address] Remember, rumr is NOT to be used for urgent needs. For medical emergencies, dial 911.

## 2021-05-18 NOTE — PROGRESS NOTES
Linda Wild is a 50 y.o. male and presents with Fall and Multiple Sclerosis  . Subjective:  He has a history of MS   He states he does not get infusion at this time. He also states he has had some progressive weakness recently  He states his balance is poor. He is followed by neurology  He has bee loosing weight     Dyslipidemia Review:  Patient presents for evaluation of lipids. Compliance with treatment thus far has been excellent. A repeat fasting lipid profile was done. The patient does not use medications that may worsen dyslipidemias (corticosteroids, progestins, anabolic steroids, diuretics, beta-blockers, amiodarone, cyclosporine, olanzapine). The patient exercises some    He is under pain management      Linda Wild is a 50 y.o. male and presents for annual Medicare Wellness Visit. Problem List: Reviewed with patient and discussed risk factors. Patient Active Problem List   Diagnosis Code    Back pain M54.9    Hypercholesterolemia E78.00    Cellulitis L03.90    Tobacco abuse Z72.0    ED (erectile dysfunction) N52.9    Decreased vision H54.7    Acute bronchitis J20.9    MS [facility]multiple sclerosis) ([facility]) G35    Fatigue R53.83    Incontinence of urine R32    Elevated hemoglobin A1c R73.09    Hearing loss H91.90    Memory loss R41.3    Multiple sclerosis ([facility]) G35    Prediabetes R73.03    Disability examination Z02.71    Anxiety F41.9    Midline low back pain without sciatica M54.5    Paresthesia of both hands R20.2    Mixed incontinence N39.46    Balance problem R26.89    Falls W19. XXXA    Bilateral thigh pain M79.651, M79.652    Recurrent depression ([facility] [address] F33.9       Current medical providers:  Patient Care Team:  Sravanthi Wright MD as PCP - General (Internal Medicine)  Sravanthi Wright MD as PCP [facility]    PSH: Reviewed with patient  Past Surgical History:   Procedure Laterality Date    HX HEENT Right     ear        SH: Reviewed with patient  Social History     Tobacco Use    Smoking status: Current Every Day Smoker     Packs/day: 0.50     Years: 21.00     Pack years: 10.50     Types: Cigarettes    Smokeless tobacco: Never Used   Substance Use Topics    Alcohol use: No    Drug use: No       FH: Reviewed with patient  Family History   Problem Relation Age of Onset    Hypertension Father     Cancer Father         prostate    Hypertension Sister     Diabetes Maternal Grandmother     Cancer Paternal Uncle         prostate    Dementia Paternal Uncle        Medications/Allergies: Reviewed with patient  Current Outpatient Medications on File Prior to Visit   Medication Sig Dispense Refill    oxyCODONE IR (OXY-IR) 15 mg immediate release tablet Take 15 mg by mouth every four (4) hours as needed for Pain.  interferon beta-1a (AVONEX) 30 mcg/0.5 mL injection 30 mcg.  pregabalin (LYRICA) 100 mg capsule Take 1 Cap by mouth two (2) times a day. Max Daily Amount: 200 mg. 60 Cap 2    predniSONE (DELTASONE) 20 mg tablet Take 20 mg by mouth two (2) times a day. 40 Tab 1    dronabinol (MARINOL) 5 mg capsule Take 1 Cap by mouth two (2) times a day. Max Daily Amount: 10 mg. 60 Cap 2    Dalfampridine (AMPYRA) 10 mg Tb12 Take 10 mg by mouth two (2) times a day. 60 Tab 2    megestrol (MEGACE) 40 mg tablet Take 1 Tab by mouth three (3) times daily. 90 Tab 1    meloxicam (MOBIC) 15 mg tablet Take 1 Tab by mouth daily. 30 Tab 1    ocrelizumab (OCREVUS) 30 mg/mL soln injection 10 mL by IntraVENous route every fourteen (14) days. 10 mL 1    naloxone 2 mg/actuation spry Use 1 spray intranasally into 1 nostril. Use a new Narcan nasal spray for subsequent doses and administer into alternating nostrils. May repeat every 2 to 3 minutes as needed. 4 Each 2    tadalafil (CIALIS) 20 mg tablet Take 1 Tab by mouth as needed. 10 Tab 12    baclofen (LIORESAL) 10 mg tablet Take 1 Tab by mouth three (3) times daily.  90 Tab 5    dextromethorphan-quiNIDine (NUEDEXTA) 20-10 mg per capsule Take 1 Cap by mouth every twelve (12) hours. 60 Cap 2    lidocaine (LIDODERM) 5 % 1 Patch by TransDERmal route every twenty-four (24) hours. 30 Each 5     No current facility-administered medications on file prior to visit. No Known Allergies    Objective:  Visit Vitals  /80 (BP 1 Location: Right arm, BP Patient Position: Sitting, BP Cuff Size: Adult)   Pulse (!) 105   Temp 98.7 °F (37.1 °C) (Oral)   Resp 20   Ht 5' 10\" (1.778 m)   SpO2 98%   BMI 27.23 kg/m²    Body mass index is 27.23 kg/m². Assessment of cognitive impairment: Alert and oriented x 3    Depression Screen:   3 most recent PHQ Screens 5/18/2021   PHQ Not Done -   Little interest or pleasure in doing things Not at all   Feeling down, depressed, irritable, or hopeless Not at all   Total Score PHQ 2 0     Depression Review:  Patient is seen for screen of depression,denies anhedonia, weight gain, insomnia, hypersomnia, psychomotor agitation, psychomotor retardation, fatigue, feelings of worthlessness/guilt, difficulty concentrating, hopelessness, impaired memory and recurrent thoughts of death Treatment includes no medication   She denies recurrent thoughts of death and suicidal thoughts without plan. Fall Risk Assessment:    Fall Risk Assessment, last 12 mths 5/18/2021   Able to walk? Yes   Fall in past 12 months? 1   Do you feel unsteady? 1   Are you worried about falling 1   Is TUG test greater than 12 seconds? 1   Is the gait abnormal? 1   Number of falls in past 12 months 2   Fall with injury? 1       Functional Ability:   Does the patient exhibit a steady gait? yes   How long did it take the patient to get up and walk from a sitting position? seconds   Is the patient self reliant?  (ie can do own laundry, meals, household chores)  yes     Does the patient handle his/her own medications? yes     Does the patient handle his/her own money?    yes     Is the patients home safe (ie good lighting, handrails on stairs and bath, etc.)? yes     Did you notice or did patient express any hearing difficulties? no     Did you notice or did patient express any vision difficulties?   no     Were distance and reading eye charts used? no       Advance Care Planning:   Patient was offered the opportunity to discuss advance care planning:  yes     Does patient have an Advance Directive:  no   If no, did you provide information on Caring Connections? yes       Plan:      Orders Placed This Encounter    CBC W/O DIFF    METABOLIC PANEL, COMPREHENSIVE    PROSTATE SPECIFIC AG    REFERRAL TO PHYSICAL THERAPY       Health Maintenance   Topic Date Due    Hepatitis C Screening  Never done    Pneumococcal 0-64 years (1 of 1 - PPSV23) Never done    COVID-19 Vaccine (1) Never done    DTaP/Tdap/Td series (1 - Tdap) Never done    Shingrix Vaccine Age 50> (1 of 2) Never done    Colorectal Cancer Screening Combo  Never done    A1C test (Diabetic or Prediabetic)  01/11/2019    Flu Vaccine (Season Ended) 09/01/2021    Medicare Yearly Exam  05/19/2022    Lipid Screen  01/17/2023       *Patient verbalized understanding and agreement with the plan. A copy of the After Visit Summary with personalized health plan was given to the patient today. Review of Systems  Constitutional: negative for fevers, chills, anorexia and weight loss  Eyes:   negative for visual disturbance and irritation  ENT:   negative for tinnitus,sore throat,nasal congestion,ear pains. hoarseness  Respiratory:  negative for cough, hemoptysis, dyspnea,wheezing  CV:   negative for chest pain, palpitations, lower extremity edema  GI:   negative for nausea, vomiting, diarrhea, abdominal pain,melena  Endo:               negative for polyuria,polydipsia,polyphagia,heat intolerance  Genitourinary: negative for frequency, dysuria and hematuria  Integument:  negative for rash and pruritus  Hematologic:  negative for easy bruising and gum/nose bleeding  Musculoskel: negative for myalgias, arthralgias, back pain, muscle weakness, joint pain  Neurological:  negative for headaches, dizziness, vertigo, memory problems and gait   Behavl/Psych: negative for feelings of anxiety, depression, mood changes    Past Medical History:   Diagnosis Date    Back pain 10/8/2010    Back pain 10/8/2010    Depression     Diabetes ([address]     Fatigue 8/24/2012    Hearing loss 1/25/2013    Memory loss 1/25/2013    [facility]     Muscle pain     Muscle weakness     Poor appetite     Snoring     Unintentional weight change     Visual disturbance      Past Surgical History:   Procedure Laterality Date    HX HEENT Right     ear     Social History     Socioeconomic History    Marital status: SINGLE     Spouse name: Not on file    Number of children: Not on file    Years of education: Not on file    Highest education level: Not on file   Tobacco Use    Smoking status: Current Every Day Smoker     Packs/day: 0.50     Years: 21.00     Pack years: 10.50     Types: Cigarettes    Smokeless tobacco: Never Used   Substance and Sexual Activity    Alcohol use: No    Drug use: No    Sexual activity: Yes     Partners: Female     Birth control/protection: Condom     Family History   Problem Relation Age of Onset    Hypertension Father     Cancer Father         prostate    Hypertension Sister     Diabetes Maternal Grandmother     Cancer Paternal Uncle         prostate    Dementia Paternal Uncle      Current Outpatient Medications   Medication Sig Dispense Refill    oxyCODONE IR (OXY-IR) 15 mg immediate release tablet Take 15 mg by mouth every four (4) hours as needed for Pain.  interferon beta-1a (AVONEX) 30 mcg/0.5 mL injection 30 mcg.  pregabalin (LYRICA) 100 mg capsule Take 1 Cap by mouth two (2) times a day. Max Daily Amount: 200 mg. 60 Cap 2    predniSONE (DELTASONE) 20 mg tablet Take 20 mg by mouth two (2) times a day.  40 Tab 1    dronabinol (MARINOL) 5 mg capsule Take 1 Cap by mouth two (2) times a day. Max Daily Amount: 10 mg. 60 Cap 2    Dalfampridine (AMPYRA) 10 mg Tb12 Take 10 mg by mouth two (2) times a day. 60 Tab 2    megestrol (MEGACE) 40 mg tablet Take 1 Tab by mouth three (3) times daily. 90 Tab 1    meloxicam (MOBIC) 15 mg tablet Take 1 Tab by mouth daily. 30 Tab 1    ocrelizumab (OCREVUS) 30 mg/mL soln injection 10 mL by IntraVENous route every fourteen (14) days. 10 mL 1    naloxone 2 mg/actuation spry Use 1 spray intranasally into 1 nostril. Use a new Narcan nasal spray for subsequent doses and administer into alternating nostrils. May repeat every 2 to 3 minutes as needed. 4 Each 2    tadalafil (CIALIS) 20 mg tablet Take 1 Tab by mouth as needed. 10 Tab 12    baclofen (LIORESAL) 10 mg tablet Take 1 Tab by mouth three (3) times daily. 90 Tab 5    dextromethorphan-quiNIDine (NUEDEXTA) 20-10 mg per capsule Take 1 Cap by mouth every twelve (12) hours. 60 Cap 2    lidocaine (LIDODERM) 5 % 1 Patch by TransDERmal route every twenty-four (24) hours.  30 Each 5     No Known Allergies    Objective:  Visit Vitals  /80 (BP 1 Location: Right arm, BP Patient Position: Sitting, BP Cuff Size: Adult)   Pulse (!) 105   Temp 98.7 °F (37.1 °C) (Oral)   Resp 20   Ht 5' 10\" (1.778 m)   SpO2 98%   BMI 27.23 kg/m²     Physical Exam:   General appearance - alert, well appearing, and in no distress  Mental status - alert, oriented to person, place, and time  EYE-JUS, EOMI, corneas normal, no foreign bodies  ENT-ENT exam normal, no neck nodes or sinus tenderness  Nose - normal and patent, no erythema, discharge or polyps  Mouth - mucous membranes moist, pharynx normal without lesions  Neck - supple, no significant adenopathy   Chest - clear to auscultation, no wheezes, rales or rhonchi, symmetric air entry   Heart - normal rate, regular rhythm, normal S1, S2, no murmurs, rubs, clicks or gallops   Abdomen - soft, nontender, nondistended, no masses or organomegaly  Lymph- no adenopathy palpable  Ext-peripheral pulses normal, no pedal edema, no clubbing or cyanosis  Skin-Warm and dry. no hyperpigmentation, vitiligo, or suspicious lesions  Neuro -alert, oriented, normal speech, upper and lower body weakness  Neck-normal C-spine, no tenderness, full ROM without pain  Feet-no nail deformities or callus formation with good pulses noted      Results for orders placed or performed during the hospital encounter of 12/04/19   CBC WITH AUTOMATED DIFF   Result Value Ref Range    WBC 6.0 4.1 - 11.1 K/uL    RBC 4.77 4.10 - 5.70 M/uL    HGB 13.8 12.1 - 17.0 g/dL    HCT 42.0 36.6 - 50.3 %    MCV 88.1 80.0 - 99.0 FL    MCH 28.9 26.0 - 34.0 PG    MCHC 32.9 30.0 - 36.5 g/dL    RDW 13.1 11.5 - 14.5 %    PLATELET 376 940 - 267 K/uL    MPV 10.8 8.9 - 12.9 FL    NRBC 0.0 0  WBC    ABSOLUTE NRBC 0.00 0.00 - 0.01 K/uL    NEUTROPHILS 54 32 - 75 %    LYMPHOCYTES 33 12 - 49 %    MONOCYTES 10 5 - 13 %    EOSINOPHILS 2 0 - 7 %    BASOPHILS 1 0 - 1 %    IMMATURE GRANULOCYTES 0 0.0 - 0.5 %    ABS. NEUTROPHILS 3.3 1.8 - 8.0 K/UL    ABS. LYMPHOCYTES 2.0 0.8 - 3.5 K/UL    ABS. MONOCYTES 0.6 0.0 - 1.0 K/UL    ABS. EOSINOPHILS 0.1 0.0 - 0.4 K/UL    ABS. BASOPHILS 0.0 0.0 - 0.1 K/UL    ABS. IMM. GRANS. 0.0 0.00 - 0.04 K/UL    DF AUTOMATED     METABOLIC PANEL, COMPREHENSIVE   Result Value Ref Range    Sodium 142 136 - 145 mmol/L    Potassium 3.6 3.5 - 5.1 mmol/L    Chloride 104 97 - 108 mmol/L    CO2 32 21 - 32 mmol/L    Anion gap 6 5 - 15 mmol/L    Glucose 112 (H) 65 - 100 mg/dL    BUN 6 6 - 20 MG/DL    Creatinine 0.84 0.70 - 1.30 MG/DL    BUN/Creatinine ratio 7 (L) 12 - 20      GFR est AA >60 >60 ml/min/1.73m2    GFR est non-AA >60 >60 ml/min/1.73m2    Calcium 8.9 8.5 - 10.1 MG/DL    Bilirubin, total 0.4 0.2 - 1.0 MG/DL    ALT (SGPT) 27 12 - 78 U/L    AST (SGOT) 14 (L) 15 - 37 U/L    Alk.  phosphatase 40 (L) 45 - 117 U/L    Protein, total 7.3 6.4 - 8.2 g/dL    Albumin 3.6 3.5 - 5.0 g/dL    Globulin 3.7 2.0 - 4.0 g/dL    A-G Ratio 1.0 (L) 1.1 - 2.2         Assessment/Plan:    ICD-10-CM ICD-9-CM    1. MS (multiple sclerosis) ([facility])  G35 340 CBC W/O DIFF      METABOLIC PANEL, COMPREHENSIVE      REFERRAL TO PHYSICAL THERAPY   2. Medicare annual wellness visit, subsequent  Z00.00 V70.0    3. Localized osteoporosis without current pathological fracture  M81.6 733.09    4. Neuropathy  G62.9 355.9    5. Chronic pain syndrome  G89.4 338.4    6. Frequency of urination  R35.0 788.41 PSA, DIAGNOSTIC (PROSTATE SPECIFIC AG)   7. At high risk for injury related to fall  Z91.81 V49.89 REFERRAL TO PHYSICAL THERAPY     Orders Placed This Encounter    CBC W/O DIFF     Standing Status:   Future     Standing Expiration Date:   3/97/7193    METABOLIC PANEL, COMPREHENSIVE     Standing Status:   Future     Standing Expiration Date:   5/18/2022    PROSTATE SPECIFIC AG     Standing Status:   Future     Standing Expiration Date:   6/18/2021    REFERRAL TO PHYSICAL THERAPY     Referral Priority:   Routine     Referral Type:   PT/OT/ST     Referral Reason:   Specialty Services Required     Referred to Provider:   Laura Rebolledo, PT, DPT     Number of Visits Requested:   1     continue present plan, call if any problems  Patient Instructions   [employer] Activation    Thank you for requesting access to Comuto. Please follow the instructions below to securely access and download your online medical record. Comuto allows you to send messages to your doctor, view your test results, renew your prescriptions, schedule appointments, and more. How Do I Sign Up? 1. In your internet browser, go to [email or web address]  2. Click on the First Time User? Click Here link in the Sign In box. You will be redirect to the New Member Sign Up page. 3. Enter your Comuto Access Code exactly as it appears below. You will not need to use this code after youve completed the sign-up process.  If you do not sign up before the expiration date, you must request a new code. ShopWiki Access Code: Activation code not generated  Current ShopWiki Status: Active (This is the date your ShopWiki access code will )    4. Enter the last four digits of your Social Security Number (xxxx) and Date of Birth (mm/dd/yyyy) as indicated and click Submit. You will be taken to the next sign-up page. 5. Create a [employer] ID. This will be your ShopWiki login ID and cannot be changed, so think of one that is secure and easy to remember. 6. Create a [employer] password. You can change your password at any time. 7. Enter your Password Reset Question and Answer. This can be used at a later time if you forget your password. 8. Enter your e-mail address. You will receive e-mail notification when new information is available in [address] 9. Click Sign Up. You can now view and download portions of your medical record. 10. Click the Download Summary menu link to download a portable copy of your medical information. Additional Information    If you have questions, please visit the Frequently Asked Questions section of the ShopWiki website at [email or web address] Remember, ShopWiki is NOT to be used for urgent needs. For medical emergencies, dial 911. Follow-up and Dispositions    · Return in about 4 weeks (around 6/15/2021), or if symptoms worsen or fail to improve. I have reviewed with the patient details of the assessment and plan and all questions were answered. Relevent patient education was performed    An After Visit Summary was printed and given to the patient.

## 2021-05-18 NOTE — PROGRESS NOTES
Chief Complaint   Patient presents with    Fall    Multiple Sclerosis     3 most recent PHQ Screens 5/18/2021   PHQ Not Done -   Little interest or pleasure in doing things Not at all   Feeling down, depressed, irritable, or hopeless Not at all   Total Score PHQ 2 0     1. Have you been to the ER, urgent care clinic since your last visit? Hospitalized since your last visit?no    2. Have you seen or consulted any other health care providers outside of the [address] [place] [address] since your last visit? Include any pap smears or colon screening.  no

## 2021-05-24 ENCOUNTER — TELEPHONE (OUTPATIENT)
Dept: NEUROLOGY | Age: 56
End: 2021-05-24

## 2021-05-24 NOTE — TELEPHONE ENCOUNTER
[facility] called the office to report that they cannot get in touch with the patient, and asked that if we are able to, to  attempt.

## 2021-05-26 ENCOUNTER — TELEPHONE (OUTPATIENT)
Dept: NEUROLOGY | Age: 56
End: 2021-05-26

## 2021-05-26 NOTE — TELEPHONE ENCOUNTER
Re: [place] appeal that Lennie Hurd submitted July 2020. Called Ms Brayden Garces, left  for [employer]   to see if they can tell if year 2 was ever approved.

## 2021-06-07 ENCOUNTER — TELEPHONE (OUTPATIENT)
Dept: NEUROLOGY | Age: 56
End: 2021-06-07

## 2021-06-07 NOTE — TELEPHONE ENCOUNTER
Re: Cinthya Hess     email sent to Suzie Jaime at [facility]    Re: Pj Do   [birth year]    Can you take a look at this for me? To see if Year 2 was ever approved on appeal?     Heres the notes from the previous specialist.     Re: Cinthya Hess appeal that Laxmi Worrell submitted July 2020. Called Ms [place], left vm for Suzie Jaime - this was my message on vm to you on 5/26/21.      to see if they can tell if year 2 was ever approved.

## 2021-06-08 ENCOUNTER — TELEPHONE (OUTPATIENT)
Dept: NEUROLOGY | Age: 56
End: 2021-06-08

## 2021-06-08 NOTE — TELEPHONE ENCOUNTER
Re: Blanca Decker our  w/ [employer] OZZY Martinez   [phone]     Pharmacy would need to redo the PA request or you can call verbally to [facility] PA department at [phone] and initiate with them. Since patient never started it per Dr. Yoshi Rodriguez note, I called [employer] and was supplied w/ a [employer] key to complete online. Submitted Year 1 via [employer] to [place] rep verified no claims had been processed for it. Status is pending.        KeyGaren Olszewski - PA Case ID: [id number]

## 2021-06-09 ENCOUNTER — TELEPHONE (OUTPATIENT)
Dept: NEUROLOGY | Age: 56
End: 2021-06-09

## 2021-06-09 NOTE — TELEPHONE ENCOUNTER
The [facility] prescription start form was signed by Dr. Leslie Vang on 21 - This is reason I submitted the prior auth for it. (According to [facility], although they had an authorization that  20, the patient never started it). This is the additional reason for submitting a new P. A. request.     The Union Marker listed in current meds is from 2018 and the Avonex is listed as a historical med. These two need to be discontinued and Mavenclad to be added to the current med list.     I faxed the approval letter, the Rx start form with Dr. Vance Simple signature, copy of the insurance card and demographic sheet to:   [facility] and Ms [address].      Igor Malone [phone]   21 - 21

## 2021-07-28 ENCOUNTER — TELEPHONE (OUTPATIENT)
Dept: INTERNAL MEDICINE CLINIC | Age: 56
End: 2021-07-28

## 2021-08-09 NOTE — TELEPHONE ENCOUNTER
Returned call [phone] unavailable  heaven sent no orders will be given per  DR. MARIBELL ALTMAN JR, PATIENT WILL NEED TO FOLLOW UP WITH NEUROLOGIST.

## 2021-08-12 ENCOUNTER — HOSPITAL ENCOUNTER (EMERGENCY)
Age: 56
Discharge: HOME | End: 2021-08-12
Attending: EMERGENCY MEDICINE
Payer: MEDICARE

## 2021-08-12 ENCOUNTER — TELEPHONE (OUTPATIENT)
Dept: INTERNAL MEDICINE CLINIC | Age: 56
End: 2021-08-12

## 2021-08-12 ENCOUNTER — OFFICE VISIT (OUTPATIENT)
Dept: INTERNAL MEDICINE CLINIC | Age: 56
End: 2021-08-12
Payer: MEDICARE

## 2021-08-12 VITALS
DIASTOLIC BLOOD PRESSURE: 80 MMHG | TEMPERATURE: 99.1 F | HEIGHT: 70 IN | RESPIRATION RATE: 19 BRPM | SYSTOLIC BLOOD PRESSURE: 120 MMHG | HEART RATE: 82 BPM | WEIGHT: 194 LBS | OXYGEN SATURATION: 97 % | BODY MASS INDEX: 27.77 KG/M2

## 2021-08-12 VITALS
RESPIRATION RATE: 18 BRPM | HEIGHT: 70 IN | WEIGHT: 194 LBS | DIASTOLIC BLOOD PRESSURE: 75 MMHG | BODY MASS INDEX: 27.77 KG/M2 | TEMPERATURE: 96.8 F | HEART RATE: 91 BPM | SYSTOLIC BLOOD PRESSURE: 112 MMHG | OXYGEN SATURATION: 100 %

## 2021-08-12 DIAGNOSIS — Z91.81 AT HIGH RISK FOR INJURY RELATED TO FALL: ICD-10-CM

## 2021-08-12 DIAGNOSIS — G62.9 NEUROPATHY: ICD-10-CM

## 2021-08-12 DIAGNOSIS — G89.29 CHRONIC BILATERAL LOW BACK PAIN WITHOUT SCIATICA: ICD-10-CM

## 2021-08-12 DIAGNOSIS — G35 MS (MULTIPLE SCLEROSIS) (HCC): Primary | ICD-10-CM
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
## 2021-08-12 DIAGNOSIS — G35 MULTIPLE SCLEROSIS (HCC): ICD-10-CM

## 2021-08-12 DIAGNOSIS — R26.9 GAIT DIFFICULTY: ICD-10-CM

## 2021-08-12 DIAGNOSIS — M54.50 CHRONIC BILATERAL LOW BACK PAIN WITHOUT SCIATICA: ICD-10-CM

## 2021-08-12 DIAGNOSIS — G89.4 CHRONIC PAIN SYNDROME: ICD-10-CM

## 2021-08-12 PROCEDURE — G9717 DOC PT DX DEP/BP F/U NT REQ: HCPCS | Performed by: INTERNAL MEDICINE

## 2021-08-12 PROCEDURE — G8419 CALC BMI OUT NRM PARAM NOF/U: HCPCS | Performed by: INTERNAL MEDICINE

## 2021-08-12 PROCEDURE — 99283 EMERGENCY DEPT VISIT LOW MDM: CPT

## 2021-08-12 PROCEDURE — 74011250637 HC RX REV CODE- 250/637: Performed by: EMERGENCY MEDICINE

## 2021-08-12 PROCEDURE — G8427 DOCREV CUR MEDS BY ELIG CLIN: HCPCS | Performed by: INTERNAL MEDICINE

## 2021-08-12 PROCEDURE — 3017F COLORECTAL CA SCREEN DOC REV: CPT | Performed by: INTERNAL MEDICINE

## 2021-08-12 PROCEDURE — 99214 OFFICE O/P EST MOD 30 MIN: CPT | Performed by: INTERNAL MEDICINE

## 2021-08-12 RX ORDER — OXYCODONE HYDROCHLORIDE 5 MG/1
10 TABLET ORAL
Status: COMPLETED | OUTPATIENT
Start: 2021-08-12 | End: 2021-08-12

## 2021-08-12 RX ADMIN — OXYCODONE 10 MG: 5 TABLET ORAL at 20:28

## 2021-08-12 NOTE — PATIENT INSTRUCTIONS
[employer] Activation    Thank you for requesting access to [facility]. Please follow the instructions below to securely access and download your online medical record. [facility] allows you to send messages to your doctor, view your test results, renew your prescriptions, schedule appointments, and more. How Do I Sign Up? 1. In your internet browser, go to [email or web address]  2. Click on the First Time User? Click Here link in the Sign In box. You will be redirect to the New Member Sign Up page. 3. Enter your [facility] Access Code exactly as it appears below. You will not need to use this code after youve completed the sign-up process. If you do not sign up before the expiration date, you must request a new code. [facility] Access Code: Activation code not generated  Current [facility] Status: Active (This is the date your [facility] access code will )    4. Enter the last four digits of your Social Security Number (xxxx) and Date of Birth (mm/dd/yyyy) as indicated and click Submit. You will be taken to the next sign-up page. 5. Create a [facility] ID. This will be your [facility] login ID and cannot be changed, so think of one that is secure and easy to remember. 6. Create a [facility] password. You can change your password at any time. 7. Enter your Password Reset Question and Answer. This can be used at a later time if you forget your password. 8. Enter your e-mail address. You will receive e-mail notification when new information is available in [address] 9. Click Sign Up. You can now view and download portions of your medical record. 10. Click the Download Summary menu link to download a portable copy of your medical information. Additional Information    If you have questions, please visit the Frequently Asked Questions section of the [facility] website at [email or web address] Remember, [employer] is NOT to be used for urgent needs. For medical emergencies, dial 911.

## 2021-08-12 NOTE — ED TRIAGE NOTES
EMS reports pt was sitting on his back porch when he got up and had a GLF. Denies hitting his head or LOC. Now reports lower back pain. pain now improved since onset. Hx of MS and uses a walker due to unsteady gait at baseline.

## 2021-08-12 NOTE — PROGRESS NOTES
Subjective:   Stacey Michel is a 49 y.o. male and presents with Other (DME FOR POWER WHEELCHAIR EVALUATION)      His ambulation is poor, cannot walk a block without fatigue. Activities of daily living are difficulty to perform   Has no strength with the use of a cane walker or wheel chair  He has been at risk for  Frequent falls. HE HAS SEVERE MS(Multiple sclerosis)      He continues to have chronic pains    He also has reported decrease sensation in the feet and body in general    He has on occasion has had some falls stating he has weakness in the upper as well as lower body        Review of Systems   Constitutional: negative for fevers, chills, anorexia and weight loss   Eyes: negative for visual disturbance and irritation   ENT: negative for tinnitus,sore throat,nasal congestion,ear pains. hoarseness   Respiratory: negative for cough, hemoptysis, dyspnea,wheezing   CV: negative for chest pain, palpitations, lower extremity edema   GI: negative for nausea, vomiting, diarrhea, abdominal pain,melena   Endo: negative for polyuria,polydipsia,polyphagia,heat intolerance   Genitourinary: negative for frequency, dysuria and hematuria   Integument: negative for rash and pruritus   Hematologic: negative for easy bruising and gum/nose bleeding   Musculoskel: myalgias, arthralgias,muscle weakness, joint pain   Neurological: gait problems,upper and lower extremity weakness   Behavl/Psych: negative for feelings of anxiety, depression, mood changes    Past Medical History:   Diagnosis Date    Back pain 10/8/2010    Back pain 10/8/2010    Depression     Diabetes ([facility] [address]     Fatigue 8/24/2012    Hearing loss 1/25/2013    Memory loss 1/25/2013    [facility] sclerosis[facility])     Muscle pain     Muscle weakness     Poor appetite     Snoring     Unintentional weight change     Visual disturbance      Past Surgical History:   Procedure Laterality Date    HX HEENT Right     ear     Social History     Socioeconomic History    Marital status: SINGLE     Spouse name: Not on file    Number of children: Not on file    Years of education: Not on file    Highest education level: Not on file   Tobacco Use    Smoking status: Current Every Day Smoker     Packs/day: 0.50     Years: 21.00     Pack years: 10.50     Types: Cigarettes    Smokeless tobacco: Never Used   Substance and Sexual Activity    Alcohol use: No    Drug use: No    Sexual activity: Yes     Partners: Female     Birth control/protection: Condom     Social Determinants of Health     Financial Resource Strain:     Difficulty of Paying Living Expenses:    Food Insecurity:     Worried About Running Out of Food in the Last Year:     Ran Out of Food in the Last Year:    Transportation Needs:     Lack of Transportation (Medical):  Lack of Transportation (Non-Medical):    Physical Activity:     Days of Exercise per Week:     Minutes of Exercise per Session:    Stress:     Feeling of Stress :    Social Connections:     Frequency of Communication with Friends and Family:     Frequency of Social Gatherings with Friends and Family:     Attends Mormon Services:     Active Member of Clubs or Organizations:     Attends Club or Organization Meetings:     Marital Status:      Family History   Problem Relation Age of Onset    Hypertension Father     Cancer Father         prostate    Hypertension Sister     Diabetes Maternal Grandmother     Cancer Paternal Uncle         prostate    Dementia Paternal Uncle      Current Outpatient Medications   Medication Sig Dispense Refill    oxyCODONE IR (OXY-IR) 15 mg immediate release tablet Take 15 mg by mouth every four (4) hours as needed for Pain.  interferon beta-1a (AVONEX) 30 mcg/0.5 mL injection 30 mcg. (Patient not taking: Reported on 8/12/2021)      pregabalin (LYRICA) 100 mg capsule Take 1 Cap by mouth two (2) times a day. Max Daily Amount: 200 mg.  (Patient not taking: Reported on 8/12/2021) 60 Cap 2  predniSONE (DELTASONE) 20 mg tablet Take 20 mg by mouth two (2) times a day. (Patient not taking: Reported on 8/12/2021) 40 Tab 1    dronabinol (MARINOL) 5 mg capsule Take 1 Cap by mouth two (2) times a day. Max Daily Amount: 10 mg. (Patient not taking: Reported on 8/12/2021) 60 Cap 2    Dalfampridine (AMPYRA) 10 mg Tb12 Take 10 mg by mouth two (2) times a day. (Patient not taking: Reported on 8/12/2021) 60 Tab 2    megestrol (MEGACE) 40 mg tablet Take 1 Tab by mouth three (3) times daily. (Patient not taking: Reported on 8/12/2021) 90 Tab 1    meloxicam (MOBIC) 15 mg tablet Take 1 Tab by mouth daily. (Patient not taking: Reported on 8/12/2021) 30 Tab 1    ocrelizumab (OCREVUS) 30 mg/mL soln injection 10 mL by IntraVENous route every fourteen (14) days. (Patient not taking: Reported on 8/12/2021) 10 mL 1    naloxone 2 mg/actuation spry Use 1 spray intranasally into 1 nostril. Use a new Narcan nasal spray for subsequent doses and administer into alternating nostrils. May repeat every 2 to 3 minutes as needed. (Patient not taking: Reported on 8/12/2021) 4 Each 2    tadalafil (CIALIS) 20 mg tablet Take 1 Tab by mouth as needed. (Patient not taking: Reported on 8/12/2021) 10 Tab 12    baclofen (LIORESAL) 10 mg tablet Take 1 Tab by mouth three (3) times daily. (Patient not taking: Reported on 8/12/2021) 90 Tab 5    dextromethorphan-quiNIDine (NUEDEXTA) 20-10 mg per capsule Take 1 Cap by mouth every twelve (12) hours. (Patient not taking: Reported on 8/12/2021) 60 Cap 2    lidocaine (LIDODERM) 5 % 1 Patch by TransDERmal route every twenty-four (24) hours.  (Patient not taking: Reported on 8/12/2021) 30 Each 5     No Known Allergies    Objective:  Visit Vitals  /80 (BP 1 Location: Right arm, BP Patient Position: Sitting, BP Cuff Size: Adult)   Pulse 82   Temp 99.1 °F (37.3 °C) (Oral)   Resp 19   Ht 5' 10\" (1.778 m)   Wt 194 lb (88 kg)   SpO2 97%   BMI 27.84 kg/m²     Physical Exam:   General appearance - alert, well appearing, and in no distress  Mental status - alert, oriented to person, place, and time  EYE-JUS, EOMI, corneas normal, no foreign bodies  ENT-ENT exam normal, no neck nodes or sinus tenderness  Nose - normal and patent, no erythema, discharge or polyps  Mouth - mucous membranes moist, pharynx normal without lesions  Neck - supple, no significant adenopathy   Chest - clear to auscultation, no wheezes, rales or rhonchi, symmetric air entry   Heart - normal rate, regular rhythm, normal S1, S2, no murmurs, rubs, clicks or gallops   Abdomen - soft, nontender, nondistended, no masses or organomegaly  Lymph- no adenopathy palpable  Ext-peripheral pulses normal, no pedal edema, no clubbing or cyanosis  Skin-Warm and dry. no hyperpigmentation, vitiligo, or suspicious lesions  Neuro -alert, oriented, normal speech,upper and lower body weakness noted,abnormal gait  Neck-normal C-spine, no tenderness, full ROM without pain      Results for orders placed or performed in visit on 05/18/21   PSA, DIAGNOSTIC (PROSTATE SPECIFIC AG)   Result Value Ref Range    Prostate Specific Ag 1.6 0.01 - 4.0 ng/mL   METABOLIC PANEL, COMPREHENSIVE   Result Value Ref Range    Sodium 142 136 - 145 mmol/L    Potassium 3.5 3.5 - 5.1 mmol/L    Chloride 106 97 - 108 mmol/L    CO2 30 21 - 32 mmol/L    Anion gap 6 5 - 15 mmol/L    Glucose 94 65 - 100 mg/dL    BUN 7 6 - 20 MG/DL    Creatinine 0.74 0.70 - 1.30 MG/DL    BUN/Creatinine ratio 9 (L) 12 - 20      GFR est AA >60 >60 ml/min/1.73m2    GFR est non-AA >60 >60 ml/min/1.73m2    Calcium 9.3 8.5 - 10.1 MG/DL    Bilirubin, total 0.7 0.2 - 1.0 MG/DL    ALT (SGPT) 12 12 - 78 U/L    AST (SGOT) 7 (L) 15 - 37 U/L    Alk.  phosphatase 46 45 - 117 U/L    Protein, total 7.4 6.4 - 8.2 g/dL    Albumin 4.1 3.5 - 5.0 g/dL    Globulin 3.3 2.0 - 4.0 g/dL    A-G Ratio 1.2 1.1 - 2.2     CBC W/O DIFF   Result Value Ref Range    WBC 5.1 4.1 - 11.1 K/uL    RBC 4.71 4.10 - 5.70 M/uL    HGB 13.8 12.1 - 17.0 g/dL    HCT 42.7 36.6 - 50.3 %    MCV 90.7 80.0 - 99.0 FL    MCH 29.3 26.0 - 34.0 PG    MCHC 32.3 30.0 - 36.5 g/dL    RDW 13.2 11.5 - 14.5 %    PLATELET 411 004 - 089 K/uL    MPV 10.1 8.9 - 12.9 FL    NRBC 0.0 0  WBC    ABSOLUTE NRBC 0.00 0.00 - 0.01 K/uL       Assessment/Plan:    ICD-10-CM ICD-9-CM    1. MS (multiple sclerosis) ([facility])  R68 337 METABOLIC PANEL, COMPREHENSIVE      CBC WITH AUTOMATED DIFF   2. Chronic pain syndrome  G89.4 338.4    3. Neuropathy  G62.9 355.9    4.  At high risk for injury related to fall  Z91.81 V49.89      Orders Placed This Encounter    METABOLIC PANEL, COMPREHENSIVE     Standing Status:   Future     Standing Expiration Date:   8/12/2022    CBC WITH AUTOMATED DIFF     Standing Status:   Future     Standing Expiration Date:   8/12/2022     PATIENT HAS PREVIOUSLY BEEN REFERRED TO PT/OT FOR A FULL MOBILITY EVALUATION AND I  AGREE WITH THE FINDINGS

## 2021-08-12 NOTE — TELEPHONE ENCOUNTER
Spoke to Kevin Sunshine with [facility] home health care per patient request, regarding 2 week hold on services if he did not come in for his scheduled appointment today. She will pass information to nursing supervisor.   [phone]

## 2021-08-13 NOTE — ED PROVIDER NOTES
The history is provided by the patient. Fall  The accident occurred 1 to 2 hours ago. Fall occurred: sitting in his chair when he got up he slid to the ground and could not get up, EMS was called ad his mother asked him to come get evaluated. He fell from a height of 1 - 2 ft. He landed on hard floor. Point of impact: fell to his bottom. Pain location: low back, chronic. The pain is mild. He was ambulatory at the scene. There was no entrapment after the fall. There was no drug use involved in the accident. There was no alcohol use involved in the accident. Pertinent negatives include no numbness, no bowel incontinence, no loss of consciousness and no laceration. Risk factors: has MS and baseline ambulation difficulties uses walker and wheelchair in home. He has tried nothing for the symptoms. Back Pain   This is a chronic problem. Episode onset: worsened after this event but has since improved. missed most recent dose of home oxycodone. The problem has been rapidly improving. The problem occurs constantly. The pain is associated with a fall. The pain is present in the lower back. The quality of the pain is described as aching and similar to previous episodes. The pain does not radiate. The pain is mild. Pertinent negatives include no numbness, no bowel incontinence, no bladder incontinence, no paresthesias, no paresis and no weakness. He has tried nothing for the symptoms.         Past Medical History:   Diagnosis Date    Back pain 10/8/2010    Back pain 10/8/2010    Depression     Diabetes ([facility] [address]     Fatigue 8/24/2012    Hearing loss 1/25/2013    Memory loss 1/25/2013    [facility] sclerosis[facility] ([facility])     Muscle pain     Muscle weakness     Poor appetite     Snoring     Unintentional weight change     Visual disturbance        Past Surgical History:   Procedure Laterality Date    HX HEENT Right     ear         Family History:   Problem Relation Age of Onset    Hypertension Father     Cancer Father         prostate    Hypertension Sister     Diabetes Maternal Grandmother     Cancer Paternal Uncle         prostate    Dementia Paternal Uncle        Social History     Socioeconomic History    Marital status: SINGLE     Spouse name: Not on file    Number of children: Not on file    Years of education: Not on file    Highest education level: Not on file   Occupational History    Not on file   Tobacco Use    Smoking status: Current Every Day Smoker     Packs/day: 0.50     Years: 21.00     Pack years: 10.50     Types: Cigarettes    Smokeless tobacco: Never Used   Substance and Sexual Activity    Alcohol use: No    Drug use: No    Sexual activity: Yes     Partners: Female     Birth control/protection: Condom   Other Topics Concern    Not on file   Social History Narrative    Not on file     Social Determinants of Health     Financial Resource Strain:     Difficulty of Paying Living Expenses:    Food Insecurity:     Worried About Running Out of Food in the Last Year:     Ran Out of Food in the Last Year:    Transportation Needs:     Lack of Transportation (Medical):  Lack of Transportation (Non-Medical):    Physical Activity:     Days of Exercise per Week:     Minutes of Exercise per Session:    Stress:     Feeling of Stress :    Social Connections:     Frequency of Communication with Friends and Family:     Frequency of Social Gatherings with Friends and Family:     Attends Buddhist Services:     Active Member of Clubs or Organizations:     Attends Club or Organization Meetings:     Marital Status:    Intimate Partner Violence:     Fear of Current or Ex-Partner:     Emotionally Abused:     Physically Abused:     Sexually Abused: ALLERGIES: Patient has no known allergies. Review of Systems   Gastrointestinal: Negative for bowel incontinence. Genitourinary: Negative for bladder incontinence. Musculoskeletal: Positive for back pain.    Neurological: Negative for loss of consciousness, weakness, numbness and paresthesias. All other systems reviewed and are negative. Vitals:    08/12/21 1845   BP: 112/75   Pulse: 91   Resp: 18   Temp: 96.8 °F (36 °C)   SpO2: 100%   Weight: 88 kg (194 lb)   Height: 5' 10\" (1.778 m)            Physical Exam  Vitals and nursing note reviewed. Constitutional:       General: He is not in acute distress. Appearance: He is well-developed. HENT:      Head: Normocephalic and atraumatic. Eyes:      Conjunctiva/sclera: Conjunctivae normal.   Neck:      Trachea: No tracheal deviation. Cardiovascular:      Rate and Rhythm: Normal rate and regular rhythm. Heart sounds: Normal heart sounds. Pulmonary:      Effort: Pulmonary effort is normal. No respiratory distress. Breath sounds: Normal breath sounds. Abdominal:      General: There is no distension. Musculoskeletal:         General: No deformity. Normal range of motion. Cervical back: Neck supple. No tenderness. Thoracic back: No tenderness. Lumbar back: No tenderness. Skin:     General: Skin is warm and dry. Findings: No laceration. Neurological:      Mental Status: He is alert. Cranial Nerves: No cranial nerve deficit. Psychiatric:         Behavior: Behavior normal.          MDM     Patient fell from his seat after sliding out of it. Family unable to lift him. No injuries. His mother wished him to be checked on and EMS transported after lift. Has MS and has chronic back pain and pain was exacerbated after fall but improved here. No red flag symptoms of fever, weight loss, saddle anesthesia, weakness, fecal/urinary incontinence or urinary retention. Provided instructions for supportive care measures. Plan to follow up with PCP as needed and return precautions discussed for worsening or new concerning symptoms.      Procedures

## 2021-09-09 ENCOUNTER — TELEPHONE (OUTPATIENT)
Dept: NEUROLOGY | Age: 56
End: 2021-09-09

## 2021-09-10 ENCOUNTER — TELEPHONE (OUTPATIENT)
Dept: NEUROLOGY | Age: 56
End: 2021-09-10

## 2021-09-10 NOTE — TELEPHONE ENCOUNTER
[facility] authorization done June 9, 2021. [place] [id number]  1/1/21  - 12/31/21 from my 6/9/21 note:     I faxed the approval letter, the Rx start form with Dr. Gaston Herrera signature, copy of the insurance card and demographic sheet to:   [facility] and [employer].        I called [employer] - to find out exactly what they need. They wanted to let us know they have been trying to reach pt w/ multiple calls, letters, ph#'s, w/ no return calls. They need to do additional screening w/ pt over the phone. I requested for Alvaro Ojeda to call me back if Year 2 PA is needed at this time.

## 2021-09-13 ENCOUNTER — TELEPHONE (OUTPATIENT)
Dept: NEUROLOGY | Age: 56
End: 2021-09-13

## 2021-09-13 NOTE — TELEPHONE ENCOUNTER
rec'd call from [employer], they also have been trying to reach pt but have been unsuccessful. She said there is nothing needed on our part - patient has not returned any calls re: Mary Haq. Message sent to Dr. Alberto Vega on 9/10 and today as an FYI.

## 2021-11-02 ENCOUNTER — TELEPHONE (OUTPATIENT)
Dept: NEUROLOGY | Age: 56
End: 2021-11-02

## 2021-11-09 ENCOUNTER — TELEPHONE (OUTPATIENT)
Dept: NEUROLOGY | Age: 56
End: 2021-11-09

## 2021-11-10 ENCOUNTER — TELEPHONE (OUTPATIENT)
Dept: NEUROLOGY | Age: 56
End: 2021-11-10

## 2021-11-11 ENCOUNTER — TELEPHONE (OUTPATIENT)
Dept: NEUROLOGY | Age: 56
End: 2021-11-11

## 2021-11-12 ENCOUNTER — APPOINTMENT (OUTPATIENT)
Dept: MRI IMAGING | Age: 56
End: 2021-11-12
Attending: INTERNAL MEDICINE
Payer: MEDICARE

## 2021-11-12 ENCOUNTER — HOSPITAL ENCOUNTER (OUTPATIENT)
Age: 56
Setting detail: OBSERVATION
Discharge: NURSING, REHAB OR LONG-TERM CARE | End: 2021-11-17
Attending: EMERGENCY MEDICINE | Admitting: INTERNAL MEDICINE
Payer: MEDICARE

## 2021-11-12 ENCOUNTER — APPOINTMENT (OUTPATIENT)
Dept: GENERAL RADIOLOGY | Age: 56
End: 2021-11-12
Attending: EMERGENCY MEDICINE
Payer: MEDICARE

## 2021-11-12 ENCOUNTER — APPOINTMENT (OUTPATIENT)
Dept: MRI IMAGING | Age: 56
End: 2021-11-12
Attending: PSYCHIATRY & NEUROLOGY
Payer: MEDICARE

## 2021-11-12 DIAGNOSIS — R29.898 BILATERAL LEG WEAKNESS: ICD-10-CM

## 2021-11-12 DIAGNOSIS — G35 MS (MULTIPLE SCLEROSIS) (HCC): ICD-10-CM

## 2021-11-12 DIAGNOSIS — T79.6XXA TRAUMATIC RHABDOMYOLYSIS, INITIAL ENCOUNTER (HCC): ICD-10-CM

## 2021-11-12 DIAGNOSIS — K59.00 CONSTIPATION, UNSPECIFIED CONSTIPATION TYPE: ICD-10-CM

## 2021-11-12 DIAGNOSIS — Z51.5 PALLIATIVE CARE ENCOUNTER: ICD-10-CM

## 2021-11-12 DIAGNOSIS — G35 MULTIPLE SCLEROSIS EXACERBATION (HCC): ICD-10-CM

## 2021-11-12 DIAGNOSIS — R52 PAIN: ICD-10-CM

## 2021-11-12 DIAGNOSIS — G35 MULTIPLE SCLEROSIS (HCC): Primary | ICD-10-CM

## 2021-11-12 DIAGNOSIS — R53.1 WEAKNESS GENERALIZED: ICD-10-CM
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
## 2021-11-12 DIAGNOSIS — M47.14 THORACIC SPONDYLOSIS WITH CORD COMPRESSION: ICD-10-CM

## 2021-11-12 DIAGNOSIS — Z71.89 ADVANCED CARE PLANNING/COUNSELING DISCUSSION: ICD-10-CM

## 2021-11-12 DIAGNOSIS — Z71.89 GOALS OF CARE, COUNSELING/DISCUSSION: ICD-10-CM

## 2021-11-12 LAB
ALBUMIN SERPL-MCNC: 3.6 G/DL (ref 3.5–5)
ALBUMIN/GLOB SERPL: 1.2 {RATIO} (ref 1.1–2.2)
ALP SERPL-CCNC: 37 U/L (ref 45–117)
ALT SERPL-CCNC: 19 U/L (ref 12–78)
AMPHET UR QL SCN: NEGATIVE
ANION GAP SERPL CALC-SCNC: 8 MMOL/L (ref 5–15)
APPEARANCE UR: CLEAR
AST SERPL-CCNC: 31 U/L (ref 15–37)
BACTERIA URNS QL MICRO: NEGATIVE /HPF
BARBITURATES UR QL SCN: NEGATIVE
BASOPHILS # BLD: 0 K/UL (ref 0–0.1)
BASOPHILS NFR BLD: 0 % (ref 0–1)
BENZODIAZ UR QL: NEGATIVE
BILIRUB SERPL-MCNC: 0.5 MG/DL (ref 0.2–1)
BILIRUB UR QL: NEGATIVE
BUN SERPL-MCNC: 10 MG/DL (ref 6–20)
BUN/CREAT SERPL: 12 (ref 12–20)
CALCIUM SERPL-MCNC: 9.1 MG/DL (ref 8.5–10.1)
CANNABINOIDS UR QL SCN: POSITIVE
CHLORIDE SERPL-SCNC: 108 MMOL/L (ref 97–108)
CK MB CFR SERPL CALC: 2.3 % (ref 0–2.5)
CK MB SERPL-MCNC: 16.7 NG/ML (ref 5–25)
CK SERPL-CCNC: 713 U/L (ref 39–308)
CK SERPL-CCNC: 860 U/L (ref 39–308)
CO2 SERPL-SCNC: 26 MMOL/L (ref 21–32)
COCAINE UR QL SCN: NEGATIVE
COLOR UR: ABNORMAL
COMMENT, HOLDF: NORMAL
CREAT SERPL-MCNC: 0.84 MG/DL (ref 0.7–1.3)
DIFFERENTIAL METHOD BLD: ABNORMAL
DRUG SCRN COMMENT,DRGCM: ABNORMAL
EOSINOPHIL # BLD: 0 K/UL (ref 0–0.4)
EOSINOPHIL NFR BLD: 0 % (ref 0–7)
EPITH CASTS URNS QL MICRO: ABNORMAL /LPF
ERYTHROCYTE [DISTWIDTH] IN BLOOD BY AUTOMATED COUNT: 13 % (ref 11.5–14.5)
ETHANOL SERPL-MCNC: <10 MG/DL
GLOBULIN SER CALC-MCNC: 3.1 G/DL (ref 2–4)
GLUCOSE SERPL-MCNC: 106 MG/DL (ref 65–100)
GLUCOSE UR STRIP.AUTO-MCNC: NEGATIVE MG/DL
HCT VFR BLD AUTO: 39.7 % (ref 36.6–50.3)
HGB BLD-MCNC: 13 G/DL (ref 12.1–17)
HGB UR QL STRIP: NEGATIVE
IMM GRANULOCYTES # BLD AUTO: 0.1 K/UL (ref 0–0.04)
IMM GRANULOCYTES NFR BLD AUTO: 1 % (ref 0–0.5)
KETONES UR QL STRIP.AUTO: NEGATIVE MG/DL
LACTATE SERPL-SCNC: 2.8 MMOL/L (ref 0.4–2)
LACTATE SERPL-SCNC: 3 MMOL/L (ref 0.4–2)
LEUKOCYTE ESTERASE UR QL STRIP.AUTO: ABNORMAL
LYMPHOCYTES # BLD: 0.9 K/UL (ref 0.8–3.5)
LYMPHOCYTES NFR BLD: 7 % (ref 12–49)
MCH RBC QN AUTO: 30.1 PG (ref 26–34)
MCHC RBC AUTO-ENTMCNC: 32.7 G/DL (ref 30–36.5)
MCV RBC AUTO: 91.9 FL (ref 80–99)
METHADONE UR QL: NEGATIVE
MONOCYTES # BLD: 0.9 K/UL (ref 0–1)
MONOCYTES NFR BLD: 7 % (ref 5–13)
NEUTS SEG # BLD: 11.3 K/UL (ref 1.8–8)
NEUTS SEG NFR BLD: 85 % (ref 32–75)
NITRITE UR QL STRIP.AUTO: NEGATIVE
NRBC # BLD: 0 K/UL (ref 0–0.01)
NRBC BLD-RTO: 0 PER 100 WBC
OPIATES UR QL: NEGATIVE
PCP UR QL: NEGATIVE
PH UR STRIP: 8 [PH] (ref 5–8)
PLATELET # BLD AUTO: 193 K/UL (ref 150–400)
PMV BLD AUTO: 9.9 FL (ref 8.9–12.9)
POTASSIUM SERPL-SCNC: 4.3 MMOL/L (ref 3.5–5.1)
PROT SERPL-MCNC: 6.7 G/DL (ref 6.4–8.2)
PROT UR STRIP-MCNC: NEGATIVE MG/DL
RBC # BLD AUTO: 4.32 M/UL (ref 4.1–5.7)
RBC #/AREA URNS HPF: ABNORMAL /HPF (ref 0–5)
SAMPLES BEING HELD,HOLD: NORMAL
SODIUM SERPL-SCNC: 142 MMOL/L (ref 136–145)
SP GR UR REFRACTOMETRY: 1.01 (ref 1–1.03)
TROPONIN-HIGH SENSITIVITY: 13 NG/L (ref 0–76)
UA: UC IF INDICATED,UAUC: ABNORMAL
UROBILINOGEN UR QL STRIP.AUTO: 0.2 EU/DL (ref 0.2–1)
WBC # BLD AUTO: 13.2 K/UL (ref 4.1–11.1)
WBC URNS QL MICRO: ABNORMAL /HPF (ref 0–4)

## 2021-11-12 PROCEDURE — 93005 ELECTROCARDIOGRAM TRACING: CPT

## 2021-11-12 PROCEDURE — 74011250636 HC RX REV CODE- 250/636: Performed by: INTERNAL MEDICINE

## 2021-11-12 PROCEDURE — 96374 THER/PROPH/DIAG INJ IV PUSH: CPT

## 2021-11-12 PROCEDURE — 99285 EMERGENCY DEPT VISIT HI MDM: CPT

## 2021-11-12 PROCEDURE — 84484 ASSAY OF TROPONIN QUANT: CPT

## 2021-11-12 PROCEDURE — 82550 ASSAY OF CK (CPK): CPT

## 2021-11-12 PROCEDURE — 83605 ASSAY OF LACTIC ACID: CPT

## 2021-11-12 PROCEDURE — 82553 CREATINE MB FRACTION: CPT

## 2021-11-12 PROCEDURE — 74011000258 HC RX REV CODE- 258: Performed by: EMERGENCY MEDICINE

## 2021-11-12 PROCEDURE — 96365 THER/PROPH/DIAG IV INF INIT: CPT

## 2021-11-12 PROCEDURE — 82077 ASSAY SPEC XCP UR&BREATH IA: CPT

## 2021-11-12 PROCEDURE — 81001 URINALYSIS AUTO W/SCOPE: CPT

## 2021-11-12 PROCEDURE — 65390000012 HC CONDITION CODE 44 OBSERVATION

## 2021-11-12 PROCEDURE — 80053 COMPREHEN METABOLIC PANEL: CPT

## 2021-11-12 PROCEDURE — 65270000029 HC RM PRIVATE

## 2021-11-12 PROCEDURE — 80307 DRUG TEST PRSMV CHEM ANLYZR: CPT

## 2021-11-12 PROCEDURE — 74011250636 HC RX REV CODE- 250/636: Performed by: EMERGENCY MEDICINE

## 2021-11-12 PROCEDURE — 74011250637 HC RX REV CODE- 250/637: Performed by: INTERNAL MEDICINE

## 2021-11-12 PROCEDURE — 36415 COLL VENOUS BLD VENIPUNCTURE: CPT

## 2021-11-12 PROCEDURE — 71045 X-RAY EXAM CHEST 1 VIEW: CPT

## 2021-11-12 PROCEDURE — 74011250636 HC RX REV CODE- 250/636: Performed by: RADIOLOGY

## 2021-11-12 PROCEDURE — A9575 INJ GADOTERATE MEGLUMI 0.1ML: HCPCS | Performed by: RADIOLOGY

## 2021-11-12 PROCEDURE — 85025 COMPLETE CBC W/AUTO DIFF WBC: CPT

## 2021-11-12 PROCEDURE — 72156 MRI NECK SPINE W/O & W/DYE: CPT

## 2021-11-12 PROCEDURE — 70553 MRI BRAIN STEM W/O & W/DYE: CPT

## 2021-11-12 RX ORDER — ONDANSETRON 4 MG/1
4 TABLET, ORALLY DISINTEGRATING ORAL
Status: DISCONTINUED | OUTPATIENT
Start: 2021-11-12 | End: 2021-11-17 | Stop reason: HOSPADM

## 2021-11-12 RX ORDER — SODIUM CHLORIDE 0.9 % (FLUSH) 0.9 %
5-40 SYRINGE (ML) INJECTION AS NEEDED
Status: DISCONTINUED | OUTPATIENT
Start: 2021-11-12 | End: 2021-11-17 | Stop reason: HOSPADM

## 2021-11-12 RX ORDER — ONDANSETRON 2 MG/ML
4 INJECTION INTRAMUSCULAR; INTRAVENOUS
Status: DISCONTINUED | OUTPATIENT
Start: 2021-11-12 | End: 2021-11-17 | Stop reason: HOSPADM

## 2021-11-12 RX ORDER — ACETAMINOPHEN 650 MG/1
650 SUPPOSITORY RECTAL
Status: DISCONTINUED | OUTPATIENT
Start: 2021-11-12 | End: 2021-11-17 | Stop reason: HOSPADM

## 2021-11-12 RX ORDER — ACETAMINOPHEN 325 MG/1
650 TABLET ORAL
Status: DISCONTINUED | OUTPATIENT
Start: 2021-11-12 | End: 2021-11-17 | Stop reason: HOSPADM

## 2021-11-12 RX ORDER — SODIUM CHLORIDE 0.9 % (FLUSH) 0.9 %
5-40 SYRINGE (ML) INJECTION EVERY 8 HOURS
Status: DISCONTINUED | OUTPATIENT
Start: 2021-11-12 | End: 2021-11-17 | Stop reason: HOSPADM

## 2021-11-12 RX ORDER — ENOXAPARIN SODIUM 100 MG/ML
40 INJECTION SUBCUTANEOUS DAILY
Status: DISCONTINUED | OUTPATIENT
Start: 2021-11-13 | End: 2021-11-17 | Stop reason: HOSPADM

## 2021-11-12 RX ORDER — SODIUM CHLORIDE, SODIUM LACTATE, POTASSIUM CHLORIDE, CALCIUM CHLORIDE 600; 310; 30; 20 MG/100ML; MG/100ML; MG/100ML; MG/100ML
150 INJECTION, SOLUTION INTRAVENOUS CONTINUOUS
Status: DISCONTINUED | OUTPATIENT
Start: 2021-11-12 | End: 2021-11-13

## 2021-11-12 RX ORDER — OXYCODONE HYDROCHLORIDE 5 MG/1
15 TABLET ORAL
Status: DISCONTINUED | OUTPATIENT
Start: 2021-11-12 | End: 2021-11-17 | Stop reason: HOSPADM

## 2021-11-12 RX ORDER — GADOTERATE MEGLUMINE 376.9 MG/ML
16 INJECTION INTRAVENOUS
Status: COMPLETED | OUTPATIENT
Start: 2021-11-12 | End: 2021-11-12

## 2021-11-12 RX ORDER — POLYETHYLENE GLYCOL 3350 17 G/17G
17 POWDER, FOR SOLUTION ORAL DAILY PRN
Status: DISCONTINUED | OUTPATIENT
Start: 2021-11-12 | End: 2021-11-17 | Stop reason: HOSPADM

## 2021-11-12 RX ADMIN — SODIUM CHLORIDE, POTASSIUM CHLORIDE, SODIUM LACTATE AND CALCIUM CHLORIDE 150 ML/HR: 600; 310; 30; 20 INJECTION, SOLUTION INTRAVENOUS at 12:59

## 2021-11-12 RX ADMIN — OXYCODONE 15 MG: 5 TABLET ORAL at 12:54

## 2021-11-12 RX ADMIN — SODIUM CHLORIDE 1000 ML: 9 INJECTION, SOLUTION INTRAVENOUS at 11:52

## 2021-11-12 RX ADMIN — SODIUM CHLORIDE 1000 ML: 9 INJECTION, SOLUTION INTRAVENOUS at 10:19

## 2021-11-12 RX ADMIN — Medication 10 ML: at 22:00

## 2021-11-12 RX ADMIN — GADOTERATE MEGLUMINE 16 ML: 376.9 INJECTION INTRAVENOUS at 16:13

## 2021-11-12 RX ADMIN — SODIUM CHLORIDE 1000 MG: 900 INJECTION INTRAVENOUS at 11:31

## 2021-11-12 NOTE — PROGRESS NOTES
11/12/2021  12:19 PM  Case management note    Reason for Admission:  MS exacerbation    Patient came to ED for a fall and decline per the insistence of his mother. Patient initially did not want to stay, spoke with him about abnormal labs and risks of leaving. Patient has MS and history of multiple falls. His mother called and stated that he has open APS, they are waiting to hear from them  [address], [address] is patient's physical address. Patient is non compliant with medications  The ED pharmacist Scarlett Rudd researched the chart for medications. The MS medication is 4000. And is only taken yearly. She found notes in chart stating they had attempted to reach patient to let him know the company will furnish med at no cost to patient. She can see that patient see neurologist and only gets pain medications filled. His mother provided some history has patient was at [facility] in the summer and went to [facility] for rehab. Mother states he wears a diaper and does not take care of himself. RUR Score:        7%             Plan for utilizing home health:      PT/OT to eval    PCP: First and Last name:  Joe Trinidad MD     Name of Practice:    Are you a current patient: Yes/No: yes   Approximate date of last visit: August   Can you participate in a virtual visit with your PCP:                     Current Advanced Directive/Advance Care Plan: Full Code  NO AD, patient unable to do at this timie      Healthcare Decision Maker:   Celso Addison mother                              Transition of Care Plan:              1. Unable to determine at this time  2. PCP follow up  3. Long term planning  4. AD planning  5. CM to follow for discharge needs    Care Management Interventions  PCP Verified by CM:  Yes (Dr. Viraj Alejandro)  Support Systems: Other (Comment)  Confirm Follow Up Transport: Family  The Plan for Transition of Care is Related to the Following Treatment Goals : MS exacerbation  Discharge Location  Discharge Placement: Unable to determine at this time  Abel Varela

## 2021-11-12 NOTE — ED TRIAGE NOTES
Pt arrives via ems with complaints of a fall in the bathroom. Was found via family member on the floor. Rescue stated the only complaint was back pain.

## 2021-11-12 NOTE — PROGRESS NOTES
[facility]: MED RECONCILIATION    Comments/Recommendations:   Patient alert and oriented for medication reconciliation but overall poor historian regarding medications, states he has not been taking any medications except pain medication. Per Rx query fill history, no maintenance medications besides oxycodone have been filled since July 2021 and the prescriptions were only for 30-day supply. Spoke with patient's mother and she confirmed patient does not take his medications as prescribed, states he refuses. She states patient was discharged from rehab over the summer and he filled those one time prescriptions but has not filled anything since then. She also confirmed he does take oxycodone. Regarding his MS treatment, mother states he is due for his yearly [address] but they have been having issues with coverage through the insurance company (out of pocket ~$4000 per mother). Mother states that her daughter is trying to work this out. Neurologist is Dr. Man Huntley. Per mother he had first treatment course of [address] August 2020 and was due this August for his second course, she stated neurologist told her that they had until December for him to remain on schedule for his treatment. Discussed patient with admitting MD and  in the ED. Confirmed NKDA and preferred pharmacy as [facility] [address]. Medications added:     none    Medications removed:    Baclofen  Ampyra  Nuedexta  Dronabinol   Avonex  Lidocaine patch  Megestrol  Meloxicam   Ocrevus  Prednisone  Lyrica  Cialsis     Medications adjusted:    Oxycodone 15mg q12h PRN adjusted from q4h PRN    Information obtained from: patient, mother, Rx query, [facility], outpatient neurology notes    Allergies: Patient has no known allergies.     Prior to Admission Medications:     Medication Documentation Review Audit       Reviewed by Jovana Coyne (Pharmacist) on 11/12/21 at 1205      Medication Sig Documenting Provider Last Dose Status Taking?   oxyCODONE IR (OXY-IR) 15 mg immediate release tablet Take 15 mg by mouth every twelve (12) hours as needed for Pain.  Provider, Historical  Active Yes                    Thank you,   Ravi Zamora, PharmD, BCPS   Contact: 1991

## 2021-11-12 NOTE — H&P
Hospitalist Admission Note    NAME: Nataly Query   :  1965   MRN:  [id number]     Date/Time:  2021 11:35 AM    Patient PCP: Brandee Be MD  ________________________________________________________________________    Given the patient's current clinical presentation, I have a high level of concern for decompensation if discharged from the emergency department. Complex decision making was performed, which includes reviewing the patient's available past medical records, laboratory results, and x-ray films. My assessment of this patient's clinical condition and my plan of care is as follows. Assessment / Plan:    #FTT/Falls: Possible MS exacerbation as he has not received any medication in 18 months. Review of Dr. Jan Edwards notes suggest his office was trying to reach pt to inform of successful financial assistance, but to no avail. There is apparently an APS case open as well   - PT/OT   - UA pending   - MRI brain eval MS flare   - CM assisting with APS case    #MS: As above; received 1g methylpred in ER.    - MRI brain, Neuro cs; anticipate furthe rmethylpred pulse    #Rhabdo: Mild, had been down for several hours this morning.    - IVF, trend CK    #Back Pain w/ opiate use: Pharmacy reports only med he has been filling has been oxy 15mg BID prn. He is mildly diaphoretic on my exam, thus far neg infx w/u, so may be early opiate w/d   - UTox   - Resume oxy 15mg BID      I have personally reviewed the radiographs, laboratory data in Epic and decisions and statements above are based partially on this personal interpretation. Code Status: Full Code  DVT Prophylaxis: Lovenox  GI Prophylaxis: not indicated       Subjective:   CHIEF COMPLAINT: \"falls\"    HISTORY OF PRESENT ILLNESS:     Rusty Julian is a 49 y.o. W/ hx MS, chronic pain presents after falling down and being unable to get up. EMS notes bottle of oxycodone near pt from which more pills were missing than should have been.  Pt had reported progressive weakness and is usually wheelchair bound. In Er, he is noted to have a minimally elevated CK of 700, but is unable to stand on his own. IM asked to admit. Upon Pharmacy med rec review and record review, it seems that pt has not received his annual MS medication, which was due in August. He presently endorses back pain, but otherwise has no complaints. Past Medical History:   Diagnosis Date    Back pain 10/8/2010    Back pain 10/8/2010    Depression     Diabetes ([address]     Fatigue 8/24/2012    Hearing loss 1/25/2013    Memory loss 1/25/2013    [facility]     Muscle pain     Muscle weakness     Poor appetite     Snoring     Unintentional weight change     Visual disturbance       Past Surgical History:   Procedure Laterality Date    HX HEENT Right     ear     Social History     Tobacco Use    Smoking status: Current Every Day Smoker     Packs/day: 0.50     Years: 21.00     Pack years: 10.50     Types: Cigarettes    Smokeless tobacco: Never Used   Substance Use Topics    Alcohol use: No      Family History   Problem Relation Age of Onset    Hypertension Father     Cancer Father         prostate    Hypertension Sister     Diabetes Maternal Grandmother     Cancer Paternal Uncle         prostate    Dementia Paternal Uncle         No Known Allergies     Prior to Admission medications    Medication Sig Start Date End Date Taking? Authorizing Provider   oxyCODONE IR (OXY-IR) 15 mg immediate release tablet Take 15 mg by mouth every twelve (12) hours as needed for Pain.    Yes Provider, Historical     REVIEW OF SYSTEMS:  See HPI for details  General: negative for fever, chills, sweats, weakness, weight loss  Eyes: negative for blurred vision, eye pain, loss of vision, diplopia  Ear Nose and Throat: negative for rhinorrhea, pharyngitis, otalgia, tinnitus, speech or swallowing difficulties  Respiratory:  negative for pleuritic pain, cough, sputum production, wheezing, SOB, LEVINE  Cardiology:  negative for chest pain, palpitations, orthopnea, PND, edema, syncope   Gastrointestinal: negative for abdominal pain, N/V, dysphagia, change in bowel habits, bleeding  Genitourinary: negative for frequency, urgency, dysuria, hematuria, incontinence  Muskuloskeletal : +for arthralgia, myalgia  Hematology: negative for easy bruising, bleeding, lymphadenopathy  Dermatological: negative for rash, ulceration, mole change, new lesion  Endocrine: negative for hot flashes or polydipsia  Neurological: negative for headache, dizziness, confusion, focal weakness, paresthesia, memory loss, +gait disturbance  Psychological: negative for anxiety, depression, agitation    Objective:   VITALS:    Visit Vitals  /71 (BP 1 Location: Left upper arm, BP Patient Position: At rest)   Pulse 94   Temp 98.9 °F (37.2 °C)   Resp 18   Ht 5' 10\" (1.778 m)   Wt 83.9 kg (185 lb)   SpO2 99%   BMI 26.54 kg/m²     PHYSICAL EXAM:    Physical Exam:    Gen: Frail, chronically ill appearing, nad  HEENT:  Pink conjunctivae, PERRL, hearing intact to voice, moist mucous membranes  Neck: Supple, without masses, thyroid non-tender  Resp: No accessory muscle use, clear breath sounds without wheezes rales or rhonchi  Card: No murmurs, normal S1, S2 without thrills, bruits or peripheral edema  Abd:  Soft, non-tender, non-distended, normoactive bowel sounds are present, no palpable organomegaly and no detectable hernias  Lymph:  No cervical or inguinal adenopathy  Musc: No cyanosis or clubbing  Skin: No rashes or ulcers, skin turgor is good  Neuro:  Cranial nerves are grossly intact, diffuse weakness  Psych:  Good insight, oriented to person, place and time, alert          _______________________________________________________________________  Care Plan discussed with:    Comments   Patient x Discussed with patient in room.  POC outlined and Questions answered    Family      RN x    Care Manager Consultant:  mechelle COHEN MD   _______________________________________________________________________  Recommended Disposition:   Home with Family x   HH/PT/OT/RN    SNF/LTC    BONNIE    ________________________________________________________________________  TOTAL TIME:  45 Minutes        Comments   >50% of visit spent in counseling and coordination of care  Chart reviewed  Discussion with patient and/or family and questions answered     ________________________________________________________________________  Signed: Angy Quinn MD    This note will not be viewable in [address] Procedures: see electronic medical records for all procedures/Xrays and details which were not copied into this note but were reviewed prior to creation of Plan. LAB DATA REVIEWED:    Recent Results (from the past 24 hour(s))   ETHYL ALCOHOL    Collection Time: 11/12/21  8:42 AM   Result Value Ref Range    ALCOHOL(ETHYL),SERUM <10 <10 MG/DL   CBC WITH AUTOMATED DIFF    Collection Time: 11/12/21  9:42 AM   Result Value Ref Range    WBC 13.2 (H) 4.1 - 11.1 K/uL    RBC 4.32 4.10 - 5.70 M/uL    HGB 13.0 12.1 - 17.0 g/dL    HCT 39.7 36.6 - 50.3 %    MCV 91.9 80.0 - 99.0 FL    MCH 30.1 26.0 - 34.0 PG    MCHC 32.7 30.0 - 36.5 g/dL    RDW 13.0 11.5 - 14.5 %    PLATELET 726 897 - 259 K/uL    MPV 9.9 8.9 - 12.9 FL    NRBC 0.0 0  WBC    ABSOLUTE NRBC 0.00 0.00 - 0.01 K/uL    NEUTROPHILS 85 (H) 32 - 75 %    LYMPHOCYTES 7 (L) 12 - 49 %    MONOCYTES 7 5 - 13 %    EOSINOPHILS 0 0 - 7 %    BASOPHILS 0 0 - 1 %    IMMATURE GRANULOCYTES 1 (H) 0.0 - 0.5 %    ABS. NEUTROPHILS 11.3 (H) 1.8 - 8.0 K/UL    ABS. LYMPHOCYTES 0.9 0.8 - 3.5 K/UL    ABS. MONOCYTES 0.9 0.0 - 1.0 K/UL    ABS. EOSINOPHILS 0.0 0.0 - 0.4 K/UL    ABS. BASOPHILS 0.0 0.0 - 0.1 K/UL    ABS. IMM.  GRANS. 0.1 (H) 0.00 - 0.04 K/UL    DF AUTOMATED     CK W/ REFLX CKMB    Collection Time: 11/12/21  9:42 AM   Result Value Ref Range     (H) 39 - 205 U/L   METABOLIC PANEL, COMPREHENSIVE Collection Time: 11/12/21  9:42 AM   Result Value Ref Range    Sodium 142 136 - 145 mmol/L    Potassium 4.3 3.5 - 5.1 mmol/L    Chloride 108 97 - 108 mmol/L    CO2 26 21 - 32 mmol/L    Anion gap 8 5 - 15 mmol/L    Glucose 106 (H) 65 - 100 mg/dL    BUN 10 6 - 20 MG/DL    Creatinine 0.84 0.70 - 1.30 MG/DL    BUN/Creatinine ratio 12 12 - 20      GFR est AA >60 >60 ml/min/1.73m2    GFR est non-AA >60 >60 ml/min/1.73m2    Calcium 9.1 8.5 - 10.1 MG/DL    Bilirubin, total 0.5 0.2 - 1.0 MG/DL    ALT (SGPT) 19 12 - 78 U/L    AST (SGOT) 31 15 - 37 U/L    Alk. phosphatase 37 (L) 45 - 117 U/L    Protein, total 6.7 6.4 - 8.2 g/dL    Albumin 3.6 3.5 - 5.0 g/dL    Globulin 3.1 2.0 - 4.0 g/dL    A-G Ratio 1.2 1.1 - 2.2     LACTIC ACID    Collection Time: 11/12/21  9:42 AM   Result Value Ref Range    Lactic acid 2.8 (HH) 0.4 - 2.0 MMOL/L   CK-MB,QUANT. Collection Time: 11/12/21  9:42 AM   Result Value Ref Range    CK - MB 16.7 (H) <3.6 NG/ML    CK-MB Index 2.3 0.0 - 2.5     TROPONIN-HIGH SENSITIVITY    Collection Time: 11/12/21  9:42 AM   Result Value Ref Range    Troponin-High Sensitivity 13 0 - 76 ng/L   SAMPLES BEING HELD    Collection Time: 11/12/21  9:47 AM   Result Value Ref Range    SAMPLES BEING HELD 1 DRK GRN     COMMENT        Add-on orders for these samples will be processed based on acceptable specimen integrity and analyte stability, which may vary by analyte.

## 2021-11-12 NOTE — CONSULTS
Neurology Consult - Inpatient      Name:   Parth Smith  MRN:    [id number]    Date of Admission:  11/12/2021    Date of Consultation:  11/13/21       HISTORY OF PRESENT ILLNESS:     This is a 49 y.o. male with  has a past medical history of Back pain (10/8/2010), Back pain (10/8/2010), Depression, Diabetes ([facility] Fatigue (8/24/2012), Hearing loss (1/25/2013), Memory loss (1/25/2013), MS (multiple sclerosis) ([facility] Muscle pain, Muscle weakness, Poor appetite, Snoring, Unintentional weight change, and Visual disturbance. .    Neurology is asked to see the patient for possible MS Exacerbation. Pt followed by Dr Glenna Polo/ Neurology as outpatient. Reviewed EMR notes. Pharmacy Med Rec note regarding MS Meds states: Regarding his MS treatment, mother states he is due for his yearly [address] but they have been having issues with coverage through the insurance company (out of pocket ~$4000 per mother). Mother states that her daughter is trying to work this out. Neurologist is Dr. Nirali Durbin. Per mother he had first treatment course of [address] August 2020 and was due this August for his second course, she stated neurologist told her that they had until December for him to remain on schedule for his treatment. Pt reports he is predominantly wheelchair bound outside of house, uses \"upright walker\" in side house. Says he fell down few days ago and could not get up. Someone was able to help him up. Admitted due to generalized weakness, and having . MRI Brain (11-12-21): 1. Chronic relatively extensive white matter disease involving supraclinoid and infratentorial areas similar to the prior exam. 2. No abnormal restricted diffusion or abnormal enhancement that would suggest active disease. MRI Cervical Spine (11-12-21): 1. Multifocal areas of cord signal abnormality in the upper cervical cord and involving the medulla and charlie.  This has not significantly progressed in the interval. No new lesions or areas of abnormal enhancement. MRI Thoracic Spine (9-): The thoracic spinal cord appears somewhat atrophic. Because of limited spatial and contrast resolution of the study and mild motion artifact, discrete lesions are difficult to identify. Axial T2-weighted images  suggest multiple scattered foci of T2 hyperintensity peripherally in the spinal cord. These appear more prominent currently than on the previous study. There is unchanged mild central disc protrusion at T7-8, with mild indentation of the ventral cord. There is mild to moderate central disc protrusion at T8-9, with mild indentation of the ventral cord, slightly increased. There is marked left facet hypertrophy at T9-10 with right anterior displacement and moderate compression of the spinal cord, unchanged. IMPRESSION:  1. Study somewhat limited by motion, spatial resolution, and contrast resolution. Atrophic spinal cord with multiple small focal lesions, more clearly demonstrated currently than on previous study. 2. Unchanged T9-10 cord compression related to left facet hypertrophy. Slight increase in mild to moderate central disc protrusion at T8-9 and mild central disc protrusion at T7-8.       Complete Review of Systems: reviewed on admission H&P    ====================================     No Known Allergies    Current Outpatient Medications   Medication Instructions    oxyCODONE IR (OXY-IR) 15 mg, Oral, EVERY 12 HOURS AS NEEDED       Current Facility-Administered Medications   Medication Dose Route Frequency Provider Last Rate Last Admin    sodium chloride (NS) flush 5-40 mL  5-40 mL IntraVENous Q8H Gray Singh MD   10 mL at 11/12/21 2200    sodium chloride (NS) flush 5-40 mL  5-40 mL IntraVENous PRN Gray Singh MD        acetaminophen (TYLENOL) tablet 650 mg  650 mg Oral Q6H PRN Gray Singh MD        Or   Tadeo acetaminophen (TYLENOL) suppository 650 mg  650 mg Rectal Q6H PRN MD Carlyle Jacinto polyethylene glycol (MIRALAX) packet 17 g  17 g Oral DAILY PRN Shanon Mcnulty MD        ondansetron (ZOFRAN ODT) tablet 4 mg  4 mg Oral Q8H PRN Shanon Mcnulty MD        Or    ondansetron [facility]) injection 4 mg  4 mg IntraVENous Q6H PRN Shanon Mcnulty MD        enoxaparin (LOVENOX) injection 40 mg  40 mg SubCUTAneous DAILY Shanon Mcnulty MD   40 mg at 11/13/21 2753    lactated Ringers infusion  150 mL/hr IntraVENous CONTINUOUS Shanon Mcnulty  mL/hr at 11/12/21 1259 150 mL/hr at 11/12/21 1259    oxyCODONE IR (ROXICODONE) tablet 15 mg  15 mg Oral Q12H PRN Shanon Mcnulty MD   15 mg at 11/12/21 1254       PSHx  has a past surgical history that includes hx heent (Right). SocHx  reports that he has been smoking cigarettes. He has a 10.50 pack-year smoking history. He has never used smokeless tobacco. He reports that he does not drink alcohol and does not use drugs. FHx family history includes Cancer in his father and paternal uncle; Dementia in his paternal uncle; Diabetes in his maternal grandmother; Hypertension in his father and sister. PHYSICAL EXAM    Patient Vitals for the past 4 hrs:   Temp Pulse Resp BP SpO2   11/13/21 0818 -- (!) 54 -- -- --   11/13/21 0739 98 °F (36.7 °C) (!) 52 18 119/69 96 %       General: Head: atraumatic. Eyes: conjunctiva clear. Neck: supple. Lungs: not examined. CV: not examined. Extremities: no edema. Skin: left knee bruising    Neurologic Exam:  Speech/ Language: Moderate dysarthria; no aphasia. Alertness: oriented person, place, situation. CNs: Smell: not tested. Visual Fields (II): full to confrontation. Pupils (II)/ Funduscopic: Not examined. Extraocular movements (III, IV, VI): conjugate in all directions, no HUBERT. Ptosis (III, VII): none. Facial Sensation (V): not examined. Facial Movements (VII): symmetric at rest; mild left lower facial weakness on smile. Hearing (VIII): normal.  Soft palate elevation (IX): not examined.  Shoulder shrug (XI): not examined. Tongue protrusion (XII): not examined    Motor:  5/5 in both upper exts. RLE: 4+/ 5. LLE 4/ 5. Sensory: intact LT in all exts. Cerebellar: + Resting tremor in right upper > right lower ext; moderate bilateral postural tremor . Deep Tendon Reflexes: 2+ biceps, 3+ patellars. Plantar response: not tested. Gait: not tested. Romberg: not tested      Labs/ Radiology       Labs (11-12-21): CBC (mild elevated WBCs, otherwise no significant abnormalities),  Serum alcohol < 10,  (elevated, ref range ), CMP normal except mildly reduced Alk Phos 37, Lactic acid elevated at 2.8, CK-MB elevated at 16.7, UA Trace amt LE, UDS + THC      Assessment/ Plan       ICD-10-CM ICD-9-CM    1. Fall, initial encounter  [address] XXXA E888.9    2. Traumatic rhabdomyolysis, initial encounter ([facility]  T79. 6XXA 958.6    3. Multiple sclerosis exacerbation (HCC)  G35 340 MRI [facility] SPINE W WO CONT      MRI [facility] SPINE W WO CONT   4. MS (multiple sclerosis) (HCC)  G35 340 MRI CERV SPINE W WO CONT      MRI CERV SPINE W WO CONT   5. Thoracic spondylosis with cord compression  M47.14 721.41 MRI [facility] SPINE W WO CONT      MRI [facility] SPINE W WO CONT   6. Bilateral leg weakness  R29.898 729.89 MRI [facility] SPINE W WO CONT      MRI [facility] SPINE W WO CONT       49 y.o. male with hx of paraplegia due to [address], couldn't get up. Rhabdomyolysis. MRI Brain and C-spine don't show any new/ active lesions to suggest etiology is MS Flare  Has hx of thoracic cord compression at T9-10 as of last T-spine MRI in 2018  ? Worsening of cord compression as cause of increased weakness    Ordered MRI T-spine +/- contrast to evaluate  Will f/u tomorrow      Thank you for asking the Neurology Service to evaluate Samantha Chance.       Signed By: Fiona Parrish MD     November 13, 2021

## 2021-11-12 NOTE — ED PROVIDER NOTES
HPI the patient was brought in by EMS because he was found on the floor in the bathroom this morning after having fallen and was unable to get up. EMS also reports that there was a bottle of oxycodone found that had more pills missing than should have been. The patient's only complaint is low back pain, which is chronic. The patient is a poor historian, and admits to falling this morning and says that his legs have been getting progressively weaker for the last few days. He denies any injury, and says his back pain is no worse than normal.  He denies taking an overdose of his pain medicine, and has no other complaints. .    Past Medical History:   Diagnosis Date    Back pain 10/8/2010    Back pain 10/8/2010    Depression     Diabetes ([address]     Fatigue 8/24/2012    Hearing loss 1/25/2013    Memory loss 1/25/2013    [facility]     Muscle pain     Muscle weakness     Poor appetite     Snoring     Unintentional weight change     Visual disturbance        Past Surgical History:   Procedure Laterality Date    HX HEENT Right     ear         Family History:   Problem Relation Age of Onset    Hypertension Father     Cancer Father         prostate    Hypertension Sister     Diabetes Maternal Grandmother     Cancer Paternal Uncle         prostate    Dementia Paternal Uncle        Social History     Socioeconomic History    Marital status: SINGLE     Spouse name: Not on file    Number of children: Not on file    Years of education: Not on file    Highest education level: Not on file   Occupational History    Not on file   Tobacco Use    Smoking status: Current Every Day Smoker     Packs/day: 0.50     Years: 21.00     Pack years: 10.50     Types: Cigarettes    Smokeless tobacco: Never Used   Substance and Sexual Activity    Alcohol use: No    Drug use: No    Sexual activity: Yes     Partners: Female     Birth control/protection: Condom   Other Topics Concern    Not on file Social History Narrative    Not on file     Social Determinants of Health     Financial Resource Strain:     Difficulty of Paying Living Expenses: Not on file   Food Insecurity:     Worried About Running Out of Food in the Last Year: Not on file    Carey of Food in the Last Year: Not on file   Transportation Needs:     Lack of Transportation (Medical): Not on file    Lack of Transportation (Non-Medical): Not on file   Physical Activity:     Days of Exercise per Week: Not on file    Minutes of Exercise per Session: Not on file   Stress:     Feeling of Stress : Not on file   Social Connections:     Frequency of Communication with Friends and Family: Not on file    Frequency of Social Gatherings with Friends and Family: Not on file    Attends Hoahaoism Services: Not on file    Active Member of [address] [place] [address] [employer] or Organizations: Not on file    Attends Club or Organization Meetings: Not on file    Marital Status: Not on file   Intimate Partner Violence:     Fear of Current or Ex-Partner: Not on file    Emotionally Abused: Not on file    Physically Abused: Not on file    Sexually Abused: Not on file   Housing Stability:     Unable to Pay for Housing in the Last Year: Not on file    Number of [place] in the Last Year: Not on file    Unstable Housing in the Last Year: Not on file         ALLERGIES: Patient has no known allergies. Review of Systems   Constitutional: Negative for fever. HENT: Negative for voice change. Eyes: Negative for visual disturbance. Respiratory: Negative for cough and shortness of breath. Cardiovascular: Negative for chest pain. Gastrointestinal: Negative for abdominal pain, nausea and vomiting. Genitourinary: Negative for flank pain. Musculoskeletal: Positive for back pain. Skin: Negative for rash. Neurological: Negative for headaches. Psychiatric/Behavioral: Positive for confusion. There were no vitals filed for this visit.          Physical Exam  Constitutional:       General: He is not in acute distress. Appearance: He is well-developed. Comments: Patient acts intoxicated or overmedicated. HENT:      Head: Normocephalic and atraumatic. Eyes:      Pupils: Pupils are equal, round, and reactive to light. Cardiovascular:      Rate and Rhythm: Normal rate. Heart sounds: No murmur heard. Pulmonary:      Effort: Pulmonary effort is normal.      Breath sounds: Normal breath sounds. Abdominal:      Palpations: Abdomen is soft. Tenderness: There is no abdominal tenderness. Musculoskeletal:         General: Normal range of motion. Cervical back: Normal range of motion. Skin:     General: Skin is warm and dry. Capillary Refill: Capillary refill takes less than 2 seconds. Comments: Bruises to both knees   Neurological:      Mental Status: He is alert. Comments: Moves all extremities to command. MDM       Procedures ED EKG interpretation:  Rhythm: NSR, rate 94. No acute changes. This EKG was interpreted by Barbie Ortiz MD,ED Provider. I spoke with the patient's mother and she tells me that the patient indeed fell and downtime is unknown. The patient has MS but is on no medications for the MS. She says the patient falls a lot, several times a week and he refuses to get help or be admitted to the hospital.      [facility] for Admission  11:08 AM    ED Room Number: ERB/B  Patient Name and age:  Shree Garcia 49 y.o.  male  Working Diagnosis:   1. Fall, initial encounter    2. Traumatic rhabdomyolysis, initial encounter ([facility] [facility]    3. Multiple sclerosis exacerbation ([facility] [address]        COVID-19 Suspicion:  no  Sepsis present:  no  Reassessment needed: no  Code Status:  Full Code  Readmission: no  Isolation Requirements:  no  Recommended Level of Care:  med/surg  Department:[facility] - [phone]  Other: Patient with MS. Has been getting progressively weaker past few days.   Feroz lee morning, downtime unknown. CPK is 700, lactic 2.8. Patient unable to ambulate due to leg weakness.

## 2021-11-13 ENCOUNTER — APPOINTMENT (OUTPATIENT)
Dept: MRI IMAGING | Age: 56
End: 2021-11-13
Attending: PSYCHIATRY & NEUROLOGY
Payer: MEDICARE

## 2021-11-13 LAB
ALBUMIN SERPL-MCNC: 2.9 G/DL (ref 3.5–5)
ALBUMIN/GLOB SERPL: 0.9 {RATIO} (ref 1.1–2.2)
ALP SERPL-CCNC: 34 U/L (ref 45–117)
ALT SERPL-CCNC: 31 U/L (ref 12–78)
ANION GAP SERPL CALC-SCNC: 7 MMOL/L (ref 5–15)
AST SERPL-CCNC: 42 U/L (ref 15–37)
BASOPHILS # BLD: 0 K/UL (ref 0–0.1)
BASOPHILS NFR BLD: 0 % (ref 0–1)
BILIRUB SERPL-MCNC: 0.4 MG/DL (ref 0.2–1)
BUN SERPL-MCNC: 13 MG/DL (ref 6–20)
BUN/CREAT SERPL: 19 (ref 12–20)
CALCIUM SERPL-MCNC: 9 MG/DL (ref 8.5–10.1)
CHLORIDE SERPL-SCNC: 111 MMOL/L (ref 97–108)
CK SERPL-CCNC: 741 U/L (ref 39–308)
CO2 SERPL-SCNC: 24 MMOL/L (ref 21–32)
CREAT SERPL-MCNC: 0.7 MG/DL (ref 0.7–1.3)
DIFFERENTIAL METHOD BLD: ABNORMAL
EOSINOPHIL # BLD: 0 K/UL (ref 0–0.4)
EOSINOPHIL NFR BLD: 0 % (ref 0–7)
ERYTHROCYTE [DISTWIDTH] IN BLOOD BY AUTOMATED COUNT: 13.2 % (ref 11.5–14.5)
GLOBULIN SER CALC-MCNC: 3.2 G/DL (ref 2–4)
GLUCOSE SERPL-MCNC: 133 MG/DL (ref 65–100)
HCT VFR BLD AUTO: 35.7 % (ref 36.6–50.3)
HGB BLD-MCNC: 11.9 G/DL (ref 12.1–17)
IMM GRANULOCYTES # BLD AUTO: 0.1 K/UL (ref 0–0.04)
IMM GRANULOCYTES NFR BLD AUTO: 1 % (ref 0–0.5)
LYMPHOCYTES # BLD: 1.1 K/UL (ref 0.8–3.5)
LYMPHOCYTES NFR BLD: 9 % (ref 12–49)
MCH RBC QN AUTO: 30.4 PG (ref 26–34)
MCHC RBC AUTO-ENTMCNC: 33.3 G/DL (ref 30–36.5)
MCV RBC AUTO: 91.1 FL (ref 80–99)
MONOCYTES # BLD: 0.4 K/UL (ref 0–1)
MONOCYTES NFR BLD: 3 % (ref 5–13)
NEUTS SEG # BLD: 11.4 K/UL (ref 1.8–8)
NEUTS SEG NFR BLD: 87 % (ref 32–75)
NRBC # BLD: 0 K/UL (ref 0–0.01)
NRBC BLD-RTO: 0 PER 100 WBC
PLATELET # BLD AUTO: 175 K/UL (ref 150–400)
PMV BLD AUTO: 10.3 FL (ref 8.9–12.9)
POTASSIUM SERPL-SCNC: 3.6 MMOL/L (ref 3.5–5.1)
PROT SERPL-MCNC: 6.1 G/DL (ref 6.4–8.2)
RBC # BLD AUTO: 3.92 M/UL (ref 4.1–5.7)
SODIUM SERPL-SCNC: 142 MMOL/L (ref 136–145)
WBC # BLD AUTO: 13 K/UL (ref 4.1–11.1)

## 2021-11-13 PROCEDURE — 97161 PT EVAL LOW COMPLEX 20 MIN: CPT

## 2021-11-13 PROCEDURE — A9575 INJ GADOTERATE MEGLUMI 0.1ML: HCPCS | Performed by: RADIOLOGY

## 2021-11-13 PROCEDURE — 2709999900 HC NON-CHARGEABLE SUPPLY

## 2021-11-13 PROCEDURE — 99215 OFFICE O/P EST HI 40 MIN: CPT | Performed by: PSYCHIATRY & NEUROLOGY

## 2021-11-13 PROCEDURE — 74011250636 HC RX REV CODE- 250/636: Performed by: INTERNAL MEDICINE

## 2021-11-13 PROCEDURE — 97116 GAIT TRAINING THERAPY: CPT

## 2021-11-13 PROCEDURE — 82550 ASSAY OF CK (CPK): CPT

## 2021-11-13 PROCEDURE — 72157 MRI CHEST SPINE W/O & W/DYE: CPT

## 2021-11-13 PROCEDURE — 65390000012 HC CONDITION CODE 44 OBSERVATION

## 2021-11-13 PROCEDURE — 65270000029 HC RM PRIVATE

## 2021-11-13 PROCEDURE — 80053 COMPREHEN METABOLIC PANEL: CPT

## 2021-11-13 PROCEDURE — 36415 COLL VENOUS BLD VENIPUNCTURE: CPT

## 2021-11-13 PROCEDURE — 85025 COMPLETE CBC W/AUTO DIFF WBC: CPT

## 2021-11-13 PROCEDURE — 96372 THER/PROPH/DIAG INJ SC/IM: CPT

## 2021-11-13 PROCEDURE — 74011250636 HC RX REV CODE- 250/636: Performed by: RADIOLOGY

## 2021-11-13 RX ORDER — GADOTERATE MEGLUMINE 376.9 MG/ML
17 INJECTION INTRAVENOUS
Status: COMPLETED | OUTPATIENT
Start: 2021-11-13 | End: 2021-11-13

## 2021-11-13 RX ADMIN — GADOTERATE MEGLUMINE 17 ML: 376.9 INJECTION INTRAVENOUS at 13:36

## 2021-11-13 RX ADMIN — Medication 10 ML: at 22:09

## 2021-11-13 RX ADMIN — ENOXAPARIN SODIUM 40 MG: 100 INJECTION SUBCUTANEOUS at 08:29

## 2021-11-13 NOTE — PROGRESS NOTES
Spiritual Care Assessment/Progress Note  [address]      NAME: Samantha Chance      MRN: [id number]  AGE: 49 y.o. SEX: male  Sabianist Affiliation: Mandaeism   Language: English     11/13/2021     Total Time (in minutes): 31     Spiritual Assessment begun in [facility] 5M1 MED SURG 1 through conversation with:         [x]Patient        [] Family    [] Friend(s)        Reason for Consult: Palliative Care, Initial/Spiritual Assessment     Spiritual beliefs: (Please include comment if needed)     [x] Identifies with a savi tradition:         [] Supported by a savi community:            [] Claims no spiritual orientation:           [] Seeking spiritual identity:                [] Adheres to an individual form of spirituality:           [] Not able to assess:                           Identified resources for coping:      [x] Prayer                               [] Music                  [] Guided Imagery     [x] Family/friends                 [] Pet visits     [] Devotional reading                         [] Unknown     [] Other:                                        Interventions offered during this visit: (See comments for more details)    Patient Interventions: Catharsis/review of pertinent events in supportive environment           Plan of Care:     [] Support spiritual and/or cultural needs    [] Support AMD and/or advance care planning process      [] Support grieving process   [] Coordinate Rites and/or Rituals    [] Coordination with community clergy   [] No spiritual needs identified at this time   [] Detailed Plan of Care below (See Comments)  [] Make referral to Music Therapy  [] Make referral to Pet Therapy     [] Make referral to Addiction services  [] Make referral to [facility]  [] Make referral to Spiritual Care Partner  [] No future visits requested        [x] Contact Spiritual Care for further referrals     Comments:  visited Mr. Aaron Zamarripa for a palliative care initial spiritual assessment on the [facility] Surgical Unit. Mr. Carlos Ruvalcaba was awake, alert but appeared to have some confusion, and was lying in bed. He made good eye contact and greeted the  with a smile. He shared that he was doing great, had just gotten done with a test, and felt good.  continued to be a listening ear as Mr. Carlos Ruvalcaba engaged in storytelling, speaking about the support he has from his sisters and parents, joyfully shared about his son, and shared other stories with the  as well. He shared that prayer is very important to him, he likes to pray for his family's safety. As our conversation was coming to a close, Mr. Carlos Ruvalcaba appeared to become confused.  provided reassurance. Consulted with his nurse. 's are available for further support upon referral  Ronal Rowe. Bharat Willams.      Paging Service: [phone])

## 2021-11-13 NOTE — PROGRESS NOTES
Jarett Benavides [facility]  [address], [place], [address] [facility]  [phone]      Medical Progress Note      NAME: Jaswinder Parisi   :  1965  MRM:  [id number]    Date/Time: 2021  1:04 PM           Assessment / Plan:     #FTT/Falls: MS exacerbation seems less likely in absence of new lesions. Appreciate Neurology recs in this regard. Pt's family has concern for self-neglect and there is an APS case open. Pain seems to be a driving factor for this patient              - PT/OT              - Thoracic MRI pending              - CM assisting with APS case   - Will have Palliative assist with advance directive, sx     #MS: As above; received 1g methylpred in ER. - Appreciate Neuro recs    #Leukocytosis: UA neg, lungs clear. Some skin breakdown but no obvious cellulitis   - Monitor for now     #Rhabdo: Mild, had been down for several hours PTA. CK downtrending.               - Stop IVF     #Back Pain w/ opiate use: Pharmacy reports only med he has been filling has been oxy 15mg BID prn.              - UTox neg for oxy, will need to discuss further with pt              - Did continue oxy 15mg BID                 Care Plan discussed with: Patient    Discussed:  Care Plan    Prophylaxis:  Lovenox    Disposition:  SNF/LTC           ___________________________________________________    Attending Physician: Julio Turner MD        Subjective:     Chief ComplaintNewman Cork, he feels better today; still weak. Wants chocolate milk    ROS:  (bold if positive, if negative)    Tolerating PT  Tolerating Diet          Objective:       Vitals:          Last 24hrs VS reviewed since prior progress note.  Most recent are:    Visit Vitals  /76 (BP 1 Location: Left upper arm, BP Patient Position: Sitting)   Pulse 64   Temp 97.2 °F (36.2 °C)   Resp 18   Ht 5' 10\" (1.778 m)   Wt 84 kg (185 lb 3 oz)   SpO2 99%   BMI 26.57 kg/m²     SpO2 Readings from Last 6 Encounters:   21 99% 08/12/21 100%   08/12/21 97%   05/18/21 98%   01/15/20 99%   06/07/19 100%            Intake/Output Summary (Last 24 hours) at 11/13/2021 1304  Last data filed at 11/13/2021 0510  Gross per 24 hour   Intake 2627 ml   Output 300 ml   Net 2327 ml          Exam:     Physical Exam:  en:    Frail, chronically ill appearing, nad  HEENT:  Pink conjunctivae, PERRL, hearing intact to voice, moist mucous membranes  Neck:  Supple, without masses, thyroid non-tender  Resp:  No accessory muscle use, clear breath sounds without wheezes rales or rhonchi  Card:   No murmurs, normal S1, S2 without thrills, bruits or peripheral edema  Abd:  Soft, non-tender, non-distended, normoactive bowel sounds are present, no palpable organomegaly and no detectable hernias  Lymph:  No cervical or inguinal adenopathy  Musc:  No cyanosis or clubbing  Skin:   No rashes or ulcers, skin turgor is good  Neuro:  Cranial nerves are grossly intact, diffuse weakness  Psych:  Good insight, oriented to person, place and time, alert       Medications Reviewed: (see below)    Lab Data Reviewed: (see below)    ______________________________________________________________________    Medications:     Current Facility-Administered Medications   Medication Dose Route Frequency    sodium chloride (NS) flush 5-40 mL  5-40 mL IntraVENous Q8H    sodium chloride (NS) flush 5-40 mL  5-40 mL IntraVENous PRN    acetaminophen (TYLENOL) tablet 650 mg  650 mg Oral Q6H PRN    Or    acetaminophen (TYLENOL) suppository 650 mg  650 mg Rectal Q6H PRN    polyethylene glycol (MIRALAX) packet 17 g  17 g Oral DAILY PRN    ondansetron (ZOFRAN ODT) tablet 4 mg  4 mg Oral Q8H PRN    Or    ondansetron (ZOFRAN) injection 4 mg  4 mg IntraVENous Q6H PRN    enoxaparin (LOVENOX) injection 40 mg  40 mg SubCUTAneous DAILY    lactated Ringers infusion  150 mL/hr IntraVENous CONTINUOUS    oxyCODONE IR (ROXICODONE) tablet 15 mg  15 mg Oral Q12H PRN            Lab Review:     Recent Labs     11/13/21  0810 11/12/21  0942   WBC 13.0* 13.2*   HGB 11.9* 13.0   HCT 35.7* 39.7    193     Recent Labs     11/13/21  0810 11/12/21  0942    142   K 3.6 4.3   * 108   CO2 24 26   * 106*   BUN 13 10   CREA 0.70 0.84   CA 9.0 9.1   ALB 2.9* 3.6   ALT 31 19     No components found for: [place]

## 2021-11-13 NOTE — PROGRESS NOTES
OT order received chart reviewed, patient approached when he had just returned from MRI. Had questions for nursing, Deferred OT at this time.  Will follow up as able for OT evaluation  Orval SONI Phillips/BLANKA

## 2021-11-13 NOTE — PROGRESS NOTES
ANA LAURA:   1) Rehab   2) Pt will require insurance auth- will need updated PT/OT notes until obtained   3) MARIA ISABEL screening- will need UAI completed/tracked down from [facility]   4) Palliative Consult- Pain mgmt and AMD   5) Pt will need 2ND IM letter at time of discharge   6) Pt will need [facility] transportation at time of discharge   7) Risk of readmission- 12% LOW- more realistically HIGH due to co morbidities     Hospital Day 2:   12:55 PM- Pt remains on Med. Surg- CM notified by provider pt in need of SNF placement. Pt difficult to understand. CM reached out to his Mom- introduced self and role. Pt's Mom states pt is currently living with his niece Blossom Hernandez in a one level home. Pt's niece works two jobs and is not able to care for pt. Pt's sister Jazmyne Jiménez comes by weekly and fills pt's prescription box. Pt is supposed to care for himself but does not. Pt is not able to provide medications to himself adequatily due to severe pain from MS. CM inquired about Medicaid- pt's Mom states her daughter Jazmyne Jiménez previously applied but pt was denied- unsure why he was denied but states this was this past summer. Pt's Mom agreeable for CM to send referral to [facility] to re-screen for Medicaid undrestands that this will allow caregivers in the home or for pt to go to LTC if needed in the future. Mom did request CM provide contact information for pt's sister Jazmyne Jiménez as she is the one who has access to pt's bank accounts. Floor CM will complete a UAI if needed. CM sent e-mail referral to [address] [facility]. Pt's Mom states pt's sisters Tara Valencia ([phone]) and Gilberto Mcgrath ([phone])) have been trying to get patient to complete AMD so they can help more but pt has been hesitant. Pt does have two adult children- 32 year old son in [place] and 32year old daughter in [place] who are estranged Mom would not provide their contact information as pt does not associate with them.  Mom understands she is LNOK in the event of an emergency and is hopeful pt will complete AMD prior to d/c to designate his sisters so they can help more. Pt's Mom is agreeable for pt to go to rehab- wishes for referrals to be sent to facilities closest to [facility] completed- referrals sent to [place], [address] of [address], waiting on responses. Pt will require insurance authorization which floor CM will initiate once placement is secured, likely will be able to start Monday and obtain by Tuesday. CM spoke with attending regarding a Palliative Consult for pain mgmt and advanced directive planning- attending very much agreeable and will place consult on Monday as they are not available on the weekend. CM updated floor CM who will continue to follow and assist as needed.      Sg Rodriguez, MSW, [address]

## 2021-11-14 PROBLEM — R62.7 FTT (FAILURE TO THRIVE) IN ADULT: Status: ACTIVE | Noted: 2021-11-14

## 2021-11-14 LAB
ALBUMIN SERPL-MCNC: 2.8 G/DL (ref 3.5–5)
ALBUMIN/GLOB SERPL: 0.9 {RATIO} (ref 1.1–2.2)
ALP SERPL-CCNC: 30 U/L (ref 45–117)
ALT SERPL-CCNC: 34 U/L (ref 12–78)
ANION GAP SERPL CALC-SCNC: 6 MMOL/L (ref 5–15)
AST SERPL-CCNC: 38 U/L (ref 15–37)
ATRIAL RATE: 94 BPM
BASOPHILS # BLD: 0 K/UL (ref 0–0.1)
BASOPHILS NFR BLD: 0 % (ref 0–1)
BILIRUB SERPL-MCNC: 0.3 MG/DL (ref 0.2–1)
BUN SERPL-MCNC: 16 MG/DL (ref 6–20)
BUN/CREAT SERPL: 21 (ref 12–20)
CALCIUM SERPL-MCNC: 9 MG/DL (ref 8.5–10.1)
CALCULATED P AXIS, ECG09: 48 DEGREES
CALCULATED R AXIS, ECG10: 24 DEGREES
CALCULATED T AXIS, ECG11: 46 DEGREES
CHLORIDE SERPL-SCNC: 112 MMOL/L (ref 97–108)
CO2 SERPL-SCNC: 26 MMOL/L (ref 21–32)
CREAT SERPL-MCNC: 0.77 MG/DL (ref 0.7–1.3)
DIAGNOSIS, 93000: NORMAL
DIFFERENTIAL METHOD BLD: ABNORMAL
EOSINOPHIL # BLD: 0 K/UL (ref 0–0.4)
EOSINOPHIL NFR BLD: 0 % (ref 0–7)
ERYTHROCYTE [DISTWIDTH] IN BLOOD BY AUTOMATED COUNT: 13.3 % (ref 11.5–14.5)
GLOBULIN SER CALC-MCNC: 3.1 G/DL (ref 2–4)
GLUCOSE SERPL-MCNC: 94 MG/DL (ref 65–100)
HCT VFR BLD AUTO: 34.9 % (ref 36.6–50.3)
HGB BLD-MCNC: 11.5 G/DL (ref 12.1–17)
IMM GRANULOCYTES # BLD AUTO: 0.1 K/UL (ref 0–0.04)
IMM GRANULOCYTES NFR BLD AUTO: 1 % (ref 0–0.5)
LYMPHOCYTES # BLD: 1.3 K/UL (ref 0.8–3.5)
LYMPHOCYTES NFR BLD: 10 % (ref 12–49)
MCH RBC QN AUTO: 29.5 PG (ref 26–34)
MCHC RBC AUTO-ENTMCNC: 33 G/DL (ref 30–36.5)
MCV RBC AUTO: 89.5 FL (ref 80–99)
MONOCYTES # BLD: 0.9 K/UL (ref 0–1)
MONOCYTES NFR BLD: 7 % (ref 5–13)
NEUTS SEG # BLD: 11.4 K/UL (ref 1.8–8)
NEUTS SEG NFR BLD: 82 % (ref 32–75)
NRBC # BLD: 0 K/UL (ref 0–0.01)
NRBC BLD-RTO: 0 PER 100 WBC
P-R INTERVAL, ECG05: 184 MS
PLATELET # BLD AUTO: 175 K/UL (ref 150–400)
PMV BLD AUTO: 10.1 FL (ref 8.9–12.9)
POTASSIUM SERPL-SCNC: 3.4 MMOL/L (ref 3.5–5.1)
PROT SERPL-MCNC: 5.9 G/DL (ref 6.4–8.2)
Q-T INTERVAL, ECG07: 344 MS
QRS DURATION, ECG06: 94 MS
QTC CALCULATION (BEZET), ECG08: 430 MS
RBC # BLD AUTO: 3.9 M/UL (ref 4.1–5.7)
SODIUM SERPL-SCNC: 144 MMOL/L (ref 136–145)
VENTRICULAR RATE, ECG03: 94 BPM
WBC # BLD AUTO: 13.8 K/UL (ref 4.1–11.1)

## 2021-11-14 PROCEDURE — 80053 COMPREHEN METABOLIC PANEL: CPT

## 2021-11-14 PROCEDURE — 36415 COLL VENOUS BLD VENIPUNCTURE: CPT

## 2021-11-14 PROCEDURE — 96372 THER/PROPH/DIAG INJ SC/IM: CPT

## 2021-11-14 PROCEDURE — 74011250636 HC RX REV CODE- 250/636: Performed by: INTERNAL MEDICINE

## 2021-11-14 PROCEDURE — 85025 COMPLETE CBC W/AUTO DIFF WBC: CPT

## 2021-11-14 PROCEDURE — 74011250637 HC RX REV CODE- 250/637: Performed by: INTERNAL MEDICINE

## 2021-11-14 PROCEDURE — G0378 HOSPITAL OBSERVATION PER HR: HCPCS

## 2021-11-14 PROCEDURE — 99213 OFFICE O/P EST LOW 20 MIN: CPT | Performed by: PSYCHIATRY & NEUROLOGY

## 2021-11-14 PROCEDURE — 65390000012 HC CONDITION CODE 44 OBSERVATION

## 2021-11-14 RX ORDER — FACIAL-BODY WIPES
10 EACH TOPICAL ONCE
Status: COMPLETED | OUTPATIENT
Start: 2021-11-14 | End: 2021-11-14

## 2021-11-14 RX ADMIN — Medication 10 ML: at 13:43

## 2021-11-14 RX ADMIN — ENOXAPARIN SODIUM 40 MG: 100 INJECTION SUBCUTANEOUS at 08:37

## 2021-11-14 RX ADMIN — Medication 10 ML: at 23:14

## 2021-11-14 RX ADMIN — BISACODYL 10 MG: 10 SUPPOSITORY RECTAL at 17:40

## 2021-11-14 RX ADMIN — Medication 10 ML: at 05:45

## 2021-11-14 NOTE — PROGRESS NOTES
Occupational Therapy Note:  Chart reviewed. Attempted OT evaluation however patient declined despite max encouragement. Patient was sleeping, received in bed, requesting to continue resting. He reports being up earlier today and now fatigued. Will continue to follow.   Balbina Corrales, OTR/L

## 2021-11-14 NOTE — PROGRESS NOTES
Jarett [address] [place] [address]  [address] [facility], [address] [place] [address]  [phone]      Medical Progress Note      NAME: Mansi Garcia   :  1965  MRM:  [id number]    Date/Time: 2021        Assessment / Plan:     #FTT/Falls: MS exacerbation seems less likely in absence of new lesions; however, he is still profoundly weak and in an apparent unsafe environment at home. Pt's family has concern for self-neglect and there is an APS case open. Pain seems to be a driving factor for this patient. MRI with spinal stenosis at thoracic spine, moderate, as well as Significant bilateral foraminal narrowing at T9-10 and T10-T11, which is where his pain is predominately located.              - PT/OT rec SNF; agree not safe for home              - CM assisting with APS case   - Will have Palliative assist with advance directive, GOC given his underlying disease process   - Oxy 15mg BID as per home meds; may consider steroid for the above spinal issues though     #MS: As above; received 1g methylpred in ER. - Appreciate Neuro recs    #Leukocytosis: UA neg, lungs clear. Some skin breakdown but no obvious cellulitis   - Monitor for now, still up but no fever     #Rhabdo: Mild, had been down for several hours PTA.  CK downtrending.               - Stop IVF     #Back Pain w/ opiate use: Pharmacy reports only med he has been filling has been oxy 15mg BID prn.              - UTox neg for oxy, discussed with pt and endorses compliance              - Did continue oxy 15mg BID   - Palliative to assist with overal sx/goc                 Care Plan discussed with: Patient    Discussed:  Care Plan    Prophylaxis:  Lovenox    Disposition:  SNF/LTC           ___________________________________________________    Attending Physician: Jason Duncan MD        Subjective:     Chief Complaint:  Maxwell Bumps, feels ok today, back pain is improved; denies cough, sob, abd pain    ROS:  (bold if positive, if negative)    Tolerating PT  Tolerating Diet          Objective:       Vitals:          Last 24hrs VS reviewed since prior progress note.  Most recent are:    Visit Vitals  /75 (BP 1 Location: Left upper arm, BP Patient Position: At rest)   Pulse 63   Temp 98 °F (36.7 °C)   Resp 18   Ht 5' 10\" (1.778 m)   Wt 84 kg (185 lb 3 oz)   SpO2 97%   BMI 26.57 kg/m²     SpO2 Readings from Last 6 Encounters:   11/14/21 97%   08/12/21 100%   08/12/21 97%   05/18/21 98%   01/15/20 99%   06/07/19 100%            Intake/Output Summary (Last 24 hours) at 11/14/2021 1031  Last data filed at 11/14/2021 0317  Gross per 24 hour   Intake 690 ml   Output 1450 ml   Net -760 ml          Exam:     Physical Exam:  en:    Frail, chronically ill appearing, nad  HEENT:  Pink conjunctivae, PERRL, hearing intact to voice, moist mucous membranes  Neck:  Supple, without masses, thyroid non-tender  Resp:  No accessory muscle use, clear breath sounds without wheezes rales or rhonchi  Card:   No murmurs, normal S1, S2 without thrills, bruits or peripheral edema  Abd:  Soft, non-tender, non-distended, normoactive bowel sounds are present, no palpable organomegaly and no detectable hernias  Lymph:  No cervical or inguinal adenopathy  Musc:  No cyanosis or clubbing  Skin:   No rashes or ulcers, skin turgor is good  Neuro:  Cranial nerves are grossly intact, diffuse weakness  Psych:  Good insight, oriented to person, place and time, alert       Medications Reviewed: (see below)    Lab Data Reviewed: (see below)    ______________________________________________________________________    Medications:     Current Facility-Administered Medications   Medication Dose Route Frequency    sodium chloride (NS) flush 5-40 mL  5-40 mL IntraVENous Q8H    sodium chloride (NS) flush 5-40 mL  5-40 mL IntraVENous PRN    acetaminophen (TYLENOL) tablet 650 mg  650 mg Oral Q6H PRN    Or    acetaminophen (TYLENOL) suppository 650 mg  650 mg Rectal Q6H PRN    polyethylene glycol (MIRALAX) packet 17 g  17 g Oral DAILY PRN    ondansetron (ZOFRAN ODT) tablet 4 mg  4 mg Oral Q8H PRN    Or    ondansetron (ZOFRAN) injection 4 mg  4 mg IntraVENous Q6H PRN    enoxaparin (LOVENOX) injection 40 mg  40 mg SubCUTAneous DAILY    oxyCODONE IR (ROXICODONE) tablet 15 mg  15 mg Oral Q12H PRN            Lab Review:     Recent Labs     11/14/21  0440 11/13/21  0810 11/12/21  0942   WBC 13.8* 13.0* 13.2*   HGB 11.5* 11.9* 13.0   HCT 34.9* 35.7* 39.7    175 193     Recent Labs     11/14/21 0440 11/13/21  0810 11/12/21  0942    142 142   K 3.4* 3.6 4.3   * 111* 108   CO2 26 24 26   GLU 94 133* 106*   BUN 16 13 10   CREA 0.77 0.70 0.84   CA 9.0 9.0 9.1   ALB 2.8* 2.9* 3.6   ALT 34 31 19     No components found for: [place]

## 2021-11-14 NOTE — PROGRESS NOTES
ANA LAURA:   1) Rehab vs. Home with Hasmukh Li (RN, PT, OT and SW), [facility] follow up and APS follow up  2) Pt will require insurance auth- will need updated PT/OT notes until obtained   3) MARIA ISABEL screening- will need UAI completed/tracked down from [facility]  4) Palliative Consult- Pain mgmt and AMD   5) Pt will need 2ND IM letter at time of discharge   6) Pt will need [facility] transportation at time of discharge   7) Risk of readmission- 12% LOW- more realistically HIGH due to co morbidities      Hospital Day 2:   3:28 PM- Pt remains on Med. Surg- pending medical stability. CM noted no response from [facility], [address] [facility] or [address]. Once placement is secured floor CM will initiate [employer] auth. Palliative Consult pending as they are not here over the weekend. CM did note pt is not vaccinated- requested PCR test to prevent discharge delays. In the event pt is not able to go to SNF he will need to go home with follow up from [place] services (RN, PT, OT and SW), be established with [facility] and follow up from [address] [place] [address]. Pt will require [facility] at time of discharge regardless of destination. Floor CM updated and will continue to follow and assist as needed.      Prudence Baker, MSJEANE, [address]

## 2021-11-14 NOTE — PROGRESS NOTES
Bedside and Verbal shift change report given to [facility] (oncoming nurse) by Kerin Mortimer. O (offgoing nurse). Report included the following information SBAR, Kardex, Intake/Output, MAR and Recent Results.

## 2021-11-14 NOTE — PROGRESS NOTES
Neurology - Inpatient Progress Note     Name:   Robert Yung  MRN:    [id number]  [address] [facility] Date:   11/12/2021    Follow up:   Hx MS; increased generalized weakness, fall, rhabdo    Interval History     MRI T-spine done yesterday 11-13-21; per report: Subtle areas of increased signal within the cord ventrally at T6 and posterior to T9 not significantly changed. No new lesions or abnormal enhancement. Multiple small protrusions. Prominent facet arthropathy at T9-T10 with thickening of the ligamentum flavum. Canal stenosis is mild to moderate at this level with dorsal indentation of the thecal sac. Significant bilateral foraminal narrowing at T9-10 and T10-T11 Given differences in technique not significantly changed. No significant canal or foraminal narrowing. Paraspinal soft tissues are unremarkable. IMPRESSION:  1. Subtle areas of cord signal abnormality not significantly progressed in the interval. No new cord lesions or abnormal enhancement.   2. Degenerative changes with prominent facet arthropathy at T9-10 and [id number] interval progression    Pt resting in bed, awake, alert  D/w him that MRI T-spine does not show a cause of his recent fall/ generalized weakness      Brief ROS: As noted above         No Known Allergies    Current Facility-Administered Medications:     sodium chloride (NS) flush 5-40 mL, 5-40 mL, IntraVENous, Q8H, Gray Singh MD, 10 mL at 11/14/21 0545    sodium chloride (NS) flush 5-40 mL, 5-40 mL, IntraVENous, PRN, Gray Singh MD    acetaminophen (TYLENOL) tablet 650 mg, 650 mg, Oral, Q6H PRN **OR** acetaminophen (TYLENOL) suppository 650 mg, 650 mg, Rectal, Q6H PRN, Gray Singh MD    polyethylene glycol (MIRALAX) packet 17 g, 17 g, Oral, DAILY PRN, Gray Singh MD    ondansetron (ZOFRAN ODT) tablet 4 mg, 4 mg, Oral, Q8H PRN **OR** ondansetron (ZOFRAN) injection 4 mg, 4 mg, IntraVENous, Q6H PRN, Gray Singh MD    enoxaparin (LOVENOX) injection 40 mg, 40 mg, SubCUTAneous, DAILY, Maegan Jay MD, 40 mg at 11/14/21 1760    oxyCODONE IR (ROXICODONE) tablet 15 mg, 15 mg, Oral, Q12H PRN, Maegan Jay MD, 15 mg at 11/12/21 1254      PMHx: has a past medical history of Back pain (10/8/2010), Back pain (10/8/2010), Depression, Diabetes ([facility] Fatigue (8/24/2012), Hearing loss (1/25/2013), Memory loss (1/25/2013), [facility] sclerosis) ([facility] Muscle pain, Muscle weakness, Poor appetite, Snoring, Unintentional weight change, and Visual disturbance. PSHx: has a past surgical history that includes hx heent (Right). Impression / Plan       ICD-10-CM ICD-9-CM   1. Fall, initial encounter  [address] XXXA E888.9   2. Traumatic rhabdomyolysis, initial encounter ([facility]  T79. 6XXA 958.6   3. Multiple sclerosis exacerbation ([facility]  G35 340   4. MS (multiple sclerosis) ([facility]  G35 340   5. Thoracic spondylosis with cord compression  M47.14 721.41   6. Bilateral leg weakness  R29.898 729.89     49 y.o. male with hx of paraplegia due to [address], couldn't get up. Rhabdomyolysis.     MRI Brain, C-spine, T-spine don't show any new/ active lesions to suggest etiology is MS Flare  No severe cervical or thoracic canal stenosis to cause increased weakness    Continue supportive care  No additional inpatient neurology workup is planned  Will s/o  Pt to f/u with Dr Samuel Fabian outpatient Neurologist          Signed By: Daniel Perez MD     November 14, 2021

## 2021-11-15 LAB
COVID-19 RAPID TEST, COVR: NOT DETECTED
SOURCE, COVRS: NORMAL

## 2021-11-15 PROCEDURE — G0378 HOSPITAL OBSERVATION PER HR: HCPCS

## 2021-11-15 PROCEDURE — 97116 GAIT TRAINING THERAPY: CPT

## 2021-11-15 PROCEDURE — 96372 THER/PROPH/DIAG INJ SC/IM: CPT

## 2021-11-15 PROCEDURE — 97530 THERAPEUTIC ACTIVITIES: CPT

## 2021-11-15 PROCEDURE — 99223 1ST HOSP IP/OBS HIGH 75: CPT | Performed by: NURSE PRACTITIONER

## 2021-11-15 PROCEDURE — 74011250637 HC RX REV CODE- 250/637: Performed by: INTERNAL MEDICINE

## 2021-11-15 PROCEDURE — 87635 SARS-COV-2 COVID-19 AMP PRB: CPT

## 2021-11-15 PROCEDURE — 97165 OT EVAL LOW COMPLEX 30 MIN: CPT

## 2021-11-15 PROCEDURE — 97535 SELF CARE MNGMENT TRAINING: CPT

## 2021-11-15 PROCEDURE — 74011250636 HC RX REV CODE- 250/636: Performed by: INTERNAL MEDICINE

## 2021-11-15 RX ORDER — POTASSIUM CHLORIDE 750 MG/1
40 TABLET, FILM COATED, EXTENDED RELEASE ORAL
Status: COMPLETED | OUTPATIENT
Start: 2021-11-15 | End: 2021-11-15

## 2021-11-15 RX ORDER — AMOXICILLIN 250 MG
1 CAPSULE ORAL
Status: DISCONTINUED | OUTPATIENT
Start: 2021-11-15 | End: 2021-11-15

## 2021-11-15 RX ORDER — AMOXICILLIN 250 MG
1 CAPSULE ORAL 2 TIMES DAILY
Status: DISCONTINUED | OUTPATIENT
Start: 2021-11-15 | End: 2021-11-17 | Stop reason: HOSPADM

## 2021-11-15 RX ADMIN — Medication 5 ML: at 22:00

## 2021-11-15 RX ADMIN — POTASSIUM CHLORIDE 40 MEQ: 750 TABLET, FILM COATED, EXTENDED RELEASE ORAL at 10:35

## 2021-11-15 RX ADMIN — Medication 10 ML: at 17:36

## 2021-11-15 RX ADMIN — DOCUSATE SODIUM 50MG AND SENNOSIDES 8.6MG 1 TABLET: 8.6; 5 TABLET, FILM COATED ORAL at 21:13

## 2021-11-15 RX ADMIN — ENOXAPARIN SODIUM 40 MG: 100 INJECTION SUBCUTANEOUS at 10:35

## 2021-11-15 RX ADMIN — Medication 10 ML: at 06:27

## 2021-11-15 NOTE — PROGRESS NOTES
Bedside and Verbal shift change report given to TERE Lynch (oncoming nurse) by Randall VELAZQUEZ (offgoing nurse). Report included the following information SBAR, Kardex, Intake/Output, MAR and Recent Results.

## 2021-11-15 NOTE — PROGRESS NOTES
Problem: Mobility Impaired (Adult and Pediatric)  Goal: *Acute Goals and Plan of Care (Insert Text)  Note:   PHYSICAL THERAPY TREATMENT  Patient: [place] (81 y.o. male)  Date: 11/15/2021  Diagnosis: Multiple sclerosis exacerbation ([facility] [G35]  FTT (failure to thrive) in adult [R62.7]   <principal problem not specified>       Precautions: Fall  Chart, physical therapy assessment, plan of care and goals were reviewed. ASSESSMENT  Patient continues with skilled PT services and is progressing towards goals. Patient received sitting at edge finishing OT session. Patient still with increased spasticity and with upright activity decreased movements due to this. Poor foot clearance, increased anterior displacement of RW and poor turning seqeuncing with LE outside base of support of walker. Patient returned to sitting up in chair. MIN A overall with ambulation due to gait deficits. .     Current Level of Function Impacting Discharge (mobility/balance): MIN A with RW     Other factors to consider for discharge:          PLAN :  Patient continues to benefit from skilled intervention to address the above impairments. Continue treatment per established plan of care. to address goals.     Recommendation for discharge: (in order for the patient to meet his/her long term goals)  Therapy up to 5 days/week in rehab setting    This discharge recommendation:  A follow-up discussion with the attending provider and/or case management is planned    IF patient discharges home will need the following DME: to be determined (TBD)       SUBJECTIVE:   Patient stated .    OBJECTIVE DATA SUMMARY:   Critical Behavior:  Neurologic State: Alert  Orientation Level: Oriented to person, Oriented to time, Oriented to situation, Oriented to place  Cognition: Follows commands     Functional Mobility Training:  Bed Mobility:     Supine to Sit: Supervision; Bed Modified              Transfers:  Sit to Stand: Contact guard assistance  Stand to Sit: Contact guard assistance        Bed to Chair: Minimum assistance                    Balance:  Sitting: Intact; Without support  Standing: Impaired; Without support  Standing - Static: Constant support; Fair  Standing - Dynamic : Constant support; Fair  Ambulation/Gait Training:  Distance (ft): 35 Feet (ft)  Assistive Device: Walker, rolling; Gait belt  Ambulation - Level of Assistance: Minimal assistance; Assist x1; Additional time        Gait Abnormalities: Decreased step clearance; Step to gait; Shuffling gait; Path deviations   Poor RW management, requires constant verbal cuing for management and for incresaed step length due to poor LE clearance  Fluctuations in alicia noted        Pain Rating:  None reported    Activity Tolerance:   Fair    After treatment patient left in no apparent distress:   Sitting in chair, Call bell within reach, and Bed / chair alarm activated    COMMUNICATION/COLLABORATION:   The patients plan of care was discussed with: Registered nurse.      Facundo Donohue PT, DPT   Time Calculation: 23 mins

## 2021-11-15 NOTE — ACP (ADVANCE CARE PLANNING)
Primary Decision Maker: [facility] - Parent - [phone]  Secondary Decision Maker: Jose Ramon Barton - Sister - [phone]  Supplemental (Other) Decision Maker: Mervin Rhodes - Sister - [phone]  Advance Care Planning 11/15/2021   Patient's Healthcare Decision Maker is: Named in scanned ACP document   Confirm Advance Directive Yes, on file   Patient Would Like to Complete Advance Directive Completed 11/15/2021     Pt had not previously completed an AMD, was receptive to discussion today. Did not complete Health Care Instructions section of form, as pt indicated this is not a matter he has given thought to in the past or was prepared to make decisions about today, indicated that he would trust his mother to make decisions about his care in the event of a medical emergency/critical situation. Pt completed Health Care Decision Maker section of form, appointed mother Radha Salinas, sister Clifford Cardoza, and sister Verónica Mendez as surrogate decision makers in respective order. Original and copies of AMD were returned to pt, copy was placed on chart to be scanned into medical record. Pt remains full code at this time.

## 2021-11-15 NOTE — CONSULTS
Palliative Medicine Consult  Cory: [id number] (2110)    Patient Name: Johnathan Ledbetter  YOB: 1965    Date of Initial Consult: 11/15/2021  Reason for Consult: Care decisions  Requesting Provider: Dr. Aminah Cabrera   Primary Care Physician: Helio Sanchez MD     SUMMARY:   Johnathan Ledbetter is a 49 y. o. with a past history of  MS, Chronic back pain, Polyneuropathy, Cervical myelopathy, Falls, Thoracic T9-10 cord compression, DM, Depression and tobacco use, who was admitted on 11/12/2021 from Home with a diagnosis of Possible MS exacerbation and Mild Rhabdomyolysis 2/2 Fall. Patient presented to ER w/ report of worsening back pain after family found him on bathroom floor for unknown period of time. Current medical issues leading to Palliative Medicine involvement include: GOC discussiong     PALLIATIVE DIAGNOSES:   1. Palliative care encounter  2. Debility  3. Pain  4. Constipation  5. Advanced care planning Discussion  6. Goals of care discussion       PLAN:   1. Prior to visit completed chart review. 2. Palliative LCSW Martine and I met with patient, no family at bedside. 3. Patient awake, alert, very pleasant, conversant. 4. Pain- Chronic, controlled. Patient denied pain at time of visit, but acknowledged he does have frequent back pain. Patient has order for  Oxycodone IR 15 mg twice daily as needed, has not used any over past 24 hours. This Regimen is same as his home regimen. No need for adjustments to regimen at this time. 5. Constipation- LBM 11/12? Patient reported hx of intermittent constipation 2/2 MS, opioids and decreased activity. Current order for Miralax daily as needed (has not used). He Was given dulcolax suppository yesterday with no results. I gave him prune juice during visit. Will schedule Senna S 1 tab orally every night, starting this everning.    6. Advanced care planning discussion-  Patient completed AMD with us today, designated his mother Jasbir Cai as primary health decision maker, sister Laurie Gaming as secondary and sister Luis Fernando Smith as tertiary. · Patient declined completing the section for health care instructions, stated he has not put much thought into what he would or would not want, stated that his mother Yue Jaime would know what to do. ·  Copy of AMDplaced in EMR, original left with patients belongings. 7. Psychosocial and baseline cognitive and functional baseline assessed, please see Palliative CLSW Sherly Yates note from today for info. 8. Goals of care discussion Plans discharge to SNF. Long Term Plan, patient wants to find an apartment for himself, stated that he currently lives with a cousin. But she is getting  and he needs to move out by [place]. 9. Initial consult note routed to primary continuity provider and/or primary health care team members  10. Communicated plan of care with: Palliative IDTDonis 192 Team, unit CM     GOALS OF CARE / TREATMENT PREFERENCES:     GOALS OF CARE:  Patient/Health Care Proxy Stated Goals: Rehabilitation    TREATMENT PREFERENCES:   Code Status: Full Code    Patient and family's personal goals include: Get his own apartment    Important upcoming milestones or family events: Unknown    The patient identifies the following as important for living well: unknown      Advance Care Planning:  [x] [facility] Interdisciplinary Team has updated the ACP Navigator with Health Care Decision Maker and Patient Capacity      Primary Decision Maker: [facility] - Parent - [phone]    Secondary Decision Maker: Nayeli Parada - Sister - [phone]    Supplemental (Other) Decision Maker:  Huber Proctor - [facility] - [phone]  Advance Care Planning 11/15/2021   Patient's Healthcare Decision Maker is: Named in scanned ACP document   Confirm Advance Directive Yes, on file   Patient Would Like to Complete Advance Directive -       Medical Interventions: Full interventions Other:    As far as possible, the palliative care team has discussed with patient / health care proxy about goals of care / treatment preferences for patient. HISTORY:     History obtained from: medical records/patient    CHIEF COMPLAINT: Denied    HPI/SUBJECTIVE:    The patient is:   [x] Verbal and participatory  [] Non-participatory due to:   Patient denied having any complaints, denied pain at time of visit, though acknowledged chronic back pain, reported that he has become progressively weaker over past year, can still ambulate with walker in apartment, but requires wheel chair outside of apartment,     Clinical Pain Assessment (nonverbal scale for severity on nonverbal patients):   Clinical Pain Assessment  Severity: 0          Duration: for how long has pt been experiencing pain (e.g., 2 days, 1 month, years)  Frequency: how often pain is an issue (e.g., several times per day, once every few days, constant)     FUNCTIONAL ASSESSMENT:     Palliative Performance Scale (PPS):  PPS: 40       PSYCHOSOCIAL/SPIRITUAL SCREENING:     Palliative IDT has assessed this patient for cultural preferences / practices and a referral made as appropriate to needs (Cultural Services, Patient Advocacy, Ethics, etc.)    Any spiritual / Religion concerns:  [] Yes /  [x] No    Caregiver Burnout:  [] Yes /  [x] No /  [x] No Caregiver Present      Anticipatory grief assessment:   [x] Normal  / [] Maladaptive       ESAS Anxiety: Anxiety: 0    ESAS Depression: Depression: 0        REVIEW OF SYSTEMS:     Positive and pertinent negative findings in ROS are noted above in HPI. The following systems were [x] reviewed / [] unable to be reviewed as noted in HPI  Other findings are noted below. Systems: constitutional, ears/nose/mouth/throat, respiratory, gastrointestinal, genitourinary, musculoskeletal, integumentary, neurologic, psychiatric, endocrine. Positive findings noted below.   Modified ESAS Completed by: provider Fatigue: 4 Drowsiness: 0   Depression: 0 Pain: 0   Anxiety: 0 Nausea: 0   Anorexia: 0 Dyspnea: 0     Constipation: Yes     Stool Occurrence(s): 0        PHYSICAL EXAM:     From RN flowsheet:  Wt Readings from Last 3 Encounters:   11/13/21 185 lb 3 oz (84 kg)   08/12/21 194 lb (88 kg)   08/12/21 194 lb (88 kg)     Blood pressure 120/64, pulse 72, temperature 97.4 °F (36.3 °C), resp. rate 20, height 5' 10\" (1.778 m), weight 185 lb 3 oz (84 kg), SpO2 93 %. Pain Scale 1: Numeric (0 - 10)  Pain Intensity 1: 0                 Last bowel movement, if known:     Constitutional: appears stated age, adequately nourished, pleasant, conversant, NAD  Eyes: pupils equal, anicteric  ENMT: no nasal discharge, moist mucous membranes  Cardiovascular: regular rhythm, distal pulses intact  Respiratory: breathing not labored, symmetric  Gastrointestinal: soft non-tender, +bowel sounds  Musculoskeletal: no deformity, no tenderness to palpation  Skin: warm, dry  Neurologic: Alert and oriented, following commands, moving all extremities, does have impaired function of hands/fingers, has intermittent tremor  in UE.    Psychiatric: full affect, no hallucinations  Other:       HISTORY:     Active Problems:    Multiple sclerosis exacerbation ([address] (11/12/2021)      FTT (failure to thrive) in adult (11/14/2021)      Past Medical History:   Diagnosis Date    Back pain 10/8/2010    Back pain 10/8/2010    Depression     Diabetes ([address]     Fatigue 8/24/2012    Hearing loss 1/25/2013    Memory loss 1/25/2013    [facility]     Muscle pain     Muscle weakness     Poor appetite     Snoring     Unintentional weight change     Visual disturbance       Past Surgical History:   Procedure Laterality Date    HX HEENT Right     ear      Family History   Problem Relation Age of Onset    Hypertension Father     Cancer Father         prostate    Hypertension Sister     Diabetes Maternal Grandmother     Cancer Paternal Uncle         prostate    Dementia Paternal Uncle       History reviewed, no pertinent family history. Social History     Tobacco Use    Smoking status: Current Every Day Smoker     Packs/day: 0.50     Years: 21.00     Pack years: 10.50     Types: Cigarettes    Smokeless tobacco: Never Used   Substance Use Topics    Alcohol use: No     No Known Allergies   Current Facility-Administered Medications   Medication Dose Route Frequency    senna-docusate (PERICOLACE) 8.6-50 mg per tablet 1 Tablet  1 Tablet Oral QHS    sodium chloride (NS) flush 5-40 mL  5-40 mL IntraVENous Q8H    sodium chloride (NS) flush 5-40 mL  5-40 mL IntraVENous PRN    acetaminophen (TYLENOL) tablet 650 mg  650 mg Oral Q6H PRN    Or    acetaminophen (TYLENOL) suppository 650 mg  650 mg Rectal Q6H PRN    polyethylene glycol (MIRALAX) packet 17 g  17 g Oral DAILY PRN    ondansetron (ZOFRAN ODT) tablet 4 mg  4 mg Oral Q8H PRN    Or    ondansetron (ZOFRAN) injection 4 mg  4 mg IntraVENous Q6H PRN    enoxaparin (LOVENOX) injection 40 mg  40 mg SubCUTAneous DAILY    oxyCODONE IR (ROXICODONE) tablet 15 mg  15 mg Oral Q12H PRN          LAB AND IMAGING FINDINGS:     Lab Results   Component Value Date/Time    WBC 13.8 (H) 11/14/2021 04:40 AM    HGB 11.5 (L) 11/14/2021 04:40 AM    PLATELET 000 52/13/7694 04:40 AM     Lab Results   Component Value Date/Time    Sodium 144 11/14/2021 04:40 AM    Potassium 3.4 (L) 11/14/2021 04:40 AM    Chloride 112 (H) 11/14/2021 04:40 AM    CO2 26 11/14/2021 04:40 AM    BUN 16 11/14/2021 04:40 AM    Creatinine 0.77 11/14/2021 04:40 AM    Calcium 9.0 11/14/2021 04:40 AM      Lab Results   Component Value Date/Time    Alk.  phosphatase 30 (L) 11/14/2021 04:40 AM    Protein, total 5.9 (L) 11/14/2021 04:40 AM    Albumin 2.8 (L) 11/14/2021 04:40 AM    Globulin 3.1 11/14/2021 04:40 AM     No results found for: INR, PTMR, PTP, PT1, PT2, APTT, INREXT, INREXT   No results found for: IRON, FE, TIBC, IBCT, PSAT, FERR   No results found for: PH, PCO2, PO2  No components found for: [place]   Lab Results   Component Value Date/Time     (H) 11/13/2021 08:10 AM    CK - MB 16.7 (H) 11/12/2021 09:42 AM                Total time: 70 min  Counseling / coordination time, spent as noted above: 50 min  > 50% counseling / coordination?: yes    Prolonged service was provided for  []30 min   []75 min in face to face time in the presence of the patient, spent as noted above. Time Start:   Time End:   Note: this can only be billed with 92573 (initial) or [id number] (follow up). If multiple start / stop times, list each separately.

## 2021-11-15 NOTE — PROGRESS NOTES
Problem: Self Care Deficits Care Plan (Adult)  Goal: *Therapy Goal (Edit Goal, Insert Text)  Description: FUNCTIONAL STATUS PRIOR TO ADMISSION: Patient was modified independent during ADL tasks and using a upright rollator for functional mobility. HOME SUPPORT: The patient lived with cousin and did not require assistance for basic ADL. Occupational Therapy  Initiated 11/15/2021  1. Patient will perform grooming in stand with modified independence within 7 day(s). 2.  Patient will perform bathing with modified independence within 7 day(s). 3.  Patient will perform lower body dressing with modified independence within 7 day(s). 4.  Patient will perform toilet transfers with modified independence within 7 day(s). 5.  Patient will perform all aspects of toileting with modified independence within 7 day(s). 6.  Patient will participate in upper extremity therapeutic exercise/activities with modified independence for 10 minutes within 7 day(s). 7.  Patient will utilize energy conservation techniques during functional activities with verbal cues within 7 day(s).    '    Outcome: Not Met   OCCUPATIONAL THERAPY EVALUATION  Patient: Mansi Garcia (72 y.o. male)  Date: 11/15/2021  Primary Diagnosis: Multiple sclerosis exacerbation ([facility] [G35]  FTT (failure to thrive) in adult [R62.7]        Precautions:   Fall    ASSESSMENT  Based on the objective data described below, the patient presents with decline in ADL performance due to general weakness, impaired standing tolerance, impaired standing balance. Some balance impairments noted at baseline due to MS. Patient however has recent history of increased falls. Rehab services prior to return home recommended to maximize ADL independence     Current Level of Function Impacting Discharge (ADLs/self-care): generally minimal assistance during ADL tasks in standing due to impaired balance    Functional Outcome Measure:   The patient scored Total: 55/100 on the Barthel Index outcome measure which is indicative of being partial dependence in basic self-care. Other factors to consider for discharge:      Patient will benefit from skilled therapy intervention to address the above noted impairments. PLAN :  Recommendations and Planned Interventions: self care training, functional mobility training, therapeutic exercise, balance training, therapeutic activities, endurance activities, neuromuscular re-education, patient education, home safety training, and family training/education    Frequency/Duration: Patient will be followed by occupational therapy 4 times a week to address goals.     Recommendation for discharge: (in order for the patient to meet his/her long term goals)  Therapy up to 5 days/week in rehab setting    This discharge recommendation:  Has been made in collaboration with the attending provider and/or case management    IF patient discharges home will need the following DME: patient owns DME required for discharge       SUBJECTIVE:   Patient pleasant and cooperative    OBJECTIVE DATA SUMMARY:   HISTORY:   Past Medical History:   Diagnosis Date    Back pain 10/8/2010    Back pain 10/8/2010    Depression     Diabetes ([address]     Fatigue 8/24/2012    Hearing loss 1/25/2013    Memory loss 1/25/2013    MS (multiple sclerosis) ([address]     Muscle pain     Muscle weakness     Poor appetite     Snoring     Unintentional weight change     Visual disturbance      Past Surgical History:   Procedure Laterality Date    HX HEENT Right     ear       Expanded or extensive additional review of patient history:     Home Situation  Home Environment: Apartment  # Steps to Enter: 1  One/Two Story Residence: One story  Living Alone: No  Support Systems: Other Family Member(s)  Current DME Used/Available at Home: Walker, rollator, Grab bars, Shower chair, Wheelchair, Wheelchair, power (upright rollator; adjustable bed)  Tub or Shower Type: Tub/Shower combination    Hand dominance: Right    EXAMINATION OF PERFORMANCE DEFICITS:  Cognitive/Behavioral Status:  Neurologic State: Alert  Orientation Level: Oriented to person; Oriented to time; Oriented to situation; Oriented to place  Cognition: Follows commands      Vision/Perceptual:                                Corrective Lenses: Glasses    Range of Motion:    AROM: Within functional limits         Strength:    Strength: Generally decreased, functional                Coordination:  Coordination: Generally decreased, functional  Fine Motor Skills-Upper: Left Intact; Right Intact    Gross Motor Skills-Upper: Left Intact; Right Intact    Tone & Sensation:          Balance:  Sitting: Intact; Without support  Standing: Impaired; Without support  Standing - Static: Constant support; Fair  Standing - Dynamic : Constant support; Fair    Functional Mobility and Transfers for ADLs:  Bed Mobility:  Supine to Sit: Supervision; Bed Modified    Transfers:  Sit to Stand: Contact guard assistance  Stand to Sit: Contact guard assistance  Bed to Chair: Minimum assistance  Bathroom Mobility: Minimum assistance  Assistive Device : Walker, rolling    ADL Assessment:  Feeding: Independent    Oral Facial Hygiene/Grooming: Supervision; Setup    Bathing: Contact guard assistance    Upper Body Dressing: Setup    Lower Body Dressing: Minimum assistance    Toileting: Minimum assistance                ADL Intervention and task modifications:        Patient was educated on the benefits of maintaining activity tolerance, functional mobility, and independence with self care tasks during acute stay. Encouraged patient to be out of bed for all meals, perform daily ADLs (as approved by RN/MD regarding bathing etc), performing functional mobility to/from bathroom with assistance, and increasing time OOB daily. Patient educated about the importance of maintaining activity tolerance to ensure safe return home and to baseline.  Patient verbalized understanding of education. Functional Measure:    Barthel Index:  Bathin  Bladder: 10  Bowels: 10  Groomin  Dressin  Feeding: 10  Mobility: 0  Stairs: 0  Toilet Use: 5  Transfer (Bed to Chair and Back): 10  Total: 55/100      The Barthel ADL Index: Guidelines  1. The index should be used as a record of what a patient does, not as a record of what a patient could do. 2. The main aim is to establish degree of independence from any help, physical or verbal, however minor and for whatever reason. 3. The need for supervision renders the patient not independent. 4. A patient's performance should be established using the best available evidence. Asking the patient, friends/relatives and nurses are the usual sources, but direct observation and common sense are also important. However direct testing is not needed. 5. Usually the patient's performance over the preceding 24-48 hours is important, but occasionally longer periods will be relevant. 6. Middle categories imply that the patient supplies over 50 per cent of the effort. 7. Use of aids to be independent is allowed. Score Interpretation (from [address] [address])    Independent   60-79 Minimally independent   40-59 Partially dependent   20-39 Very dependent   <20 Totally dependent     -Lor Waters., Barthel, D.W. (1965). Functional evaluation: the Barthel Index. [facility] ([address] (1997). The Barthel activities of daily living index: self-reporting versus actual performance in the old (> or = 75 years). Journal of [address] [place] [address] 45(7), [facility], J.J.M.F, Andrew Mckee, Lanre Rich. (1999). Measuring the change in disability after inpatient rehabilitation; comparison of the responsiveness of the Barthel Index and Functional [place] Measure. Journal of Neurology, Neurosurgery, and Psychiatry, 66(4), 418-181.   BRINDA Padilla Caro, ALLYSON IrbyJLOPEZ, & Laura Zhou M.A. (2004) Assessment of post-stroke quality of life in cost-effectiveness studies: The usefulness of the Barthel Index and the EuroQoL-5D. Quality of Life Research, 15, 121-16     Occupational Therapy Evaluation Charge Determination   History Examination Decision-Making   MEDIUM Complexity : Expanded review of history including physical, cognitive and psychosocial  history  LOW Complexity : 1-3 performance deficits relating to physical, cognitive , or psychosocial skils that result in activity limitations and / or participation restrictions  LOW Complexity : No comorbidities that affect functional and no verbal or physical assistance needed to complete eval tasks       Based on the above components, the patient evaluation is determined to be of the following complexity level: LOW   Pain Rating:  None reported     Activity Tolerance:   Fair and requires rest breaks    After treatment patient left in no apparent distress:    Hand off to Physical Therapist    COMMUNICATION/EDUCATION:   The patients plan of care was discussed with: Physical therapist and Registered nurse. Home safety education was provided and the patient/caregiver indicated understanding. and Patient/family have participated as able in goal setting and plan of care. This patients plan of care is appropriate for delegation to [facility].     Thank you for this referral.  Radha Paez OT  Time Calculation: 27 mins

## 2021-11-15 NOTE — PROGRESS NOTES
Jarett Rader [place] [address]  [address] [facility], [address] [place] [address]  [phone]      Medical Progress Note      NAME: Alessandra Andino   :  1965  MRM:  [id number]    Date/Time: 11/15/2021        Assessment / Plan:     #FTT/Falls: MS exacerbation seems less likely in absence of new lesions; however, he is still profoundly weak and in an apparent unsafe environment at home. Pt's family has concern for self-neglect and there is an APS case open. Pain seems to be a driving factor for this patient. MRI with spinal stenosis at thoracic spine, moderate, as well as Significant bilateral foraminal narrowing at T9-10 and T10-T11, which is where his pain is predominately located.              - PT/OT rec SNF; agree not safe for home              - CM assisting with APS case   -Palliative assist with advance directive, GOC given his underlying disease process   - Oxy 15mg BID as per home meds;consider steroid PRN for the above spinal issues     #MS: As above; received 1g methylpred in ER. - Appreciate Neuro recs    #Leukocytosis: UA neg, lungs clear. Some skin breakdown but no obvious cellulitis   - Monitor for now, still up but no fever     #Rhabdo: Mild, had been down for several hours PTA.  CK downtrending.               - S/p  IVF     #Back Pain w/ opiate use: Pharmacy reports only med he has been filling has been oxy 15mg BID prn.              - UTox neg for oxy, discussed with pt and endorses compliance              - Did continue oxy 15mg BID   - Palliative to assist with overal sx/goc                 Care Plan discussed with: Patient    Discussed:  Care Plan    Prophylaxis:  Lovenox    Disposition:  SNF/LTC           ___________________________________________________    Attending Physician: Carlyle Parra DO        Subjective:     Chief Complaint:  Sunil Sermons,  back pain still present but improving    ROS:  (bold if positive, if negative)    Tolerating PT  Tolerating Diet Objective:       Vitals:          Last 24hrs VS reviewed since prior progress note.  Most recent are:    Visit Vitals  /64 (BP 1 Location: Right upper arm, BP Patient Position: Sitting)   Pulse 72   Temp 97.4 °F (36.3 °C)   Resp 20   Ht 5' 10\" (1.778 m)   Wt 84 kg (185 lb 3 oz)   SpO2 93%   BMI 26.57 kg/m²     SpO2 Readings from Last 6 Encounters:   11/15/21 93%   08/12/21 100%   08/12/21 97%   05/18/21 98%   01/15/20 99%   06/07/19 100%            Intake/Output Summary (Last 24 hours) at 11/15/2021 1657  Last data filed at 11/15/2021 0945  Gross per 24 hour   Intake 200 ml   Output 1150 ml   Net -950 ml          Exam:     Physical Exam:  en:    Frail, chronically ill appearing, nad  HEENT:  Pink conjunctivae, PERRL, hearing intact to voice, moist mucous membranes  Neck:  Supple, without masses, thyroid non-tender  Resp:  No accessory muscle use, clear breath sounds without wheezes rales or rhonchi  Card:   No murmurs, normal S1, S2 without thrills, bruits or peripheral edema  Abd:  Soft, non-tender, non-distended, normoactive bowel sounds are present  Musc:  No cyanosis or clubbing  Skin:   No rashes or ulcers, skin turgor is good  Neuro:  Cranial nerves are grossly intact, diffuse weakness  Psych:  Good insight, oriented to person, place and time, alert       Medications Reviewed: (see below)    Lab Data Reviewed: (see below)    ______________________________________________________________________    Medications:     Current Facility-Administered Medications   Medication Dose Route Frequency    sodium chloride (NS) flush 5-40 mL  5-40 mL IntraVENous Q8H    sodium chloride (NS) flush 5-40 mL  5-40 mL IntraVENous PRN    acetaminophen (TYLENOL) tablet 650 mg  650 mg Oral Q6H PRN    Or    acetaminophen (TYLENOL) suppository 650 mg  650 mg Rectal Q6H PRN    polyethylene glycol (MIRALAX) packet 17 g  17 g Oral DAILY PRN    ondansetron (ZOFRAN ODT) tablet 4 mg  4 mg Oral Q8H PRN    Or    [facility]) injection 4 mg  4 mg IntraVENous Q6H PRN    enoxaparin (LOVENOX) injection 40 mg  40 mg SubCUTAneous DAILY    oxyCODONE IR (ROXICODONE) tablet 15 mg  15 mg Oral Q12H PRN            Lab Review:     Recent Labs     11/14/21  0440 11/13/21  0810   WBC 13.8* 13.0*   HGB 11.5* 11.9*   HCT 34.9* 35.7*    175     Recent Labs     11/14/21 0440 11/13/21  0810    142   K 3.4* 3.6   * 111*   CO2 26 24   GLU 94 133*   BUN 16 13   CREA 0.77 0.70   CA 9.0 9.0   ALB 2.8* 2.9*   ALT 34 31     No components found for: [place]

## 2021-11-15 NOTE — PROGRESS NOTES
Palliative Medicine      Code Status: Full Code    Advance Care Planning:  Advance Care Planning 11/15/2021   Patient's Healthcare Decision Maker is: Named in scanned ACP document   Confirm Advance Directive Yes, on file   Patient Would Like to Complete Advance Directive Completed 11/15/2021       Patient / Family Encounter Documentation    Participants (names):  Pt, Palliative Medicine (NP Yissel, [address])     Narrative:  Pt was awake in bed, no family currently present. Pt's speech was slow/slightly slurred at times but pt was able to carry on lengthy conversation. Pt is not , has two adult children, shared that his son is in the [employer] and was recently , resides in [place]; dtr lives locally. Pt indicated that relationship with his children is not particularly close. Pt's parents are both living; two sisters are local.  Pt previously worked for [employer], was diagnosed with MS in 2012, was later approved for disability. Pt stated he has been living with a cousin for the past 3 yrs but will need to move out when she gets  next month. Pt expressed desire for his \"own space,\" stated his mother is concerned that he needs assistance but pt expressed belief that he would be able to managed with the assistive devices he already has in place and \"[employer]\" for meal delivery (though acknowledged this might prove costly). Pt had not previously completed an AMD, was receptive to discussion today. Did not complete Health Care Instructions section of form, as pt indicated this is not a matter he has given thought to in the past or was prepared to make decisions about today. Pt did, however, complete [address] section of form, appointed mother Curt Pierre, sister Dennis Obrien, and sister Latonya Bruce as surrogate decision makers in respective order. Original and copies were returned to pt, copy was placed on chart to be scanned into medical record.   Pt remains full code at this time, indicated that his mother would make decisions about his care in the event of a medical emergency/critical situation. Psychosocial Issues Identified/ Resilience Factors:  Coping with advanced illness and progressive decline over time. Pt shared that coworkers thought he was faking his disease when he was first diagnosed but shared that he does have close friends and family who have supported him through his journey. Pt clearly relies on his mother and sisters though greatly values his independence. Pt appeared to be in good, lighthearted spirits at time of visit, does have history of anxiety/depression. No spiritual concerns identified at this time;  has made visit, noted prayer to be a source of strength/comfort. Goals of Care / Plan: Medical POA section of AMD was completed on this date. Pt reportedly has open APS case for concerns of self-neglect; Care Manager is assisting with plan for SNF pending insurance auth, pt is aware and in agreement with plan though ultimate goal is to live on his own. Palliative Medicine will plan to reach out to mother to provide update re: completion of AMD.  Will follow for support. Thank you for including Palliative Medicine in the care of Mr. Judy Price.     karlaluisut Heather Martínez LCSW, [facility]-  [phone])

## 2021-11-15 NOTE — PROGRESS NOTES
11/15/2021  Case Management Progress Note    11:15 AM  Patient is 49year old male admitted 11/12 with MS exacerbation  Patient's RUR is 11% green/low risk for readmission  Covid test: none this admission, will need for placement, communicated with nursing. Chart reviewed--patient discussed at IDR rounds   Per MD patient is medically stable for discharge--he has been accepted by the [facility], and I have confirmed with patient's mother that she is okay with this placement. I started auth with Beni just prior to this writing, 11:10am on 11/15. Patient is not vaccinated per notes, so will need a private room available when he does discharge. Noted also that patient will need [facility] transportation. Will continue to follow and assist with discharge planning. Transition of Care Plan   1. Continue medical management/treatment  2. Accepted at the [address] [facility]  3. Auth started 11/15   4. [facility] transportation will be needed at discharge  5.  CM will continue to follow and assist    JUNO Eng

## 2021-11-16 LAB
ALBUMIN SERPL-MCNC: 2.6 G/DL (ref 3.5–5)
ALBUMIN/GLOB SERPL: 0.8 {RATIO} (ref 1.1–2.2)
ALP SERPL-CCNC: 33 U/L (ref 45–117)
ALT SERPL-CCNC: 33 U/L (ref 12–78)
ANION GAP SERPL CALC-SCNC: 5 MMOL/L (ref 5–15)
AST SERPL-CCNC: 28 U/L (ref 15–37)
BASOPHILS # BLD: 0 K/UL (ref 0–0.1)
BASOPHILS NFR BLD: 0 % (ref 0–1)
BILIRUB SERPL-MCNC: 0.4 MG/DL (ref 0.2–1)
BUN SERPL-MCNC: 10 MG/DL (ref 6–20)
BUN/CREAT SERPL: 15 (ref 12–20)
CALCIUM SERPL-MCNC: 8.7 MG/DL (ref 8.5–10.1)
CHLORIDE SERPL-SCNC: 110 MMOL/L (ref 97–108)
CO2 SERPL-SCNC: 29 MMOL/L (ref 21–32)
CREAT SERPL-MCNC: 0.67 MG/DL (ref 0.7–1.3)
DIFFERENTIAL METHOD BLD: NORMAL
EOSINOPHIL # BLD: 0 K/UL (ref 0–0.4)
EOSINOPHIL NFR BLD: 1 % (ref 0–7)
ERYTHROCYTE [DISTWIDTH] IN BLOOD BY AUTOMATED COUNT: 12.9 % (ref 11.5–14.5)
GLOBULIN SER CALC-MCNC: 3.1 G/DL (ref 2–4)
GLUCOSE SERPL-MCNC: 92 MG/DL (ref 65–100)
HCT VFR BLD AUTO: 38.4 % (ref 36.6–50.3)
HGB BLD-MCNC: 12.6 G/DL (ref 12.1–17)
IMM GRANULOCYTES # BLD AUTO: 0 K/UL (ref 0–0.04)
IMM GRANULOCYTES NFR BLD AUTO: 0 % (ref 0–0.5)
LYMPHOCYTES # BLD: 1.3 K/UL (ref 0.8–3.5)
LYMPHOCYTES NFR BLD: 23 % (ref 12–49)
MCH RBC QN AUTO: 29.7 PG (ref 26–34)
MCHC RBC AUTO-ENTMCNC: 32.8 G/DL (ref 30–36.5)
MCV RBC AUTO: 90.6 FL (ref 80–99)
MONOCYTES # BLD: 0.5 K/UL (ref 0–1)
MONOCYTES NFR BLD: 9 % (ref 5–13)
NEUTS SEG # BLD: 3.7 K/UL (ref 1.8–8)
NEUTS SEG NFR BLD: 67 % (ref 32–75)
NRBC # BLD: 0 K/UL (ref 0–0.01)
NRBC BLD-RTO: 0 PER 100 WBC
PLATELET # BLD AUTO: 188 K/UL (ref 150–400)
PMV BLD AUTO: 9.6 FL (ref 8.9–12.9)
POTASSIUM SERPL-SCNC: 3.5 MMOL/L (ref 3.5–5.1)
PROT SERPL-MCNC: 5.7 G/DL (ref 6.4–8.2)
RBC # BLD AUTO: 4.24 M/UL (ref 4.1–5.7)
SODIUM SERPL-SCNC: 144 MMOL/L (ref 136–145)
WBC # BLD AUTO: 5.5 K/UL (ref 4.1–11.1)

## 2021-11-16 PROCEDURE — 97116 GAIT TRAINING THERAPY: CPT

## 2021-11-16 PROCEDURE — 74011250636 HC RX REV CODE- 250/636: Performed by: INTERNAL MEDICINE

## 2021-11-16 PROCEDURE — 36415 COLL VENOUS BLD VENIPUNCTURE: CPT

## 2021-11-16 PROCEDURE — 85025 COMPLETE CBC W/AUTO DIFF WBC: CPT

## 2021-11-16 PROCEDURE — 74011250637 HC RX REV CODE- 250/637: Performed by: INTERNAL MEDICINE

## 2021-11-16 PROCEDURE — 80053 COMPREHEN METABOLIC PANEL: CPT

## 2021-11-16 PROCEDURE — G0378 HOSPITAL OBSERVATION PER HR: HCPCS

## 2021-11-16 PROCEDURE — 96372 THER/PROPH/DIAG INJ SC/IM: CPT

## 2021-11-16 RX ORDER — OXYCODONE HYDROCHLORIDE 15 MG/1
15 TABLET ORAL
Qty: 8 TABLET | Refills: 0 | Status: SHIPPED | OUTPATIENT
Start: 2021-11-16 | End: 2021-11-19

## 2021-11-16 RX ORDER — CLONIDINE HYDROCHLORIDE 0.1 MG/1
0.1 TABLET ORAL
Status: DISCONTINUED | OUTPATIENT
Start: 2021-11-16 | End: 2021-11-17 | Stop reason: HOSPADM

## 2021-11-16 RX ORDER — AMOXICILLIN 250 MG
1 CAPSULE ORAL 2 TIMES DAILY
Qty: 30 TABLET | Refills: 0 | Status: SHIPPED
Start: 2021-11-16

## 2021-11-16 RX ORDER — POLYETHYLENE GLYCOL 3350 17 G/17G
17 POWDER, FOR SOLUTION ORAL DAILY
Qty: 30 PACKET | Refills: 0 | Status: SHIPPED
Start: 2021-11-16

## 2021-11-16 RX ADMIN — Medication 10 ML: at 17:56

## 2021-11-16 RX ADMIN — Medication 10 ML: at 22:33

## 2021-11-16 RX ADMIN — DOCUSATE SODIUM 50MG AND SENNOSIDES 8.6MG 1 TABLET: 8.6; 5 TABLET, FILM COATED ORAL at 17:54

## 2021-11-16 RX ADMIN — DOCUSATE SODIUM 50MG AND SENNOSIDES 8.6MG 1 TABLET: 8.6; 5 TABLET, FILM COATED ORAL at 10:27

## 2021-11-16 RX ADMIN — ENOXAPARIN SODIUM 40 MG: 100 INJECTION SUBCUTANEOUS at 10:27

## 2021-11-16 RX ADMIN — Medication 10 ML: at 06:00

## 2021-11-16 NOTE — PROGRESS NOTES
Music Therapy Assessment  LISA Hare [id number]     1965  49 y.o.  male    Patient Telephone Number: [phone] (home)   Yarsanism Affiliation: Alexsandra Tavares   Language: English   Patient Active Problem List    Diagnosis Date Noted    FTT (failure to thrive) in adult 11/14/2021    Multiple sclerosis exacerbation ([facility] 11/12/2021    Recurrent depression ([facility] 01/04/2018    Paresthesia of both hands 08/28/2015    Mixed incontinence 08/28/2015    Balance problem 08/28/2015    Falls 08/28/2015    Bilateral thigh pain 08/28/2015    Anxiety 06/19/2015    Midline low back pain without sciatica 06/19/2015    Disability examination 02/18/2013    Prediabetes 01/30/2013    Hearing loss 01/25/2013    Memory loss 01/25/2013    Multiple sclerosis ([facility] 01/25/2013    Elevated hemoglobin A1c 08/27/2012    MS (multiple sclerosis) ([facility] 08/24/2012    Fatigue 08/24/2012    Incontinence of urine 08/24/2012    Decreased vision 09/28/2011    Acute bronchitis 09/28/2011    Tobacco abuse 03/18/2011    ED (erectile dysfunction) 03/18/2011    Back pain 10/08/2010    Hypercholesterolemia 10/08/2010    Cellulitis 10/08/2010        Date: 11/16/2021            Total Time (in minutes): 33          SFM 5M1 MED SURG 1    Mental Status:   [x] Alert [  ] Isidore Jennifer [  ]  Confused  [  ] Minimally responsive  [  ] Sleeping    Communication Status: [  ] Impaired Speech [  ] Nonverbal -N/A    Physical Status:   [  ] Oxygen in use  [  ] Hard of Hearing [  ] Vision Impaired  [  ] Ambulatory  [  ] Ambulatory with assistance [  ] Non-ambulatory -N/A    Music Preferences, Background: R&B from the 1980's-2000's, including Pasha Wilkinson. Pt said he records music using his laptop. Clinical Problem addressed: Support self-expression. Goal(s) met in session:  Physical/Pain management (Scale of 1-10):    Pre-session rating: Pt denied pain. Post-session rating: Pt denied pain.    [  ] Increased relaxation   [  ] Affected breathing patterns  [  ] Decreased muscle tension   [  ] Decreased agitation  [  ] Affected heart rate    [  ] Increased alertness     Emotional/Psychological:  [x] Increased self-expression   [  ] Decreased aggressive behavior   [  ] Decreased feelings of stress  [  ] Discussed healthy coping skills     [  ] Improved mood    [  ] Decreased withdrawn behavior     Social:  [  ] Decreased feelings of isolation/loneliness [x] Positive social interaction   [  ] Provided support and/or comfort for family/friends    Spiritual:  [  ] Spiritual support    [  ] Expressed peace  [  ] Expressed savi    [  ] Discussed beliefs    Techniques Utilized (Check all that apply):   [  ] Procedural support MT [  ] Music for relaxation [x] Patient preferred music  [  ] Meron analysis  [  ] Song choice  [x] Music for validation  [  ] Entrainment  [  ] Movement to music [  ] Guided visualization  [  ] Jerelene Coolspring  [  ] Patient instrument playing [  ] Rosey Lowe writing  [  ] Ni Pale along   [  ] Calderon Dingwall  [  ] Sensory stimulation  [x] Active Listening  [  ] Music for spiritual support [  ] Making of CDs as gifts    Session Observations:  Referral from [employer], Palliative . Patient (pt) was alert lying in bed. This music therapist (MT) asked how the pt was feeling and he denied pain. Pt spoke at a slower pace and mildly slurred his words but was comprehendible. MT asked the pt about his music preferences and his music background. Pt increased self-expression in response to these questions as evidenced by (AEB) sharing about these, about his sisters, and then about an event from his life that troubled him. MT provided active listening and sang the Jessi Watters song No More Drama to provide validation through music for what pt shared. Pt expressed enjoyment in the music. He said he wanted quiet to sleep and denied any further needs as MT concluded the session.     Love Pacheco, MT-BC (Music Therapist-Board Certified)  Spiritual Care Department  Referral-based service

## 2021-11-16 NOTE — PROGRESS NOTES
Problem: Mobility Impaired (Adult and Pediatric)  Goal: *Acute Goals and Plan of Care (Insert Text)  Note:   PHYSICAL THERAPY TREATMENT  Patient: Teddy Manzo (86 y.o. male)  Date: 11/16/2021  Diagnosis: Multiple sclerosis exacerbation ([facility] [G35]  FTT (failure to thrive) in adult [R62.7]   <principal problem not specified>       Precautions: Fall  Chart, physical therapy assessment, plan of care and goals were reviewed. ASSESSMENT  Patient continues with skilled PT services and is progressing towards goals. Patient performed repeat sit-stands. Requirs cuing for hand placement and sequencing. Patient then able to increase ambulation distance and ambulate outside the room for total of 65 feet with RW, MIN A for balance and RW management. .     Current Level of Function Impacting Discharge (mobility/balance): MIN A with RW    Other factors to consider for discharge:          PLAN :  Patient continues to benefit from skilled intervention to address the above impairments. Continue treatment per established plan of care. to address goals. Recommendation for discharge: (in order for the patient to meet his/her long term goals)  Therapy up to 5 days/week in rehab setting    This discharge recommendation:  A follow-up discussion with the attending provider and/or case management is planned    IF patient discharges home will need the following DME: to be determined (TBD)       SUBJECTIVE:   Patient stated i am tired today.     OBJECTIVE DATA SUMMARY:   Critical Behavior:  Neurologic State: Alert  Orientation Level: Oriented X4  Cognition: Follows commands     Functional Mobility Training:  Bed Mobility:     Supine to Sit: Minimum assistance  Sit to Supine: Supervision           Transfers:  Sit to Stand: Contact guard assistance  Stand to Sit: Contact guard assistance        Bed to Chair: Minimum assistance                    Balance:     Ambulation/Gait Training:  Distance (ft): 65 Feet (ft)  Assistive Device: Walker, rolling; Gait belt  Ambulation - Level of Assistance: Minimal assistance; Assist x1; Additional time                                Therapeutic Exercises:  5x sit-stand  With hands: 23.74 sec  Without hands: 27.24 sec  Pain Rating:  None reported    Activity Tolerance:   Fair    After treatment patient left in no apparent distress:   Supine in bed, Call bell within reach, and Bed / chair alarm activated    COMMUNICATION/COLLABORATION:   The patients plan of care was discussed with: Registered nurse.      Luz Marina Lilly PT, DPT   Time Calculation: 20 mins

## 2021-11-16 NOTE — PALLIATIVE CARE DISCHARGE
The Palliative Medicine team was consulted as part of your / your loved one's care in the hospital. Our team is a supportive service; we strive to relieve suffering and improve quality of life. You identified the following goal(s) as your main focus for healthcare: Patient/Health Care Proxy Stated Goals: Rehabilitation    We reviewed advance care planning information, which includes the following:  Advance Care Planning 11/15/2021   Patient's Healthcare Decision Maker is: Named in scanned ACP document   Confirm Advance Directive Yes, on file   Patient Would Like to Complete Advance Directive Completed 11/15/2021     We reviewed / discussed your code status as: Full Code     Full Code means perform CPR in the event of cardiac arrest     [facility] means do NOT perform CPR in the event of cardiac arrest     Partial Code means you have specific preferences, please discuss with your health care team     No Order means this issue was not addressed / resolved during your stay    Because of the importance of this information, we are providing you with a printed copy to share with other healthcare providers after this hospitalization is complete. If any of the above information is incomplete or incorrect, please contact the Palliative Medicine team at [phone].

## 2021-11-16 NOTE — PROGRESS NOTES
Jarett Benavides [facility] [address]  [address] [facility], [address] [place] [address]  [phone]      Medical Progress Note      NAME: Reuben Gerard   :  1965  MRM:  [id number]    Date/Time: 2021        Assessment / Plan:     FTT/Falls: MS exacerbation seems less likely in absence of new lesions; however, he is still profoundly weak and in an apparent unsafe environment at home. Pt's family has concern for self-neglect and there is an APS case open. Pain seems to be a driving factor for this patient. MRI with spinal stenosis at thoracic spine, moderate, as well as Significant bilateral foraminal narrowing at T9-10 and T10-T11, which is where his pain is predominately located.              - PT/OT rec SNF; agree not safe for home              - CM assisting with APS case   -Palliative assist with advance directive, GOC given his underlying disease process        MS: As above; received 1g methylpred in ER. Appreciate Neuro recs. MRI brain and C/T spine neg    Leukocytosis: resolved UA neg, lungs clear. Some skin breakdown but no obvious cellulitis        Rhabdo: Mild resovled, had been down for several hours PTA. CK downtrending. S/p  IVF     Back Pain w/ opiate use: Pharmacy reports only med he has been filling has been oxy 15mg BID prn.              - UTox neg for oxy, discussed with pt and endorses compliance              - Did continue oxy 15mg BID   - Palliative to assist with overal sx/goc                   ADFC  Prophylaxis:  Lovenox  Disposition:  SNF/LTC accepted waiting for INS auth           ___________________________________________________         Subjective:     Chief Complaint:  Admitted with falls   Patient seen and examined, chart reviewed. Patient accepted to skilled nursing facility waiting for insurance out. No other acute issues reported by patient or staff at this time. Objective:       Vitals:          Last 24hrs VS reviewed since prior progress note. Most recent are:    Visit Vitals  BP (!) 144/83 (BP 1 Location: Right upper arm, BP Patient Position: At rest)   Pulse 63   Temp 97.3 °F (36.3 °C)   Resp 20   Ht 5' 10\" (1.778 m)   Wt 84 kg (185 lb 3 oz)   SpO2 99%   BMI 26.57 kg/m²     SpO2 Readings from Last 6 Encounters:   11/16/21 99%   08/12/21 100%   08/12/21 97%   05/18/21 98%   01/15/20 99%   06/07/19 100%            Intake/Output Summary (Last 24 hours) at 11/16/2021 0735  Last data filed at 11/15/2021 2009  Gross per 24 hour   Intake 200 ml   Output 940 ml   Net -740 ml          Exam:     Physical Exam:  Gen:   chronically ill appearing,   HEENT:    hearing intact to voice, moist mucous membranes  Neck:  Supple  Resp:  No accessory muscle use  Card:    normal S1, S2  Abd:  Soft, non-tender, non-distended  Musc:  No cyanosis  Skin:   No rashes  Neuro:  diffuse weakness  Psych:  Good insight           ______________________________________________________________________    Medications:     Current Facility-Administered Medications   Medication Dose Route Frequency    senna-docusate (PERICOLACE) 8.6-50 mg per tablet 1 Tablet  1 Tablet Oral BID    sodium chloride (NS) flush 5-40 mL  5-40 mL IntraVENous Q8H    sodium chloride (NS) flush 5-40 mL  5-40 mL IntraVENous PRN    acetaminophen (TYLENOL) tablet 650 mg  650 mg Oral Q6H PRN    Or    acetaminophen (TYLENOL) suppository 650 mg  650 mg Rectal Q6H PRN    polyethylene glycol (MIRALAX) packet 17 g  17 g Oral DAILY PRN    ondansetron (ZOFRAN ODT) tablet 4 mg  4 mg Oral Q8H PRN    Or    ondansetron (ZOFRAN) injection 4 mg  4 mg IntraVENous Q6H PRN    enoxaparin (LOVENOX) injection 40 mg  40 mg SubCUTAneous DAILY    oxyCODONE IR (ROXICODONE) tablet 15 mg  15 mg Oral Q12H PRN            Lab Review:     Recent Labs     11/16/21  0014 11/14/21  0440 11/13/21  0810   WBC 5.5 13.8* 13.0*   HGB 12.6 11.5* 11.9*   HCT 38.4 34.9* 35.7*    175 175     Recent Labs     11/16/21  0014 11/14/21  0440 11/13/21  0810    144 142   K 3.5 3.4* 3.6   * 112* 111*   CO2 29 26 24   GLU 92 94 133*   BUN 10 16 13   CREA 0.67* 0.77 0.70   CA 8.7 9.0 9.0   ALB 2.6* 2.8* 2.9*   ALT 33 34 31     No components found for: [place]

## 2021-11-16 NOTE — PROGRESS NOTES
11/16/2021  Case Management Progress Note    4:33 PM  Auth location changed to the [facility], no other information changes so see auth ID etc below. Per Vinayak Case they should have a bed ready in the morning. Catheryn Leventhal, MSW     2:49 PM  Spoke with Chely Duarte @ the [facility], they will likely not have a private room until early next week. Called Vinayak Douglas at the [address] [facility] and they do have a private room--just need to get him accepted there. Referral faxed, will also call [employer] and change destination. Catheryn Leventhal, MSW    10:40 AM  Patient is 49year old male admitted 11/12 with MS exacerbation  Patient's RUR is 10% green/low risk for readmission  Covid test: none this admission, will need for placement, communicated with nursing. Chart reviewed--patient discussed at [address] [place] [address] for patient this morning, auth ID is [id number] and reference number is [id number]. Spoke with Chely Duarte in admissions at the [facility], because patient is unvaccinated he needs a private room which they do not have available yet as of this morning. Chely Duarte will be calling me back later on today to confirm arrangements. Tena Burnham is good until tomorrow and she is hopeful that she can make that happen. If not, Albina Juares can be extended. Will continue to follow and assist with discharge planning. Transition of Care Plan   1. Continue medical management  2. [employer] auth received  3. Awaiting bed at the [facility] due to vaccinated status  4. Transportation TBD--will review today with PT  5.  CM will continue to follow    Catheryn Leventhal, MSW

## 2021-11-16 NOTE — PROGRESS NOTES
Bedside and Verbal shift change report given to May (oncoming nurse) by [employer] (offgoing nurse). Report included the following information SBAR, Kardex and MAR.

## 2021-11-16 NOTE — DISCHARGE INSTRUCTIONS
Diet regular  Activity slowly advance as tolerated  Strict fall/aspiration/pressure ulcer precautions  Continue current wound care  As needed bladder check, call MD if more than 300 cc  Straight cath as needed if bladder scan more than 350 cc  Check CBC/BMP next week  Return to ER or call PCP immediately if symptoms recur or get worse

## 2021-11-17 VITALS
HEIGHT: 70 IN | TEMPERATURE: 98.8 F | WEIGHT: 185.19 LBS | BODY MASS INDEX: 26.51 KG/M2 | DIASTOLIC BLOOD PRESSURE: 93 MMHG | OXYGEN SATURATION: 100 % | HEART RATE: 73 BPM | RESPIRATION RATE: 18 BRPM | SYSTOLIC BLOOD PRESSURE: 137 MMHG

## 2021-11-17 PROCEDURE — 74011250636 HC RX REV CODE- 250/636: Performed by: INTERNAL MEDICINE

## 2021-11-17 PROCEDURE — 74011250637 HC RX REV CODE- 250/637: Performed by: INTERNAL MEDICINE

## 2021-11-17 PROCEDURE — 96372 THER/PROPH/DIAG INJ SC/IM: CPT

## 2021-11-17 PROCEDURE — G0378 HOSPITAL OBSERVATION PER HR: HCPCS

## 2021-11-17 RX ADMIN — ENOXAPARIN SODIUM 40 MG: 100 INJECTION SUBCUTANEOUS at 09:10

## 2021-11-17 RX ADMIN — DOCUSATE SODIUM 50MG AND SENNOSIDES 8.6MG 1 TABLET: 8.6; 5 TABLET, FILM COATED ORAL at 09:10

## 2021-11-17 RX ADMIN — Medication 10 ML: at 06:19

## 2021-11-17 NOTE — PROGRESS NOTES
Bedside and Verbal shift change report given to Nancy (oncoming nurse) by [employer] (offgoing nurse). Report included the following information SBAR, Kardex and MAR.

## 2021-11-17 NOTE — PROGRESS NOTES
TRANSFER - OUT REPORT:    Verbal report given to Alla(name) on Mariana Kumar  being transferred to [place](unit) for routine progression of care       Report consisted of patients Situation, Background, Assessment and   Recommendations(SBAR). Information from the following report(s) SBAR, Kardex and MAR was reviewed with the receiving nurse. Lines:   Peripheral IV 11/16/21 Right Antecubital (Active)   Site Assessment Clean, dry, & intact 11/17/21 0910   Phlebitis Assessment 0 11/17/21 0910   Infiltration Assessment 0 11/17/21 0910   Dressing Status Clean, dry, & intact 11/17/21 0910   Dressing Type Transparent 11/17/21 0910   Hub Color/Line Status Blue; Patent 11/16/21 2240   Action Taken Open ports on tubing capped 11/16/21 2240   Alcohol Cap Used No 11/16/21 2240        Opportunity for questions and clarification was provided.       Patient transported with:   [employer]

## 2021-11-17 NOTE — PROGRESS NOTES
Palliative Medicine Social Work Note      SW reached out to pt's mother Albino Lopez to inform her that Medical POA section of AMD was completed this week. Pt has original and copies; mother requested copy be mailed to her attention at:  [facility], [address]. Mother had questions re: insurance coverage for facilities, stated pt was denied Medicaid in the past but his sister has resubmitted an application. Mother shared that [facility] had previously made visit to pt and had determined that the home was not an appropriate setting for pt. Mother is aware that staff at [address] [place] [address] can assist family with further navigating Medicaid process. Mother spoke with Care Manager earlier, is now waiting for wheelchair company to contact her to arrange payment for pt's transport today. Thank you for including Palliative Medicine in the care of Mr. Syed Mast.      [address] [place] [address], [facility]  Palliative Medicine:  [phone])

## 2021-11-17 NOTE — PROGRESS NOTES
11/17/2021  Transition of Care Plan to SNF/Rehab    SNF/Rehab Transition:  Patient has been accepted to [facility] and meets criteria for admission. [facility] will transported by [facility] and expected to leave at 4pm .    Communication to Patient/Family:  Met with patient and mother (identified care giver) and they are agreeable to the transition plan. Communication to SNF/Rehab:  Bedside RN, Tammy Amin, has been notified to update the transition plan to the facility and call report (phone number [phone]). Discharge information has been updated on the AVS.     Discharge instructions to be fax'd to facility at [facility] # [phone]). Patient has been identified as part of the [employer]  For Care Coordination associated with that Bundle Program, please contact **  Bundle information has been communication to *. Nursing Please include all hard scripts for controlled substances, med rec and dc summary, and AVS in packet. Reviewed and confirmed with facility, [facility], can manage the patient care needs for the following:     SNF/Rehab Transition:  Patient to follow-up with Home Health:  [facility], other ** ,none)  PCP/Specialist: Patient will follow up with MD at facility and then with PCP    Reviewed and confirmed with facility, [facility]* they can manage the patient care needs for the following:     Bruce Dominguez with (X) only those applicable:    Medication:  []  Medications will be available at the facility  []  IV Antibiotics **  []  Controlled Substance - hard copy to be sent with patient   []  Weekly Labs   Documents:  [] Hard RX  [] MAR  [] Kardex  [] AVS  []Transfer Summary  []Discharge   Equipment:  []  CPAP/BiPAP  []  Wound Vacuum  []  Thurman or Urinary Device  []  PICC/Central Line  []  Nebulizer  []  Ventilator   Treatment:  []Isolation (for MRSA, VRE, etc.)  []Surgical Drain Management  []Tracheostomy Care  []Dressing Changes  []Dialysis with transportation and chair time . []PEG Care  []Oxygen  []Daily Weights for Heart Failure   Dietary:  []Any diet limitations  []Tube Feedings   []Total Parenteral Management (TPN)   Eligible for Medicaid Long Term Services and Supports  Yes:  [] Eligible for medical assistance or will become eligible within 180 days and UAI completed. [x] Provider/Patient and/or support system has requested screening. [] UAI copy provided to patient or responsible party,[facility]. [x] UAI unavailable at discharge will send once processed to SNF provider. [] UAI unavailable at discharged mailed to patient  No:   [] Private pay and is not financially eligible for Medicaid within the next 180 days. [] Reside out-of-state.   [] A residents of a state owned/operated facility that is licensed  by [place] [address] [email or web address] or [facility]  [] Enrollment in [facility] hospice services  [] [address]  [] Patient /Family declines to have screening completed or provide financial information for screening     Financial Resources:  Medicaid    [] Initiated and application pending   [] Full coverage     Advanced Care Plan:  []Surrogate Decision Maker of Care  []POA  [x]Communicated Code Status Full* (DDNR\", \"Full\")    Other     Financial Resources:  Medicaid    [] Initiated and application pending   [] Full coverage     Advanced Care Plan:  []Surrogate Decision Maker of Care  []POA  [x]Communicated Code Status Full* (DDNR\", \"Full\")    Other     JUNO Alvarado

## 2021-11-17 NOTE — PROGRESS NOTES
11/17/2021  Case Management Progress Note    11:46 AM  Patient is 49year old male admitted 11/12 with MS exacerbation  Patient's RUR is 9% green/low risk for readmission  Covid test: negative 11/15  Chart reviewed--patient discussed at IDR rounds   Patient is approved for the [facility], I have spoke with his family and arranged for a wheelchair van through [facility] at 3pm today. Patient's mother will arrange for payment with [facility]. Will continue to follow and update. Transition of Care Plan   1. Continue medical management/treatment  2. Patient to discharge to the [address] [facility] this afternoon  3. [facility] arranged for 3pm   4.  CM will follow, SNF note to come    JUNO Whitfield

## 2021-11-17 NOTE — PROGRESS NOTES
Bedside, Verbal and Written shift change report given to [employer] RN (oncoming nurse) by Jane Glez RN (offgoing nurse). Report included the following information SBAR, Kardex, ED Summary, Procedure Summary, Intake/Output, MAR, Recent Results and Med Rec Status.

## 2021-11-17 NOTE — PROGRESS NOTES
Jarett Benavides [facility] [place] [address]  [address] [facility], [address] [place] [address]  [phone]      Medical Progress Note      NAME: Mariana Kumar   :  1965  MRM:  [id number]    Date/Time: 2021        Assessment / Plan:     FTT/Falls: MS exacerbation seems less likely in absence of new lesions; however, he is still profoundly weak and in an apparent unsafe environment at home. Pt's family has concern for self-neglect and there is an APS case open. Pain seems to be a driving factor for this patient. MRI with spinal stenosis at thoracic spine, moderate, as well as Significant bilateral foraminal narrowing at T9-10 and T10-T11, which is where his pain is predominately located.              - PT/OT rec SNF; agree not safe for home              - CM assisting with APS case              -Palliative assist with advance directive, GOC given his underlying disease process        MS: As above; received 1g methylpred in ER. Appreciate Neuro recs. MRI brain and C/T spine neg     Leukocytosis: resolved UA neg, lungs clear. Some skin breakdown but no obvious cellulitis                   Rhabdo: Mild resovled, had been down for several hours PTA. CK downtrending.  S/p  IVF     Back Pain w/ opiate use: Pharmacy reports only med he has been filling has been oxy 15mg BID prn.              - UTox neg for oxy, discussed with pt and endorses compliance              - Did continue oxy 15mg BID              - Palliative to assist with overal sx/goc                                                                                                                    ADFC  Prophylaxis:  Lovenox  Disposition:  SNF/LTC  if bed available today. Medically stable last 2 days              ___________________________________________________         Subjective:     Chief Complaint:  Admitted with falls   Patient seen and examined, chart reviewed. Patient doing well without any acute complaints.   As per case management bed was not available yesterday and and may go today if bed available. No other acute issues reported to me by staff at this time. Objective:       Vitals:          Last 24hrs VS reviewed since prior progress note.  Most recent are:    Visit Vitals  /85 (BP 1 Location: Left arm, BP Patient Position: At rest)   Pulse 61   Temp 98 °F (36.7 °C)   Resp 18   Ht 5' 10\" (1.778 m)   Wt 84 kg (185 lb 3 oz)   SpO2 99%   BMI 26.57 kg/m²     SpO2 Readings from Last 6 Encounters:   11/17/21 99%   08/12/21 100%   08/12/21 97%   05/18/21 98%   01/15/20 99%   06/07/19 100%            Intake/Output Summary (Last 24 hours) at 11/17/2021 0816  Last data filed at 11/17/2021 0802  Gross per 24 hour   Intake 300 ml   Output 2125 ml   Net -1825 ml          Exam:     Physical Exam:  Gen:   chronically ill appearing,   HEENT:    hearing intact to voice, moist mucous membranes  Neck:  Supple  Resp:  No accessory muscle use  Card:    normal S1, S2  Abd:  Soft, non-tender, non-distended  Musc:  No cyanosis  Skin:   No rashes  Neuro:  diffuse weakness  Psych:  Good insight           ______________________________________________________________________    Medications:     Current Facility-Administered Medications   Medication Dose Route Frequency    cloNIDine HCL (CATAPRES) tablet 0.1 mg  0.1 mg Oral Q6H PRN    senna-docusate (PERICOLACE) 8.6-50 mg per tablet 1 Tablet  1 Tablet Oral BID    sodium chloride (NS) flush 5-40 mL  5-40 mL IntraVENous Q8H    sodium chloride (NS) flush 5-40 mL  5-40 mL IntraVENous PRN    acetaminophen (TYLENOL) tablet 650 mg  650 mg Oral Q6H PRN    Or    acetaminophen (TYLENOL) suppository 650 mg  650 mg Rectal Q6H PRN    polyethylene glycol (MIRALAX) packet 17 g  17 g Oral DAILY PRN    ondansetron (ZOFRAN ODT) tablet 4 mg  4 mg Oral Q8H PRN    Or    ondansetron (ZOFRAN) injection 4 mg  4 mg IntraVENous Q6H PRN    enoxaparin (LOVENOX) injection 40 mg  40 mg SubCUTAneous DAILY    oxyCODONE IR (ROXICODONE) tablet 15 mg  15 mg Oral Q12H PRN            Lab Review:     Recent Labs     11/16/21  0014   WBC 5.5   HGB 12.6   HCT 38.4        Recent Labs     11/16/21  0014      K 3.5   *   CO2 29   GLU 92   BUN 10   CREA 0.67*   CA 8.7   ALB 2.6*   ALT 33     No components found for: [place]

## 2021-11-17 NOTE — DISCHARGE SUMMARY
Hospitalist Discharge Summary     Patient ID:  Sean Gonzalez  [id number]  49 y.o.  1965 11/12/2021    PCP on record: Rodrigo Mccauley MD    Admit date: 11/12/2021  Discharge date and time: 11/17/2021    DISCHARGE DIAGNOSIS:  Falls  Multiple sclerosis  Chronic back pain on narcotics at home  Generalized weakness and FTT adult      CONSULTATIONS:  IP CONSULT TO NEUROLOGY  IP CONSULT TO PALLIATIVE CARE - PROVIDER    Excerpted HPI from H&P of Deangelo Ware MD:  CHIEF COMPLAINT: \"falls\"     HISTORY OF PRESENT ILLNESS:     Hollie Arnold is a 49 y.o. W/ hx MS, chronic pain presents after falling down and being unable to get up. EMS notes bottle of oxycodone near pt from which more pills were missing than should have been. Pt had reported progressive weakness and is usually wheelchair bound.      In Er, he is noted to have a minimally elevated CK of 700, but is unable to stand on his own. IM asked to admit.      Upon Pharmacy med rec review and record review, it seems that pt has not received his annual MS medication, which was due in August. He presently endorses back pain, but otherwise has no complaints.     ______________________________________________________________________  DISCHARGE SUMMARY/HOSPITAL COURSE:  for full details see H&P, daily progress notes, labs, consult notes. FTT/Falls: MS exacerbation seems less likely in absence of new lesions; however, he is still profoundly weak and in an apparent unsafe environment at home. Pt's family has concern for self-neglect and there is an APS case open. Pain seems to be a driving factor for this patient.  MRI with spinal stenosis at thoracic spine, moderate, as well as Significant bilateral foraminal narrowing at T9-10 and T10-T11, which is where his pain is predominately located.              - PT/OT rec SNF; agree not safe for home              - CM assisting with APS case              -Palliative assist with advance directive, GOC given his underlying disease process        MS: As above; received 1g methylpred in ER. Appreciate Neuro recs. MRI brain and C/T spine neg     Leukocytosis: resolved UA neg, lungs clear. Some skin breakdown but no obvious cellulitis                   Rhabdo: Mild resovled, had been down for several hours PTA. CK downtrending. S/p  IVF     Back Pain w/ opiate use: Pharmacy reports only med he has been filling has been oxy 15mg BID prn.              - UTox neg for oxy, discussed with pt and endorses compliance              - Did continue oxy 15mg BID              - Palliative to assist with overal sx/goc                                                                                                                    ADFC  Prophylaxis:  Lovenox  Disposition:  SNF/LTC  if bed available today        _______________________________________________________________________  Patient seen and examined by me on discharge day. Patient maximized inpatient benefit stay, per case management insurance authorization obtained. If bed available he can be discharged to SNF today. No other acute issues medically stable for discharge at this time. _______________________________________________________________________  DISCHARGE MEDICATIONS:   Current Discharge Medication List      START taking these medications    Details   senna-docusate (PERICOLACE) 8.6-50 mg per tablet Take 1 Tablet by mouth two (2) times a day. Qty: 30 Tablet, Refills: 0  Start date: 11/16/2021      polyethylene glycol (MIRALAX) 17 gram packet Take 1 Packet by mouth daily. Qty: 30 Packet, Refills: 0  Start date: 11/16/2021         CONTINUE these medications which have CHANGED    Details   oxyCODONE IR (OXY-IR) 15 mg immediate release tablet Take 1 Tablet by mouth every twelve (12) hours as needed for Pain for up to 3 days.  Max Daily Amount: 30 mg.  Qty: 8 Tablet, Refills: 0  Start date: 11/16/2021, End date: 11/19/2021    Associated Diagnoses: [facility] ([facility]               Patient Follow Up Instructions:   Diet regular  Activity slowly advance as tolerated  Strict fall/aspiration/pressure ulcer precautions  Continue current wound care  As needed bladder check, call MD if more than 300 cc  Straight cath as needed if bladder scan more than 350 cc  Check CBC/BMP next week  Return to ER or call PCP immediately if symptoms recur or get worse    Follow-up Information     Follow up With Specialties Details Why Contact Info    Primary neurologist in 2 to 4 weeks  Call in 4 weeks Call to schedule follow up visit with Dr Ramana Sosa regarding your Multiple Sclerosis     [facility] [place] [address] [place]   [phone]    MD at [facility] 1 to 2 days        Eun Dorantes MD Internal Medicine   Counts include 234 beds at the [facility]  [phone]          ________________________________________________________________    Risk of deterioration: Moderate    Condition at Discharge:  Stable  __________________________________________________________________    Disposition  SNF/LTC    ____________________________________________________________________    Code Status: Full Code  ___________________________________________________________________      Total time in minutes spent coordinating this discharge 31  minutes    Signed:  Ciera Mckeon MD .

## 2021-11-18 ENCOUNTER — PATIENT OUTREACH (OUTPATIENT)
Dept: CASE MANAGEMENT | Age: 56
End: 2021-11-18

## 2021-11-18 ENCOUNTER — TELEPHONE (OUTPATIENT)
Dept: NEUROLOGY | Age: 56
End: 2021-11-18

## 2021-11-18 NOTE — PROGRESS NOTES
Transition of care outreach postponed for 14 days due to patient's discharge to SNF. Per EMR patient discharged to [facility]. Will continue to monitor patient progress.

## 2021-11-23 ENCOUNTER — PATIENT OUTREACH (OUTPATIENT)
Dept: CASE MANAGEMENT | Age: 56
End: 2021-11-23

## 2021-11-23 NOTE — PROGRESS NOTES
Post Acute Facility Update     *The information contained in this note was received during a weekly care coordination call with the post acute facility*    Current Facility: [facility] (SNF)  Anticipated Discharge Date: TBD    Facility Nursing Update: Medically stable   Facility Social Work Update: Complex discharge plan. Has open APS case d/t unsafe living arrangement. Bundle Program Status: none  Upper Extremity Assistance: Minimal Assistance   Lower Extremity Assistance: Maximum Assistance  Bed Mobility: Stand by Assist - Presence and Cueing  Transfers: Contact Guard Assist - hands on patient for balance   Ambulation: Stand by Assist - Presence and Cueing  How far (in feet) is the patient ambulating? 200  Device Used: walker  Barriers to Discharge: Potential LTC placement d/u living arrangement at home (unsafe). Per facility, Pt will be here until insurance denies him.        JUNO Love  [facility] Team

## 2021-11-23 NOTE — TELEPHONE ENCOUNTER
Pa approval for [employer] received. Call placed to patient, contact successful. Patient confirmed x2 identifiers. Notified as above. Patient requires new prescription. Request for prescription sent to  to complete upon return.

## 2021-12-02 ENCOUNTER — PATIENT OUTREACH (OUTPATIENT)
Dept: CASE MANAGEMENT | Age: 56
End: 2021-12-02

## 2021-12-02 NOTE — PROGRESS NOTES
Post Acute Facility Update     *The information contained in this note was received during a weekly care coordination call with the post acute facility*    Current Facility: [facility] ([facility])  Anticipated Discharge Date: 12/3/2021    Facility Nursing Update: No issues, pain under control. Facility Social Work Update: Family is appealing ins. Denial of LCD 12/2/21. Offering d/c option for adult care home, but family is declining options. Medicaid application completed. Bundle Program Status: none  Upper Extremity Assistance: Stand by Assist - Presence and Cueing  Lower Extremity Assistance: Stand by Assist - Presence and Cueing  Bed Mobility: Independent  Transfers: Stand by Assist - Presence and Cueing  Ambulation: Stand by Assist - Presence and Cueing  How far (in feet) is the patient ambulating?  130 ft  Device Used: walker  Barriers to Discharge: ARTURO Vazquez LPN Care Coordinator

## 2021-12-02 NOTE — PROGRESS NOTES
Received notification patient is currently admitted to the facility, will continue to monitor patient progress.

## 2021-12-06 ENCOUNTER — HOME HEALTH ADMISSION (OUTPATIENT)
Dept: HOME HEALTH SERVICES | Facility: HOME HEALTH | Age: 56
End: 2021-12-06
Payer: MEDICARE

## 2021-12-08 ENCOUNTER — HOME CARE VISIT (OUTPATIENT)
Dept: SCHEDULING | Facility: HOME HEALTH | Age: 56
End: 2021-12-08
Payer: MEDICARE

## 2021-12-08 VITALS
SYSTOLIC BLOOD PRESSURE: 139 MMHG | HEART RATE: 78 BPM | DIASTOLIC BLOOD PRESSURE: 76 MMHG | OXYGEN SATURATION: 97 % | TEMPERATURE: 97.5 F | RESPIRATION RATE: 17 BRPM

## 2021-12-08 PROCEDURE — 3331090001 HH PPS REVENUE CREDIT

## 2021-12-08 PROCEDURE — 400018 HH-NO PAY CLAIM PROCEDURE

## 2021-12-08 PROCEDURE — G0299 HHS/HOSPICE OF RN EA 15 MIN: HCPCS

## 2021-12-08 PROCEDURE — G0151 HHCP-SERV OF PT,EA 15 MIN: HCPCS

## 2021-12-08 PROCEDURE — 3331090002 HH PPS REVENUE DEBIT

## 2021-12-08 PROCEDURE — 400013 HH SOC

## 2021-12-09 ENCOUNTER — TELEPHONE (OUTPATIENT)
Dept: NEUROLOGY | Age: 56
End: 2021-12-09

## 2021-12-09 ENCOUNTER — PATIENT OUTREACH (OUTPATIENT)
Dept: CASE MANAGEMENT | Age: 56
End: 2021-12-09

## 2021-12-09 VITALS
OXYGEN SATURATION: 98 % | RESPIRATION RATE: 18 BRPM | DIASTOLIC BLOOD PRESSURE: 80 MMHG | TEMPERATURE: 98 F | HEART RATE: 70 BPM | SYSTOLIC BLOOD PRESSURE: 126 MMHG

## 2021-12-09 PROCEDURE — 3331090001 HH PPS REVENUE CREDIT

## 2021-12-09 PROCEDURE — 3331090002 HH PPS REVENUE DEBIT

## 2021-12-09 NOTE — PROGRESS NOTES
[address] [place] [address] Dr Discharge Note    *The information contained in this note was received during a weekly care coordination call with the post acute facility*      Patient was discharged from [address] [place] [address] ([facility]) on 12/3/2021 to [facility].     PCP: MD RAVI Quintana appointment:   Future Appointments   Date Time Provider Shira Manju   12/10/2021  9:30 AM Scottie Jaime OT [facility] [address]   12/10/2021 To Be Determined Shiva Abarca, PT [facility]   12/13/2021 To Be Determined Calista Form, PT [facility]   12/13/2021 To Be Determined Aide Burr, [facility]   12/13/2021 10:45 AM Jose Maldonado MD [facility] AMB   12/15/2021 To Be Determined Calista Form, PT [facility]   12/15/2021 To Be Determined Zeb Matt  [address]   12/16/2021 10:20 AM Xavier Polo MD [facility] BS AMB   12/20/2021 To Be Determined Calista Form, PT [address]   12/20/2021 To Be Determined Aide Burr, LPN [facility]   12/22/2021 To Be Determined Calista Form, PT [facility]   12/22/2021 To Be Determined Aide Burr, [place] [address] [facility]   12/27/2021 To Be Determined Calista Form, PT [facility]   12/27/2021 To Be Determined Aide [place], LPN [place] [address] [facility]   12/29/2021 To Be Determined Calista Form, PT [facility]   1/3/2022 To Be Determined Calista Form, PT [facility]   1/3/2022 To Be Determined Aide Burr LPN [address]   1/5/2022 To Be Determined Calista Form, PT [facility]   1/10/2022 To Be Determined Calista Form, PT [facility]   1/10/2022 To Be Determined [place] Burr, LPN [address]   1/12/2022 To Be Determined Calista Amato PT [facility]   1/17/2022 To Be Determined Aide Burr, LPN [address]   1/24/2022 To Be Determined Aide Brur, LPN [address] [facility]   1/31/2022 To Be Determined Amilcar Elmore, RN [address] [facility]       Home Health/Outpatient orders at discharge: home health care  [address]: [address] ordered at discharge: none  [address] received prior to discharge: Nav Butts managing patient: [facility] Team will sign off at this time. Medications were not reconciled and general patient assessment was not completed during this skilled nursing facility outreach.      Barbara Frias LPN Care Coordinator

## 2021-12-09 NOTE — TELEPHONE ENCOUNTER
Eve Ngo w/ [facility] called about the year 2 script for [facility] that was sent over. It was missing information. Please refax with all sections filled in.

## 2021-12-10 ENCOUNTER — PATIENT OUTREACH (OUTPATIENT)
Dept: CASE MANAGEMENT | Age: 56
End: 2021-12-10

## 2021-12-10 ENCOUNTER — HOME CARE VISIT (OUTPATIENT)
Dept: SCHEDULING | Facility: HOME HEALTH | Age: 56
End: 2021-12-10
Payer: MEDICARE

## 2021-12-10 VITALS
OXYGEN SATURATION: 98 % | DIASTOLIC BLOOD PRESSURE: 87 MMHG | TEMPERATURE: 98.3 F | RESPIRATION RATE: 17 BRPM | HEART RATE: 87 BPM | SYSTOLIC BLOOD PRESSURE: 130 MMHG

## 2021-12-10 PROCEDURE — 3331090002 HH PPS REVENUE DEBIT

## 2021-12-10 PROCEDURE — 3331090001 HH PPS REVENUE CREDIT

## 2021-12-10 PROCEDURE — G0151 HHCP-SERV OF PT,EA 15 MIN: HCPCS

## 2021-12-10 PROCEDURE — G0152 HHCP-SERV OF OT,EA 15 MIN: HCPCS

## 2021-12-10 NOTE — PROGRESS NOTES
[address] [place] [address] Dr Discharge Follow-Up      Date/Time:  12/10/2021 9:50 AM    Patient was admitted to [facility] on 2021 and [facility] on 2021. The patients discharge diagnosis was MS exacerbation. Patient was discharge on 12/3/2021 from [address] [facility]. The discharge summary from the post acute facilty was not available at the time of outreach. Patient was contacted within 4 business days of discharge from the post acute facility. N Care Coordinator contacted the caregiver  [facility] by telephone to perform post hospital discharge follow up. Verified name and  with caregiver as identifiers. Provided introduction to self, and explanation of the LPN Care Coordinator role. Patient was discharged to [address] [place] [address]. Caregiver received post acute facility discharge instructions. who verbalized understanding. Caregiver given an opportunity to ask questions and does not have any further questions or concerns at this time. The caregiver agrees to contact the PCP office for questions related to their healthcare. Advance Care Planning:   Does patient have an Advance Directive:  reviewed and current     Home Health orders at discharge: PT, OT, [facility]: [address] [place] [address]  Date of initial visit: 2021    [address] [place] [address] ordered at discharge: none  [address] [place] [address] received: n/a   Per EMR, patient has walker and wheelchair (manual)     Medication(s):   The post acute facility medication discharge list was not available for this call. Medication review was not performed with caregiver, who verbalizes understanding of administration of home medications. There were barriers to obtaining medications identified at this time.  [facility] reports patient does not have all of medication prescribed at discharge, she will contact patient's family today to  the  remaining medication from the pharmacy.     [facility] follow up appointment(s):   Future Appointments   Date Time Provider Department Center   12/13/2021 To Be Determined Belinda Bottom, PT [address] [facility]   12/13/2021 To Be Determined Ramos Rocío, [facility]   12/13/2021 10:45 AM Kathrin Sanchez MD [facility] BS AMB   12/15/2021 To Be Determined Belinda Bottom, PT [facility]   12/15/2021 To Be Determined Kailyn Mckeon  [place] [address] [facility]   12/16/2021 10:20 AM Abner Polo MD [facility] BS AMB   12/20/2021 To Be Determined Belinda Bottom, PT [facility]   12/20/2021 To Be Determined Ramos [facility], [place] [address] Medical [facility]   12/22/2021 To Be Determined Belinda Bottom, PT [facility]   12/22/2021 To Be Determined Ramos Rocío, [facility]   12/27/2021 To Be Determined Belinda Bottom, PT [facility]   12/27/2021 To Be Determined Ramos Rocío, [place] [address] [facility]   12/29/2021 To Be Determined Belinda Bottom, PT [facility]   1/3/2022 To Be Determined Belinda Bottom, PT [facility]   1/3/2022 To Be Determined Ramos Alford N [address] [facility]   1/5/2022 To Be Determined Belinda Bottom, PT [facility]   1/10/2022 To Be Determined Belinda Bottom, PT [facility]   1/10/2022 To Be Determined Ramos Boys, LPN [facility]   1/12/2022 To Be Determined Belinda Bottom, PT [facility]   1/17/2022 To Be Determined Ramos Boys, N [facility]   1/24/2022 To Be Determined Ramos Boys, [facility]   1/31/2022 To Be Determined Catarina, [address] Juan, RN [address] [facility]      Non-[facility] follow up appointment(s): n/a  [facility]:  information provided as a resource

## 2021-12-10 NOTE — TELEPHONE ENCOUNTER
Printed paperwork and placed in Dr. Kareen Vargas hands at his desk. Highlights portions he needed to complete. Once completed we can fax back to company.

## 2021-12-11 PROCEDURE — 3331090002 HH PPS REVENUE DEBIT

## 2021-12-11 PROCEDURE — 3331090001 HH PPS REVENUE CREDIT

## 2021-12-12 PROCEDURE — 3331090002 HH PPS REVENUE DEBIT

## 2021-12-12 PROCEDURE — 3331090001 HH PPS REVENUE CREDIT

## 2021-12-13 PROCEDURE — 3331090001 HH PPS REVENUE CREDIT

## 2021-12-13 PROCEDURE — 3331090002 HH PPS REVENUE DEBIT

## 2021-12-14 ENCOUNTER — TELEPHONE (OUTPATIENT)
Dept: NEUROLOGY | Age: 56
End: 2021-12-14

## 2021-12-14 ENCOUNTER — HOME CARE VISIT (OUTPATIENT)
Dept: SCHEDULING | Facility: HOME HEALTH | Age: 56
End: 2021-12-14
Payer: MEDICARE

## 2021-12-14 VITALS
TEMPERATURE: 98.1 F | HEART RATE: 78 BPM | DIASTOLIC BLOOD PRESSURE: 90 MMHG | OXYGEN SATURATION: 98 % | SYSTOLIC BLOOD PRESSURE: 138 MMHG

## 2021-12-14 PROCEDURE — 3331090001 HH PPS REVENUE CREDIT

## 2021-12-14 PROCEDURE — G0300 HHS/HOSPICE OF LPN EA 15 MIN: HCPCS

## 2021-12-14 PROCEDURE — 3331090002 HH PPS REVENUE DEBIT

## 2021-12-14 PROCEDURE — G0158 HHC OT ASSISTANT EA 15: HCPCS

## 2021-12-14 NOTE — TELEPHONE ENCOUNTER
Message taken directly from [facility] patient representative  From amber davalos called regarding the patients Mavenclad form, stated the form is missing all necessary documentation except the signature and date, please correct form and resubmit

## 2021-12-15 ENCOUNTER — HOME CARE VISIT (OUTPATIENT)
Dept: SCHEDULING | Facility: HOME HEALTH | Age: 56
End: 2021-12-15
Payer: MEDICARE

## 2021-12-15 VITALS
OXYGEN SATURATION: 99 % | DIASTOLIC BLOOD PRESSURE: 90 MMHG | SYSTOLIC BLOOD PRESSURE: 138 MMHG | TEMPERATURE: 98.2 F | HEART RATE: 78 BPM

## 2021-12-15 VITALS
SYSTOLIC BLOOD PRESSURE: 138 MMHG | OXYGEN SATURATION: 96 % | HEART RATE: 76 BPM | TEMPERATURE: 97.3 F | DIASTOLIC BLOOD PRESSURE: 86 MMHG | RESPIRATION RATE: 20 BRPM

## 2021-12-15 PROCEDURE — G0151 HHCP-SERV OF PT,EA 15 MIN: HCPCS

## 2021-12-15 PROCEDURE — 3331090002 HH PPS REVENUE DEBIT

## 2021-12-15 PROCEDURE — 3331090001 HH PPS REVENUE CREDIT

## 2021-12-15 NOTE — TELEPHONE ENCOUNTER
Patient has not been seen for more than a year, in the last visit more than a year ago, he noted that there was a huge cost of $4000 for the medication and he is not ready for it as insurance appeared not to be covered much. He will need an evaluation to write for the [facility] prescription.

## 2021-12-16 ENCOUNTER — HOME CARE VISIT (OUTPATIENT)
Dept: HOME HEALTH SERVICES | Facility: HOME HEALTH | Age: 56
End: 2021-12-16
Payer: MEDICARE

## 2021-12-16 ENCOUNTER — HOME CARE VISIT (OUTPATIENT)
Dept: SCHEDULING | Facility: HOME HEALTH | Age: 56
End: 2021-12-16
Payer: MEDICARE

## 2021-12-16 VITALS
TEMPERATURE: 97.7 F | OXYGEN SATURATION: 98 % | DIASTOLIC BLOOD PRESSURE: 70 MMHG | HEART RATE: 105 BPM | SYSTOLIC BLOOD PRESSURE: 130 MMHG

## 2021-12-16 PROCEDURE — G0158 HHC OT ASSISTANT EA 15: HCPCS

## 2021-12-16 PROCEDURE — 3331090002 HH PPS REVENUE DEBIT

## 2021-12-16 PROCEDURE — 3331090001 HH PPS REVENUE CREDIT

## 2021-12-17 ENCOUNTER — HOME CARE VISIT (OUTPATIENT)
Dept: SCHEDULING | Facility: HOME HEALTH | Age: 56
End: 2021-12-17
Payer: MEDICARE

## 2021-12-17 VITALS
OXYGEN SATURATION: 99 % | SYSTOLIC BLOOD PRESSURE: 122 MMHG | RESPIRATION RATE: 18 BRPM | DIASTOLIC BLOOD PRESSURE: 88 MMHG | HEART RATE: 95 BPM | TEMPERATURE: 97.9 F

## 2021-12-17 PROCEDURE — 3331090002 HH PPS REVENUE DEBIT

## 2021-12-17 PROCEDURE — G0151 HHCP-SERV OF PT,EA 15 MIN: HCPCS

## 2021-12-17 PROCEDURE — 3331090001 HH PPS REVENUE CREDIT

## 2021-12-17 NOTE — TELEPHONE ENCOUNTER
Contacted [employer] in regards to [facility] treatment. Notified of MD response in regards to continuing treatment as noted *Patient has not been seen for more than a year, in the last visit more than a year ago, he noted that there was a huge cost of $4000 for the medication and he is not ready for it as insurance appeared not to be covered much. He will need an evaluation to write for the [facility] prescription. *     Also noted patient scheduled for f/u 12/16 and did not show for appointment. ANTONIO case closed at this time.

## 2021-12-18 PROCEDURE — 3331090002 HH PPS REVENUE DEBIT

## 2021-12-18 PROCEDURE — 3331090001 HH PPS REVENUE CREDIT

## 2021-12-19 PROCEDURE — 3331090002 HH PPS REVENUE DEBIT

## 2021-12-19 PROCEDURE — 3331090001 HH PPS REVENUE CREDIT

## 2021-12-20 ENCOUNTER — HOME CARE VISIT (OUTPATIENT)
Dept: HOME HEALTH SERVICES | Facility: HOME HEALTH | Age: 56
End: 2021-12-20
Payer: MEDICARE

## 2021-12-20 ENCOUNTER — HOME CARE VISIT (OUTPATIENT)
Dept: SCHEDULING | Facility: HOME HEALTH | Age: 56
End: 2021-12-20
Payer: MEDICARE

## 2021-12-20 ENCOUNTER — PATIENT OUTREACH (OUTPATIENT)
Dept: CASE MANAGEMENT | Age: 56
End: 2021-12-20

## 2021-12-20 VITALS
DIASTOLIC BLOOD PRESSURE: 80 MMHG | TEMPERATURE: 97.8 F | SYSTOLIC BLOOD PRESSURE: 118 MMHG | RESPIRATION RATE: 18 BRPM | HEART RATE: 71 BPM | OXYGEN SATURATION: 95 %

## 2021-12-20 VITALS
DIASTOLIC BLOOD PRESSURE: 90 MMHG | TEMPERATURE: 97.4 F | SYSTOLIC BLOOD PRESSURE: 130 MMHG | OXYGEN SATURATION: 98 % | HEART RATE: 77 BPM

## 2021-12-20 PROCEDURE — G0151 HHCP-SERV OF PT,EA 15 MIN: HCPCS

## 2021-12-20 PROCEDURE — 3331090001 HH PPS REVENUE CREDIT

## 2021-12-20 PROCEDURE — 3331090002 HH PPS REVENUE DEBIT

## 2021-12-20 PROCEDURE — G0158 HHC OT ASSISTANT EA 15: HCPCS

## 2021-12-20 NOTE — PROGRESS NOTES
Patient has graduated from the Transitions of Care Coordination  program on 12/20/2021. Patient/family has the ability to self-manage at this time Care management goals have been completed. Patient was not referred to the [facility] team for further management. Patient has Care Transition Nurse's contact information for any further questions, concerns, or needs.   Patients upcoming visits:    Future Appointments   Date Time Provider Shira Nunes   12/20/2021  3:00 PM Carmie Dessert [facility]   12/22/2021 To Be Determined Carmie Dessert [facility]   12/22/2021 To Be Determined Mitchel Underwood LPN [facility]   12/22/2021 10:00 AM Manisha Mueller PT [facility]   12/27/2021 To Be Determined Mitchel Underwood LPN [facility]   12/28/2021 To Be Determined Carmie Dessert [facility]   12/28/2021 To Be Determined Manisha Mueller PT [facility]   12/29/2021 To Be Determined Manisha Mueller PT [facility]   12/30/2021 To Be Determined Carmie Dessert [facility]   1/3/2022 To Be Determined Mitchel Underwood LPN [facility]   1/3/2022 To Be Determined Marina Hadley [facility]   1/3/2022 To Be Determined Leonid Mendez OT [facility]   1/5/2022 To Be Determined Carmie Dessert [facility]   1/5/2022 To Be Determined Manisha Mueller PT [place] [id number] Medical [facility]   1/7/2022 12:30 PM Roxie Polo MD [facility] BS AMB   1/10/2022 To Be Determined Mitchel Underwood LPN [facility]   1/10/2022 To Be Determined Jsoeph Paige [facility]   1/10/2022 To Be Determined [facility]   1/12/2022 To Be Determined Joseph Paige [place] [address]   1/12/2022 To Be Determined [facility] [place] [address]   1/17/2022 To Be Determined Mitchel Underwood LPN [facility]   1/17/2022 To Be Determined Judith Fell [address] [facility]   1/19/2022 To Be Determined Rakan Márquez [facility] [address]   1/24/2022 To Be Determined Torri Butler LPN [facility]   1/31/2022 To Be Determined Domonique Claire RN [facility] [address]

## 2021-12-21 PROCEDURE — 3331090002 HH PPS REVENUE DEBIT

## 2021-12-21 PROCEDURE — 3331090001 HH PPS REVENUE CREDIT

## 2021-12-22 ENCOUNTER — HOME CARE VISIT (OUTPATIENT)
Dept: SCHEDULING | Facility: HOME HEALTH | Age: 56
End: 2021-12-22
Payer: MEDICARE

## 2021-12-22 VITALS
DIASTOLIC BLOOD PRESSURE: 82 MMHG | TEMPERATURE: 97 F | OXYGEN SATURATION: 97 % | HEART RATE: 73 BPM | RESPIRATION RATE: 20 BRPM | SYSTOLIC BLOOD PRESSURE: 118 MMHG

## 2021-12-22 VITALS
HEART RATE: 95 BPM | SYSTOLIC BLOOD PRESSURE: 124 MMHG | TEMPERATURE: 98.2 F | DIASTOLIC BLOOD PRESSURE: 84 MMHG | OXYGEN SATURATION: 99 %

## 2021-12-22 PROCEDURE — 3331090001 HH PPS REVENUE CREDIT

## 2021-12-22 PROCEDURE — G0158 HHC OT ASSISTANT EA 15: HCPCS

## 2021-12-22 PROCEDURE — G0151 HHCP-SERV OF PT,EA 15 MIN: HCPCS

## 2021-12-22 PROCEDURE — 3331090002 HH PPS REVENUE DEBIT

## 2021-12-23 PROCEDURE — 3331090002 HH PPS REVENUE DEBIT

## 2021-12-23 PROCEDURE — 3331090001 HH PPS REVENUE CREDIT

## 2021-12-24 PROCEDURE — 3331090001 HH PPS REVENUE CREDIT

## 2021-12-24 PROCEDURE — 3331090002 HH PPS REVENUE DEBIT

## 2021-12-25 PROCEDURE — 3331090001 HH PPS REVENUE CREDIT

## 2021-12-25 PROCEDURE — 3331090002 HH PPS REVENUE DEBIT

## 2021-12-26 PROCEDURE — 3331090002 HH PPS REVENUE DEBIT

## 2021-12-26 PROCEDURE — 3331090001 HH PPS REVENUE CREDIT

## 2021-12-27 PROCEDURE — 3331090002 HH PPS REVENUE DEBIT

## 2021-12-27 PROCEDURE — 3331090001 HH PPS REVENUE CREDIT

## 2021-12-28 ENCOUNTER — HOME CARE VISIT (OUTPATIENT)
Dept: HOME HEALTH SERVICES | Facility: HOME HEALTH | Age: 56
End: 2021-12-28
Payer: MEDICARE

## 2021-12-28 ENCOUNTER — HOME CARE VISIT (OUTPATIENT)
Dept: SCHEDULING | Facility: HOME HEALTH | Age: 56
End: 2021-12-28
Payer: MEDICARE

## 2021-12-28 VITALS
HEART RATE: 76 BPM | TEMPERATURE: 96.8 F | SYSTOLIC BLOOD PRESSURE: 140 MMHG | OXYGEN SATURATION: 98 % | RESPIRATION RATE: 16 BRPM | DIASTOLIC BLOOD PRESSURE: 88 MMHG

## 2021-12-28 PROCEDURE — 3331090002 HH PPS REVENUE DEBIT

## 2021-12-28 PROCEDURE — G0300 HHS/HOSPICE OF LPN EA 15 MIN: HCPCS

## 2021-12-28 PROCEDURE — 3331090001 HH PPS REVENUE CREDIT

## 2021-12-29 ENCOUNTER — HOME CARE VISIT (OUTPATIENT)
Dept: HOME HEALTH SERVICES | Facility: HOME HEALTH | Age: 56
End: 2021-12-29
Payer: MEDICARE

## 2021-12-29 VITALS
OXYGEN SATURATION: 95 % | HEART RATE: 90 BPM | TEMPERATURE: 97.3 F | DIASTOLIC BLOOD PRESSURE: 92 MMHG | RESPIRATION RATE: 20 BRPM | SYSTOLIC BLOOD PRESSURE: 128 MMHG

## 2021-12-29 PROCEDURE — G0151 HHCP-SERV OF PT,EA 15 MIN: HCPCS

## 2021-12-29 PROCEDURE — 3331090001 HH PPS REVENUE CREDIT

## 2021-12-29 PROCEDURE — 3331090002 HH PPS REVENUE DEBIT

## 2021-12-30 ENCOUNTER — HOME CARE VISIT (OUTPATIENT)
Dept: SCHEDULING | Facility: HOME HEALTH | Age: 56
End: 2021-12-30
Payer: MEDICARE

## 2021-12-30 VITALS
DIASTOLIC BLOOD PRESSURE: 80 MMHG | RESPIRATION RATE: 18 BRPM | HEART RATE: 80 BPM | TEMPERATURE: 98.3 F | SYSTOLIC BLOOD PRESSURE: 108 MMHG | OXYGEN SATURATION: 96 %

## 2021-12-30 PROCEDURE — 3331090001 HH PPS REVENUE CREDIT

## 2021-12-30 PROCEDURE — G0151 HHCP-SERV OF PT,EA 15 MIN: HCPCS

## 2021-12-30 PROCEDURE — 3331090002 HH PPS REVENUE DEBIT

## 2021-12-31 PROCEDURE — 3331090001 HH PPS REVENUE CREDIT

## 2021-12-31 PROCEDURE — 3331090002 HH PPS REVENUE DEBIT

## 2022-01-01 PROCEDURE — 3331090001 HH PPS REVENUE CREDIT

## 2022-01-01 PROCEDURE — 3331090002 HH PPS REVENUE DEBIT

## 2022-01-02 PROCEDURE — 3331090001 HH PPS REVENUE CREDIT

## 2022-01-02 PROCEDURE — 3331090002 HH PPS REVENUE DEBIT

## 2022-01-03 ENCOUNTER — HOME CARE VISIT (OUTPATIENT)
Dept: SCHEDULING | Facility: HOME HEALTH | Age: 57
End: 2022-01-03
Payer: MEDICARE

## 2022-01-03 VITALS
SYSTOLIC BLOOD PRESSURE: 110 MMHG | HEART RATE: 75 BPM | DIASTOLIC BLOOD PRESSURE: 78 MMHG | TEMPERATURE: 96.7 F | OXYGEN SATURATION: 99 % | RESPIRATION RATE: 18 BRPM

## 2022-01-03 PROCEDURE — 3331090001 HH PPS REVENUE CREDIT

## 2022-01-03 PROCEDURE — G0151 HHCP-SERV OF PT,EA 15 MIN: HCPCS

## 2022-01-03 PROCEDURE — 3331090002 HH PPS REVENUE DEBIT

## 2022-01-04 ENCOUNTER — HOME CARE VISIT (OUTPATIENT)
Dept: SCHEDULING | Facility: HOME HEALTH | Age: 57
End: 2022-01-04
Payer: MEDICARE

## 2022-01-04 ENCOUNTER — HOME CARE VISIT (OUTPATIENT)
Dept: HOME HEALTH SERVICES | Facility: HOME HEALTH | Age: 57
End: 2022-01-04
Payer: MEDICARE

## 2022-01-04 PROCEDURE — 3331090001 HH PPS REVENUE CREDIT

## 2022-01-04 PROCEDURE — 3331090002 HH PPS REVENUE DEBIT

## 2022-01-04 PROCEDURE — G0152 HHCP-SERV OF OT,EA 15 MIN: HCPCS

## 2022-01-05 ENCOUNTER — HOME CARE VISIT (OUTPATIENT)
Dept: SCHEDULING | Facility: HOME HEALTH | Age: 57
End: 2022-01-05
Payer: MEDICARE

## 2022-01-05 VITALS
SYSTOLIC BLOOD PRESSURE: 136 MMHG | RESPIRATION RATE: 14 BRPM | OXYGEN SATURATION: 100 % | HEART RATE: 70 BPM | DIASTOLIC BLOOD PRESSURE: 70 MMHG | TEMPERATURE: 96.9 F

## 2022-01-05 VITALS
DIASTOLIC BLOOD PRESSURE: 70 MMHG | TEMPERATURE: 98 F | HEART RATE: 77 BPM | OXYGEN SATURATION: 98 % | SYSTOLIC BLOOD PRESSURE: 122 MMHG

## 2022-01-05 PROCEDURE — G0300 HHS/HOSPICE OF LPN EA 15 MIN: HCPCS

## 2022-01-05 PROCEDURE — 3331090001 HH PPS REVENUE CREDIT

## 2022-01-05 PROCEDURE — G0151 HHCP-SERV OF PT,EA 15 MIN: HCPCS

## 2022-01-05 PROCEDURE — 3331090002 HH PPS REVENUE DEBIT

## 2022-01-06 ENCOUNTER — HOME CARE VISIT (OUTPATIENT)
Dept: SCHEDULING | Facility: HOME HEALTH | Age: 57
End: 2022-01-06
Payer: MEDICARE

## 2022-01-06 VITALS
RESPIRATION RATE: 20 BRPM | OXYGEN SATURATION: 98 % | DIASTOLIC BLOOD PRESSURE: 82 MMHG | SYSTOLIC BLOOD PRESSURE: 122 MMHG | TEMPERATURE: 98.5 F | HEART RATE: 81 BPM

## 2022-01-06 VITALS
OXYGEN SATURATION: 98 % | SYSTOLIC BLOOD PRESSURE: 130 MMHG | HEART RATE: 72 BPM | DIASTOLIC BLOOD PRESSURE: 90 MMHG | TEMPERATURE: 98.2 F

## 2022-01-06 PROCEDURE — G0158 HHC OT ASSISTANT EA 15: HCPCS

## 2022-01-06 PROCEDURE — 3331090003 HH PPS REVENUE ADJ

## 2022-01-06 PROCEDURE — 3331090002 HH PPS REVENUE DEBIT

## 2022-01-06 PROCEDURE — 3331090001 HH PPS REVENUE CREDIT

## 2022-01-07 PROCEDURE — 400018 HH-NO PAY CLAIM PROCEDURE

## 2022-01-07 PROCEDURE — 3331090002 HH PPS REVENUE DEBIT

## 2022-01-07 PROCEDURE — 3331090001 HH PPS REVENUE CREDIT

## 2022-01-08 PROCEDURE — 3331090002 HH PPS REVENUE DEBIT

## 2022-01-08 PROCEDURE — 3331090001 HH PPS REVENUE CREDIT

## 2022-01-09 PROCEDURE — 3331090002 HH PPS REVENUE DEBIT

## 2022-01-09 PROCEDURE — 3331090001 HH PPS REVENUE CREDIT

## 2022-01-10 ENCOUNTER — HOME CARE VISIT (OUTPATIENT)
Dept: SCHEDULING | Facility: HOME HEALTH | Age: 57
End: 2022-01-10
Payer: MEDICARE

## 2022-01-10 VITALS
SYSTOLIC BLOOD PRESSURE: 118 MMHG | HEART RATE: 67 BPM | OXYGEN SATURATION: 100 % | TEMPERATURE: 97.7 F | DIASTOLIC BLOOD PRESSURE: 90 MMHG

## 2022-01-10 VITALS
SYSTOLIC BLOOD PRESSURE: 132 MMHG | TEMPERATURE: 97.8 F | HEART RATE: 81 BPM | RESPIRATION RATE: 20 BRPM | DIASTOLIC BLOOD PRESSURE: 88 MMHG | OXYGEN SATURATION: 95 %

## 2022-01-10 PROCEDURE — G0158 HHC OT ASSISTANT EA 15: HCPCS

## 2022-01-10 PROCEDURE — 3331090002 HH PPS REVENUE DEBIT

## 2022-01-10 PROCEDURE — 3331090001 HH PPS REVENUE CREDIT

## 2022-01-10 PROCEDURE — G0151 HHCP-SERV OF PT,EA 15 MIN: HCPCS

## 2022-01-10 PROCEDURE — 400013 HH SOC

## 2022-01-11 PROCEDURE — 3331090002 HH PPS REVENUE DEBIT

## 2022-01-11 PROCEDURE — 3331090001 HH PPS REVENUE CREDIT

## 2022-01-12 ENCOUNTER — HOME CARE VISIT (OUTPATIENT)
Dept: SCHEDULING | Facility: HOME HEALTH | Age: 57
End: 2022-01-12
Payer: MEDICARE

## 2022-01-12 VITALS
DIASTOLIC BLOOD PRESSURE: 80 MMHG | SYSTOLIC BLOOD PRESSURE: 118 MMHG | OXYGEN SATURATION: 97 % | HEART RATE: 70 BPM | TEMPERATURE: 97.7 F

## 2022-01-12 VITALS
TEMPERATURE: 96.8 F | SYSTOLIC BLOOD PRESSURE: 122 MMHG | DIASTOLIC BLOOD PRESSURE: 88 MMHG | OXYGEN SATURATION: 96 % | RESPIRATION RATE: 20 BRPM | HEART RATE: 68 BPM

## 2022-01-12 VITALS
TEMPERATURE: 98 F | SYSTOLIC BLOOD PRESSURE: 120 MMHG | OXYGEN SATURATION: 99 % | HEART RATE: 80 BPM | DIASTOLIC BLOOD PRESSURE: 78 MMHG

## 2022-01-12 PROCEDURE — 3331090002 HH PPS REVENUE DEBIT

## 2022-01-12 PROCEDURE — G0158 HHC OT ASSISTANT EA 15: HCPCS

## 2022-01-12 PROCEDURE — 3331090001 HH PPS REVENUE CREDIT

## 2022-01-12 PROCEDURE — G0299 HHS/HOSPICE OF RN EA 15 MIN: HCPCS

## 2022-01-12 PROCEDURE — G0151 HHCP-SERV OF PT,EA 15 MIN: HCPCS

## 2022-01-13 PROCEDURE — 3331090002 HH PPS REVENUE DEBIT

## 2022-01-13 PROCEDURE — 3331090001 HH PPS REVENUE CREDIT

## 2022-01-14 PROCEDURE — 3331090001 HH PPS REVENUE CREDIT

## 2022-01-14 PROCEDURE — 3331090002 HH PPS REVENUE DEBIT

## 2022-01-15 PROCEDURE — 3331090002 HH PPS REVENUE DEBIT

## 2022-01-15 PROCEDURE — 3331090001 HH PPS REVENUE CREDIT

## 2022-01-16 PROCEDURE — 3331090002 HH PPS REVENUE DEBIT

## 2022-01-16 PROCEDURE — 3331090001 HH PPS REVENUE CREDIT

## 2022-01-17 PROCEDURE — 3331090002 HH PPS REVENUE DEBIT

## 2022-01-17 PROCEDURE — 3331090001 HH PPS REVENUE CREDIT

## 2022-01-18 ENCOUNTER — HOME CARE VISIT (OUTPATIENT)
Dept: SCHEDULING | Facility: HOME HEALTH | Age: 57
End: 2022-01-18
Payer: MEDICARE

## 2022-01-18 VITALS
HEART RATE: 76 BPM | TEMPERATURE: 97.7 F | DIASTOLIC BLOOD PRESSURE: 88 MMHG | OXYGEN SATURATION: 99 % | SYSTOLIC BLOOD PRESSURE: 116 MMHG

## 2022-01-18 PROCEDURE — 3331090002 HH PPS REVENUE DEBIT

## 2022-01-18 PROCEDURE — 3331090001 HH PPS REVENUE CREDIT

## 2022-01-18 PROCEDURE — G0158 HHC OT ASSISTANT EA 15: HCPCS

## 2022-01-19 ENCOUNTER — HOME CARE VISIT (OUTPATIENT)
Dept: SCHEDULING | Facility: HOME HEALTH | Age: 57
End: 2022-01-19
Payer: MEDICARE

## 2022-01-19 PROCEDURE — 3331090002 HH PPS REVENUE DEBIT

## 2022-01-19 PROCEDURE — G0299 HHS/HOSPICE OF RN EA 15 MIN: HCPCS

## 2022-01-19 PROCEDURE — 3331090001 HH PPS REVENUE CREDIT

## 2022-01-20 ENCOUNTER — HOME CARE VISIT (OUTPATIENT)
Dept: SCHEDULING | Facility: HOME HEALTH | Age: 57
End: 2022-01-20
Payer: MEDICARE

## 2022-01-20 VITALS
DIASTOLIC BLOOD PRESSURE: 80 MMHG | RESPIRATION RATE: 18 BRPM | HEART RATE: 86 BPM | SYSTOLIC BLOOD PRESSURE: 112 MMHG | OXYGEN SATURATION: 98 % | TEMPERATURE: 99 F

## 2022-01-20 PROCEDURE — 3331090001 HH PPS REVENUE CREDIT

## 2022-01-20 PROCEDURE — 3331090002 HH PPS REVENUE DEBIT

## 2022-01-21 PROCEDURE — 3331090002 HH PPS REVENUE DEBIT

## 2022-01-21 PROCEDURE — 3331090001 HH PPS REVENUE CREDIT

## 2022-01-22 PROCEDURE — 3331090001 HH PPS REVENUE CREDIT

## 2022-01-22 PROCEDURE — 3331090002 HH PPS REVENUE DEBIT

## 2022-01-23 PROCEDURE — 3331090002 HH PPS REVENUE DEBIT

## 2022-01-23 PROCEDURE — 3331090001 HH PPS REVENUE CREDIT

## 2022-01-24 PROCEDURE — 3331090002 HH PPS REVENUE DEBIT

## 2022-01-24 PROCEDURE — 3331090001 HH PPS REVENUE CREDIT

## 2022-01-25 PROCEDURE — 3331090002 HH PPS REVENUE DEBIT

## 2022-01-25 PROCEDURE — 3331090001 HH PPS REVENUE CREDIT

## 2022-01-26 PROCEDURE — 3331090002 HH PPS REVENUE DEBIT

## 2022-01-26 PROCEDURE — 3331090001 HH PPS REVENUE CREDIT

## 2022-01-27 ENCOUNTER — HOME CARE VISIT (OUTPATIENT)
Dept: SCHEDULING | Facility: HOME HEALTH | Age: 57
End: 2022-01-27
Payer: MEDICARE

## 2022-01-27 PROCEDURE — 3331090001 HH PPS REVENUE CREDIT

## 2022-01-27 PROCEDURE — G0299 HHS/HOSPICE OF RN EA 15 MIN: HCPCS

## 2022-01-27 PROCEDURE — 3331090002 HH PPS REVENUE DEBIT

## 2022-01-28 PROCEDURE — 3331090002 HH PPS REVENUE DEBIT

## 2022-01-28 PROCEDURE — 3331090001 HH PPS REVENUE CREDIT

## 2022-01-29 PROCEDURE — 3331090002 HH PPS REVENUE DEBIT

## 2022-01-29 PROCEDURE — 3331090001 HH PPS REVENUE CREDIT

## 2022-01-30 PROCEDURE — 3331090002 HH PPS REVENUE DEBIT

## 2022-01-30 PROCEDURE — 3331090001 HH PPS REVENUE CREDIT

## 2022-01-31 VITALS
SYSTOLIC BLOOD PRESSURE: 120 MMHG | TEMPERATURE: 96.3 F | OXYGEN SATURATION: 100 % | HEART RATE: 67 BPM | DIASTOLIC BLOOD PRESSURE: 80 MMHG

## 2022-01-31 PROCEDURE — 3331090001 HH PPS REVENUE CREDIT

## 2022-01-31 PROCEDURE — 3331090002 HH PPS REVENUE DEBIT

## 2022-02-01 PROCEDURE — 3331090001 HH PPS REVENUE CREDIT

## 2022-02-01 PROCEDURE — 3331090002 HH PPS REVENUE DEBIT

## 2022-02-02 PROCEDURE — 3331090001 HH PPS REVENUE CREDIT

## 2022-02-02 PROCEDURE — 3331090002 HH PPS REVENUE DEBIT

## 2022-02-03 ENCOUNTER — HOME CARE VISIT (OUTPATIENT)
Dept: HOME HEALTH SERVICES | Facility: HOME HEALTH | Age: 57
End: 2022-02-03
Payer: MEDICARE

## 2022-02-03 PROCEDURE — 3331090002 HH PPS REVENUE DEBIT

## 2022-02-03 PROCEDURE — 3331090001 HH PPS REVENUE CREDIT

## 2022-02-04 ENCOUNTER — HOME CARE VISIT (OUTPATIENT)
Dept: SCHEDULING | Facility: HOME HEALTH | Age: 57
End: 2022-02-04
Payer: MEDICARE

## 2022-02-04 VITALS
RESPIRATION RATE: 18 BRPM | HEART RATE: 71 BPM | SYSTOLIC BLOOD PRESSURE: 128 MMHG | DIASTOLIC BLOOD PRESSURE: 72 MMHG | TEMPERATURE: 99.6 F | OXYGEN SATURATION: 100 %

## 2022-02-04 PROCEDURE — 3331090002 HH PPS REVENUE DEBIT

## 2022-02-04 PROCEDURE — G0299 HHS/HOSPICE OF RN EA 15 MIN: HCPCS

## 2022-02-04 PROCEDURE — 3331090001 HH PPS REVENUE CREDIT

## 2022-02-05 PROCEDURE — 3331090001 HH PPS REVENUE CREDIT

## 2022-02-05 PROCEDURE — 3331090002 HH PPS REVENUE DEBIT

## 2022-02-28 ENCOUNTER — TELEPHONE (OUTPATIENT)
Dept: NEUROLOGY | Age: 57
End: 2022-02-28

## 2022-02-28 NOTE — TELEPHONE ENCOUNTER
Spoke with pt, he wants to know if he can do another round of Elham Garcia, he said that his situation is getting worse. Please call back.       [phone]

## 2022-03-22 ENCOUNTER — OFFICE VISIT (OUTPATIENT)
Dept: NEUROLOGY | Age: 57
End: 2022-03-22
Payer: MEDICARE

## 2022-03-22 VITALS
TEMPERATURE: 98.5 F | BODY MASS INDEX: 25.88 KG/M2 | OXYGEN SATURATION: 99 % | RESPIRATION RATE: 14 BRPM | HEART RATE: 96 BPM | DIASTOLIC BLOOD PRESSURE: 101 MMHG | WEIGHT: 180.4 LBS | SYSTOLIC BLOOD PRESSURE: 118 MMHG

## 2022-03-22 DIAGNOSIS — G95.9 CERVICAL MYELOPATHY (HCC): ICD-10-CM

## 2022-03-22 DIAGNOSIS — G62.9 POLYNEUROPATHY: ICD-10-CM

## 2022-03-22 DIAGNOSIS — G35 SECONDARY PROGRESSIVE MULTIPLE SCLEROSIS (HCC): Primary | ICD-10-CM

## 2022-03-22 DIAGNOSIS — R25.1 TREMOR: ICD-10-CM

## 2022-03-22 DIAGNOSIS — R29.6 FREQUENT FALLS: ICD-10-CM

## 2022-03-22 DIAGNOSIS — G89.4 CHRONIC PAIN SYNDROME: ICD-10-CM

## 2022-03-22 DIAGNOSIS — R26.9 GAIT DISORDER: ICD-10-CM

## 2022-03-22 PROCEDURE — G8427 DOCREV CUR MEDS BY ELIG CLIN: HCPCS | Performed by: PSYCHIATRY & NEUROLOGY

## 2022-03-22 PROCEDURE — G8417 CALC BMI ABV UP PARAM F/U: HCPCS | Performed by: PSYCHIATRY & NEUROLOGY

## 2022-03-22 PROCEDURE — G9717 DOC PT DX DEP/BP F/U NT REQ: HCPCS | Performed by: PSYCHIATRY & NEUROLOGY

## 2022-03-22 PROCEDURE — 99214 OFFICE O/P EST MOD 30 MIN: CPT | Performed by: PSYCHIATRY & NEUROLOGY

## 2022-03-22 PROCEDURE — 3017F COLORECTAL CA SCREEN DOC REV: CPT | Performed by: PSYCHIATRY & NEUROLOGY

## 2022-03-22 RX ORDER — METHYLPREDNISOLONE 32 MG/1
TABLET ORAL
Qty: 20 TABLET | Refills: 0 | Status: SHIPPED | OUTPATIENT
Start: 2022-03-22 | End: 2022-04-22

## 2022-03-22 RX ORDER — TIZANIDINE 4 MG/1
4 TABLET ORAL
Qty: 30 TABLET | Refills: 2 | Status: SHIPPED | OUTPATIENT
Start: 2022-03-22 | End: 2022-07-04 | Stop reason: SDUPTHER

## 2022-03-22 RX ORDER — CLADRIBINE 10 MG/1
10 TABLET ORAL DAILY
Qty: 6 EACH | Refills: 1 | Status: ON HOLD | OUTPATIENT
Start: 2022-03-22 | End: 2022-04-22

## 2022-03-22 NOTE — PROGRESS NOTES
Chief Complaint   Patient presents with    Follow-up     need PT/OT, need more MS medication      Visit Vitals  BP (!) 118/101   Pulse 96   Temp 98.5 °F (36.9 °C)   Resp 14   Wt 180 lb 6.4 oz (81.8 kg)   SpO2 99%   BMI 25.88 kg/m²

## 2022-03-22 NOTE — PROGRESS NOTES
Neurology Progress Note    NAME:  Celena Shah   :   1965   MRN:   [id number]     Date/Time:  3/22/2022  Subjective:    Celena Shah is a 49 y.o. male here today for follow-up for multiple sclerosis, pain, weakness difficulty ambulating, frequent fall. Patient was accompanied by his father to the visit. Patient has not been seen for nearly 2 years, has not been consistently on DMT. Since patient was last seen, no progression of symptoms  Patient has been hospitalized, subsequently discharged to skilled nursing facility and now in assisted living. Due to secondary progression of patient's illness, he was started on Mavenclad as he failed most of the other DMTs. Patient however has not had  [address] for the past year as patient was unable to keep his office appointment, and there was confusion on wether the patient was on the medication. He was getting worse on Ocrevus which was the last documented DMT, it was not resolved on commencing on Mavenclad because of the co-pays, however after the initial premedication labs and work-up, patient was started on it but fell through the crack because of not following up with his visits to the office. Patient today says he is still very weak, difficulty walking and frequent fall. He says his condition is getting worse since the last visit. He continues to have a lot of pain. Patient says he has has not been been losing a lot of weight anymore. He says  his legs still tend to give out on him and he will fall even when he still using his cane. Denies loss of consciousness. He noted that he has experience of confusion and been having a lot of nausea. He continues to have neck pain is sharp in nature, continuous, burning sensation that goes around to the arms, making the arms weak. He says he drops things at times. He says tremors have been getting worse, he has been told that his head also shakes with his hands.   Back pain is also sharp and constant, goes on to the legs causing numbness and tingling sensation of the legs with weakness, he says he is weaker on the right side. .  Patient is advised to quit smoking as this is making multiple sclerosis worse. He says his memory has been very bad. Patient apparently had some studies done during the admissions but has not been able to get the medical records as patient was admitted to outside hospital  At this time, I will plan to start patient on infusion of pulse steroids-Solmedrol but patient prefers to be on oral steroids while waiting for the availability of course of [address]. Patient is referred to physical therapy  He is following pain management for his pain. I will obtain blood work for VZV, CBC, CMP  He denies loss of consciousness, dysphagia, odynophagia. .  Review of Systems - General ROS: positive for  - fatigue, night sweats and sleep disturbance  Psychological ROS: positive for - anxiety, concentration difficulties, depression, mood swings and sleep disturbances  Ophthalmic ROS: positive for - blurry vision, decreased vision and photophobia  ENT ROS: positive for - headaches, tinnitus, vertigo and visual changes  Allergy and Immunology ROS: negative  Hematological and Lymphatic ROS: negative  Endocrine ROS: negative  Respiratory ROS: no cough, shortness of breath, or wheezing  Cardiovascular ROS: no chest pain or dyspnea on exertion  Gastrointestinal ROS: no abdominal pain, change in bowel habits, or black or bloody stools  Genito-Urinary ROS: no dysuria, trouble voiding, or hematuria  Musculoskeletal ROS: positive for - gait disturbance, joint pain, joint stiffness, joint swelling, muscle pain and muscular weakness  Neurological ROS: positive for - dizziness, gait disturbance, headaches, impaired coordination/balance, memory loss, numbness/tingling, visual changes and weakness  Dermatological ROS: negative     *      Medications reviewed:  Current Outpatient Medications   Medication Sig Dispense Refill    methylPREDNISolone (MEDROL) 32 mg tablet 1 tab p.o. tid x3 days, 1 tab p.o.bid x4 days, 1 tab p.o. every day x3days 20 Tablet 0    tiZANidine (ZANAFLEX) 4 mg tablet Take 1 Tablet by mouth nightly. 30 Tablet 2    cladribine,multiple sclerosis, (Mavenclad, 6 tablet pack,) 10 mg tab Take 10 mg by mouth daily. 6 Each 1    oxyCODONE IR (OXY-IR) 15 mg immediate release tablet Take 15 mg by mouth two (2) times a day. as needed for pain      senna-docusate (PERICOLACE) 8.6-50 mg per tablet Take 1 Tablet by mouth two (2) times a day. (Patient not taking: Reported on 3/22/2022) 30 Tablet 0    polyethylene glycol (MIRALAX) 17 gram packet Take 1 Packet by mouth daily.  (Patient not taking: Reported on 3/22/2022) 30 Packet 0        Objective:   Vitals:  Vitals:    03/22/22 0909   BP: (!) 118/101   Pulse: 96   Resp: 14   Temp: 98.5 °F (36.9 °C)   SpO2: 99%   Weight: 180 lb 6.4 oz (81.8 kg)       Lab Data Reviewed:  Lab Results   Component Value Date/Time    WBC 5.5 11/16/2021 12:14 AM    HCT 38.4 11/16/2021 12:14 AM    HGB 12.6 11/16/2021 12:14 AM    PLATELET 446 90/65/5279 12:14 AM       Lab Results   Component Value Date/Time    Sodium 144 11/16/2021 12:14 AM    Potassium 3.5 11/16/2021 12:14 AM    Chloride 110 (H) 11/16/2021 12:14 AM    CO2 29 11/16/2021 12:14 AM    Glucose 92 11/16/2021 12:14 AM    BUN 10 11/16/2021 12:14 AM    Creatinine 0.67 (L) 11/16/2021 12:14 AM    Calcium 8.7 11/16/2021 12:14 AM       No components found for: TROPQUANT    No results found for: BELEN      Lab Results   Component Value Date/Time    Hemoglobin A1c 6.2 (H) 05/08/2013 10:17 AM    Hemoglobin A1c (POC) 5.4 01/11/2018 04:22 PM        No results found for: B12LT, FOL, RBCF    No results found for: BELENWestight, REECEANA    Lab Results   Component Value Date/Time    Cholesterol, total 232 (H) 05/08/2013 10:17 AM    Cholesterol (POC) 195 01/17/2018 04:18 PM    HDL Cholesterol 41 05/08/2013 10:17 AM    HDL Cholesterol (POC) 37 01/17/2018 04:18 PM    LDL Cholesterol (POC) 105 01/17/2018 04:18 PM    LDL, calculated 163 (H) 05/08/2013 10:17 AM    VLDL, calculated 28 05/08/2013 10:17 AM    Triglyceride 142 05/08/2013 10:17 AM    Triglycerides (POC) 266 01/17/2018 04:18 PM    CHOL/HDL Ratio 7.3 (H) 10/08/2010 01:10 PM         CT Results (recent):  Results from Abstract encounter on 01/13/22    CT HEAD W WO CONT      MRI Results (recent):  Results from [place] encounter on 11/12/21    MRI [facility] SPINE W WO CONT    Narrative  INDICATION: History of MS and thoracic cord compression    Exam: MRI thoracic spine. Comparison 4/13/2020 Sequences include sagittal and  axial T1 and T2-weighted images. Sagittal STIR. After the intravenous  administration of 17 mL of Dotarem sagittal and axial T1-weighted images were  obtained. FINDINGS: Alignment of the thoracic spine is normal. There are no suspicious  marrow lesions or evidence of fracture. Mild endplate degenerative change. Subtle areas of increased signal within the cord ventrally at T6 and posterior  to T9 not significantly changed. No new lesions or abnormal enhancement. Multiple small protrusions. Prominent facet arthropathy at T9-T10 with  thickening of the ligamentum flavum. Canal stenosis is mild to moderate at this  level with dorsal indentation of the thecal sac. Significant bilateral foraminal  narrowing at T9-10 and T10-T11 Given differences in technique not significantly  changed. No significant canal or foraminal narrowing. Paraspinal soft tissues  are unremarkable. Impression  1. Subtle areas of cord signal abnormality not significantly progressed in the  interval. No new cord lesions or abnormal enhancement  2. Degenerative changes with prominent facet arthropathy at T9-10 and T10-T11  without interval progression      IR Results (recent):  No results found for this or any previous visit.       VAS/US Results (recent):  No results found for this or any previous visit.      PHYSICAL EXAM:  General:    Alert, cooperative, no distress, appears stated age. Head:   Normocephalic, without obvious abnormality, atraumatic. Eyes:   Conjunctivae/corneas clear. PERRLA  Nose:  Nares normal. No drainage or sinus tenderness. Throat:    Lips, mucosa, and tongue normal.  No Thrush  Neck:  Supple, symmetrical,  no adenopathy, thyroid: non tender    no carotid bruit and no JVD. Paraspinal tenderness  Back:    Symmetric, diffuse tenderness. Lungs:   Clear to auscultation bilaterally. No Wheezing or Rhonchi. No rales. Chest wall:  No tenderness or deformity. No Accessory muscle use. Heart:   Regular rate and rhythm,  no murmur, rub or gallop. Abdomen:   Soft, non-tender. Not distended. Bowel sounds normal. No masses  Extremities: Extremities normal, atraumatic, No cyanosis. No edema. No clubbing  Skin:     Texture, turgor normal. No rashes or lesions. Not Jaundiced  Lymph nodes: Cervical, supraclavicular normal.  Psych:  Good insight. Not depressed. Anxious. NEUROLOGICAL EXAM:  Appearance: The patient is well developed, well nourished, provides a coherent history and is in no acute distress. Mental Status: Oriented to time, place and person. Mood and affect appropriate. Cranial Nerves:   Intact visual fields. Fundi are benign. JUS, EOM's full, no nystagmus, no ptosis. Facial sensation is normal. Corneal reflexes are intact. Facial movement is symmetric. Hearing is normal bilaterally. Palate is midline with normal sternocleidomastoid and trapezius muscles are normal. Tongue is midline. Motor:  5-/5 strength in upper extremity and 4+/5 lower extremity proximal and distal muscles. Normal bulk and tone. No fasciculations. Reflexes:   Deep tendon reflexes 2+/4 and symmetrical.   Sensory:    Dysesthesia to touch, pinprick and vibration. Gait:   Unsteady gait. Ambulates with cane   Tremor:    Tremors noted. Cerebellar:  No cerebellar signs present. Neurovascular:  Normal heart sounds and regular rhythm, peripheral pulses intact, and no carotid bruits. Assesment  1. [facility] progressive [facility]    - REFERRAL TO PHYSICAL THERAPY    2. Chronic pain syndrome  Following pain management    3. Gait disorder    - REFERRAL TO PHYSICAL THERAPY    4. Cervical myelopathy (HCC)    - REFERRAL TO PHYSICAL THERAPY    5. Polyneuropathy  Lyrica    6. Frequent falls  Physical therapy  7. Tremor  Clonazepam    ___________________________________________________  PLAN: Medication and plan discussed with patient and his father      ICD-10-CM ICD-9-CM    1. Secondary progressive multiple sclerosis ([facility]  G35 340 REFERRAL TO PHYSICAL THERAPY   2. Chronic pain syndrome  G89.4 338.4    3. Gait disorder  R26.9 781.2 REFERRAL TO PHYSICAL THERAPY   4. Cervical myelopathy (HCC)  G95.9 721.1 REFERRAL TO PHYSICAL THERAPY   5. Polyneuropathy  G62.9 356.9    6. Frequent falls  R29.6 V15.88    7. Tremor  R25.1 781.0      Follow-up and Dispositions    · Return in about 3 months (around 6/22/2022).                ___________________________________________________    Attending Physician: Oscar Fierro MD

## 2022-03-29 ENCOUNTER — TELEPHONE (OUTPATIENT)
Dept: NEUROLOGY | Age: 57
End: 2022-03-29

## 2022-03-30 ENCOUNTER — TELEPHONE (OUTPATIENT)
Dept: NEUROLOGY | Age: 57
End: 2022-03-30

## 2022-03-30 NOTE — TELEPHONE ENCOUNTER
Received Hello message, \"Please call re: Dr. Romeo Encinas [email or web address]. I have MS. \"

## 2022-03-30 NOTE — TELEPHONE ENCOUNTER
lvm that we are returning the call and that MS one will be contacting them about the [facility]

## 2022-04-06 ENCOUNTER — TELEPHONE (OUTPATIENT)
Dept: NEUROLOGY | Age: 57
End: 2022-04-06

## 2022-04-06 NOTE — TELEPHONE ENCOUNTER
I called and left a message that [employer] that helps her with the [address] [place] [address] Road has not been able to reach her.  I gave the phone number to reach them: [phone]

## 2022-04-06 NOTE — TELEPHONE ENCOUNTER
Pt called to check on mavenclad status. States medication is supposed to be mailed to pt but they have not heard anything. Please call mom, Ritu Bound [phone]    Also, please disregard message on 3/30. Neither pt or mom remember leaving that message.

## 2022-04-06 NOTE — TELEPHONE ENCOUNTER
Please call in regards to a message she got from [employer]. Needs to let you know what's going on. They are telling her that they can not do anything until they get the rx.

## 2022-04-08 NOTE — TELEPHONE ENCOUNTER
I called and notified the patient's mother and notified her that I am waiting on the form to be filled out and will most likely be able to fax in early next week.

## 2022-04-11 ENCOUNTER — DOCUMENTATION ONLY (OUTPATIENT)
Dept: NEUROLOGY | Age: 57
End: 2022-04-11

## 2022-04-12 ENCOUNTER — APPOINTMENT (OUTPATIENT)
Dept: PHYSICAL THERAPY | Age: 57
End: 2022-04-12

## 2022-04-16 ENCOUNTER — APPOINTMENT (OUTPATIENT)
Dept: CT IMAGING | Age: 57
DRG: 060 | End: 2022-04-16
Attending: STUDENT IN AN ORGANIZED HEALTH CARE EDUCATION/TRAINING PROGRAM
Payer: MEDICARE

## 2022-04-16 ENCOUNTER — HOSPITAL ENCOUNTER (INPATIENT)
Age: 57
LOS: 6 days | Discharge: NURSING, REHAB OR LONG-TERM CARE | DRG: 060 | End: 2022-04-22
Attending: STUDENT IN AN ORGANIZED HEALTH CARE EDUCATION/TRAINING PROGRAM | Admitting: STUDENT IN AN ORGANIZED HEALTH CARE EDUCATION/TRAINING PROGRAM
Payer: MEDICARE

## 2022-04-16 DIAGNOSIS — G35 MS (MULTIPLE SCLEROSIS) (HCC): ICD-10-CM

## 2022-04-16 DIAGNOSIS — R47.1 DYSARTHRIA: ICD-10-CM

## 2022-04-16 DIAGNOSIS — R26.89 BALANCE PROBLEM: ICD-10-CM

## 2022-04-16 DIAGNOSIS — G35 MULTIPLE SCLEROSIS EXACERBATION (HCC): ICD-10-CM

## 2022-04-16 DIAGNOSIS — R29.6 RECURRENT FALLS: ICD-10-CM

## 2022-04-16 DIAGNOSIS — G35 MULTIPLE SCLEROSIS (HCC): Primary | ICD-10-CM

## 2022-04-16 LAB
ALBUMIN SERPL-MCNC: 3.4 G/DL (ref 3.5–5)
ALBUMIN/GLOB SERPL: 0.9 {RATIO} (ref 1.1–2.2)
ALP SERPL-CCNC: 41 U/L (ref 45–117)
ALT SERPL-CCNC: 24 U/L (ref 12–78)
ANION GAP SERPL CALC-SCNC: 4 MMOL/L (ref 5–15)
APAP SERPL-MCNC: <2 UG/ML (ref 10–30)
APPEARANCE UR: CLEAR
AST SERPL-CCNC: 16 U/L (ref 15–37)
BASOPHILS # BLD: 0 K/UL (ref 0–0.1)
BASOPHILS NFR BLD: 0 % (ref 0–1)
BILIRUB SERPL-MCNC: 0.5 MG/DL (ref 0.2–1)
BILIRUB UR QL: NEGATIVE
BUN SERPL-MCNC: 13 MG/DL (ref 6–20)
BUN/CREAT SERPL: 13 (ref 12–20)
CALCIUM SERPL-MCNC: 9.1 MG/DL (ref 8.5–10.1)
CHLORIDE SERPL-SCNC: 108 MMOL/L (ref 97–108)
CO2 SERPL-SCNC: 30 MMOL/L (ref 21–32)
COLOR UR: NORMAL
CREAT SERPL-MCNC: 0.98 MG/DL (ref 0.7–1.3)
DIFFERENTIAL METHOD BLD: ABNORMAL
EOSINOPHIL # BLD: 0 K/UL (ref 0–0.4)
EOSINOPHIL NFR BLD: 0 % (ref 0–7)
ERYTHROCYTE [DISTWIDTH] IN BLOOD BY AUTOMATED COUNT: 13.7 % (ref 11.5–14.5)
GLOBULIN SER CALC-MCNC: 3.6 G/DL (ref 2–4)
GLUCOSE BLD STRIP.AUTO-MCNC: 132 MG/DL (ref 65–117)
GLUCOSE SERPL-MCNC: 124 MG/DL (ref 65–100)
GLUCOSE UR STRIP.AUTO-MCNC: NEGATIVE MG/DL
HCT VFR BLD AUTO: 38.4 % (ref 36.6–50.3)
HGB BLD-MCNC: 12.6 G/DL (ref 12.1–17)
HGB UR QL STRIP: NEGATIVE
IMM GRANULOCYTES # BLD AUTO: 0 K/UL (ref 0–0.04)
IMM GRANULOCYTES NFR BLD AUTO: 0 % (ref 0–0.5)
INR PPP: 1 (ref 0.9–1.1)
KETONES UR QL STRIP.AUTO: NEGATIVE MG/DL
LEUKOCYTE ESTERASE UR QL STRIP.AUTO: NEGATIVE
LYMPHOCYTES # BLD: 0.6 K/UL (ref 0.8–3.5)
LYMPHOCYTES NFR BLD: 8 % (ref 12–49)
MCH RBC QN AUTO: 30 PG (ref 26–34)
MCHC RBC AUTO-ENTMCNC: 32.8 G/DL (ref 30–36.5)
MCV RBC AUTO: 91.4 FL (ref 80–99)
MONOCYTES # BLD: 0.8 K/UL (ref 0–1)
MONOCYTES NFR BLD: 10 % (ref 5–13)
NEUTS SEG # BLD: 6.1 K/UL (ref 1.8–8)
NEUTS SEG NFR BLD: 82 % (ref 32–75)
NITRITE UR QL STRIP.AUTO: NEGATIVE
NRBC # BLD: 0 K/UL (ref 0–0.01)
NRBC BLD-RTO: 0 PER 100 WBC
PH UR STRIP: 7 [PH] (ref 5–8)
PLATELET # BLD AUTO: 154 K/UL (ref 150–400)
PMV BLD AUTO: 9.4 FL (ref 8.9–12.9)
POTASSIUM SERPL-SCNC: 3.8 MMOL/L (ref 3.5–5.1)
PROT SERPL-MCNC: 7 G/DL (ref 6.4–8.2)
PROT UR STRIP-MCNC: NEGATIVE MG/DL
PROTHROMBIN TIME: 10.3 SEC (ref 9–11.1)
RBC # BLD AUTO: 4.2 M/UL (ref 4.1–5.7)
RBC MORPH BLD: ABNORMAL
SALICYLATES SERPL-MCNC: <1.7 MG/DL (ref 2.8–20)
SERVICE CMNT-IMP: ABNORMAL
SODIUM SERPL-SCNC: 142 MMOL/L (ref 136–145)
SP GR UR REFRACTOMETRY: 1.01 (ref 1–1.03)
UROBILINOGEN UR QL STRIP.AUTO: 0.2 EU/DL (ref 0.2–1)
WBC # BLD AUTO: 7.5 K/UL (ref 4.1–11.1)

## 2022-04-16 PROCEDURE — 81003 URINALYSIS AUTO W/O SCOPE: CPT

## 2022-04-16 PROCEDURE — 82962 GLUCOSE BLOOD TEST: CPT

## 2022-04-16 PROCEDURE — 74011000250 HC RX REV CODE- 250: Performed by: STUDENT IN AN ORGANIZED HEALTH CARE EDUCATION/TRAINING PROGRAM

## 2022-04-16 PROCEDURE — 36415 COLL VENOUS BLD VENIPUNCTURE: CPT

## 2022-04-16 PROCEDURE — 65660000000 HC RM CCU STEPDOWN

## 2022-04-16 PROCEDURE — 99285 EMERGENCY DEPT VISIT HI MDM: CPT

## 2022-04-16 PROCEDURE — 80143 DRUG ASSAY ACETAMINOPHEN: CPT

## 2022-04-16 PROCEDURE — 85610 PROTHROMBIN TIME: CPT

## 2022-04-16 PROCEDURE — 85025 COMPLETE CBC W/AUTO DIFF WBC: CPT

## 2022-04-16 PROCEDURE — 80053 COMPREHEN METABOLIC PANEL: CPT

## 2022-04-16 PROCEDURE — 74011250636 HC RX REV CODE- 250/636: Performed by: STUDENT IN AN ORGANIZED HEALTH CARE EDUCATION/TRAINING PROGRAM

## 2022-04-16 PROCEDURE — 74011250637 HC RX REV CODE- 250/637: Performed by: STUDENT IN AN ORGANIZED HEALTH CARE EDUCATION/TRAINING PROGRAM

## 2022-04-16 PROCEDURE — 74011000258 HC RX REV CODE- 258: Performed by: STUDENT IN AN ORGANIZED HEALTH CARE EDUCATION/TRAINING PROGRAM

## 2022-04-16 PROCEDURE — 94762 N-INVAS EAR/PLS OXIMTRY CONT: CPT

## 2022-04-16 PROCEDURE — 93005 ELECTROCARDIOGRAM TRACING: CPT

## 2022-04-16 PROCEDURE — 80179 DRUG ASSAY SALICYLATE: CPT

## 2022-04-16 PROCEDURE — 74011636637 HC RX REV CODE- 636/637: Performed by: STUDENT IN AN ORGANIZED HEALTH CARE EDUCATION/TRAINING PROGRAM

## 2022-04-16 PROCEDURE — 70450 CT HEAD/BRAIN W/O DYE: CPT

## 2022-04-16 RX ORDER — ENOXAPARIN SODIUM 100 MG/ML
40 INJECTION SUBCUTANEOUS EVERY 24 HOURS
Status: DISCONTINUED | OUTPATIENT
Start: 2022-04-16 | End: 2022-04-22 | Stop reason: HOSPADM

## 2022-04-16 RX ORDER — ACETAMINOPHEN 650 MG/1
650 SUPPOSITORY RECTAL
Status: DISCONTINUED | OUTPATIENT
Start: 2022-04-16 | End: 2022-04-22 | Stop reason: HOSPADM

## 2022-04-16 RX ORDER — SODIUM CHLORIDE 0.9 % (FLUSH) 0.9 %
5-40 SYRINGE (ML) INJECTION EVERY 8 HOURS
Status: DISCONTINUED | OUTPATIENT
Start: 2022-04-16 | End: 2022-04-22 | Stop reason: HOSPADM

## 2022-04-16 RX ORDER — ONDANSETRON 4 MG/1
4 TABLET, ORALLY DISINTEGRATING ORAL
Status: DISCONTINUED | OUTPATIENT
Start: 2022-04-16 | End: 2022-04-22 | Stop reason: HOSPADM

## 2022-04-16 RX ORDER — ACETAMINOPHEN 325 MG/1
650 TABLET ORAL
Status: DISCONTINUED | OUTPATIENT
Start: 2022-04-16 | End: 2022-04-22 | Stop reason: HOSPADM

## 2022-04-16 RX ORDER — ONDANSETRON 2 MG/ML
4 INJECTION INTRAMUSCULAR; INTRAVENOUS
Status: DISCONTINUED | OUTPATIENT
Start: 2022-04-16 | End: 2022-04-22 | Stop reason: HOSPADM

## 2022-04-16 RX ORDER — POLYETHYLENE GLYCOL 3350 17 G/17G
17 POWDER, FOR SOLUTION ORAL DAILY PRN
Status: DISCONTINUED | OUTPATIENT
Start: 2022-04-16 | End: 2022-04-22 | Stop reason: HOSPADM

## 2022-04-16 RX ORDER — OXYCODONE HYDROCHLORIDE 5 MG/1
15 TABLET ORAL 2 TIMES DAILY
Status: DISCONTINUED | OUTPATIENT
Start: 2022-04-16 | End: 2022-04-22 | Stop reason: HOSPADM

## 2022-04-16 RX ORDER — SODIUM CHLORIDE 0.9 % (FLUSH) 0.9 %
5-40 SYRINGE (ML) INJECTION AS NEEDED
Status: DISCONTINUED | OUTPATIENT
Start: 2022-04-16 | End: 2022-04-22 | Stop reason: HOSPADM

## 2022-04-16 RX ORDER — GUAIFENESIN 100 MG/5ML
81 LIQUID (ML) ORAL DAILY
Status: DISCONTINUED | OUTPATIENT
Start: 2022-04-17 | End: 2022-04-22 | Stop reason: HOSPADM

## 2022-04-16 RX ADMIN — OXYCODONE 15 MG: 5 TABLET ORAL at 18:59

## 2022-04-16 RX ADMIN — SODIUM CHLORIDE, PRESERVATIVE FREE 10 ML: 5 INJECTION INTRAVENOUS at 21:02

## 2022-04-16 RX ADMIN — SODIUM CHLORIDE, PRESERVATIVE FREE 10 ML: 5 INJECTION INTRAVENOUS at 17:58

## 2022-04-16 RX ADMIN — Medication 10 UNITS: at 18:59

## 2022-04-16 RX ADMIN — SODIUM CHLORIDE 1000 MG: 900 INJECTION INTRAVENOUS at 20:36

## 2022-04-16 RX ADMIN — ENOXAPARIN SODIUM 40 MG: 40 INJECTION SUBCUTANEOUS at 18:59

## 2022-04-16 RX ADMIN — SODIUM CHLORIDE 1000 ML: 9 INJECTION, SOLUTION INTRAVENOUS at 16:36

## 2022-04-16 NOTE — ED NOTES
TRANSFER - OUT REPORT:    Verbal report given to [facility] (name) on Chanelle Duarte  being transferred to Neuro Tele (unit) for routine progression of care       Report consisted of patients Situation, Background, Assessment and   Recommendations(SBAR). Information from the following report(s) SBAR, Kardex, ED Summary and MAR was reviewed with the receiving nurse. Lines:   Peripheral IV 04/16/22 Left Antecubital (Active)   Site Assessment Clean, dry, & intact 04/16/22 1603   Phlebitis Assessment 0 04/16/22 1603   Infiltration Assessment 0 04/16/22 1603   Dressing Status Clean, dry, & intact 04/16/22 1603   Dressing Type Tape;Transparent 04/16/22 1603   Hub Color/Line Status Green 04/16/22 1603        Opportunity for questions and clarification was provided.

## 2022-04-16 NOTE — ED PROVIDER NOTES
EMERGENCY DEPARTMENT HISTORY AND PHYSICAL EXAM      Date: 4/16/2022  Patient Name: Victor Hugo Wren    History of Presenting Illness     Chief Complaint   Patient presents with   [facility] Fall         HPI: Victor Hugo Wren, 49 y.o. male presents to the ED with cc of slurred speech and fall. History obtained from EMS and from patient. Per EMS, at his group home, he went outside and had fallen. They thought that his speech seemed more slurred than usual, they noticed this after the fall at around 3:30 PM.  He states that he has slurred speech at baseline and that this is no different. He reports falling because he lost his footing, does have a history of frequent falls. He denies any new weakness numbness or tingling in the arms or legs, no vision changes or difficulty swallowing. He did not hit his head during the fall. He reports some slight pain in his low back, however no pain in the neck. He says that he was feeling a little weak all over prior to this because he had not eaten all day. Otherwise denies any recent illnesses, no fevers or cough, no vomiting or diarrhea. He does not take any blood thinners. There are no other complaints, changes, or physical findings at this time. PCP: Rodney Casanova., MD    No current facility-administered medications on file prior to encounter. Current Outpatient Medications on File Prior to Encounter   Medication Sig Dispense Refill    methylPREDNISolone (MEDROL) 32 mg tablet 1 tab p.o. tid x3 days, 1 tab p.o.bid x4 days, 1 tab p.o. every day x3days 20 Tablet 0    tiZANidine (ZANAFLEX) 4 mg tablet Take 1 Tablet by mouth nightly. 30 Tablet 2    cladribine,multiple sclerosis, ([employer]ad, 6 tablet pack,) 10 mg tab Take 10 mg by mouth daily. 6 Each 1    oxyCODONE IR (OXY-IR) 15 mg immediate release tablet Take 15 mg by mouth two (2) times a day. as needed for pain      senna-docusate (PERICOLACE) 8.6-50 mg per tablet Take 1 Tablet by mouth two (2) times a day. (Patient not taking: Reported on 3/22/2022) 30 Tablet 0    polyethylene glycol (MIRALAX) 17 gram packet Take 1 Packet by mouth daily. (Patient not taking: Reported on 3/22/2022) 30 Packet 0       Past History     Past Medical History:  Past Medical History:   Diagnosis Date    Back pain 10/8/2010    Back pain 10/8/2010    Depression     Diabetes ([address]     Fatigue 8/24/2012    Hearing loss 1/25/2013    Memory loss 1/25/2013    [facility]     Muscle pain     Muscle weakness     Poor appetite     Snoring     Unintentional weight change     Visual disturbance        Past Surgical History:  Past Surgical History:   Procedure Laterality Date    HX HEENT Right     ear       Family History:  Family History   Problem Relation Age of Onset    Hypertension Father     Cancer Father         prostate    Hypertension Sister     Diabetes Maternal Grandmother     Cancer Paternal Uncle         prostate    Dementia Paternal Uncle        Social History:  Social History     Tobacco Use    Smoking status: Current Every Day Smoker     Packs/day: 0.50     Years: 21.00     Pack years: 10.50     Types: Cigarettes    Smokeless tobacco: Never Used   Substance Use Topics    Alcohol use: No    Drug use: No       Allergies:  No Known Allergies      Review of Systems   no fever  No ear pain  No eye pain  no shortness of breath  no chest pain  no abdominal pain  no dysuria  no leg pain  No rash  No lymphadenopathy  No weight loss    Physical Exam   Physical Exam  Constitutional:       General: He is not in acute distress. Appearance: He is not toxic-appearing. HENT:      Head: Normocephalic and atraumatic. Mouth/Throat:      Mouth: Mucous membranes are moist.   Eyes:      Comments: Extraocular movements intact, there does seem to be a rightward gaze preference, however this can be overcome   Cardiovascular:      Rate and Rhythm: Normal rate and regular rhythm.    Pulmonary:      Effort: Pulmonary effort is normal.      Breath sounds: Normal breath sounds. Abdominal:      Palpations: Abdomen is soft. Tenderness: There is no abdominal tenderness. Musculoskeletal:         General: No deformity. Cervical back: Neck supple. Comments: No midline tenderness of the spine. Skin:     General: Skin is warm and dry. Neurological:      General: No focal deficit present. Mental Status: He is alert and oriented to person, place, and time. Comments: Full strength with shoulder flexion bilaterally, symmetric strength with hip flexion bilaterally with normal straight leg raise for 5 seconds bilaterally. Sensation to light touch intact over upper and lower extremities bilaterally. Speech is dysarthric. However understandable. Symmetric facial expressions, midline tongue protrusion. Psychiatric:         Mood and Affect: Mood normal.         Diagnostic Study Results     Labs -     Recent Results (from the past 24 hour(s))   GLUCOSE, POC    Collection Time: 04/16/22  4:11 PM   Result Value Ref Range    Glucose (POC) 132 (H) 65 - 117 mg/dL    Performed by Kiarra PALOMO        Radiologic Studies -   CT CODE NEURO HEAD WO CONTRAST    (Results Pending)   CTA CODE NEURO HEAD AND NECK W CONT    (Results Pending)   CT CODE NEURO PERF W CBF    (Results Pending)     CT Results  (Last 48 hours)    None        CXR Results  (Last 48 hours)    None            Medical Decision Making   I am the first provider for this patient. I reviewed the vital signs, available nursing notes, past medical history, past surgical history, family history and social history. Vital Signs-Reviewed the patient's vital signs. Patient Vitals for the past 24 hrs:   Temp Pulse Resp BP SpO2   04/16/22 1600 99.2 °F (37.3 °C) (!) 121 16 139/62 100 %         Provider Notes (Medical Decision Making):   28-year-old male presenting with slurred speech. Does have history of dysarthria and MS per chart review.   He feels like his speech is at baseline. However, per EMS, group home thought that it was worse. Given potential new onset neurologic findings, stroke level 1 is called. He seems to have some slight gaze preference to the right on exam but this can be overcome. Otherwise no new focal findings on neuro exam.  He does report mechanical fall earlier today, per chart review has history of frequent falls. No significant injuries evident on exam from the fall, he did not hit his head. ED Course:     Initial assessment performed. The patients presenting problems have been discussed, and they are in agreement with the care plan formulated and outlined with them. I have encouraged them to ask questions as they arise throughout their visit. I spoken with Dr. Nazanin Dugan of teleneurology, I have explained the findings on my neuro exam and history as above, he states that no TPA or acute intervention required, recommends admission for MRI, he declines to do a physical exam.    CBC negative for leukocytosis or anemia, basic metabolic panel with normal renal function, no worrisome electrolyte abnormalities, UA not suggestive of UTI, CT scan with no acute intracranial abnormality, moderately severe periventricular white matter changes roughly stable since last MRI. Reevaluation, he is resting comfortably, I have spoken with his mom who was on the phone with him, and she states that his speech does seem more slurred than baseline. She is also noted that he has not been doing so well at the group home, she is concerned about the care that he is receiving there, states that he has been getting weaker and falling frequently. Critical Care Time:         Disposition:  Admit    PLAN:  1. Current Discharge Medication List        2.    Follow-up Information    None       Return to ED if worse     Diagnosis     Clinical Impression: Acute on chronic dysarthria with history of MS

## 2022-04-16 NOTE — H&P
Hospitalist Admission Note    NAME: Rambo Michael   :  1965   MRN:  [id number]     Date/Time:  2022 6:12 PM    Patient PCP: Jeremías Bhagat MD  ______________________________________________________________________  Given the patient's current clinical presentation, I have a high level of concern for decompensation if discharged from the emergency department. Complex decision making was performed, which includes reviewing the patient's available past medical records, laboratory results, and x-ray films. My assessment of this patient's clinical condition and my plan of care is as follows. Assessment / Plan:    Dysarthria with recurrent falls  Multiple sclerosis exacerbation  Rule out stroke  - CT head-No acute intracranial abnormality is confirmed. Moderately severe periventricular white matter changes, roughly stable since brain MRI 2021 and consistent with the clinical history of demyelination. -MRI brain ordered. - Started Solu-Medrol 1 g x 4 doses. - NPH 10 units twice daily for steroid-induced hyperglycemia.  - Neurology consulted. - Neurochecks, PT and OT consulted. - Speech therapy consulted. - Lipid panel, A1c in a.m.  - Aspirin 81 mg daily. Code Status: Full code  Surrogate Decision Maker:    DVT Prophylaxis: Lovenox  GI Prophylaxis: not indicated    Baseline: Ambulatory with assistance of group home      Subjective:   CHIEF COMPLAINT: Recurrent fall    HISTORY OF PRESENT ILLNESS:     Ariela Shultz is a 49 y.o.  male who presents with recurrent falls with one fall happened today. Patient past medical history of multiple sclerosis, states a group home. Patient states that today he fell down while at a grocery store. Patient also found to have some dysarthria which was worse from his baseline according to the group home staff but according to the patient very much at the baseline.   Denies any weakness or numbness in the body, no fever or chills, no chest pain or shortness of breath, no nausea vomiting, no abdominal pain, no headache, no passing out episodes. Patient has multiple falls in the last few weeks secondary to the weakness. We were asked to admit for work up and evaluation of the above problems. Past Medical History:   Diagnosis Date    Back pain 10/8/2010    Back pain 10/8/2010    Depression     Diabetes ([address]     Fatigue 8/24/2012    Hearing loss 1/25/2013    Memory loss 1/25/2013    [facility])     Muscle pain     Muscle weakness     Poor appetite     Snoring     Unintentional weight change     Visual disturbance         Past Surgical History:   Procedure Laterality Date    HX HEENT Right     ear       Social History     Tobacco Use    Smoking status: Current Every Day Smoker     Packs/day: 0.50     Years: 21.00     Pack years: 10.50     Types: Cigarettes    Smokeless tobacco: Never Used   Substance Use Topics    Alcohol use: No        Family History   Problem Relation Age of Onset    Hypertension Father     Cancer Father         prostate    Hypertension Sister     Diabetes Maternal Grandmother     Cancer Paternal Uncle         prostate    Dementia Paternal Uncle      No Known Allergies     Prior to Admission medications    Medication Sig Start Date End Date Taking? Authorizing Provider   methylPREDNISolone (MEDROL) 32 mg tablet 1 tab p.o. tid x3 days, 1 tab p.o.bid x4 days, 1 tab p.o. every day x3days 3/22/22   Syed Polo MD   tiZANidine (ZANAFLEX) 4 mg tablet Take 1 Tablet by mouth nightly. 3/22/22   Syed Polo MD   cladribine,multiple sclerosis, (Mavenclad, 6 tablet pack,) 10 mg tab Take 10 mg by mouth daily. 3/22/22   Syed Polo MD   oxyCODONE IR (OXY-IR) 15 mg immediate release tablet Take 15 mg by mouth two (2) times a day.  as needed for pain    Provider, Historical   senna-docusate (PERICOLACE) 8.6-50 mg per tablet Take 1 Tablet by mouth two (2) times a day. Patient not taking: Reported on 3/22/2022 11/16/21   Kaleb Cárdenas MD   polyethylene glycol (MIRALAX) 17 gram packet Take 1 Packet by mouth daily. Patient not taking: Reported on 3/22/2022 11/16/21   Travon John MD       REVIEW OF SYSTEMS:     I am not able to complete the review of systems because: The patient is intubated and sedated    The patient has altered mental status due to his acute medical problems    The patient has baseline aphasia from prior stroke(s)    The patient has baseline dementia and is not reliable historian    The patient is in acute medical distress and unable to provide information           Total of 12 systems reviewed as follows:       POSITIVE= underlined text  Negative = text not underlined  General:  fever, chills, sweats, generalized weakness, weight loss/gain,      loss of appetite   Eyes:    blurred vision, eye pain, loss of vision, double vision  ENT:    rhinorrhea, pharyngitis   Respiratory:   cough, sputum production, SOB, LEVINE, wheezing, pleuritic pain   Cardiology:   chest pain, palpitations, orthopnea, PND, edema, syncope   Gastrointestinal:  abdominal pain , N/V, diarrhea, dysphagia, constipation, bleeding   Genitourinary:  frequency, urgency, dysuria, hematuria, incontinence   Muskuloskeletal :  arthralgia, myalgia, back pain  Hematology:  easy bruising, nose or gum bleeding, lymphadenopathy   Dermatological: rash, ulceration, pruritis, color change / jaundice  Endocrine:   hot flashes or polydipsia   Neurological:  headache, dizziness, confusion, focal weakness, paresthesia,     Speech difficulties, memory loss, gait difficulty  Psychological: Feelings of anxiety, depression, agitation    Objective:   VITALS:    Visit Vitals  BP (!) 141/99   Pulse (!) 112   Temp 99.2 °F (37.3 °C)   Resp 15   Ht 5' 10\" (1.778 m)   Wt 81.8 kg (180 lb 5.4 oz)   SpO2 100%   BMI 25.88 kg/m²       PHYSICAL EXAM:    General:    Alert, cooperative, no distress, appears stated age. HEENT: Atraumatic, anicteric sclerae, pink conjunctivae     No oral ulcers, mucosa moist, throat clear, dentition fair  Neck:  Supple, symmetrical,  thyroid: non tender  Lungs:   Clear to auscultation bilaterally. No Wheezing or Rhonchi. No rales. Chest wall:  No tenderness  No Accessory muscle use. Heart:   Regular  rhythm,  No  murmur   No edema  Abdomen:   Soft, non-tender. Not distended. Bowel sounds normal  Extremities: No cyanosis. No clubbing,      Skin turgor normal, Capillary refill normal, Radial dial pulse 2+  Skin:     Not pale. Not Jaundiced  No rashes   Psych:  Good insight. Not depressed. Not anxious or agitated. Neurologic: EOMs intact. No facial asymmetry. Slurred speech. Symmetrical strength, Sensation grossly intact. Alert and oriented X 4.     _______________________________________________________________________  Care Plan discussed with:    Comments   Patient x    Family      RN x    Care Manager                    Consultant:  x    _______________________________________________________________________  Expected  Disposition:   Home with Family    HH/PT/OT/RN x   SNF/LTC    BONNIE    ________________________________________________________________________  TOTAL TIME: 61 Minutes    Critical Care Provided     Minutes non procedure based      Comments     Reviewed previous records   >50% of visit spent in counseling and coordination of care  Discussion with patient and/or family and questions answered       ________________________________________________________________________  Signed: Isaiah Lugo MD    Procedures: see electronic medical records for all procedures/Xrays and details which were not copied into this note but were reviewed prior to creation of Plan.     LAB DATA REVIEWED:    Recent Results (from the past 24 hour(s))   GLUCOSE, POC    Collection Time: 04/16/22  4:11 PM   Result Value Ref Range    Glucose (POC) 132 (H) 65 - 117 mg/dL    Performed by Bishop Jimenez MSN CBC WITH AUTOMATED DIFF    Collection Time: 04/16/22  4:12 PM   Result Value Ref Range    WBC 7.5 4.1 - 11.1 K/uL    RBC 4.20 4. 10 - 5.70 M/uL    HGB 12.6 12.1 - 17.0 g/dL    HCT 38.4 36.6 - 50.3 %    MCV 91.4 80.0 - 99.0 FL    MCH 30.0 26.0 - 34.0 PG    MCHC 32.8 30.0 - 36.5 g/dL    RDW 13.7 11.5 - 14.5 %    PLATELET 958 679 - 893 K/uL    MPV 9.4 8.9 - 12.9 FL    NRBC 0.0 0  WBC    ABSOLUTE NRBC 0.00 0.00 - 0.01 K/uL    NEUTROPHILS 82 (H) 32 - 75 %    LYMPHOCYTES 8 (L) 12 - 49 %    MONOCYTES 10 5 - 13 %    EOSINOPHILS 0 0 - 7 %    BASOPHILS 0 0 - 1 %    IMMATURE GRANULOCYTES 0 0.0 - 0.5 %    ABS. NEUTROPHILS 6.1 1.8 - 8.0 K/UL    ABS. LYMPHOCYTES 0.6 (L) 0.8 - 3.5 K/UL    ABS. MONOCYTES 0.8 0.0 - 1.0 K/UL    ABS. EOSINOPHILS 0.0 0.0 - 0.4 K/UL    ABS. BASOPHILS 0.0 0.0 - 0.1 K/UL    ABS. IMM. GRANS. 0.0 0.00 - 0.04 K/UL    DF SMEAR SCANNED      RBC COMMENTS NORMOCYTIC, NORMOCHROMIC     METABOLIC PANEL, COMPREHENSIVE    Collection Time: 04/16/22  4:12 PM   Result Value Ref Range    Sodium 142 136 - 145 mmol/L    Potassium 3.8 3.5 - 5.1 mmol/L    Chloride 108 97 - 108 mmol/L    CO2 30 21 - 32 mmol/L    Anion gap 4 (L) 5 - 15 mmol/L    Glucose 124 (H) 65 - 100 mg/dL    BUN 13 6 - 20 MG/DL    Creatinine 0.98 0.70 - 1.30 MG/DL    BUN/Creatinine ratio 13 12 - 20      GFR est AA >60 >60 ml/min/1.73m2    GFR est non-AA >60 >60 ml/min/1.73m2    Calcium 9.1 8.5 - 10.1 MG/DL    Bilirubin, total 0.5 0.2 - 1.0 MG/DL    ALT (SGPT) 24 12 - 78 U/L    AST (SGOT) 16 15 - 37 U/L    Alk.  phosphatase 41 (L) 45 - 117 U/L    Protein, total 7.0 6.4 - 8.2 g/dL    Albumin 3.4 (L) 3.5 - 5.0 g/dL    Globulin 3.6 2.0 - 4.0 g/dL    A-G Ratio 0.9 (L) 1.1 - 2.2     PROTHROMBIN TIME + INR    Collection Time: 04/16/22  4:12 PM   Result Value Ref Range    INR 1.0 0.9 - 1.1      Prothrombin time 10.3 9.0 - 11.1 sec   URINALYSIS W/ RFLX MICROSCOPIC    Collection Time: 04/16/22  4:27 PM   Result Value Ref Range    Color YELLOW/STRAW Appearance CLEAR CLEAR      Specific gravity 1.014 1.003 - 1.030      pH (UA) 7.0 5.0 - 8.0      Protein Negative NEG mg/dL    Glucose Negative NEG mg/dL    Ketone Negative NEG mg/dL    Bilirubin Negative NEG      Blood Negative NEG      Urobilinogen 0.2 0.2 - 1.0 EU/dL    Nitrites Negative NEG      Leukocyte Esterase Negative NEG     ACETAMINOPHEN    Collection Time: 04/16/22  4:31 PM   Result Value Ref Range    Acetaminophen level <2 (L) 10 - 30 ug/mL   SALICYLATE    Collection Time: 04/16/22  4:31 PM   Result Value Ref Range    Salicylate level <3.9 (L) 2.8 - 20.0 MG/DL

## 2022-04-17 ENCOUNTER — APPOINTMENT (OUTPATIENT)
Dept: MRI IMAGING | Age: 57
DRG: 060 | End: 2022-04-17
Attending: STUDENT IN AN ORGANIZED HEALTH CARE EDUCATION/TRAINING PROGRAM
Payer: MEDICARE

## 2022-04-17 ENCOUNTER — APPOINTMENT (OUTPATIENT)
Dept: MRI IMAGING | Age: 57
DRG: 060 | End: 2022-04-17
Attending: PSYCHIATRY & NEUROLOGY
Payer: MEDICARE

## 2022-04-17 LAB
ANION GAP SERPL CALC-SCNC: 5 MMOL/L (ref 5–15)
ATRIAL RATE: 111 BPM
BUN SERPL-MCNC: 10 MG/DL (ref 6–20)
BUN/CREAT SERPL: 13 (ref 12–20)
CALCIUM SERPL-MCNC: 9.1 MG/DL (ref 8.5–10.1)
CALCULATED P AXIS, ECG09: 67 DEGREES
CALCULATED R AXIS, ECG10: 7 DEGREES
CALCULATED T AXIS, ECG11: 55 DEGREES
CHLORIDE SERPL-SCNC: 108 MMOL/L (ref 97–108)
CHOLEST SERPL-MCNC: 184 MG/DL
CO2 SERPL-SCNC: 27 MMOL/L (ref 21–32)
CREAT SERPL-MCNC: 0.8 MG/DL (ref 0.7–1.3)
DIAGNOSIS, 93000: NORMAL
ERYTHROCYTE [DISTWIDTH] IN BLOOD BY AUTOMATED COUNT: 13.6 % (ref 11.5–14.5)
EST. AVERAGE GLUCOSE BLD GHB EST-MCNC: 108 MG/DL
GLUCOSE BLD STRIP.AUTO-MCNC: 120 MG/DL (ref 65–117)
GLUCOSE BLD STRIP.AUTO-MCNC: 133 MG/DL (ref 65–117)
GLUCOSE BLD STRIP.AUTO-MCNC: 184 MG/DL (ref 65–117)
GLUCOSE SERPL-MCNC: 140 MG/DL (ref 65–100)
HBA1C MFR BLD: 5.4 % (ref 4–5.6)
HCT VFR BLD AUTO: 38.2 % (ref 36.6–50.3)
HDLC SERPL-MCNC: 63 MG/DL
HDLC SERPL: 2.9 {RATIO} (ref 0–5)
HGB BLD-MCNC: 12.4 G/DL (ref 12.1–17)
LDLC SERPL CALC-MCNC: 110.8 MG/DL (ref 0–100)
MAGNESIUM SERPL-MCNC: 2.1 MG/DL (ref 1.6–2.4)
MCH RBC QN AUTO: 29.5 PG (ref 26–34)
MCHC RBC AUTO-ENTMCNC: 32.5 G/DL (ref 30–36.5)
MCV RBC AUTO: 91 FL (ref 80–99)
NRBC # BLD: 0 K/UL (ref 0–0.01)
NRBC BLD-RTO: 0 PER 100 WBC
P-R INTERVAL, ECG05: 162 MS
PLATELET # BLD AUTO: 158 K/UL (ref 150–400)
PMV BLD AUTO: 10.2 FL (ref 8.9–12.9)
POTASSIUM SERPL-SCNC: 3.9 MMOL/L (ref 3.5–5.1)
Q-T INTERVAL, ECG07: 326 MS
QRS DURATION, ECG06: 90 MS
QTC CALCULATION (BEZET), ECG08: 443 MS
RBC # BLD AUTO: 4.2 M/UL (ref 4.1–5.7)
SERVICE CMNT-IMP: ABNORMAL
SODIUM SERPL-SCNC: 140 MMOL/L (ref 136–145)
TRIGL SERPL-MCNC: 51 MG/DL (ref ?–150)
VENTRICULAR RATE, ECG03: 111 BPM
VLDLC SERPL CALC-MCNC: 10.2 MG/DL
WBC # BLD AUTO: 6.9 K/UL (ref 4.1–11.1)

## 2022-04-17 PROCEDURE — 74011250636 HC RX REV CODE- 250/636: Performed by: INTERNAL MEDICINE

## 2022-04-17 PROCEDURE — 82962 GLUCOSE BLOOD TEST: CPT

## 2022-04-17 PROCEDURE — 80061 LIPID PANEL: CPT

## 2022-04-17 PROCEDURE — 97535 SELF CARE MNGMENT TRAINING: CPT | Performed by: OCCUPATIONAL THERAPIST

## 2022-04-17 PROCEDURE — 97116 GAIT TRAINING THERAPY: CPT

## 2022-04-17 PROCEDURE — 97530 THERAPEUTIC ACTIVITIES: CPT | Performed by: OCCUPATIONAL THERAPIST

## 2022-04-17 PROCEDURE — 97530 THERAPEUTIC ACTIVITIES: CPT

## 2022-04-17 PROCEDURE — 65660000000 HC RM CCU STEPDOWN

## 2022-04-17 PROCEDURE — 85027 COMPLETE CBC AUTOMATED: CPT

## 2022-04-17 PROCEDURE — 97161 PT EVAL LOW COMPLEX 20 MIN: CPT

## 2022-04-17 PROCEDURE — 80048 BASIC METABOLIC PNL TOTAL CA: CPT

## 2022-04-17 PROCEDURE — 74011250637 HC RX REV CODE- 250/637: Performed by: INTERNAL MEDICINE

## 2022-04-17 PROCEDURE — 36415 COLL VENOUS BLD VENIPUNCTURE: CPT

## 2022-04-17 PROCEDURE — 83036 HEMOGLOBIN GLYCOSYLATED A1C: CPT

## 2022-04-17 PROCEDURE — 74011636637 HC RX REV CODE- 636/637: Performed by: INTERNAL MEDICINE

## 2022-04-17 PROCEDURE — 83735 ASSAY OF MAGNESIUM: CPT

## 2022-04-17 PROCEDURE — 74011250637 HC RX REV CODE- 250/637: Performed by: STUDENT IN AN ORGANIZED HEALTH CARE EDUCATION/TRAINING PROGRAM

## 2022-04-17 PROCEDURE — 97165 OT EVAL LOW COMPLEX 30 MIN: CPT | Performed by: OCCUPATIONAL THERAPIST

## 2022-04-17 PROCEDURE — 72156 MRI NECK SPINE W/O & W/DYE: CPT

## 2022-04-17 PROCEDURE — 99223 1ST HOSP IP/OBS HIGH 75: CPT | Performed by: PSYCHIATRY & NEUROLOGY

## 2022-04-17 PROCEDURE — 74011250636 HC RX REV CODE- 250/636: Performed by: STUDENT IN AN ORGANIZED HEALTH CARE EDUCATION/TRAINING PROGRAM

## 2022-04-17 PROCEDURE — 70553 MRI BRAIN STEM W/O & W/DYE: CPT

## 2022-04-17 PROCEDURE — 74011000250 HC RX REV CODE- 250: Performed by: STUDENT IN AN ORGANIZED HEALTH CARE EDUCATION/TRAINING PROGRAM

## 2022-04-17 PROCEDURE — A9576 INJ PROHANCE MULTIPACK: HCPCS | Performed by: INTERNAL MEDICINE

## 2022-04-17 PROCEDURE — 74011000258 HC RX REV CODE- 258: Performed by: STUDENT IN AN ORGANIZED HEALTH CARE EDUCATION/TRAINING PROGRAM

## 2022-04-17 RX ORDER — ATORVASTATIN CALCIUM 40 MG/1
40 TABLET, FILM COATED ORAL
Status: DISCONTINUED | OUTPATIENT
Start: 2022-04-17 | End: 2022-04-22 | Stop reason: HOSPADM

## 2022-04-17 RX ADMIN — OXYCODONE 15 MG: 5 TABLET ORAL at 22:02

## 2022-04-17 RX ADMIN — GADOTERIDOL 15 ML: 279.3 INJECTION, SOLUTION INTRAVENOUS at 16:00

## 2022-04-17 RX ADMIN — ENOXAPARIN SODIUM 40 MG: 40 INJECTION SUBCUTANEOUS at 17:24

## 2022-04-17 RX ADMIN — SODIUM CHLORIDE, PRESERVATIVE FREE 10 ML: 5 INJECTION INTRAVENOUS at 05:06

## 2022-04-17 RX ADMIN — Medication 7 UNITS: at 17:24

## 2022-04-17 RX ADMIN — SODIUM CHLORIDE 1000 MG: 900 INJECTION INTRAVENOUS at 17:24

## 2022-04-17 RX ADMIN — OXYCODONE 15 MG: 5 TABLET ORAL at 06:32

## 2022-04-17 RX ADMIN — ATORVASTATIN CALCIUM 40 MG: 40 TABLET, FILM COATED ORAL at 22:02

## 2022-04-17 RX ADMIN — ASPIRIN 81 MG: 81 TABLET, CHEWABLE ORAL at 10:41

## 2022-04-17 RX ADMIN — SODIUM CHLORIDE, PRESERVATIVE FREE 10 ML: 5 INJECTION INTRAVENOUS at 17:25

## 2022-04-17 NOTE — PROGRESS NOTES
Pt admitted to [facility] before shift change. NIH and swallow screen completed. Vitals signs taken and telebox placed on pt. Meds due from ED were given upon arrival to [facility], except those needing an IV pump, awaiting pump to be delivered. Pt had 9/10 pain in his back and stated that he spilled his whole bottle of oxycodone when he fell prior to admission. Pt stated that he takes 15mg oxycodone PRN at home. MD ordered pain meds, and RN administered. Pt will need pain reassessment. Bedside report given to [facility], RN.     Mckenzie Rinocn RN

## 2022-04-17 NOTE — CONSULTS
Neurology Note    Patient ID:  Oleksandr Salazar  [id number]  89 y.o.  1965      Date of Consultation:  April 17, 2022    Referring Physician: Dr. Orlando Feldman    Reason for Consultation:  Multiple sclerosis      Assessment and Plan:    The patient is a 53-year-old gentleman with relapsing remitting multiple sclerosis who presents after having increasing falls and speech difficulties. His neurological examination does reveal nystagmus, dysarthria, dysmetria and left-sided sensory deficit. Relapsing remitting multiple sclerosis with probable acute exacerbation:  The patient should receive brain MRI as well as an MRI of the cervical spine. I did add this order on. Less likely to be stroke but possible given his age. He should receive 1 g of Solu-Medrol x5 doses. I discussed the rationale of this with the patient today. Please monitor glucose levels while receiving Solu-Medrol. The patient will need aggressive physical therapy, Occupational Therapy, and speech therapy. Ultimately, he will need close follow-up with his primary neurologist in regards to getting Sanswire approved. It appears they have been working on this through his [employer]. Chronic neuropathic pain:  He continues on his home pain medication and muscle relaxant. Subjective: I have MS       History of Present Illness:   Oleksandr Salazar is a 49 y.o. male with a history of multiple sclerosis who lives at a group home who began to have increasing recurrent falls with  That happened the day of admission. He fell while outside. He had difficult time getting up. He was also having dysarthria which was worsening. He states that individuals at his facility had noted to speech changes more than he has. There is no focal numbness, tingling, or weakness. He has fallen a few times over the past few weeks. He has not been able to start Sanswire again.   It appears there has been some insurance issues and they are continuing to work on it. Upon a review of the medical records, the patient is followed in the neurology clinic by  and was last seen approximately 3 weeks ago. He has taken multiple disease modifying medications in the past including Ocrevus and Mavenclad. It was also noted that the patient does have chronic pain involving his neck and low back. There is also some cognitive concerns  From reviewing order notes, the patient has been seen by other neurologist both at [facility] and [address] [place] [address]. He was on rebif at one point in time  Past Medical History:   Diagnosis Date    Back pain 10/8/2010    Back pain 10/8/2010    Depression     Diabetes ([address]     Fatigue 8/24/2012    Hearing loss 1/25/2013    Memory loss 1/25/2013    [facility])     Muscle pain     Muscle weakness     Poor appetite     Snoring     Unintentional weight change     Visual disturbance         Past Surgical History:   Procedure Laterality Date    HX HEENT Right     ear        Family History   Problem Relation Age of Onset    Hypertension Father     Cancer Father         prostate    Hypertension Sister     Diabetes Maternal Grandmother     Cancer Paternal Uncle         prostate    Dementia Paternal Uncle         Social History     Tobacco Use    Smoking status: Current Every Day Smoker     Packs/day: 0.50     Years: 21.00     Pack years: 10.50     Types: Cigarettes    Smokeless tobacco: Never Used   Substance Use Topics    Alcohol use: No        No Known Allergies     Prior to Admission medications    Medication Sig Start Date End Date Taking? Authorizing Provider   tiZANidine (ZANAFLEX) 4 mg tablet Take 1 Tablet by mouth nightly. 3/22/22  Yes Syed Polo MD   oxyCODONE IR (OXY-IR) 15 mg immediate release tablet Take 15 mg by mouth two (2) times a day.  as needed for pain   Yes Provider, Historical   methylPREDNISolone (MEDROL) 32 mg tablet 1 tab p.o. tid x3 days, 1 tab p.o.bid x4 days, 1 tab p.o. every day x3days  Patient not taking: Reported on 4/17/2022 3/22/22   Lien Carrera MD   cladribine,multiple sclerosis, (Mavenclad, 6 tablet pack,) 10 mg tab Take 10 mg by mouth daily. Patient not taking: Reported on 4/17/2022 3/22/22   Syed Polo MD   senna-docusate (PERICOLACE) 8.6-50 mg per tablet Take 1 Tablet by mouth two (2) times a day. Patient not taking: Reported on 4/17/2022 11/16/21   Kaleb Cárdenas MD   polyethylene glycol (MIRALAX) 17 gram packet Take 1 Packet by mouth daily.   Patient not taking: Reported on 4/17/2022 11/16/21   Tracy Farrell MD     Current Facility-Administered Medications   Medication Dose Route Frequency    acetaminophen (TYLENOL) tablet 650 mg  650 mg Oral Q4H PRN    Or    acetaminophen (TYLENOL) solution 650 mg  650 mg Per NG tube Q4H PRN    Or    acetaminophen (TYLENOL) suppository 650 mg  650 mg Rectal Q4H PRN    aspirin chewable tablet 81 mg  81 mg Oral DAILY    enoxaparin (LOVENOX) injection 40 mg  40 mg SubCUTAneous Q24H    methylPREDNISolone (Solu-MEDROL) 1 g in 100 mL NS MBP  1,000 mg IntraVENous Q24H    insulin NPH (NOVOLIN N, HUMULIN N) injection 10 Units  10 Units SubCUTAneous ACB&D    sodium chloride (NS) flush 5-40 mL  5-40 mL IntraVENous Q8H    sodium chloride (NS) flush 5-40 mL  5-40 mL IntraVENous PRN    polyethylene glycol (MIRALAX) packet 17 g  17 g Oral DAILY PRN    ondansetron (ZOFRAN ODT) tablet 4 mg  4 mg Oral Q8H PRN    Or    ondansetron (ZOFRAN) injection 4 mg  4 mg IntraVENous Q6H PRN    oxyCODONE IR (ROXICODONE) tablet 15 mg  15 mg Oral BID       Review of Systems:    General, constitutional: negative  Eyes, vision: negative  Ears, nose, throat: negative  Cardiovascular, heart: negative  Respiratory: negative  Gastrointestinal: negative  Genitourinary: negative  Musculoskeletal: negative  Skin and integumentary: negative  Psychiatric: negative  Endocrine: negative  Neurological: negative, except for HPI  Hematologic/lymphatic: negative  Allergy/immunology: negative    Objective:     Visit Vitals  BP (!) 147/88 (BP 1 Location: Right upper arm, BP Patient Position: At rest)   Pulse 83   Temp 98.4 °F (36.9 °C)   Resp 18   Ht 5' 10\" (1.778 m)   Wt 180 lb 5.4 oz (81.8 kg)   SpO2 100%   BMI 25.88 kg/m²       Physical Exam:      General:  appears well nourished in no acute distress  Neck: no carotid bruits  Lungs: clear to auscultation  Heart:  no murmurs, regular rate  Lower extremity: peripheral pulses palpable and no edema  Skin: intact    Neurological exam:    Awake, alert, oriented to person, place and time  Recent and remote memory were normal  Attention and concentration were intact  Language was intact. There was no aphasia  Speech: Scanning, dysarthric speech  Fund of knowledge was preserved    Cranial nerves:   II-XII were tested    Perrrla  Fundoscopic examination revealed venous pulsations and no clear abnormalities  Visual fields were full  Eomi, he does have rotational nystagmus with right lateral gaze and superior gaze  Facial sensation:  normal and symmetric  Facial motor: normal and symmetric  Hearing intact  SCM strength intact  Tongue: midline without fasciculations    Motor: Tone normal  Pronator drift is  No evidence of fasciculations    Strength testing:   deltoid triceps biceps Wrist ext. Wrist flex. intrinsics Hip flex. Hip ext. Knee ext. Knee flex Dorsi flex Plantar flex   Right 5 5 5 5 5 5 5 5 5 5 5 5   Left 5 5 5 5 5 5 5 5 5 5 5 5         Sensory:  Upper extremity: Decreased to pinprick in the left upper extremity  Lower extremity: Decreased to pinprick in the left lower extremity    Reflexes:    Right Left  Biceps  3 3  Triceps 3 3  Brachiorad. 3 3  Patella  3 3  Achilles 2 2    Plantar response:  flexor bilaterally      Cerebellar testing:  no resting tremor apparent, he does have dysmetria right greater than left upper extremity. Gait: This was not assessed this morning.   The patient does walk with a Rollator he does not have one at the bedside today    Labs:     Lab Results   Component Value Date/Time    Hemoglobin A1c 6.2 (H) 05/08/2013 10:17 AM    Sodium 140 04/17/2022 01:06 AM    Potassium 3.9 04/17/2022 01:06 AM    Chloride 108 04/17/2022 01:06 AM    Glucose 140 (H) 04/17/2022 01:06 AM    BUN 10 04/17/2022 01:06 AM    Creatinine 0.80 04/17/2022 01:06 AM    Calcium 9.1 04/17/2022 01:06 AM    WBC 6.9 04/17/2022 01:06 AM    HCT 38.2 04/17/2022 01:06 AM    HGB 12.4 04/17/2022 01:06 AM    PLATELET 819 45/87/0675 01:06 AM       Imaging:    Results from Hospital Encounter encounter on 11/12/21    MRI [facility] SPINE W WO CONT    Narrative  INDICATION: History of MS and thoracic cord compression    Exam: MRI thoracic spine. Comparison 4/13/2020 Sequences include sagittal and  axial T1 and T2-weighted images. Sagittal STIR. After the intravenous  administration of 17 mL of Dotarem sagittal and axial T1-weighted images were  obtained. FINDINGS: Alignment of the thoracic spine is normal. There are no suspicious  marrow lesions or evidence of fracture. Mild endplate degenerative change. Subtle areas of increased signal within the cord ventrally at T6 and posterior  to T9 not significantly changed. No new lesions or abnormal enhancement. Multiple small protrusions. Prominent facet arthropathy at T9-T10 with  thickening of the ligamentum flavum. Canal stenosis is mild to moderate at this  level with dorsal indentation of the thecal sac. Significant bilateral foraminal  narrowing at T9-10 and T10-T11 Given differences in technique not significantly  changed. No significant canal or foraminal narrowing. Paraspinal soft tissues  are unremarkable. Impression  1. Subtle areas of cord signal abnormality not significantly progressed in the  interval. No new cord lesions or abnormal enhancement  2.  Degenerative changes with prominent facet arthropathy at T9-10 and T10-T11  without interval progression      Results from [facility] Encounter encounter on 04/16/22    CT CODE NEURO HEAD WO CONTRAST    Narrative  EXAM: CT CODE NEURO HEAD WO CONTRAST    INDICATION: Code Stroke. MS.    COMPARISON: Brain MRI 11/12/2021. CONTRAST: None. TECHNIQUE: Unenhanced CT of the head was performed using 5 mm images. Brain and  bone windows were generated. Coronal and sagittal reformats. CT dose reduction  was achieved through use of a standardized protocol tailored for this  examination and automatic exposure control for dose modulation. FINDINGS:  Generalized prominence of cerebral sulci and ventricles is increased, greater  than expected for age, but stable and likely related to the clinical history of  demyelination. .. Patchy periventricular white matter low-density is moderately  severe and also stable. . There is no intracranial hemorrhage, extra-axial  collection, or mass effect. The basilar cisterns are open. No CT evidence of  acute infarct. The bone windows demonstrate no abnormalities. The visualized portions of the  paranasal sinuses and mastoid air cells are clear. Impression  1. No acute intracranial abnormality is confirmed. 2. Moderately severe periventricular white matter changes, roughly stable since  brain MRI 11/12/2021 and consistent with the clinical history of demyelination. I did independently review the head CT performed on April 16, 2022. There is no acute abnormalities. There was mildly severe periventricular white matter changes. MRI would be beneficial    The patient's last brain MRI was in November 2021 which revealed extensive white matter disease which was unchanged from previously. His MRI of the cervical spine from November 2021 reveals extensive white matter changes    Complex decision making was necessary due to the patient's current medical condition and other medical co-morbidities.          Active Problems:    Multiple sclerosis exacerbation ([address] (11/12/2021)      Dysarthria (4/16/2022) Recurrent falls (4/16/2022)                   Signed By:  Jose Bergeron DO FAAN    April 17, 2022

## 2022-04-17 NOTE — PROGRESS NOTES
Pt VIJAY to MRI. Taking wallet, cell phone, 2 rings, and necklace/chain with him.     Ronny Watters RN

## 2022-04-17 NOTE — PROGRESS NOTES
End of Shift Note    Bedside shift change report given to [facility], RN (oncoming nurse) by Dax Reynolds RN (offgoing nurse). Report included the following information SBAR, Kardex, MAR, Recent Results and Cardiac Rhythm NSR    Shift worked: days   Shift summary and any significant changes:    Pt had increased moments of periodic situational confusion today. He called the ED from his hospital bed and said that he was in the bathroom floor. We immediately checked on the patient, and he was in his bed with the phone and asked for a soda. The bed alarm was still active on zone 2 and the alarm had not sounded. Pt had a fall prior to admission, and appeared to be confused about the timing of his fall. See case management note for disposition.      Concerns for physician to address:  see above   Zone phone for oncoming shift:   [phone]     Patient Information  Rosas Montgomery  49 y.o.  4/16/2022  3:52 PM by Pranav Perez MD. Rosas Montgomery was admitted from Home    Problem List  Patient Active Problem List    Diagnosis Date Noted    Dysarthria 04/16/2022    Recurrent falls 04/16/2022    FTT (failure to thrive) in adult 11/14/2021    Multiple sclerosis exacerbation ([address] 11/12/2021    Recurrent depression ([address] 01/04/2018    Paresthesia of both hands 08/28/2015    Mixed incontinence 08/28/2015    Balance problem 08/28/2015    Falls 08/28/2015    Bilateral thigh pain 08/28/2015    Anxiety 06/19/2015    Midline low back pain without sciatica 06/19/2015    Disability examination 02/18/2013    Prediabetes 01/30/2013    Hearing loss 01/25/2013    Memory loss 01/25/2013    Multiple sclerosis ([address] 01/25/2013    Elevated hemoglobin A1c 08/27/2012    MS (multiple sclerosis) ([address] 08/24/2012    Fatigue 08/24/2012    Incontinence of urine 08/24/2012    Decreased vision 09/28/2011    Acute bronchitis 09/28/2011    Tobacco abuse 03/18/2011    ED (erectile dysfunction) 03/18/2011    Back pain 10/08/2010  Hypercholesterolemia 10/08/2010    Cellulitis 10/08/2010     Past Medical History:   Diagnosis Date    Back pain 10/8/2010    Back pain 10/8/2010    Depression     Diabetes ([address]     Fatigue 8/24/2012    Hearing loss 1/25/2013    Memory loss 1/25/2013    MS (multiple sclerosis) (HCC)     Muscle pain     Muscle weakness     Poor appetite     Snoring     Unintentional weight change     Visual disturbance        Core Measures:  CVA: Yes Yes    Activity:  Activity Level: Up with Assistance,Chair  Number times ambulated in hallways past shift: 0  Number of times OOB to chair past shift: 1    Cardiac:   Cardiac Monitoring: Yes      Cardiac Rhythm: Sinus Rhythm    Access:   Current line(s): PIV (2)    Genitourinary:   Urinary status: Voiding  Urinary Catheter? External Catheter    Respiratory:   O2 Device: None (Room air)  Chronic home O2 use?: NO  Incentive spirometer at bedside: NO       GI:  Last Bowel Movement Date: 04/16/22  Current diet:  ADULT DIET Regular; 4 carb choices (60 gm/meal); Low Fat/Low Chol/High Fiber/2 gm Na  DIET ONE TIME MESSAGE  Passing flatus: YES  Tolerating current diet: YES       Pain Management:   Patient states pain is manageable on current regimen: YES    Skin:  Italo Score: 17  Interventions: increase time out of bed and PT/OT consult    Patient Safety:  Fall Score:  Total Score: 5  Interventions: bed/chair alarm  High Fall Risk: Yes    DVT prophylaxis:  DVT prophylaxis Med- Yes  DVT prophylaxis SCD or LOC- No     Wounds: (If Applicable)  Wounds- No  Location NA    Active Consults:  IP CONSULT TO HOSPITALIST  IP CONSULT TO NEUROLOGY    Length of Stay:  Expected LOS: - - -  Actual LOS: 1  Discharge Plan: Yes       Cristi Page RN

## 2022-04-17 NOTE — PROGRESS NOTES
-Please complete MRI History and Safety Screening Form   - Patient cannot be scanned until this form is completed, including signatures, and reviewed in MRI to ensure patient is SAFE and eligible for MRI. - CALL MRI when this has been successfully completed at [phone].

## 2022-04-17 NOTE — PROGRESS NOTES
End of Shift Note    Bedside shift change report given to Tahir Number (oncoming nurse) by Gerry Shipman RN (offgoing nurse). Report included the following information SBAR, Kardex, MAR, Recent Results and Cardiac Rhythm NSR    Shift worked:  Nights   Shift summary and any significant changes:     Pt noted to have jerky movements in concert w/tremors re to MS. Fall precautions maintained. Pain to back eased after medication. Voiding clear yellow urine but tending to spill urinal, male purewick applied. Refused BS check.   40 meq Potassium given as ordered       Concerns for physician to address:     Zone phone for oncoming shift:   [phone]     Patient Information  Shiraz Cutler  49 y.o.  4/16/2022  3:52 PM by Ghanshyam Gresham MD. Shiraz Cutler was admitted from Home    Problem List  Patient Active Problem List    Diagnosis Date Noted    Dysarthria 04/16/2022    Recurrent falls 04/16/2022    FTT (failure to thrive) in adult 11/14/2021    Multiple sclerosis exacerbation ([address] 11/12/2021    Recurrent depression ([address] 01/04/2018    Paresthesia of both hands 08/28/2015    Mixed incontinence 08/28/2015    Balance problem 08/28/2015    Falls 08/28/2015    Bilateral thigh pain 08/28/2015    Anxiety 06/19/2015    Midline low back pain without sciatica 06/19/2015    Disability examination 02/18/2013    Prediabetes 01/30/2013    Hearing loss 01/25/2013    Memory loss 01/25/2013    Multiple sclerosis ([address] 01/25/2013    Elevated hemoglobin A1c 08/27/2012    MS (multiple sclerosis) ([address] 08/24/2012    Fatigue 08/24/2012    Incontinence of urine 08/24/2012    Decreased vision 09/28/2011    Acute bronchitis 09/28/2011    Tobacco abuse 03/18/2011    ED (erectile dysfunction) 03/18/2011    Back pain 10/08/2010    Hypercholesterolemia 10/08/2010    Cellulitis 10/08/2010     Past Medical History:   Diagnosis Date    Back pain 10/8/2010    Back pain 10/8/2010    Depression     Diabetes ([address]  Fatigue 8/24/2012    Hearing loss 1/25/2013    Memory loss 1/25/2013    MS (multiple sclerosis) (HCC)     Muscle pain     Muscle weakness     Poor appetite     Snoring     Unintentional weight change     Visual disturbance        Core Measures:  CVA: Yes Yes    Activity:  Activity Level: Bed Rest  Number times ambulated in hallways past shift: 0  Number of times OOB to chair past shift: 1    Cardiac:   Cardiac Monitoring: Yes      Cardiac Rhythm: Sinus Tachy    Access:   Current line(s): PIV (2)    Genitourinary:   Urinary status: Voiding  Urinary Catheter? External Catheter    Respiratory:   O2 Device: None (Room air)  Chronic home O2 use?: NO  Incentive spirometer at bedside: NO       GI:  Last Bowel Movement Date: 04/16/22  Current diet:  ADULT DIET Regular; 4 carb choices (60 gm/meal); Low Fat/Low Chol/High Fiber/2 gm Na  DIET ONE TIME MESSAGE  Passing flatus: YES  Tolerating current diet: YES       Pain Management:   Patient states pain is manageable on current regimen: YES    Skin:  Italo Score: 15  Interventions: increase time out of bed and PT/OT consult    Patient Safety:  Fall Score:  Total Score: 4  Interventions: bed/chair alarm  High Fall Risk: Yes    DVT prophylaxis:  DVT prophylaxis Med- Yes  DVT prophylaxis SCD or LOC- No     Wounds: (If Applicable)  Wounds- No  Location NA    Active Consults:  IP CONSULT TO HOSPITALIST  IP CONSULT TO NEUROLOGY    Length of Stay:  Expected LOS: - - -  Actual LOS: 1  Discharge Plan: Yes       Pepe Patel RN

## 2022-04-17 NOTE — PROGRESS NOTES
Hospitalist Progress Note    NAME: Oleksandr Salazar   :  1965   MRN:  [id number]       Assessment / Plan:  Dysarthria with recurrent falls concerning for multiple sclerosis exacerbation  Rule out stroke  hx of paraplegia due to MS  Chronic back pain on narcotics at home  - CT head-No acute intracranial abnormality is confirmed. Moderately severe periventricular white matter changes, roughly stable since brain MRI 2021 and consistent with the clinical history of demyelination. -MRI brain and C spine ordered. - Started Solu-Medrol 1 g x 4 doses. - NPH 7 units twice daily for steroid-induced hyperglycemia. - Lipid panel and A1c is pending.  - Aspirin 81 mg daily, atorvastatin  - Neurology consulted.   -- PT/OT     Code Status: Full code  DVT Prophylaxis: Lovenox  GI Prophylaxis: not indicated   Baseline: Ambulatory with assistance of group home     Subjective:     Chief Complaint / Reason for Physician Visit  NAD. Discussed with RN events overnight. Review of Systems:  Symptom Y/N Comments  Symptom Y/N Comments   Fever/Chills    Chest Pain     Poor Appetite    Edema     Cough    Abdominal Pain     Sputum    Joint Pain     SOB/LEVINE    Pruritis/Rash     Nausea/vomit    Tolerating PT/OT     Diarrhea    Tolerating Diet     Constipation    Other       Could NOT obtain due to:      Objective:     VITALS:   Last 24hrs VS reviewed since prior progress note.  Most recent are:  Patient Vitals for the past 24 hrs:   Temp Pulse Resp BP SpO2   22 0710 98 °F (36.7 °C) 78 19 117/76 100 %   22 0316 98.4 °F (36.9 °C) 83 18 (!) 147/88 100 %   22 2310 98.5 °F (36.9 °C) 78 16 123/88 100 %   22 1919 98.9 °F (37.2 °C) (!) 109 18 (!) 152/91 100 %   22 1817 99 °F (37.2 °C) (!) 110 17 (!) 135/95 100 %   22 1728 -- (!) 112 15 (!) 141/99 100 %   22 1643 -- (!) 111 15 (!) 136/97 100 %   22 1600 99.2 °F (37.3 °C) (!) 121 16 139/62 100 %       Intake/Output Summary (Last 24 hours) at 4/17/2022 0837  Last data filed at 4/16/2022 2310  Gross per 24 hour   Intake --   Output 550 ml   Net -550 ml        I had a face to face encounter and independently examined this patient on 4/17/2022, as outlined below:  PHYSICAL EXAM:  General: WD, WN. Alert, cooperative, no acute distress    EENT:  EOMI. Anicteric sclerae. MMM  Resp:  CTA bilaterally, no wheezing or rales. No accessory muscle use  CV:  Regular  rhythm,  No edema  GI:  Soft, Non distended, Non tender. +Bowel sounds  Neurologic:  Alert and oriented X 3, tremulous   Psych:   Not anxious nor agitated  Skin:  No rashes. No jaundice    Reviewed most current lab test results and cultures  YES  Reviewed most current radiology test results   YES  Review and summation of old records today    NO  Reviewed patient's current orders and MAR    YES  PMH/SH reviewed - no change compared to H&P  ________________________________________________________________________  Care Plan discussed with:    Comments   Patient x    Family  x Pt's mother was on the phone during my encounter   RN x    Care Manager     Consultant                        Multidiciplinary team rounds were held today with , nursing, pharmacist and clinical coordinator. Patient's plan of care was discussed; medications were reviewed and discharge planning was addressed. ________________________________________________________________________  Total NON critical care TIME: 35  Minutes    Total CRITICAL CARE TIME Spent:   Minutes non procedure based      Comments   >50% of visit spent in counseling and coordination of care     ________________________________________________________________________  Matilde Cassidy MD     Procedures: see electronic medical records for all procedures/Xrays and details which were not copied into this note but were reviewed prior to creation of Plan. LABS:  I reviewed today's most current labs and imaging studies.   Pertinent labs include:  Recent Labs     04/17/22  0106 04/16/22  1612   WBC 6.9 7.5   HGB 12.4 12.6   HCT 38.2 38.4    154     Recent Labs     04/17/22  0106 04/16/22  1612    142   K 3.9 3.8    108   CO2 27 30   * 124*   BUN 10 13   CREA 0.80 0.98   CA 9.1 9.1   MG 2.1  --    ALB  --  3.4*   TBILI  --  0.5   ALT  --  24   INR  --  1.0       Signed: Juan Nevarez MD

## 2022-04-17 NOTE — PROGRESS NOTES
Problem: Mobility Impaired (Adult and Pediatric)  Goal: *Acute Goals and Plan of Care (Insert Text)  Description: FUNCTIONAL STATUS PRIOR TO ADMISSION: Patient was modified independent using a rollator walker for functional mobility. HOME SUPPORT PRIOR TO ADMISSION: The patient lived in group home. Reports he has assistance available but typically does not use. Physical Therapy Goals  Initiated 4/17/2022  1. Patient will move from supine to sit and sit to supine , scoot up and down, and roll side to side in bed with independence within 7 day(s). 2.  Patient will transfer from bed to chair and chair to bed with modified independence using the least restrictive device within 7 day(s). 3.  Patient will perform sit to stand with modified independence within 7 day(s). 4.  Patient will ambulate with supervision/set-up for 15 feet with the least restrictive device within 7 day(s). Outcome: Not Met  PHYSICAL THERAPY EVALUATION  Patient: Ortega Topete (42 y.o. male)  Date: 4/17/2022  Primary Diagnosis: Multiple sclerosis exacerbation ([facility] [address] [G35]  Dysarthria [R47.1]  Recurrent falls [R29.6]        Precautions: Fall       ASSESSMENT  Based on the objective data described below, the patient presents with impaired balance, tremors, spastic movements, and generalized weakness. He reports he is close to baseline but does have a history of frequent falls and does admit to those increasing. He tolerated very short distance ambulation in room and difficulty with management of RW. Patient uses rollator walker at baseline and would benefit from trial of device next visit. He reports having assistance at group home if needed. Would benefit from continued skilled therapy and recommend  PT and OT. Current Level of Function Impacting Discharge (mobility/balance): CGA-Min A with transfers and very short ambulation     Functional Outcome Measure:   The patient scored 40/100 on the Barthel Inde outcome measure which is indicative of partially dependent for functional mobility/ADL. Other factors to consider for discharge: fall history and risk     Patient will benefit from skilled therapy intervention to address the above noted impairments. PLAN :  Recommendations and Planned Interventions: bed mobility training, transfer training, gait training, therapeutic exercises, patient and family training/education, and therapeutic activities      Frequency/Duration: Patient will be followed by physical therapy:  3 times a week to address goals. Recommendation for discharge: (in order for the patient to meet his/her long term goals)  Physical therapy at least 2 days/week in the home AND ensure assist and/or supervision for safety with mobility    This discharge recommendation:  Has not yet been discussed the attending provider and/or case management    IF patient discharges home will need the following DME: to be determined (TBD)- likely none as patient owns DME      SUBJECTIVE:   Patient stated I feel like I'm moving fine.     OBJECTIVE DATA SUMMARY:   HISTORY:    Past Medical History:   Diagnosis Date    Back pain 10/8/2010    Back pain 10/8/2010    Depression     Diabetes ([facility] [address]     Fatigue 8/24/2012    Hearing loss 1/25/2013    Memory loss 1/25/2013    MS (multiple sclerosis) ([facility])     Muscle pain     Muscle weakness     Poor appetite     Snoring     Unintentional weight change     Visual disturbance      Past Surgical History:   Procedure Laterality Date    HX HEENT Right     ear       Personal factors and/or comorbidities impacting plan of care:     Home Situation  Home Environment: Group home  # Steps to Enter: 0  One/Two Story Residence: One story  Living Alone: No  Support Systems: Adult Group Home  Patient Expects to be Discharged to[de-identified] Skilled nursing facility  Current DME Used/Available at Home: Triston Dye, rollator (Shower chair, suction grab bar in shower)  Tub or Shower Type: Shower    EXAMINATION/PRESENTATION/DECISION MAKING:   Critical Behavior:  Neurologic State: Alert  Orientation Level: Oriented X4  Cognition: Appropriate for age attention/concentration,Follows commands  Safety/Judgement: Decreased awareness of need for safety,Insight into deficits  Hearing: Auditory  Auditory Impairment: None  Range Of Motion:  AROM: Generally decreased, functional                       Strength:    Strength: Generally decreased, functional                    Tone & Sensation:   Tone: Normal              Sensation: Intact               Coordination:  Coordination: Generally decreased, functional  Vision:   Acuity: Within Defined Limits  Corrective Lenses: Glasses  Functional Mobility:  Bed Mobility:  Rolling: Supervision  Supine to Sit: Supervision     Scooting: Contact guard assistance  Transfers:  Sit to Stand: Contact guard assistance  Stand to Sit: Contact guard assistance        Bed to Chair: Minimum assistance (ambulating with RW.)              Balance:   Sitting: Intact  Standing: Impaired  Standing - Static: Fair;Constant support  Standing - Dynamic : Constant support; Fair  Ambulation/Gait Training:  Distance (ft): 5 Feet (ft)  Assistive Device: Walker, rolling;Gait belt  Ambulation - Level of Assistance: Minimal assistance        Gait Abnormalities: Ataxic;Decreased step clearance; Path deviations (tremors, spasticity)              Speed/Radha: Delayed; Fluctuations  Step Length: Left shortened;Right shortened                  Required assistance for RW advancement and to maintain walker contact with floor. Secondary to tremors, patient often lifting device from floor. Will benefit from trial of rollator (what he uses at baseline) vs weighting down of RW.  Distance limited per patients request      Functional Measure:  Barthel Index:    Bathin  Bladder: 0  Bowels: 10  Groomin  Dressin  Feedin  Mobility: 0  Stairs: 0  Toilet Use: 5  Transfer (Bed to Chair and Back): 10  Total: 40/100     The Barthel ADL Index: Guidelines  1. The index should be used as a record of what a patient does, not as a record of what a patient could do. 2. The main aim is to establish degree of independence from any help, physical or verbal, however minor and for whatever reason. 3. The need for supervision renders the patient not independent. 4. A patient's performance should be established using the best available evidence. Asking the patient, friends/relatives and nurses are the usual sources, but direct observation and common sense are also important. However direct testing is not needed. 5. Usually the patient's performance over the preceding 24-48 hours is important, but occasionally longer periods will be relevant. 6. Middle categories imply that the patient supplies over 50 per cent of the effort. 7. Use of aids to be independent is allowed. Score Interpretation (from [facility] [address])    Independent   60-79 Minimally independent   40-59 Partially dependent   20-39 Very dependent   <20 Totally dependent     -Lor Waters., Barthel, D.W. (1965). Functional evaluation: the Barthel Index. [address] [facility] ([address] (1997). The Barthel activities of daily living index: self-reporting versus actual performance in the old (> or = 75 years). Journal [address] 45(7), [address] [facility], ..., [facility] Rochelle Ventura. (1999). Measuring the change in disability after inpatient rehabilitation; comparison of the responsiveness of the Barthel Index and Functional [place] Measure. Journal of Neurology, Neurosurgery, and Psychiatry, 66(4), 965-148. Tino Hamman, N.J.A, VARGAS Irby, & Rob Reilly, MScarletA. (2004) Assessment of post-stroke quality of life in cost-effectiveness studies: The usefulness of the Barthel Index and the EuroQoL-5D.  Quality of Life Research, 15, 215-54         Physical Therapy Evaluation Charge Determination   History Examination Presentation Decision-Making   MEDIUM  Complexity : 1-2 comorbidities / personal factors will impact the outcome/ POC  LOW Complexity : 1-2 Standardized tests and measures addressing body structure, function, activity limitation and / or participation in recreation  LOW Complexity : Stable, uncomplicated  Other outcome measures Barthel Index  MEDIUM      Based on the above components, the patient evaluation is determined to be of the following complexity level: LOW     Pain Rating:  No c/o pain    Activity Tolerance:   Good and Fair    After treatment patient left in no apparent distress:   Sitting in chair, Call bell within reach, and Bed / chair alarm activated    COMMUNICATION/EDUCATION:   The patients plan of care was discussed with: Occupational therapist and Registered nurse. Fall prevention education was provided and the patient/caregiver indicated understanding., Patient/family have participated as able in goal setting and plan of care. , and Patient/family agree to work toward stated goals and plan of care.     Thank you for this referral.  Eben Ganser, PT, DPT   Time Calculation: 24 mins

## 2022-04-17 NOTE — PROGRESS NOTES
Transition of Care Plan:    RUR: 9%  Disposition: SNF - [address] (referral pending)   *PT/OT/SLP consults pending   *Will need rapid COVID-19 test for admission on day of d/c  Follow up appointments: PCP, Neurology  DME needed: TBD - has RW, cane, manual w/c, motorized w/c  Transportation at Discharge: [address] [place] [address] or means to access home: N/A         Medicare Letter: Needed at d/c  Is patient a BCPI-A Bundle: N/A      If yes, was Bundle Letter given?: N/A     Is patient a  and connected with the [place]? N/A            Caregiver Contact: Roscoe Van, mother ([phone])  Discharge Caregiver contacted prior to discharge? Yes  Care Conference needed?: No    Reason for Admission:  Multiple Sclerosis Exacerbation; Dysarthria; Recurrent Falls                   RUR Score: 9%                  Plan for utilizing home health: PT/OT/SLP consults family - likely SNF placement with transition to LTC         PCP: First and Last name:  Mustapha Engle MD     Name of Practice:    Are you a current patient: Yes/No: Yes   Approximate date of last visit: 8/12/21   Can you participate in a virtual visit with your PCP: In-person preferred                     Current Advanced Directive/Advance Care Plan: Full Code - ACP on file from 11/15/21. No changes requested. Advance Care Planning     General Advance Care Planning (ACP) Conversation    Date of Conversation:4/17/22  Conducted with: Immanuel Braden (pt), Bishop Troncoso (mother)    Healthcare Decision Maker:     Primary Decision Maker: [facility] - [phone]    Secondary Decision Maker: Roro Plush - Sister - [phone]    Supplemental (Other) Decision Maker: [facility] - [phone]  Click here to complete [employer] including selection of the [employer] Relationship (ie \"Primary\")      Content/Action Overview:   Reviewed DNR/DNI and patient declined to make any changes at this time.    Length of Voluntary ACP Conversation in minutes: <16 minutes  Domingo Stephen                Transition of Care Plan:                    - SNF placement pending PT/OT/SLP consults  - [address]  - 2nd IM Letter  - BLS transport  - Follow-up Care Appointments    50 YO  Male admitted on 4/16/22 for Multiple Sclerosis Exacerbation; Dysarthria; Recurrent Falls. Main sources of support include his mother and 2 sisters that live locally. Has 2 children that live in [place] that he is estranged from. Lives in [address] [facility] [address] [facility] ([address], [place], [address]) with 3 other residents. Group home is 1-story with MARILEE. Typically ambulates with cane or RW and group home assists with medication management, meals but pt's functioning has declined recently. Hx of [facility]), SNF ([facility]). Has RW, cane, manual w/c, motorized w/c at home. Preferred Rx is [facility] ([address] [facility] Has [employer] and Medicaid (will confirm with insurance cards). Demographic info verified, assessment completed. CM spoke with pt's mother/mPOA1 (James Tejeda, [phone]) re: d/c planning. Reported that pt recently got placed into Group Home but family was actively trying to get him into [address] prior to admission to hospital as he has not been doing well in group home and needs higher level of care. Mother reported that Medicaid application was submitted last year after hospitalization while they were still living in [place] and were unaware that it had been approved until recently when they tried to apply again as information was sent to a different address. Family has worked hard to get Medicaid submitted/approved, reported that it will be \"reinstated\" starting 5/1/22. Mother reported insurance cards should be in pt's wallet at bedside. CM to scan copy of Medicaid information to scan on pt's chart and [facility].  Advised of pending PT/OT/SLP consults. Current plan for SNF with transition to LTC. Will need [employer]. FOC referral sent to [facility] via [address] link, will need to verify with SNF recommendations. Mother requested if MD could give her a call with medical update today. MD notified via [employer]. CM will continue to remain available to assist with d/c planning. Care Management Interventions  PCP Verified by CM:  Yes  Palliative Care Criteria Met (RRAT>21 & CHF Dx)?: No  Mode of Transport at Discharge: BLS  Transition of Care Consult (CM Consult): Discharge Planning  Discharge Durable Medical Equipment: No (RW, cane, manual w/c, motorized w/c at home)  Health Maintenance Reviewed: Yes  Physical Therapy Consult: Yes  Occupational Therapy Consult: Yes  Speech Therapy Consult: Yes  Support Systems: Parent(s),Child(marla),Other Family Member(s),Adult Group Home  Confirm Follow Up Transport: [facility] [employer]  The Plan for Transition of Care is Related to the Following Treatment Goals : Possible SNF with transition to LTC  The Patient and/or Patient Representative was Provided with a Choice of Provider and Agrees with the Discharge Plan?: Yes  Name of the Patient Representative Who was Provided with a Choice of Provider and Agrees with the Discharge Plan: Akiko Smoke (pt), Jasmyne Coates (mother/mPOA)  Freedom of Choice List was Provided with Basic Dialogue that Supports the Patient's Individualized Plan of Care/Goals, Treatment Preferences and Shares the Quality Data Associated with the Providers?: Yes  Discharge Location  Patient Expects to be Discharged to[de-identified] Skilled nursing facility    Agusto Ryan, [address] [facility]  [phone]

## 2022-04-17 NOTE — PROGRESS NOTES
Problem: Self Care Deficits Care Plan (Adult)  Goal: *Acute Goals and Plan of Care (Insert Text)  Description:     FUNCTIONAL STATUS PRIOR TO ADMISSION: Patient ambulates up to 10 feet with rollator mod I, he is mod I for ADLs, with the exception of requiring supervision for showers. He has had increased falls during ambulation, and he has had one fall in the shower after due to suction grab bar coming loose. HOME SUPPORT: The patient lived in group home. Occupational Therapy Goals  Initiated 4/17/2022  1. Patient will perform grooming standing at sink with supervision/set-up within 7 day(s). 2.  Patient will perform sponge bathing with supervision/set-up within 7 day(s). 3.  Patient will perform lower body dressing with supervision/set-up within 7 day(s). 4.  Patient will perform toilet transfers with supervision/set-up within 7 day(s). 5.  Patient will perform all aspects of toileting with supervision/set-up within 7 day(s). Outcome: Progressing Towards Goal    OCCUPATIONAL THERAPY EVALUATION  Patient: Oleksandr Salazar (67 y.o. male)  Date: 4/17/2022  Primary Diagnosis: Multiple sclerosis exacerbation ([facility] [address] [G35]  Dysarthria [R47.1]  Recurrent falls [R29.6]        Precautions: Falls       ASSESSMENT  Based on the objective data described below, the patient presents with impaired coordination with UE tremors, mild GW, decreased activity tolerance, decreased safety awareness and decreased standing balance which is impairing his functional independence. The patient is functioning below his independent to supervision/setup baseline, now performing ADLs and functional mobility at a supervision/setup to min A level. The patient will benefit from acute OT intervention and he will need HHOT, PT and the supervision/assistance of group home staff PRN after discharge. Functional Outcome Measure:   The patient scored 40/100 on the Barthel Index outcome measure which is indicative of a 60% impairment in function. PLAN :  Recommendations and Planned Interventions: self care training, functional mobility training, therapeutic exercise, balance training, therapeutic activities, endurance activities, patient education, and home safety training    Frequency/Duration: Patient will be followed by occupational therapy 4 times a week to address goals. Recommendation for discharge: (in order for the patient to meet his/her long term goals)  Return to group home with HHOT and PT, and the supervision/assistance of group home staff PRN. Equipment recommendations for successful discharge (if) home: none       OBJECTIVE DATA SUMMARY:   HISTORY:   Past Medical History:   Diagnosis Date    Back pain 10/8/2010    Back pain 10/8/2010    Depression     Diabetes ([facility] [address]     Fatigue 8/24/2012    Hearing loss 1/25/2013    Memory loss 1/25/2013    MS (multiple sclerosis) ([facility])     Muscle pain     Muscle weakness     Poor appetite     Snoring     Unintentional weight change     Visual disturbance      Past Surgical History:   Procedure Laterality Date    HX HEENT Right     ear       Expanded or extensive additional review of patient history:     Home Situation  Home Environment: Group home  # Steps to Enter: 0  One/Two Story Residence: One story  Living Alone: No  Support Systems: Adult Group Home  Patient Expects to be Discharged to[de-identified] Skilled nursing facility  Current DME Used/Available at Home: Misa Escobar, rollator (Shower chair, suction grab bar in shower)  Tub or Shower Type: Shower    Hand dominance: Right    EXAMINATION OF PERFORMANCE DEFICITS:  Cognitive/Behavioral Status:  Neurologic State: Alert  Orientation Level: Oriented X4  Cognition: Appropriate for age attention/concentration; Follows commands        Safety/Judgement: Decreased awareness of need for safety; Insight into deficits      Hearing:   Auditory  Auditory Impairment: None    Vision/Perceptual:    Acuity: Within Defined Limits    Corrective Lenses: Glasses    Range of Motion:  AROM: Generally decreased, functional    Strength:  Strength: Generally decreased, functional    Coordination:  Coordination: Generally decreased, functional  Fine Motor Skills-Upper: Left Impaired;Right Impaired    Gross Motor Skills-Upper: Left Impaired;Right Impaired    Tone & Sensation:  Tone: Normal  Sensation: Intact       Balance:  Sitting: Intact  Standing: Impaired  Standing - Static: Fair;Constant support  Standing - Dynamic : Constant support; Fair    Functional Mobility and Transfers for ADLs:  Bed Mobility:  Rolling: Supervision  Supine to Sit: Supervision  Scooting: Contact guard assistance    Transfers:  Sit to Stand: Contact guard assistance  Stand to Sit: Contact guard assistance  Bed to Chair: Minimum assistance (ambulating with RW.)    ADL Assessment:  Feeding: Supervision;Setup    Oral Facial Hygiene/Grooming: Contact guard assistance;Setup    Bathing: Minimum assistance    Upper Body Dressing: Supervision;Setup    Lower Body Dressing: Minimum assistance    Toileting: Minimum assistance                ADL Intervention and task modifications:  Initiated bed mobility, dressing ADL, transfer, toileting, standing grooming and safety training. Functional Measure:  Barthel Index:    Bathin  Bladder: 0  Bowels: 10  Groomin  Dressin  Feedin  Mobility: 0  Stairs: 0  Toilet Use: 5  Transfer (Bed to Chair and Back): 10  Total: 40/100        Percentage of impairment   0%   1-19%   20-39%   40-59%   60-79%   80-99%   100%   Barthel Score 0-100 100 99-80 79-60 59-40 20-39 1-19   0     The Barthel ADL Index: Guidelines  1. The index should be used as a record of what a patient does, not as a record of what a patient could do. 2. The main aim is to establish degree of independence from any help, physical or verbal, however minor and for whatever reason. 3. The need for supervision renders the patient not independent.   4. A patient's performance should be established using the best available evidence. Asking the patient, friends/relatives and nurses are the usual sources, but direct observation and common sense are also important. However direct testing is not needed. 5. Usually the patient's performance over the preceding 24-48 hours is important, but occasionally longer periods will be relevant. 6. Middle categories imply that the patient supplies over 50 per cent of the effort. 7. Use of aids to be independent is allowed. Veronica Pereyra., Barthel, D.W. ([id number]). Functional evaluation: the Barthel Index. [address] [facility] (14)2. [facility] House kalin ADRIANA Lowery, Jeanna Frankel., Alfred Collazo., [place], [address] [facility] (1999). Measuring the change indisability after inpatient rehabilitation; comparison of the responsiveness of the Barthel Index and Functional [place] Measure. Journal of Neurology, Neurosurgery, and Psychiatry, 66(4), 984-663. Gisela Pressley, N.J.A, VARGAS Irby, & Maxim Mathew M.A. (2004.) Assessment of post-stroke quality of life in cost-effectiveness studies: The usefulness of the Barthel Index and the EuroQoL-5D. Quality of Life Research, 13, 427-43        Pain Rating:  No complaint of pain    Activity Tolerance:   Fair      After treatment patient left in no apparent distress:    Sitting in chair, Call bell within reach, and Bed / chair alarm activated    COMMUNICATION/EDUCATION:   The patients plan of care was discussed with: Physical Therapist and Registered Nurse. Home safety education was provided and the patient/caregiver indicated understanding., Patient/family have participated as able in goal setting and plan of care. , and Patient/family agree to work toward stated goals and plan of care. This patients plan of care is appropriate for delegation to [facility].     Thank you for this referral.  Bravo Meyer, OTR/L  Time Calculation: 30 mins

## 2022-04-18 LAB
ANION GAP SERPL CALC-SCNC: 3 MMOL/L (ref 5–15)
BASOPHILS # BLD: 0 K/UL (ref 0–0.1)
BASOPHILS NFR BLD: 0 % (ref 0–1)
BUN SERPL-MCNC: 17 MG/DL (ref 6–20)
BUN/CREAT SERPL: 18 (ref 12–20)
CALCIUM SERPL-MCNC: 9.2 MG/DL (ref 8.5–10.1)
CHLORIDE SERPL-SCNC: 106 MMOL/L (ref 97–108)
CO2 SERPL-SCNC: 29 MMOL/L (ref 21–32)
CREAT SERPL-MCNC: 0.93 MG/DL (ref 0.7–1.3)
DIFFERENTIAL METHOD BLD: ABNORMAL
EOSINOPHIL # BLD: 0 K/UL (ref 0–0.4)
EOSINOPHIL NFR BLD: 0 % (ref 0–7)
ERYTHROCYTE [DISTWIDTH] IN BLOOD BY AUTOMATED COUNT: 13.2 % (ref 11.5–14.5)
GLUCOSE BLD STRIP.AUTO-MCNC: 114 MG/DL (ref 65–117)
GLUCOSE BLD STRIP.AUTO-MCNC: 123 MG/DL (ref 65–117)
GLUCOSE BLD STRIP.AUTO-MCNC: 68 MG/DL (ref 65–117)
GLUCOSE BLD STRIP.AUTO-MCNC: 91 MG/DL (ref 65–117)
GLUCOSE SERPL-MCNC: 141 MG/DL (ref 65–100)
HCT VFR BLD AUTO: 37 % (ref 36.6–50.3)
HGB BLD-MCNC: 12.2 G/DL (ref 12.1–17)
IMM GRANULOCYTES # BLD AUTO: 0.1 K/UL (ref 0–0.04)
IMM GRANULOCYTES NFR BLD AUTO: 1 % (ref 0–0.5)
LYMPHOCYTES # BLD: 0.9 K/UL (ref 0.8–3.5)
LYMPHOCYTES NFR BLD: 8 % (ref 12–49)
MCH RBC QN AUTO: 30.1 PG (ref 26–34)
MCHC RBC AUTO-ENTMCNC: 33 G/DL (ref 30–36.5)
MCV RBC AUTO: 91.4 FL (ref 80–99)
MONOCYTES # BLD: 0.4 K/UL (ref 0–1)
MONOCYTES NFR BLD: 3 % (ref 5–13)
NEUTS SEG # BLD: 10.1 K/UL (ref 1.8–8)
NEUTS SEG NFR BLD: 88 % (ref 32–75)
NRBC # BLD: 0 K/UL (ref 0–0.01)
NRBC BLD-RTO: 0 PER 100 WBC
PLATELET # BLD AUTO: 151 K/UL (ref 150–400)
PMV BLD AUTO: 10.7 FL (ref 8.9–12.9)
POTASSIUM SERPL-SCNC: 4.2 MMOL/L (ref 3.5–5.1)
RBC # BLD AUTO: 4.05 M/UL (ref 4.1–5.7)
SERVICE CMNT-IMP: ABNORMAL
SERVICE CMNT-IMP: NORMAL
SODIUM SERPL-SCNC: 138 MMOL/L (ref 136–145)
WBC # BLD AUTO: 11.5 K/UL (ref 4.1–11.1)

## 2022-04-18 PROCEDURE — 92610 EVALUATE SWALLOWING FUNCTION: CPT

## 2022-04-18 PROCEDURE — 82962 GLUCOSE BLOOD TEST: CPT

## 2022-04-18 PROCEDURE — 74011636637 HC RX REV CODE- 636/637: Performed by: INTERNAL MEDICINE

## 2022-04-18 PROCEDURE — 85025 COMPLETE CBC W/AUTO DIFF WBC: CPT

## 2022-04-18 PROCEDURE — 74011250637 HC RX REV CODE- 250/637: Performed by: STUDENT IN AN ORGANIZED HEALTH CARE EDUCATION/TRAINING PROGRAM

## 2022-04-18 PROCEDURE — 74011250636 HC RX REV CODE- 250/636: Performed by: STUDENT IN AN ORGANIZED HEALTH CARE EDUCATION/TRAINING PROGRAM

## 2022-04-18 PROCEDURE — 80048 BASIC METABOLIC PNL TOTAL CA: CPT

## 2022-04-18 PROCEDURE — 99233 SBSQ HOSP IP/OBS HIGH 50: CPT | Performed by: PSYCHIATRY & NEUROLOGY

## 2022-04-18 PROCEDURE — 36415 COLL VENOUS BLD VENIPUNCTURE: CPT

## 2022-04-18 PROCEDURE — 65270000046 HC RM TELEMETRY

## 2022-04-18 PROCEDURE — 74011000250 HC RX REV CODE- 250: Performed by: STUDENT IN AN ORGANIZED HEALTH CARE EDUCATION/TRAINING PROGRAM

## 2022-04-18 RX ADMIN — ENOXAPARIN SODIUM 40 MG: 40 INJECTION SUBCUTANEOUS at 18:14

## 2022-04-18 RX ADMIN — SODIUM CHLORIDE, PRESERVATIVE FREE 10 ML: 5 INJECTION INTRAVENOUS at 05:36

## 2022-04-18 RX ADMIN — Medication 7 UNITS: at 07:30

## 2022-04-18 RX ADMIN — SODIUM CHLORIDE, PRESERVATIVE FREE 10 ML: 5 INJECTION INTRAVENOUS at 21:30

## 2022-04-18 RX ADMIN — OXYCODONE 15 MG: 5 TABLET ORAL at 18:14

## 2022-04-18 RX ADMIN — OXYCODONE 15 MG: 5 TABLET ORAL at 10:19

## 2022-04-18 RX ADMIN — SODIUM CHLORIDE, PRESERVATIVE FREE 10 ML: 5 INJECTION INTRAVENOUS at 18:15

## 2022-04-18 RX ADMIN — ASPIRIN 81 MG: 81 TABLET, CHEWABLE ORAL at 10:19

## 2022-04-18 RX ADMIN — SODIUM CHLORIDE, PRESERVATIVE FREE 10 ML: 5 INJECTION INTRAVENOUS at 05:37

## 2022-04-18 RX ADMIN — Medication 7 UNITS: at 18:14

## 2022-04-18 NOTE — PROGRESS NOTES
SPEECH PATHOLOGY BEDSIDE SWALLOW EVALUATION/DISCHARGE  Patient: Oleksandr Salazar (89 y.o. male)  Date: 4/18/2022  Primary Diagnosis: Multiple sclerosis exacerbation ([facility] [address] [G35]  Dysarthria [R47.1]  Recurrent falls [R29.6]       Precautions:       ASSESSMENT :  Based on the objective data described below, the patient presents with suspected functional oropharyngeal swallow. Timely and complete mastication of solids. Suspect pharyngeal swallow WFL with no overt s/s aspiration with any consistency. Patient denies history of dysphagia. MRI negative. He reports his speech is at his baseline (dysarthric). Skilled acute therapy provided by a speech-language pathologist is not indicated at this time     PLAN :  Recommendations:  Regular diet/ thin liquids  Strict upright positioning with all PO    Discharge Recommendations: None     SUBJECTIVE:   Patient stated I just want to get home to my pop tarts.     OBJECTIVE:     Past Medical History:   Diagnosis Date    Back pain 10/8/2010    Back pain 10/8/2010    Depression     Diabetes ([facility] [address]     Fatigue 8/24/2012    Hearing loss 1/25/2013    Memory loss 1/25/2013    [facility])     Muscle pain     Muscle weakness     Poor appetite     Snoring     Unintentional weight change     Visual disturbance      Past Surgical History:   Procedure Laterality Date    HX HEENT Right     ear     Prior Level of Function/Home Situation:   Home Situation  Home Environment: Group home  # Steps to Enter: 0  One/Two Story Residence: One story  Living Alone: No  Support Systems: Adult Group Home  Patient Expects to be Discharged to[de-identified] Home with home health  Current DME Used/Available at Home: Sharon Flirt, rollator (Shower chair, suction grab bar in shower)  Tub or Shower Type: Shower  Diet prior to admission: regular/thin liquids   Current Diet:  Regular/thin liquids    Cognitive and Communication Status:  Neurologic State: Alert  Orientation Level: Oriented to person,Oriented to place,Oriented to situation,Disoriented to time  Cognition: Follows commands        Safety/Judgement: Decreased awareness of need for safety,Insight into deficits  Oral Assessment:     P.O. Trials:  Patient Position: up in bed  Vocal quality prior to P.O.: No impairment  Consistency Presented: Thin liquid; Solid  How Presented: Successive swallows;Cup/sip     Bolus Acceptance: No impairment  Bolus Formation/Control: No impairment     Propulsion: No impairment  Oral Residue: None  Initiation of Swallow: No impairment  Laryngeal Elevation: Functional  Aspiration Signs/Symptoms: None  Pharyngeal Phase Characteristics: No impairment, issues, or problems   Effective Modifications: None  Cues for Modifications: None       Oral Phase Severity: No impairment  Pharyngeal Phase Severity : No impairment  NOMS:   The NOMS functional outcome measure was used to quantify this patient's level of swallowing impairment. Based on the NOMS, the patient was determined to be at level 7 for swallow function     NOMS Swallowing Levels:  Level 1 (CN): NPO  Level 2 (CM): NPO but takes consistency in therapy  Level 3 (CL): Takes less than 50% of nutrition p.o. and continues with nonoral feedings; and/or safe with mod cues; and/or max diet restriction  Level 4 (CK): Safe swallow but needs mod cues; and/or mod diet restriction; and/or still requires some nonoral feeding/supplements  Level 5 (CJ): Safe swallow with min diet restriction; and/or needs min cues  Level 6 (CI): Independent with p.o.; rare cues; usually self cues; may need to avoid some foods or needs extra time  Level 7 ([address] [place] [address]): Independent for all p.o.  ALEC. (2003). National Outcomes Measurement System (NOMS): Adult Speech-Language Pathology User's Guide.        Pain:  Pain Scale 1: Numeric (0 - 10)  Pain Intensity 1: 4  Pain Location 1: Back  After treatment:   Patient left in no apparent distress in bed    COMMUNICATION/EDUCATION:       The patient's plan of care including recommendations, planned interventions, and recommended diet changes were discussed with: Registered nurse.      Thank you for this referral.  ILIANA Ye  Time Calculation: 20 mins

## 2022-04-18 NOTE — DISCHARGE SUMMARY
Discharge Summary      Name: Missy Diaz  [id number]  YOB: 1965 (Age: 49 y.o.)   Date of Admission: 4/16/2022  Date of Discharge: 4/18/2022  Attending Physician: Megan Aguilar MD    Discharge Diagnosis:   Dysarthria with recurrent falls in the setting of multiple sclerosis exacerbation  CVA was ruled out  hx of paraplegia due to MS  Chronic back pain on narcotics at home    Consultations:  [place]    Brief Admission History/Reason for Admission Per July Perla MD:   Aguilar Gonzales is a 49 y.o.  male who presents with recurrent falls with one fall happened today. Patient past medical history of multiple sclerosis, states a group home. Patient states that today he fell down while at a grocery store. Patient also found to have some dysarthria which was worse from his baseline according to the group home staff but according to the patient very much at the baseline. Denies any weakness or numbness in the body, no fever or chills, no chest pain or shortness of breath, no nausea vomiting, no abdominal pain, no headache, no passing out episodes. Patient has multiple falls in the last few weeks secondary to the weakness.     We were asked to admit for work up and evaluation of the above problems. Brief Hospital Course by Main Problems:   Dysarthria with recurrent falls in the setting of multiple sclerosis  CVA was ruled out  hx of paraplegia due to MS  Chronic back pain on narcotics at home  CT head-No acute intracranial abnormality is confirmed. Moderately severe periventricular white matter changes, roughly stable since brain MRI 11/12/2021 and consistent with the clinical history of demyelination. MRI brain and C spine neg for acute findings.   Demyelinating disease in the cervical spinal cord and lower brainstem of moderate severity, without progression since 11/12/2021 He did received 2 doses IV solumedrol. There is no indication to continue high dose IV steroids since no new lesion were seen on MRI. I spoke to neurology this AM who also recommended to discontinue steroids. Today patient is seen and examined, his vital signs are stable, his lab work is stable and he was cleared by all consultant parties including neurology to be discharged for outpatient follow-up. PT/OT recommended HH at discharge. Discharge Exam:  Patient seen and examined by me on discharge day. Pertinent Findings:  Visit Vitals  /87 (BP 1 Location: Right upper arm, BP Patient Position: Reclining)   Pulse 65   Temp 98.4 °F (36.9 °C)   Resp 19   Ht 5' 10\" (1.778 m)   Wt 81.8 kg (180 lb 5.4 oz)   SpO2 99%   BMI 25.88 kg/m²     Gen:    Not in distress  Chest: Clear lungs  CVS:   Regular rhythm. No edema  Abd:  Soft, not distended, not tender    Discharge/Recent Laboratory Results:  Recent Labs     04/18/22  0535 04/17/22  0106 04/17/22  0106      < > 140   K 4.2   < > 3.9      < > 108   CO2 29   < > 27   BUN 17   < > 10   *   < > 140*   CA 9.2   < > 9.1   MG  --   --  2.1    < > = values in this interval not displayed. Recent Labs     04/18/22  0535   HGB 12.2   HCT 37.0   WBC 11.5*          Discharge Medications:  Current Discharge Medication List      CONTINUE these medications which have NOT CHANGED    Details   tiZANidine (ZANAFLEX) 4 mg tablet Take 1 Tablet by mouth nightly. Qty: 30 Tablet, Refills: 2      oxyCODONE IR (OXY-IR) 15 mg immediate release tablet Take 15 mg by mouth two (2) times a day. as needed for pain      methylPREDNISolone (MEDROL) 32 mg tablet 1 tab p.o. tid x3 days, 1 tab p.o.bid x4 days, 1 tab p.o. every day x3days  Qty: 20 Tablet, Refills: 0      cladribine,multiple sclerosis, (Mavenclad, 6 tablet pack,) 10 mg tab Take 10 mg by mouth daily. Qty: 6 Each, Refills: 1      senna-docusate (PERICOLACE) 8.6-50 mg per tablet Take 1 Tablet by mouth two (2) times a day.   Qty: 30 Tablet, Refills: 0      polyethylene glycol (MIRALAX) 17 gram packet Take 1 Packet by mouth daily.   Qty: 30 Packet, Refills: 0                 DISPOSITION:    Home with Family:    Home with HH/PT/OT/RN: x   SNF/LTC:    BONNIE:    OTHER:          Follow up with:   PCP : Ángel Gastelum MD  Follow-up Information     Follow up With Specialties Details Why Contact Info    Vandana Wilson MD Internal Medicine In 2 weeks  [facility]  [phone]      Tim Gomez DO Neurology In 2 weeks  [address] [place] [address]  [phone]              Total time in minutes spent coordinating this discharge (includes going over instructions, follow-up, prescriptions, and preparing report for sign off to her PCP) :  35 minutes

## 2022-04-18 NOTE — PROGRESS NOTES
Pharmacist Discharge Medication Reconciliation    Significant PMH:   Past Medical History:   Diagnosis Date    Back pain 10/8/2010    Back pain 10/8/2010    Depression     Diabetes ([address]     Fatigue 8/24/2012    Hearing loss 1/25/2013    Memory loss 1/25/2013    MS (multiple sclerosis) ([facility])     Muscle pain     Muscle weakness     Poor appetite     Snoring     Unintentional weight change     Visual disturbance      Encounter Diagnoses:   Encounter Diagnoses   Name Primary? Dysarthria Yes    Multiple sclerosis exacerbation ([facility])     Recurrent falls     MS (multiple sclerosis) ([facility])     Balance problem      Allergies: Patient has no known allergies. Discharge Medications:   Current Discharge Medication List        CONTINUE these medications which have NOT CHANGED    Details   tiZANidine (ZANAFLEX) 4 mg tablet Take 1 Tablet by mouth nightly. Qty: 30 Tablet, Refills: 2      oxyCODONE IR (OXY-IR) 15 mg immediate release tablet Take 15 mg by mouth two (2) times a day. as needed for pain      methylPREDNISolone (MEDROL) 32 mg tablet 1 tab p.o. tid x3 days, 1 tab p.o.bid x4 days, 1 tab p.o. every day x3days  Qty: 20 Tablet, Refills: 0      cladribine,multiple sclerosis, (Mavenclad, 6 tablet pack,) 10 mg tab Take 10 mg by mouth daily. Qty: 6 Each, Refills: 1      senna-docusate (PERICOLACE) 8.6-50 mg per tablet Take 1 Tablet by mouth two (2) times a day. Qty: 30 Tablet, Refills: 0      polyethylene glycol (MIRALAX) 17 gram packet Take 1 Packet by mouth daily. Qty: 30 Packet, Refills: 0             The patient's chart, MAR and AVS were reviewed by Kushal Arreola [facility].     Discharging Provider: Sincere Amaral MD    Thank you,     Kushal Arreola, [facility]

## 2022-04-18 NOTE — PROGRESS NOTES
Neurology Note    Patient ID:  Chanelle Duarte  [id number]  40 y.o.  1965      Date of Consultation:  April 18, 2022    Assessment and Plan:    The patient is a 53-year-old gentleman with relapsing remitting multiple sclerosis who presents after having increasing falls and speech difficulties. His neurological examination does reveal nystagmus, dysarthria, dysmetria and left-sided sensory deficit. Relapsing remitting multiple sclerosis with progression:    Updated brain and cervical spine MRI with no new exacerbation. Severe disease   Most likely transient worsening associated with underlying medical conditions/fall/doconditing and secondary progression of disease  Steroids can be discontinued. I discussed this with the patient and the primary team today. Please monitor glucose levels while receiving Solu-Medrol. continue physical therapy, Occupational Therapy, and speech therapy. The patient will need close follow-up with his primary neurologist in regards to getting [address] approved. It appears they have been working on this through his [employer]. Chronic neuropathic pain:  He continues on his home pain medication and muscle relaxant. Subjective: I have MS     History of Present Illness:   Chanelle Duarte is a 49 y.o. male with a history of multiple sclerosis who lives at a group home who began to have increasing recurrent falls and transient worsening of his speech. Since admission, the patient does feel that he has gotten better. He has a good appetite this morning and did walk with therapy yesterday and he feels that he is close to his baseline. There is no focal numbness, tingling, or weakness. As noted previously, the patient is followed in the neurology clinic by  and was last seen approximately 3 weeks ago. He has taken multiple disease modifying medications in the past including Ocrevus and Mavenclad.   It was also noted that the patient does have chronic pain involving his neck and low back. There is also some cognitive concerns  From reviewing other notes, the patient has been seen by other neurologists.   He was on rebif at one point in time    Past Medical History:   Diagnosis Date    Back pain 10/8/2010    Back pain 10/8/2010    Depression     Diabetes ([address]     Fatigue 8/24/2012    Hearing loss 1/25/2013    Memory loss 1/25/2013    [facility]     Muscle pain     Muscle weakness     Poor appetite     Snoring     Unintentional weight change     Visual disturbance         Past Surgical History:   Procedure Laterality Date    HX HEENT Right     ear        Family History   Problem Relation Age of Onset    Hypertension Father     Cancer Father         prostate    Hypertension Sister     Diabetes Maternal Grandmother     Cancer Paternal Uncle         prostate    Dementia Paternal Uncle         Social History     Tobacco Use    Smoking status: Current Every Day Smoker     Packs/day: 0.50     Years: 21.00     Pack years: 10.50     Types: Cigarettes    Smokeless tobacco: Never Used   Substance Use Topics    Alcohol use: No        No Known Allergies       Current Facility-Administered Medications   Medication Dose Route Frequency    insulin NPH (NOVOLIN N, HUMULIN N) injection 7 Units  7 Units SubCUTAneous ACB&D    atorvastatin (LIPITOR) tablet 40 mg  40 mg Oral QHS    acetaminophen (TYLENOL) tablet 650 mg  650 mg Oral Q4H PRN    Or    acetaminophen (TYLENOL) solution 650 mg  650 mg Per NG tube Q4H PRN    Or    acetaminophen (TYLENOL) suppository 650 mg  650 mg Rectal Q4H PRN    aspirin chewable tablet 81 mg  81 mg Oral DAILY    enoxaparin (LOVENOX) injection 40 mg  40 mg SubCUTAneous Q24H    sodium chloride (NS) flush 5-40 mL  5-40 mL IntraVENous Q8H    sodium chloride (NS) flush 5-40 mL  5-40 mL IntraVENous PRN    polyethylene glycol (MIRALAX) packet 17 g  17 g Oral DAILY PRN    ondansetron ([facility] ODT) tablet 4 mg  4 mg Oral Q8H PRN    Or    ondansetron (ZOFRAN) injection 4 mg  4 mg IntraVENous Q6H PRN    oxyCODONE IR (ROXICODONE) tablet 15 mg  15 mg Oral BID       Review of Systems:    General, constitutional: negative  Eyes, vision: negative  Ears, nose, throat: negative  Cardiovascular, heart: negative  Respiratory: negative  Gastrointestinal: negative  Genitourinary: negative  Musculoskeletal: negative  Skin and integumentary: negative  Psychiatric: negative  Endocrine: negative  Neurological: negative, except for HPI  Hematologic/lymphatic: negative  Allergy/immunology: negative    Objective:     Visit Vitals  /87 (BP 1 Location: Right upper arm, BP Patient Position: Reclining)   Pulse 65   Temp 98.4 °F (36.9 °C)   Resp 19   Ht 5' 10\" (1.778 m)   Wt 180 lb 5.4 oz (81.8 kg)   SpO2 99%   BMI 25.88 kg/m²       Physical Exam:    General:  appears well nourished in no acute distress  Neck: no carotid bruits  Lungs: clear to auscultation  Heart:  no murmurs, regular rate  Lower extremity: peripheral pulses palpable and no edema  Skin: intact    Neurological exam:    Awake, alert, oriented to person, place and time  Recent and remote memory were normal  Attention and concentration were intact  Language was intact. There was no aphasia  Speech: Scanning, dysarthric speech  Fund of knowledge was preserved    Cranial nerves:   II-XII were tested    [employer] fields were full  Eomi, he does have rotational nystagmus with right lateral gaze and superior gaze  Facial sensation:  normal and symmetric  Facial motor: normal and symmetric  Hearing intact  SCM strength intact  Tongue: midline without fasciculations    Motor: Tone normal  Pronator drift is absent  No evidence of fasciculations    Strength testing:   deltoid triceps biceps Wrist ext. Wrist flex. intrinsics Hip flex. Hip ext. Knee ext.   Knee flex Dorsi flex Plantar flex   Right 5 5 5 5 5 5 5 5 5 5 5 5   Left 5 5 5 5 5 5 5 5 5 5 5 5 Sensory:  Upper extremity: Decreased to pinprick in the left upper extremity  Lower extremity: Decreased to pinprick in the left lower extremity    Reflexes:    Right Left  Biceps  3 3  Triceps 3 3  Brachiorad. 3 3  Patella  3 3  Achilles 2 2    Plantar response:  flexor bilaterally    Cerebellar testing:  no resting tremor apparent, he does have dysmetria, right greater than left upper extremity. Gait: This was not assessed this morning. The patient did walk with a rolling walker and pt yesterday. Labs:     Lab Results   Component Value Date/Time    Hemoglobin A1c 5.4 04/17/2022 01:06 AM    Sodium 138 04/18/2022 05:35 AM    Potassium 4.2 04/18/2022 05:35 AM    Chloride 106 04/18/2022 05:35 AM    Glucose 141 (H) 04/18/2022 05:35 AM    BUN 17 04/18/2022 05:35 AM    Creatinine 0.93 04/18/2022 05:35 AM    Calcium 9.2 04/18/2022 05:35 AM    WBC 11.5 (H) 04/18/2022 05:35 AM    HCT 37.0 04/18/2022 05:35 AM    HGB 12.2 04/18/2022 05:35 AM    PLATELET 868 49/20/7430 05:35 AM       Imaging:    Results from Hospital Encounter encounter on 04/16/22    MRI BRAIN W WO CONT    Narrative  EXAM: MRI BRAIN W WO CONT    TECHNIQUE: Brain images including sagittal and axial T1-weighted, axial FLAIR,  T2-weighted, diffusion weighted, gradient echo,  sagittal T2 FLAIR, coronal T2. Axial and coronal post contrast T1-weighted images. IV CONTRAST:  15 cc ProHance    INDICATION:  MS flareup, dysarthria, recurrent falls    COMPARISON:  MRI of 11/12/2021    FINDINGS:  BRAIN PARENCHYMA:  Findings consistent with moderately extensive multiple  sclerosis including diffuse thinning and signal abnormality in the corpus  callosum, multiple confluent periventricular white matter lesions as well as  focal lesions in the subcortical region, the brainstem, and the cerebellum. No  significant change in number and extent of lesions when compared to 11/12/2021. No enhancing lesions. No areas of diffusion restriction.  No acute infarct. Partially visualized abnormality seen in the upper cervical spine. Multiple T1  hypointense lesions in the deep cerebral white matter. INTRACRANIAL HEMORRHAGE: None. CSF SPACES:  Mild prominence of the ventricles and cortical sulci, consistent  with cerebral volume loss. BASAL CISTERNS:  Patent. MIDLINE SHIFT: None. VASCULAR SYSTEM:  Normal flow voids. PARANASAL SINUSES AND MASTOID AIR CELLS:  No significant opacification. VISUALIZED ORBITS:  No significant abnormalities. VISUALIZED UPPER CERVICAL SPINE:  No significant abnormalities. SELLA:  No enlargement or  focal abnormality. SKULL BASE:  No significant abnormalities. Cerebellar tonsils in normal  position. CALVARIUM:  Intact. Impression  1. No acute findings. 2. Moderately extensive findings of multiple sclerosis with both supratentorial  and infratentorial involvement, not significantly changed since 11/12/2021. Results from [place] encounter on 04/16/22    CT CODE NEURO HEAD WO CONTRAST    Narrative  EXAM: CT CODE NEURO HEAD WO CONTRAST    INDICATION: Code Stroke. MS.    COMPARISON: Brain MRI 11/12/2021. CONTRAST: None. TECHNIQUE: Unenhanced CT of the head was performed using 5 mm images. Brain and  bone windows were generated. Coronal and sagittal reformats. CT dose reduction  was achieved through use of a standardized protocol tailored for this  examination and automatic exposure control for dose modulation. FINDINGS:  Generalized prominence of cerebral sulci and ventricles is increased, greater  than expected for age, but stable and likely related to the clinical history of  demyelination. .. Patchy periventricular white matter low-density is moderately  severe and also stable. . There is no intracranial hemorrhage, extra-axial  collection, or mass effect. The basilar cisterns are open. No CT evidence of  acute infarct. The bone windows demonstrate no abnormalities.  The visualized portions of the  paranasal sinuses and mastoid air cells are clear. Impression  1. No acute intracranial abnormality is confirmed. 2. Moderately severe periventricular white matter changes, roughly stable since  brain MRI 11/12/2021 and consistent with the clinical history of demyelination. head CT performed on April 16, 2022. There is no acute abnormalities. I did independently review the brain mri performed on    I did independently review the mri of the cervical spine performed on 4/17/2022 which revealed widespread demyelinating disease throughout the cervical cord and lower brainstem. No progression from 11/2021. I did independently review the mri of the brain mri from 4/17/2022 which revealed moderately severe widespread demyelinating disease associated with diagnosis of MS. No significant change from 11/12/2021. I spent  35   minutes providing care to this  acutely ill inpatient with > 50% of the time counseling and assisting in the coordination of care of the patient on the patient's hospital floor/unit.       Active Problems:    Multiple sclerosis exacerbation ([address] (11/12/2021)      Dysarthria (4/16/2022)      Recurrent falls (4/16/2022)                   Signed By:  Dahiana Tellez DO FAAN    April 18, 2022

## 2022-04-18 NOTE — PROGRESS NOTES
Hospital follow-up PCP transitional care appointment has been scheduled with NP Destinee Pham on 4/26/22 at 1130 . Pending patient discharge.   Sky Higuera, Care Management Assistant

## 2022-04-18 NOTE — PROGRESS NOTES
Transition of Care Plan:     RUR: 9% - \"low risk\"  Disposition: SNF with transition to LTC (pending acceptance/insurance auth)  Follow up appointments: PCP & specialist as indicated   DME needed: No DME needs identified for d/c  Transportation at Discharge: CM to secure medical transport for d/c; PCS to be completed & placed on chart  Keys or means to access home: N/A        IM Medicare Letter: 2nd IM reviewed/signed at bedside 4/18/22; copy on chart  Is patient a BCPI-A Bundle: N/A     Is patient a [place] and connected with the [address]? N/A            Caregiver Contact: Pt's mother Lorie Seen: [phone])   Discharge Caregiver contacted prior to discharge? Yes  Care Conference needed?: No    Update - 1:30 PM: CM received call back from pt's mother reporting she spoke with the group home owner & he's \"not getting involved. \" Mother reiterated pt is not able to return to the group home at d/c. Both pt & mother agreeable to CM working to secure SNF placement w/ transition to LTC; both parties agreeable to mass referral being sent for placement to avoid further delay in d/c. CM will complete FOC with pt & place copy on chart. Update - 12:39 PM: CM received call back from pt's mother reporting she's unable to get in contact with the group home owner; mother reported she left voicemail requesting call back. Mother requested for CM to continue working to pursue SNF placement. Update - 11:36 AM: CM met with pt to request contact information for group home; pt unable to provide. Pt agreeable to CM contacting his mother while he's present to confirm number for group home. Mother declined to provide contact information, reporting the group home owner Ruben Santos) doesn't want his number given out. Per mother, pt has been \"forbidden\" to return to the group home. Mother reported pt's father is going to  his belongings from the group home tomorrow (4/19/22).  CM requested for mother to relay request to group home owner to contact CM to confirm pt is unable to return to setting at d/c. Initial note: CM acknowledged d/c. MD requested for CM to f/u with pt to confirm direction of disposition, as he is declining SNF placement. Referral was sent via [facility] for SNF 4/17/22 per mother/primary mPOA Radha Diehl) request; referral pending. CM met with pt to confirm whether or not he's agreeable to pursing SNF placement; pt declined. Pt requested for CM to arrange medical transport for him to return back to his group home. Pt reported his mother has been trying to get him into a rehab facility, however, he's not interested in placement at this time. Pt declined for CM to contact his mother to discuss disposition. Pt agreeable to utilizing [facility] intervention at d/c; no preference in [facility] provider identified. Pt agreeable to mass referral being sent to [facility] agencies in-network with his insurance coverage. FOC offered, signed copy placed on chart. No immediate questions/concerns identified. 2nd IM reviewed/signed at bedside; copy on chart.     JUNO Warren  Care Manager, [facility]

## 2022-04-19 LAB
GLUCOSE BLD STRIP.AUTO-MCNC: 68 MG/DL (ref 65–117)
GLUCOSE BLD STRIP.AUTO-MCNC: 71 MG/DL (ref 65–117)
GLUCOSE BLD STRIP.AUTO-MCNC: 79 MG/DL (ref 65–117)
GLUCOSE BLD STRIP.AUTO-MCNC: 94 MG/DL (ref 65–117)
SERVICE CMNT-IMP: NORMAL

## 2022-04-19 PROCEDURE — 65270000046 HC RM TELEMETRY

## 2022-04-19 PROCEDURE — 74011250637 HC RX REV CODE- 250/637: Performed by: STUDENT IN AN ORGANIZED HEALTH CARE EDUCATION/TRAINING PROGRAM

## 2022-04-19 PROCEDURE — 82962 GLUCOSE BLOOD TEST: CPT

## 2022-04-19 PROCEDURE — 74011000250 HC RX REV CODE- 250: Performed by: STUDENT IN AN ORGANIZED HEALTH CARE EDUCATION/TRAINING PROGRAM

## 2022-04-19 PROCEDURE — 74011250637 HC RX REV CODE- 250/637: Performed by: INTERNAL MEDICINE

## 2022-04-19 PROCEDURE — 74011250636 HC RX REV CODE- 250/636: Performed by: STUDENT IN AN ORGANIZED HEALTH CARE EDUCATION/TRAINING PROGRAM

## 2022-04-19 PROCEDURE — 94760 N-INVAS EAR/PLS OXIMETRY 1: CPT

## 2022-04-19 PROCEDURE — 99232 SBSQ HOSP IP/OBS MODERATE 35: CPT | Performed by: PSYCHIATRY & NEUROLOGY

## 2022-04-19 RX ADMIN — ATORVASTATIN CALCIUM 40 MG: 40 TABLET, FILM COATED ORAL at 21:05

## 2022-04-19 RX ADMIN — SODIUM CHLORIDE, PRESERVATIVE FREE 10 ML: 5 INJECTION INTRAVENOUS at 06:07

## 2022-04-19 RX ADMIN — ENOXAPARIN SODIUM 40 MG: 40 INJECTION SUBCUTANEOUS at 17:04

## 2022-04-19 RX ADMIN — OXYCODONE 15 MG: 5 TABLET ORAL at 09:22

## 2022-04-19 RX ADMIN — OXYCODONE 15 MG: 5 TABLET ORAL at 21:04

## 2022-04-19 RX ADMIN — SODIUM CHLORIDE, PRESERVATIVE FREE 10 ML: 5 INJECTION INTRAVENOUS at 17:04

## 2022-04-19 RX ADMIN — SODIUM CHLORIDE, PRESERVATIVE FREE 10 ML: 5 INJECTION INTRAVENOUS at 21:05

## 2022-04-19 RX ADMIN — ASPIRIN 81 MG: 81 TABLET, CHEWABLE ORAL at 09:21

## 2022-04-19 NOTE — PROGRESS NOTES
Transition of Care Plan:     RUR: 9% - \"low risk\"  Disposition: SNF with transition to [address] (pending insurance auth)  Follow up appointments: PCP & specialist as indicated   DME needed: No DME needs identified for d/c  Transportation at Discharge: CM to secure medical transport for d/c; PCS to be completed & placed on chart  Keys or means to access home: N/A        IM Medicare Letter: 2nd IM reviewed/signed at bedside 4/18/22; copy on chart  Is patient a BCPI-A Bundle: N/A     Is patient a  and connected with [facility]? N/A            Caregiver Contact: Pt's mother Charlie Nielson: [phone])   Discharge Caregiver contacted prior to [address] needed?: No    Initial note: Chart reviewed. Mass referral sent for SNF 4/28/22 was accepted by [facility]. CM contacted [facility] admission director Addison Alonso: [phone]) to f/u on referral & discuss pt's transition to the facility. Rollen Cooks informed CM plans to initiate insurance auth through [facility] for rehab services, with the ultimate goal of him transitioning to LTC after benefits are exhausted. Rollen Cooks informed pt has access to Medicaid coverage; policy information to be confirmed once card is received by pt/mother. CM met with pt at bedside to report update regarding SNF placement/accepting facility. Pt agreeable to proceeding with [facility]. Pt provided CM with copy of Medicaid card (policy #: [id number]); copy of card placed on chart for records. CM will contact pt's mother Charlie Nielson: [phone]) to report update.     Magen Collazo MSW  Care Manager, [facility]

## 2022-04-19 NOTE — PROGRESS NOTES
1930 - 2200 Blood sugar 68. Patient AAOX4 and watching tv in bed. Snacks served. Patient consumed 100% of snacks. 2335 - Repeat BS 91. AAOx4. No complaint voiced. Will continue to monitor.

## 2022-04-19 NOTE — PROGRESS NOTES
End of Shift Note    Bedside shift change report given to Braxton Arredondo RN (oncoming nurse) by Claude Somerset, RN (offgoing nurse). Report included the following information SBAR, Kardex, MAR, Recent Results and Cardiac Rhythm NSR    Shift worked: days   Shift summary and any significant changes:    Pt continues to have periods of confusion. He removed his male pure wick and found playing with himself. MAGEDN Sharif Dubin was going to reapply male pure wick. Did not notice that It was not replaced until report was passing on to the assigned day nurse. Patient is able to use the urinal freely. At night, he gets more confused.    Concerns for physician to address:  see above   Zone phone for oncoming shift:   [phone]     Patient Information  Mitchel Vang  49 y.o.  4/16/2022  3:52 PM by Rosie Doe MD. Mitchel Vang was admitted from Home    Problem List  Patient Active Problem List    Diagnosis Date Noted    Dysarthria 04/16/2022    Recurrent falls 04/16/2022    FTT (failure to thrive) in adult 11/14/2021    Multiple sclerosis exacerbation ([address] 11/12/2021    Recurrent depression ([address] 01/04/2018    Paresthesia of both hands 08/28/2015    Mixed incontinence 08/28/2015    Balance problem 08/28/2015    Falls 08/28/2015    Bilateral thigh pain 08/28/2015    Anxiety 06/19/2015    Midline low back pain without sciatica 06/19/2015    Disability examination 02/18/2013    Prediabetes 01/30/2013    Hearing loss 01/25/2013    Memory loss 01/25/2013    Multiple sclerosis ([address] 01/25/2013    Elevated hemoglobin A1c 08/27/2012    MS (multiple sclerosis) ([address] 08/24/2012    Fatigue 08/24/2012    Incontinence of urine 08/24/2012    Decreased vision 09/28/2011    Acute bronchitis 09/28/2011    Tobacco abuse 03/18/2011    ED (erectile dysfunction) 03/18/2011    Back pain 10/08/2010    Hypercholesterolemia 10/08/2010    Cellulitis 10/08/2010     Past Medical History:   Diagnosis Date    Back pain 10/8/2010  Back pain 10/8/2010    Depression     Diabetes [facility]     Fatigue 8/24/2012    Hearing loss 1/25/2013    Memory loss 1/25/2013    MS (multiple sclerosis) ([facility])     Muscle pain     Muscle weakness     Poor appetite     Snoring     Unintentional weight change     Visual disturbance        Core Measures:  CVA: Yes Yes    Activity:  Activity Level: Bed Rest  Number times ambulated in hallways past shift: 0  Number of times OOB to chair past shift: 1    Cardiac:   Cardiac Monitoring: Yes      Cardiac Rhythm: Sinus Rhythm    Access:   Current line(s): PIV (2)    Genitourinary:   Urinary status: Voiding  Urinary Catheter? External Catheter    Respiratory:   O2 Device: None (Room air)  Chronic home O2 use?: NO  Incentive spirometer at bedside: NO       GI:  Last Bowel Movement Date: 04/16/22  Current diet:  ADULT DIET Regular; 4 carb choices (60 gm/meal); Low Fat/Low Chol/High Fiber/2 gm Na  DIET ONE TIME MESSAGE  Passing flatus: YES  Tolerating current diet: YES       Pain Management:   Patient states pain is manageable on current regimen: YES    Skin:  Italo Score: 19  Interventions: increase time out of bed and PT/OT consult    Patient Safety:  Fall Score:  Total Score: 5  Interventions: bed/chair alarm  High Fall Risk: Yes    DVT prophylaxis:  DVT prophylaxis Med- Yes  DVT prophylaxis SCD or LOC- No     Wounds: (If Applicable)  Wounds- No  Location NA    Active Consults:  IP CONSULT TO HOSPITALIST  IP CONSULT TO NEUROLOGY    Length of Stay:  Expected LOS: 3d 0h  Actual LOS: 3  Discharge Plan: Yes       Melissa Boykin RN

## 2022-04-19 NOTE — PROGRESS NOTES
End of Shift Note     Bedside shift change report given to Barbie Short RN (oncoming nurse) by Herminio Mathias RN (offgoing nurse).   Report included the following information SBAR, Kardex, MAR, Recent Results and Cardiac Rhythm NSR     Shift worked: days   Shift summary and any significant changes:     Pt continues to have periods of confusion    No significant changes to condition   Concerns for physician to address: None   Zone phone for oncoming shift:  [phone]      Patient Information  Neo Grossman  49 y.o.  4/16/2022  3:52 PM by Sawyer Mohr MD. Neo Grossman was admitted from Home     Problem List       Patient Active Problem List     Diagnosis Date Noted    Dysarthria 04/16/2022    Recurrent falls 04/16/2022    FTT (failure to thrive) in adult 11/14/2021    Multiple sclerosis exacerbation ([address] 11/12/2021    Recurrent depression ([address] 01/04/2018    Paresthesia of both hands 08/28/2015    Mixed incontinence 08/28/2015    Balance problem 08/28/2015    Falls 08/28/2015    Bilateral thigh pain 08/28/2015    Anxiety 06/19/2015    Midline low back pain without sciatica 06/19/2015    Disability examination 02/18/2013    Prediabetes 01/30/2013    Hearing loss 01/25/2013    Memory loss 01/25/2013    Multiple sclerosis ([address] 01/25/2013    Elevated hemoglobin A1c 08/27/2012    MS (multiple sclerosis) ([address] 08/24/2012    Fatigue 08/24/2012    Incontinence of urine 08/24/2012    Decreased vision 09/28/2011    Acute bronchitis 09/28/2011    Tobacco abuse 03/18/2011    ED (erectile dysfunction) 03/18/2011    Back pain 10/08/2010    Hypercholesterolemia 10/08/2010    Cellulitis 10/08/2010           Past Medical History:   Diagnosis Date    Back pain 10/8/2010    Back pain 10/8/2010    Depression      Diabetes ([address]      Fatigue 8/24/2012    Hearing loss 1/25/2013    Memory loss 1/25/2013    MS [facility] sclerosis) ([facility])      Muscle pain      Muscle weakness      Poor appetite      Snoring      Unintentional weight change      Visual disturbance           Core Measures:  CVA: Yes Yes     Activity:  Activity Level: Bed Rest  Number times ambulated in hallways past shift: 0  Number of times OOB to chair past shift: 1     Cardiac:   Cardiac Monitoring: Yes      Cardiac Rhythm: Sinus Rhythm     Access:   Current line(s): PIV (2)     Genitourinary:   Urinary status: Voiding  Urinary Catheter? External Catheter     Respiratory:   O2 Device: None (Room air)  Chronic home O2 use?: NO  Incentive spirometer at bedside: NO     GI:  Last Bowel Movement Date: 04/16/22  Current diet:  ADULT DIET Regular; 4 carb choices (60 gm/meal); Low Fat/Low Chol/High Fiber/2 gm Na  DIET ONE TIME MESSAGE  Passing flatus: YES  Tolerating current diet: YES     Pain Management:   Patient states pain is manageable on current regimen: YES     Skin:  Italo Score: 19  Interventions: increase time out of bed and PT/OT consult    Patient Safety:  Fall Score: Total Score: 5  Interventions: bed/chair alarm  High Fall Risk: Yes     DVT prophylaxis:  DVT prophylaxis Med- Yes  DVT prophylaxis SCD or LOC- No      Wounds: (If Applicable)  Wounds- No  Location NA     Active Consults:  IP CONSULT TO HOSPITALIST  IP CONSULT TO NEUROLOGY     Length of Stay:  Expected LOS: 3d 0h  Actual LOS: 3  Discharge Plan:  Yes         Darion Chisholm RN

## 2022-04-19 NOTE — PROGRESS NOTES
[facility]: [id number] [address] Neurology  Rafiq Bearden  [phone]        Name:   Bruno Valdivia   Medical record #: [id number]  Admission Date: 4/16/2022   Reason for Consult:  MS    Subjective:   Overnight events:    Reports of confusion overnight, possibly related to steroids or hypoglycemia. Now calm and appropriate, questions about what made him hypoglycemic yesterday. Objective:     MRI did not indicate active lesions, Steroids stopped on 4/17. Awaiting placement. Follow up with Dr. Dottie Juan discussed with:  Patient x   Family    [address] [place] [address]    Consultant/Specialist:         Thank you for allowing the Neurology Service the pleasure of participating in the care of your patient. This patient will be discussed with my collaborating care team physician, Dr. Rakan Ascencio, and he may have further recommendations regarding this patient's care. Bhakti Barber, [facility]  ====================      Physical Exam    Patient Vitals for the past 12 hrs:   Temp Pulse Resp BP SpO2   04/19/22 0720 98 °F (36.7 °C) 67 19 111/81 100 %   04/19/22 0254 98.4 °F (36.9 °C) 68 20 114/72 100 %   04/18/22 2300 98.8 °F (37.1 °C) 61 29 113/79 99 %         NEUROLOGICAL EXAM:         General:  Alert, cooperative, no acute distress  Mental Status: Oriented to time, place and person. Fully attentive. No aphasia. Full fund of knowledge. Normal recent and remote memory. Cranial Nerves:   Visual Fields:  normal in all quadrants in both eyes. EOM: right lateral nystagmus. Facial movements:  symmetric, no ptosis Facial sensation:  intact to LT on both sides. Hearing:  normal.       Language:  no dysarthria, no aphasia, normal fluency, normal repetition. Tongue: midline. Soft palate: not examined  SCMs: normal, symmetric.           Reflexes:   LUExt: 2+/ 4                 RUExt: 2+/ 4  LLExt: 2+/4                  RLExt: 2+/ 4          Sensory:   LT and Temp intact in all extremities            Cerebellar:  No resting, no postural tremors, normal finger nose finger.   No pronator drift                            Motor:           LUExt: 3/ 5               RUExt: 3/ 5                                              LLExt: 3/ 5                RLExt: 3/ 5        Gait:   Not tested

## 2022-04-19 NOTE — PROGRESS NOTES
Neurology Note    Patient ID:  Jermaine Giles  [id number]  30 y.o.  1965      Date of Consultation:  April 19, 2022    Assessment and Plan:    The patient is a 74-year-old gentleman with relapsing remitting multiple sclerosis who presents after having increasing falls and speech difficulties. His neurological examination does reveal persistent nystagmus, dysarthria, dysmetria and left-sided sensory deficit. Relapsing remitting multiple sclerosis with secondary progression:    Updated brain and cervical spine MRI with no new exacerbation. Severe disease   Most likely transient worsening associated with underlying medical conditions/fall/doconditing and secondary progression of disease  Steroids have been stopped  I discussed the patient's disease course with the patient and his sister (via phone) today  continue physical therapy, Occupational Therapy, and speech therapy - considering rehabilitation. It appears the patient will need a new long term care facility      The patient will need close follow-up with his primary neurologist (Dr. Beryl Schmidt) in regards to getting [facility] approved. It appears they have been working on this through his [employer]. Chronic neuropathic pain:  He continues on his home pain medication and muscle relaxant. There is no other additional neurology recommendations at this time. If questions arise, please not hesitate to contact me and I will return to see the patient. The patient should follow-up in clinic in 3 to 4 weeks time after discharge with Dr. Beryl Schmidt. Subjective: I have MS and feel about the same     History of Present Illness:   Jermaine Giles is a 49 y.o. male with a history of multiple sclerosis who lives at a group home who began to have increasing recurrent falls and transient worsening of his speech. The patient was slightly agitated at times last night. This afternoon, he denies any new symptoms.   His sister on the phone feels that his speech and communication has been stable. As noted previously, the patient is followed in the neurology clinic by  and was last seen approximately 3 weeks ago. He has taken multiple disease modifying medications in the past including Ocrevus and Mavenclad. It was also noted that the patient does have chronic pain involving his neck and low back. There is also some cognitive concerns  From reviewing other notes, the patient has been seen by other neurologists.   He was on rebif at one point in time    Past Medical History:   Diagnosis Date    Back pain 10/8/2010    Back pain 10/8/2010    Depression     Diabetes ([address]     Fatigue 8/24/2012    Hearing loss 1/25/2013    Memory loss 1/25/2013    [facility]     Muscle pain     Muscle weakness     Poor appetite     Snoring     Unintentional weight change     Visual disturbance         Past Surgical History:   Procedure Laterality Date    HX HEENT Right     ear        Family History   Problem Relation Age of Onset    Hypertension Father     Cancer Father         prostate    Hypertension Sister     Diabetes Maternal Grandmother     Cancer Paternal Uncle         prostate    Dementia Paternal Uncle         Social History     Tobacco Use    Smoking status: Current Every Day Smoker     Packs/day: 0.50     Years: 21.00     Pack years: 10.50     Types: Cigarettes    Smokeless tobacco: Never Used   Substance Use Topics    Alcohol use: No        No Known Allergies       Current Facility-Administered Medications   Medication Dose Route Frequency    atorvastatin (LIPITOR) tablet 40 mg  40 mg Oral QHS    acetaminophen (TYLENOL) tablet 650 mg  650 mg Oral Q4H PRN    Or    acetaminophen (TYLENOL) solution 650 mg  650 mg Per NG tube Q4H PRN    Or    acetaminophen (TYLENOL) suppository 650 mg  650 mg Rectal Q4H PRN    aspirin chewable tablet 81 mg  81 mg Oral DAILY    enoxaparin (LOVENOX) injection 40 mg  40 mg SubCUTAneous Q24H    sodium chloride (NS) flush 5-40 mL  5-40 mL IntraVENous Q8H    sodium chloride (NS) flush 5-40 mL  5-40 mL IntraVENous PRN    polyethylene glycol (MIRALAX) packet 17 g  17 g Oral DAILY PRN    ondansetron (ZOFRAN ODT) tablet 4 mg  4 mg Oral Q8H PRN    Or    ondansetron (ZOFRAN) injection 4 mg  4 mg IntraVENous Q6H PRN    oxyCODONE IR (ROXICODONE) tablet 15 mg  15 mg Oral BID       Review of Systems:    General, constitutional: negative  Eyes, vision: negative  Ears, nose, throat: negative  Cardiovascular, heart: negative  Respiratory: negative  Gastrointestinal: negative  Genitourinary: negative  Musculoskeletal: negative  Skin and integumentary: negative  Psychiatric: negative  Endocrine: negative  Neurological: negative, except for HPI  Hematologic/lymphatic: negative  Allergy/immunology: negative    Objective:     Visit Vitals  /69 (BP 1 Location: Right upper arm, BP Patient Position: At rest)   Pulse 71   Temp 97.9 °F (36.6 °C)   Resp 18   Ht 5' 10\" (1.778 m)   Wt 180 lb 5.4 oz (81.8 kg)   SpO2 99%   BMI 25.88 kg/m²       Physical Exam:    General:  appears well nourished in no acute distress  Neck: no carotid bruits  Lungs: clear to auscultation  Heart:  no murmurs, regular rate  Lower extremity: peripheral pulses palpable and no edema  Skin: intact    Neurological exam:    Awake, alert, oriented to person, place and time  Recent and remote memory were normal  Attention and concentration were intact  Language was intact. There was no aphasia  Speech: Scanning, dysarthric speech - unchanged  Fund of knowledge was preserved    Cranial nerves:   II-XII were tested    Perrrla  Visual fields were full  Eomi, he does have rotational nystagmus with right lateral gaze and superior gaze - unchanged. Facial sensation:  normal and symmetric  Facial motor: normal and symmetric  Hearing intact  SCM strength intact  Tongue: midline without fasciculations    Motor:    Tone normal  Pronator drift is absent  No evidence of fasciculations    Strength testing:   deltoid triceps biceps Wrist ext. Wrist flex. intrinsics Hip flex. Hip ext. Knee ext. Knee flex Dorsi flex Plantar flex   Right 5 5 5 5 5 5 5 5 5 5 5 5   Left 5 5 5 5 5 5 5 5 5 5 5 5     Strength remains good. Poor coordination. Sensory:  Upper extremity: Decreased to pinprick in the left upper extremity  Lower extremity: Decreased to pinprick in the left lower extremity    Reflexes:    Right Left  Biceps  3 3  Triceps 3 3  Brachiorad. 3 3  Patella  3 3  Achilles 2 2    Plantar response:  flexor bilaterally    Cerebellar testing:  no resting tremor apparent, he does have dysmetria, right greater than left upper extremity. Labs:     Lab Results   Component Value Date/Time    Hemoglobin A1c 5.4 04/17/2022 01:06 AM    Sodium 138 04/18/2022 05:35 AM    Potassium 4.2 04/18/2022 05:35 AM    Chloride 106 04/18/2022 05:35 AM    Glucose 141 (H) 04/18/2022 05:35 AM    BUN 17 04/18/2022 05:35 AM    Creatinine 0.93 04/18/2022 05:35 AM    Calcium 9.2 04/18/2022 05:35 AM    WBC 11.5 (H) 04/18/2022 05:35 AM    HCT 37.0 04/18/2022 05:35 AM    HGB 12.2 04/18/2022 05:35 AM    PLATELET 728 98/47/7765 05:35 AM       Imaging:    Results from Hospital Encounter encounter on 04/16/22    MRI BRAIN W WO CONT    Narrative  EXAM: MRI BRAIN W WO CONT    TECHNIQUE: Brain images including sagittal and axial T1-weighted, axial FLAIR,  T2-weighted, diffusion weighted, gradient echo,  sagittal T2 FLAIR, coronal T2. Axial and coronal post contrast T1-weighted images.     IV CONTRAST:  15 cc ProHance    INDICATION:  MS flareup, dysarthria, recurrent falls    COMPARISON:  MRI of 11/12/2021    FINDINGS:  BRAIN PARENCHYMA:  Findings consistent with moderately extensive multiple  sclerosis including diffuse thinning and signal abnormality in the corpus  callosum, multiple confluent periventricular white matter lesions as well as  focal lesions in the subcortical region, the brainstem, and the cerebellum. No  significant change in number and extent of lesions when compared to 11/12/2021. No enhancing lesions. No areas of diffusion restriction. No acute infarct. Partially visualized abnormality seen in the upper cervical spine. Multiple T1  hypointense lesions in the deep cerebral white matter. INTRACRANIAL HEMORRHAGE: None. CSF SPACES:  Mild prominence of the ventricles and cortical sulci, consistent  with cerebral volume loss. BASAL CISTERNS:  Patent. MIDLINE SHIFT: None. VASCULAR SYSTEM:  Normal flow voids. PARANASAL SINUSES AND MASTOID AIR CELLS:  No significant opacification. VISUALIZED ORBITS:  No significant abnormalities. VISUALIZED UPPER CERVICAL SPINE:  No significant abnormalities. SELLA:  No enlargement or  focal abnormality. SKULL BASE:  No significant abnormalities. Cerebellar tonsils in normal  position. CALVARIUM:  Intact. Impression  1. No acute findings. 2. Moderately extensive findings of multiple sclerosis with both supratentorial  and infratentorial involvement, not significantly changed since 11/12/2021. Results from [place] encounter on 04/16/22    CT CODE NEURO HEAD WO CONTRAST    Narrative  EXAM: CT CODE NEURO HEAD WO CONTRAST    INDICATION: Code Stroke. MS.    COMPARISON: Brain MRI 11/12/2021. CONTRAST: None. TECHNIQUE: Unenhanced CT of the head was performed using 5 mm images. Brain and  bone windows were generated. Coronal and sagittal reformats. CT dose reduction  was achieved through use of a standardized protocol tailored for this  examination and automatic exposure control for dose modulation. FINDINGS:  Generalized prominence of cerebral sulci and ventricles is increased, greater  than expected for age, but stable and likely related to the clinical history of  demyelination. .. Patchy periventricular white matter low-density is moderately  severe and also stable. . There is no intracranial hemorrhage, extra-axial  collection, or mass effect. The basilar cisterns are open. No CT evidence of  acute infarct. The bone windows demonstrate no abnormalities. The visualized portions of the  paranasal sinuses and mastoid air cells are clear. Impression  1. No acute intracranial abnormality is confirmed. 2. Moderately severe periventricular white matter changes, roughly stable since  brain MRI 11/12/2021 and consistent with the clinical history of demyelination. head CT performed on April 16, 2022. There is no acute abnormalities. mri of the cervical spine performed on 4/17/2022 which revealed widespread demyelinating disease throughout the cervical cord and lower brainstem. No progression from 11/2021. mri of the brain mri from 4/17/2022 which revealed moderately severe widespread demyelinating disease associated with diagnosis of MS. No significant change from 11/12/2021. I spent  25   minutes providing care to this  acutely ill inpatient with > 50% of the time counseling and assisting in the coordination of care of the patient on the patient's hospital floor/unit.       Active Problems:    Multiple sclerosis exacerbation ([address] (11/12/2021)      Dysarthria (4/16/2022)      Recurrent falls (4/16/2022)                   Signed By:  Dahiana Tellez DO FAAN    April 19, 2022

## 2022-04-19 NOTE — PROGRESS NOTES
Hospitalist Progress Note    NAME: Neo Grossman   :  1965   MRN:  [id number]       Assessment / Plan:  Dysarthria with recurrent falls in the setting of multiple sclerosis, no acute exacerbation  CVA was ruled out  hx of paraplegia due to MS  Chronic back pain on narcotics at home  CT head-No acute intracranial abnormality is confirmed. Moderately severe periventricular white matter changes, roughly stable since brain MRI 2021 and consistent with the clinical history of demyelination. MRI brain and C spine neg for acute findings. Demyelinating disease in the cervical spinal cord and lower brainstem of moderate severity, without progression since 2021 He did received 2 doses IV solumedrol. There is no indication to continue high dose IV steroids since no new lesion were seen on MRI. I spoke to neurology this AM who also recommended to discontinue steroids. Today patient is seen and examined, his vital signs are stable, his lab work is stable and he was cleared by all consultant parties including neurology to be discharged for outpatient follow-up.   Placement is pending. Per CM, pt needs LTC. He can no longer return to group home  Does not want us to call his mother unless he is present. Code Status: Full code  DVT Prophylaxis: Lovenox  GI Prophylaxis: not indicated     Subjective:     Chief Complaint / Reason for Physician Visit  NAD. Discussed with RN events overnight. Review of Systems:  Symptom Y/N Comments  Symptom Y/N Comments   Fever/Chills    Chest Pain     Poor Appetite    Edema     Cough    Abdominal Pain     Sputum    Joint Pain     SOB/LEVINE    Pruritis/Rash     Nausea/vomit    Tolerating PT/OT     Diarrhea    Tolerating Diet     Constipation    Other       Could NOT obtain due to:      Objective:     VITALS:   Last 24hrs VS reviewed since prior progress note.  Most recent are:  Patient Vitals for the past 24 hrs:   Temp Pulse Resp BP SpO2   22 0720 98 °F (36.7 °C) 67 19 111/81 100 %   04/19/22 0254 98.4 °F (36.9 °C) 68 20 114/72 100 %   04/18/22 2300 98.8 °F (37.1 °C) 61 29 113/79 99 %   04/18/22 1950 98.2 °F (36.8 °C) 74 18 125/77 100 %   04/18/22 1704 98 °F (36.7 °C) 70 18 123/79 100 %   04/18/22 1258 98.4 °F (36.9 °C) 76 18 120/75 98 %     No intake or output data in the 24 hours ending 04/19/22 0956     I had a face to face encounter and independently examined this patient on 4/19/2022, as outlined below:  PHYSICAL EXAM:  General: WD, WN. Alert, cooperative, no acute distress    EENT:  EOMI. Anicteric sclerae. MMM  Resp:  CTA bilaterally, no wheezing or rales. No accessory muscle use  CV:  Regular  rhythm,  No edema  GI:  Soft, Non distended, Non tender. +Bowel sounds  Neurologic:  Alert and oriented X 3, slow speech, tremulous  Psych:   Not anxious nor agitated  Skin:  No rashes. No jaundice    Reviewed most current lab test results and cultures  YES  Reviewed most current radiology test results   YES  Review and summation of old records today    NO  Reviewed patient's current orders and MAR    YES  PMH/ reviewed - no change compared to H&P  ________________________________________________________________________  Care Plan discussed with:    Comments   Patient x    Family      RN x    Care Manager x    Consultant                        Multidiciplinary team rounds were held today with , nursing, pharmacist and clinical coordinator. Patient's plan of care was discussed; medications were reviewed and discharge planning was addressed.      ________________________________________________________________________  Total NON critical care TIME:  35   Minutes    Total CRITICAL CARE TIME Spent:   Minutes non procedure based      Comments   >50% of visit spent in counseling and coordination of care     ________________________________________________________________________  Stephanie Grant MD     Procedures: see electronic medical records for all procedures/Xrays and details which were not copied into this note but were reviewed prior to creation of Plan. LABS:  I reviewed today's most current labs and imaging studies.   Pertinent labs include:  Recent Labs     04/18/22 0535 04/17/22 0106 04/16/22  1612   WBC 11.5* 6.9 7.5   HGB 12.2 12.4 12.6   HCT 37.0 38.2 38.4    158 154     Recent Labs     04/18/22 0535 04/17/22 0106 04/16/22  1612    140 142   K 4.2 3.9 3.8    108 108   CO2 29 27 30   * 140* 124*   BUN 17 10 13   CREA 0.93 0.80 0.98   CA 9.2 9.1 9.1   MG  --  2.1  --    ALB  --   --  3.4*   TBILI  --   --  0.5   ALT  --   --  24   INR  --   --  1.0       Signed: Stephanie Grant MD

## 2022-04-20 LAB
GLUCOSE BLD STRIP.AUTO-MCNC: 106 MG/DL (ref 65–117)
GLUCOSE BLD STRIP.AUTO-MCNC: 198 MG/DL (ref 65–117)
GLUCOSE BLD STRIP.AUTO-MCNC: 73 MG/DL (ref 65–117)
SERVICE CMNT-IMP: ABNORMAL
SERVICE CMNT-IMP: NORMAL
SERVICE CMNT-IMP: NORMAL

## 2022-04-20 PROCEDURE — 74011000250 HC RX REV CODE- 250: Performed by: STUDENT IN AN ORGANIZED HEALTH CARE EDUCATION/TRAINING PROGRAM

## 2022-04-20 PROCEDURE — 97116 GAIT TRAINING THERAPY: CPT

## 2022-04-20 PROCEDURE — 74011250637 HC RX REV CODE- 250/637: Performed by: INTERNAL MEDICINE

## 2022-04-20 PROCEDURE — 97535 SELF CARE MNGMENT TRAINING: CPT

## 2022-04-20 PROCEDURE — 97530 THERAPEUTIC ACTIVITIES: CPT

## 2022-04-20 PROCEDURE — 74011250636 HC RX REV CODE- 250/636: Performed by: STUDENT IN AN ORGANIZED HEALTH CARE EDUCATION/TRAINING PROGRAM

## 2022-04-20 PROCEDURE — 74011250637 HC RX REV CODE- 250/637: Performed by: STUDENT IN AN ORGANIZED HEALTH CARE EDUCATION/TRAINING PROGRAM

## 2022-04-20 PROCEDURE — 82962 GLUCOSE BLOOD TEST: CPT

## 2022-04-20 PROCEDURE — 65270000046 HC RM TELEMETRY

## 2022-04-20 RX ADMIN — SODIUM CHLORIDE, PRESERVATIVE FREE 10 ML: 5 INJECTION INTRAVENOUS at 21:24

## 2022-04-20 RX ADMIN — OXYCODONE 15 MG: 5 TABLET ORAL at 08:37

## 2022-04-20 RX ADMIN — ATORVASTATIN CALCIUM 40 MG: 40 TABLET, FILM COATED ORAL at 21:24

## 2022-04-20 RX ADMIN — ASPIRIN 81 MG: 81 TABLET, CHEWABLE ORAL at 08:37

## 2022-04-20 RX ADMIN — ENOXAPARIN SODIUM 40 MG: 40 INJECTION SUBCUTANEOUS at 18:03

## 2022-04-20 RX ADMIN — SODIUM CHLORIDE, PRESERVATIVE FREE 10 ML: 5 INJECTION INTRAVENOUS at 13:41

## 2022-04-20 RX ADMIN — OXYCODONE 15 MG: 5 TABLET ORAL at 20:41

## 2022-04-20 RX ADMIN — SODIUM CHLORIDE, PRESERVATIVE FREE 10 ML: 5 INJECTION INTRAVENOUS at 06:17

## 2022-04-20 NOTE — PROGRESS NOTES
End of Shift Note     Bedside shift change report given to Jose Calvin RN (oncoming nurse) by Matilde Palomino RN (offgoing nurse).   Report included the following information SBAR, Kardex, MAR, Recent Results and Cardiac Rhythm NSR     Shift worked: Nights    Shift summary and any significant changes:     Pt in bed sleeping all night,     No significant changes to condition   Concerns for physician to address: None   Zone phone for oncoming shift:  [phone]      Patient Information  Leonel Hatchet  49 y.o.  4/16/2022  3:52 PM by Isaiah Lugo MD. Leonel Hatchet was admitted from Home     Problem List       Patient Active Problem List     Diagnosis Date Noted    Dysarthria 04/16/2022    Recurrent falls 04/16/2022    FTT (failure to thrive) in adult 11/14/2021    Multiple sclerosis exacerbation ([address] 11/12/2021    Recurrent depression ([address] 01/04/2018    Paresthesia of both hands 08/28/2015    Mixed incontinence 08/28/2015    Balance problem 08/28/2015    Falls 08/28/2015    Bilateral thigh pain 08/28/2015    Anxiety 06/19/2015    Midline low back pain without sciatica 06/19/2015    Disability examination 02/18/2013    Prediabetes 01/30/2013    Hearing loss 01/25/2013    Memory loss 01/25/2013    Multiple sclerosis ([address] 01/25/2013    Elevated hemoglobin A1c 08/27/2012    MS (multiple sclerosis) ([address] 08/24/2012    Fatigue 08/24/2012    Incontinence of urine 08/24/2012    Decreased vision 09/28/2011    Acute bronchitis 09/28/2011    Tobacco abuse 03/18/2011    ED (erectile dysfunction) 03/18/2011    Back pain 10/08/2010    Hypercholesterolemia 10/08/2010    Cellulitis 10/08/2010           Past Medical History:   Diagnosis Date    Back pain 10/8/2010    Back pain 10/8/2010    Depression      Diabetes ([address]      Fatigue 8/24/2012    Hearing loss 1/25/2013    Memory loss 1/25/2013    [facility]      Muscle pain      Muscle weakness      Poor appetite      Snoring      Unintentional weight change      Visual disturbance           Core Measures:  CVA: Yes Yes     Activity:  Activity Level: Bed Rest  Number times ambulated in hallways past shift: 0  Number of times OOB to chair past shift: 1     Cardiac:   Cardiac Monitoring: Yes      Cardiac Rhythm: Sinus Rhythm     Access:   Current line(s): PIV (2)     Genitourinary:   Urinary status: Voiding  Urinary Catheter? External Catheter     Respiratory:   O2 Device: None (Room air)  Chronic home O2 use?: NO  Incentive spirometer at bedside: NO     GI:  Last Bowel Movement Date: 04/16/22  Current diet:  ADULT DIET Regular; 4 carb choices (60 gm/meal); Low Fat/Low Chol/High Fiber/2 gm Na  DIET ONE TIME MESSAGE  Passing flatus: YES  Tolerating current diet: YES     Pain Management:   Patient states pain is manageable on current regimen: YES     Skin:  Italo Score: 19  Interventions: increase time out of bed and PT/OT consult    Patient Safety:  Fall Score: Total Score: 5  Interventions: bed/chair alarm  High Fall Risk: Yes     DVT prophylaxis:  DVT prophylaxis Med- Yes  DVT prophylaxis SCD or LOC- No      Wounds: (If Applicable)  Wounds- No  Location NA     Active Consults:  IP CONSULT TO HOSPITALIST  IP CONSULT TO NEUROLOGY     Length of Stay:  Expected LOS: 3d 0h  Actual LOS: 3  Discharge Plan:  Yes         Bev Easley RN

## 2022-04-20 NOTE — PROGRESS NOTES
Problem: Mobility Impaired (Adult and Pediatric)  Goal: *Acute Goals and Plan of Care (Insert Text)  Description: FUNCTIONAL STATUS PRIOR TO ADMISSION: Patient was modified independent using a rollator walker for functional mobility. HOME SUPPORT PRIOR TO ADMISSION: The patient lived in group home. Reports he has assistance available but typically does not use. Physical Therapy Goals  Initiated 4/17/2022  1. Patient will move from supine to sit and sit to supine , scoot up and down, and roll side to side in bed with independence within 7 day(s). 2.  Patient will transfer from bed to chair and chair to bed with modified independence using the least restrictive device within 7 day(s). 3.  Patient will perform sit to stand with modified independence within 7 day(s). 4.  Patient will ambulate with supervision/set-up for 15 feet with the least restrictive device within 7 day(s). Outcome: Progressing Towards Goal   PHYSICAL THERAPY TREATMENT  Patient: Ronn Amaro (33 y.o. male)  Date: 4/20/2022  Diagnosis: Multiple sclerosis exacerbation ([facility] [G35]  Dysarthria [R47.1]  Recurrent falls [R29.6] <principal problem not specified>       Precautions:    Chart, physical therapy assessment, plan of care and goals were reviewed. ASSESSMENT  Patient continues with skilled PT services and is progressing towards goals, but continues to present with generalized weakness, decreased activity tolerance, impaired balance, tremors, and spasticity with movement. Pt seated in bedside chair working with OT. Rollator obtained to trial with pt this session. OT reports he was overall min a to transfer to the bedside chair with HHA but required mod a for stand to sit. Will the rollator he was able to ambulate a little farther this session in his room, but fatigued and had to have to bedside chair brought up behind him. He reports falling yesterday, getting up alone.   Reiterated to pt not to get up alone and to ring his call bell for assistance. He was able to manage the rollator much better, but needed verbal and manual cuing for body position in rollator. He remains close to baseline per his report but continues to be a fall risk with a history of frequent falls. He would benefit from further rehab at discharge to maximize his functional independence. Current Level of Function Impacting Discharge (mobility/balance):   Transfers:  Sit to Stand: Contact guard assistance  Stand to Sit: Contact guard assistance (vc to contrl descent in the chair)  Ambulation/Gait Training:  Distance (ft): 12 Feet (ft)  Assistive Device: Gait belt;Walker, rollator  Ambulation - Level of Assistance: Minimal assistance     Other factors to consider for discharge: slightly impulsive, high fall risk, some confusion         PLAN :  Patient continues to benefit from skilled intervention to address the above impairments. Continue treatment per established plan of care. to address goals. Recommendation for discharge: (in order for the patient to meet his/her long term goals)  Therapy up to 5 days/week in SNF setting    This discharge recommendation:  Has been made in collaboration with the attending provider and/or case management    IF patient discharges home will need the following DME: to be determined (TBD)       SUBJECTIVE:   Patient stated I fell yesterday.     OBJECTIVE DATA SUMMARY:   Critical Behavior:  Neurologic State: Alert,Appropriate for age,Confused  Orientation Level: Oriented to person,Oriented to place  Cognition: Follows commands  Safety/Judgement: Decreased awareness of need for safety,Insight into deficits  Functional Mobility Training:  Bed Mobility:      deferred, pt seated in bedside chair on arrival              Transfers:  Sit to Stand: Contact guard assistance  Stand to Sit: Contact guard assistance (vc to contrl descent in the chair)            Balance:  Sitting: Intact  Standing: Impaired  Standing - Static: Constant support; Fair  Standing - Dynamic : Constant support; Fair  Ambulation/Gait Training:  Distance (ft): 12 Feet (ft)  Assistive Device: Gait belt;Walker, rollator  Ambulation - Level of Assistance: Minimal assistance        Gait Abnormalities: Ataxic;Decreased step clearance; Path deviations;Trunk sway increased (tremors, spasticity)              Speed/Radha: Delayed; Fluctuations  Step Length: Left shortened;Right shortened         Pain Rating:  Reported back pain, didn't quantify    Activity Tolerance:   Fair and requires rest breaks    After treatment patient left in no apparent distress:   Sitting in chair, Call bell within reach, and Bed / chair alarm activated    COMMUNICATION/COLLABORATION:   The patients plan of care was discussed with: Occupational therapist.     Mason Miller PT   Time Calculation: 23 mins

## 2022-04-20 NOTE — PROGRESS NOTES
Hospitalist Progress Note    NAME: Devyn    :  1965   MRN:  [id number]       Assessment / Plan:  Dysarthria with recurrent falls in the setting of multiple sclerosis, no acute exacerbation  CVA was ruled out  hx of paraplegia due to MS  Chronic back pain on narcotics at home    CT head-No acute intracranial abnormality is confirmed. Moderately severe periventricular white matter changes, roughly stable since brain MRI 2021 and consistent with the clinical history of demyelination. MRI brain and C spine neg for acute findings.  Demyelinating disease in the cervical spinal cord and lower brainstem of moderate severity, without progression since 2021 He did received 2 doses IV solumedrol.  and was stopped as MRI not suggestive of MS flare. Neurology eval appreciated. Placement is pending. Per CM, pt needs LTC. He can no longer return to group home  Does not want us to call his mother unless he is present.     Code Status: Full code  DVT Prophylaxis: Lovenox  GI Prophylaxis: not indicated    Estimated discharge date: Medically ready, pending LTC placement  Barriers:    Code status: Full  Prophylaxis: Lovenox  Recommended Disposition: SNF/LTC     Subjective:     Chief Complaint / Reason for Physician Visit  No active complains  Complains of some mild low back pain    Review of Systems:  Symptom Y/N Comments  Symptom Y/N Comments   Fever/Chills    Chest Pain     Poor Appetite    Edema     Cough    Abdominal Pain     Sputum    Joint Pain     SOB/LEVINE    Pruritis/Rash     Nausea/vomit    Tolerating PT/OT     Diarrhea    Tolerating Diet     Constipation    Other       Could NOT obtain due to:      Objective:     VITALS:   Last 24hrs VS reviewed since prior progress note.  Most recent are:  Patient Vitals for the past 24 hrs:   Temp Pulse Resp BP SpO2   22 1143 97.5 °F (36.4 °C) 77 18 121/87 99 %   22 0720 98.1 °F (36.7 °C) 65 18 125/87 100 %   22 0338 98.3 °F (36.8 °C) 70 18 120/70 96 %   04/19/22 1935 98.2 °F (36.8 °C) 63 18 110/74 100 %   04/19/22 1559 97.8 °F (36.6 °C) 74 18 (!) 141/77 100 %       Intake/Output Summary (Last 24 hours) at 4/20/2022 1526  Last data filed at 4/19/2022 1649  Gross per 24 hour   Intake --   Output 350 ml   Net -350 ml        I had a face to face encounter and independently examined this patient on 4/20/2022, as outlined below:  PHYSICAL EXAM:  General: Awake, No acute distress  EENT:  EOMI. Anicteric sclerae. MMM  Resp:  CTA bilaterally, no wheezing or rales. No accessory muscle use  CV:  Regular  rhythm,  No edema  GI:  Soft, Non distended, Non tender. +Bowel sounds  Neurologic:  Alert and oriented X 3, normal speech,   Psych:   Not anxious nor agitated  Skin:  No rashes. No jaundice    Reviewed most current lab test results and cultures  YES  Reviewed most current radiology test results   YES  Review and summation of old records today    NO  Reviewed patient's current orders and MAR    YES  PMH/SH reviewed - no change compared to H&P  ________________________________________________________________________  Care Plan discussed with:    Comments   Patient y    Family      RN y    Care Manager     Consultant                        Multidiciplinary team rounds were held today with , nursing, pharmacist and clinical coordinator. Patient's plan of care was discussed; medications were reviewed and discharge planning was addressed.      ________________________________________________________________________  Total NON critical care TIME: 38  Minutes    Total CRITICAL CARE TIME Spent:   Minutes non procedure based      Comments   >50% of visit spent in counseling and coordination of care     ________________________________________________________________________  Yelena Jones MD     Procedures: see electronic medical records for all procedures/Xrays and details which were not copied into this note but were reviewed prior to creation of Plan.      LABS:  I reviewed today's most current labs and imaging studies.   Pertinent labs include:  Recent Labs     04/18/22  0535   WBC 11.5*   HGB 12.2   HCT 37.0        Recent Labs     04/18/22  0535      K 4.2      CO2 29   *   BUN 17   CREA 0.93   CA 9.2       Signed: Agnieszka Dinh MD

## 2022-04-20 NOTE — PROGRESS NOTES
Transition of Care Plan:     RUR: 9% - \"low risk\"  Disposition: SNF with transition to [address] (pending insurance auth - Nolberto Matte initiated 4/20/22, pending)  Follow up appointments: PCP & specialist as indicated   DME needed: No DME needs identified for d/c  Transportation at Discharge: CM to secure medical transport for d/c; PCS to be completed & placed on chart  Keys or means to access home: N/A        IM Medicare Letter: 2nd IM reviewed/signed at bedside 4/18/22; copy on chart  Is patient a BCPI-A Bundle: N/A     Is patient a [place] and connected with the [facility]? N/A            Caregiver Contact: Pt's mother (Berenice Sandra)   Discharge Caregiver contacted prior to y [address] needed?: No     Update - 3:59 PM: CM initiated insurance auth via [place] for SNF; updates on status of auth to be reported out once available. Initial note: Chart reviewed. Pt was accepted to [facility] [address] pending insurance Nolberto Matte. Pt will transition from SNF to LTC at the facility once rehab benefits are exhausted. CM will fax clinical information to Deanna Swanson (f: [phone]) today to initiate insurance auth. CM will meet coordinate updates with pt and his mother Pelon Cam).     Tono Leung, MSW  Care Manager, [facility]

## 2022-04-20 NOTE — PROGRESS NOTES
Bedside shift change report given to July RN (oncoming nurse) by Patricia RN (offgoing nurse).   Report included the following information SBAR, Kardex, MAR, Recent Results and Cardiac Rhythm NSR     Shift worked: 3-7P   Shift summary and any significant changes:     none   Concerns for physician to address: None   Zone phone for oncoming shift:        Patient Information  Neo Grossman  49 y.o.  4/16/2022  3:52 PM by Sawyer Mohr MD. Iwona Sidhu was admitted from Home     Problem List          Patient Active Problem List     Diagnosis Date Noted    Dysarthria 04/16/2022    Recurrent falls 04/16/2022    FTT (failure to thrive) in adult 11/14/2021    Multiple sclerosis exacerbation ([address] 11/12/2021    Recurrent depression ([address] 01/04/2018    Paresthesia of both hands 08/28/2015    Mixed incontinence 08/28/2015    Balance problem 08/28/2015    Falls 08/28/2015    Bilateral thigh pain 08/28/2015    Anxiety 06/19/2015    Midline low back pain without sciatica 06/19/2015    Disability examination 02/18/2013    Prediabetes 01/30/2013    Hearing loss 01/25/2013    Memory loss 01/25/2013    Multiple sclerosis ([address] 01/25/2013    Elevated hemoglobin A1c 08/27/2012    MS (multiple sclerosis) ([address] 08/24/2012    Fatigue 08/24/2012    Incontinence of urine 08/24/2012    Decreased vision 09/28/2011    Acute bronchitis 09/28/2011    Tobacco abuse 03/18/2011    ED (erectile dysfunction) 03/18/2011    Back pain 10/08/2010    Hypercholesterolemia 10/08/2010    Cellulitis 10/08/2010              Past Medical History:   Diagnosis Date    Back pain 10/8/2010    Back pain 10/8/2010    Depression      Diabetes ([address]      Fatigue 8/24/2012    Hearing loss 1/25/2013    Memory loss 1/25/2013    MS [facility] sclerosis) [facility]      Muscle pain      Muscle weakness      Poor appetite      Snoring      Unintentional weight change      Visual disturbance           Core Measures:  CVA: Yes Yes     Activity:  Activity Level: Bed Rest  Number times ambulated in hallways past shift: 0  Number of times OOB to chair past shift: 1     Cardiac:   Cardiac Monitoring: Yes      Cardiac Rhythm: Sinus Rhythm     Access:   Current line(s): PIV (2)     Genitourinary:   Urinary status: Voiding  Urinary Catheter? External Catheter     Respiratory:   O2 Device: None (Room air)  Chronic home O2 use?: NO  Incentive spirometer at bedside: NO     GI:  Last Bowel Movement Date: 04/16/22  Current diet:  ADULT DIET Regular; 4 carb choices (60 gm/meal); Low Fat/Low Chol/High Fiber/2 gm Na  DIET ONE TIME MESSAGE  Passing flatus: YES  Tolerating current diet: YES     Pain Management:   Patient states pain is manageable on current regimen: YES     Skin:  Italo Score: 19  Interventions: increase time out of bed and PT/OT consult    Patient Safety:  Fall Score: Total Score: 5  Interventions: bed/chair alarm  High Fall Risk:  Yes     DVT prophylaxis:  DVT prophylaxis Med- Yes  DVT prophylaxis SCD or LOC- No      Wounds: (If Applicable)  Wounds- No  Location NA     Active Consults:  IP CONSULT TO HOSPITALIST  IP CONSULT TO NEUROLOGY     Length of Stay:  Expected LOS: 3d 0h  Actual LOS: 4  Discharge Plan: Yes

## 2022-04-20 NOTE — PROGRESS NOTES
End of Shift Note     Bedside shift change report given to Ellie Nieves, RN (oncoming nurse) Merline Navas RN (offgoing nurse).   Report included the following information SBAR, Kardex, MAR, Recent Results and Cardiac Rhythm NSR     Shift worked: 7a-3p   Shift summary and any significant changes:     Pt worked with therapy today and got up to the chair     Continues to have some periods of confusion     No other significant changes to condition   Concerns for physician to address: None   Zone phone for oncoming shift:  [phone]      Patient Information  Willis Dys  49 y.o.  4/16/2022  3:52 PM by Shiela Ramirez MD. Iwona Sidhu was admitted from Home     Problem List          Patient Active Problem List     Diagnosis Date Noted    Dysarthria 04/16/2022    Recurrent falls 04/16/2022    FTT (failure to thrive) in adult 11/14/2021    Multiple sclerosis exacerbation ([address] 11/12/2021    Recurrent depression ([address] 01/04/2018    Paresthesia of both hands 08/28/2015    Mixed incontinence 08/28/2015    Balance problem 08/28/2015    Falls 08/28/2015    Bilateral thigh pain 08/28/2015    Anxiety 06/19/2015    Midline low back pain without sciatica 06/19/2015    Disability examination 02/18/2013    Prediabetes 01/30/2013    Hearing loss 01/25/2013    Memory loss 01/25/2013    Multiple sclerosis ([address] 01/25/2013    Elevated hemoglobin A1c 08/27/2012    MS (multiple sclerosis) ([address] 08/24/2012    Fatigue 08/24/2012    Incontinence of urine 08/24/2012    Decreased vision 09/28/2011    Acute bronchitis 09/28/2011    Tobacco abuse 03/18/2011    ED (erectile dysfunction) 03/18/2011    Back pain 10/08/2010    Hypercholesterolemia 10/08/2010    Cellulitis 10/08/2010              Past Medical History:   Diagnosis Date    Back pain 10/8/2010    Back pain 10/8/2010    Depression      Diabetes ([address]      Fatigue 8/24/2012    Hearing loss 1/25/2013    Memory loss 1/25/2013    [facility] ([facility]) [facility]      Muscle pain      Muscle weakness      Poor appetite      Snoring      Unintentional weight change      Visual disturbance           Core Measures:  CVA: Yes Yes     Activity:  Activity Level: Bed Rest  Number times ambulated in hallways past shift: 0  Number of times OOB to chair past shift: 1     Cardiac:   Cardiac Monitoring: Yes      Cardiac Rhythm: Sinus Rhythm     Access:   Current line(s): PIV (2)     Genitourinary:   Urinary status: Voiding  Urinary Catheter? External Catheter     Respiratory:   O2 Device: None (Room air)  Chronic home O2 use?: NO  Incentive spirometer at bedside: NO     GI:  Last Bowel Movement Date: 04/16/22  Current diet:  ADULT DIET Regular; 4 carb choices (60 gm/meal); Low Fat/Low Chol/High Fiber/2 gm Na  DIET ONE TIME MESSAGE  Passing flatus: YES  Tolerating current diet: YES     Pain Management:   Patient states pain is manageable on current regimen: YES     Skin:  Italo Score: 19  Interventions: increase time out of bed and PT/OT consult    Patient Safety:  Fall Score: Total Score: 5  Interventions: bed/chair alarm  High Fall Risk:  Yes     DVT prophylaxis:  DVT prophylaxis Med- Yes  DVT prophylaxis SCD or LOC- No      Wounds: (If Applicable)  Wounds- No  Location NA     Active Consults:  IP CONSULT TO HOSPITALIST  IP CONSULT TO NEUROLOGY     Length of Stay:  Expected LOS: 3d 0h  Actual LOS: 4  Discharge Plan: Aaron Bustillo RN

## 2022-04-20 NOTE — PROGRESS NOTES
Problem: Self Care Deficits Care Plan (Adult)  Goal: *Acute Goals and Plan of Care (Insert Text)  Description:     FUNCTIONAL STATUS PRIOR TO ADMISSION: Patient ambulates up to 10 feet with rollator mod I, he is mod I for ADLs, with the exception of requiring supervision for showers. He has had increased falls during ambulation, and he has had one fall in the shower after due to suction grab bar coming loose. HOME SUPPORT: The patient lived in group home. Occupational Therapy Goals  Initiated 4/17/2022  1. Patient will perform grooming standing at sink with supervision/set-up within 7 day(s). 2.  Patient will perform sponge bathing with supervision/set-up within 7 day(s). 3.  Patient will perform lower body dressing with supervision/set-up within 7 day(s). 4.  Patient will perform toilet transfers with supervision/set-up within 7 day(s). 5.  Patient will perform all aspects of toileting with supervision/set-up within 7 day(s). Outcome: Progressing Towards Goal   OCCUPATIONAL THERAPY TREATMENT  Patient: Lang Craven (89 y.o. male)  Date: 4/20/2022  Diagnosis: Multiple sclerosis exacerbation ([facility] [G35]  Dysarthria [R47.1]  Recurrent falls [R29.6] <principal problem not specified>       Precautions:    Chart, occupational therapy assessment, plan of care, and goals were reviewed. ASSESSMENT  Patient continues with skilled OT services and is progressing towards goals. Patient received resting in bed, agreeable to session with encouragement and education on benefits. Patient mobilizes to EOB with CGA, demonstrating intact sitting balance during EOB dressing activity.  Patient declines bathroom ADLs at this time, however in preparation for increased safety and independence in self-care, patient tolerates approx 4 minutes static stand with unilateral support of bed rail with CGA to min assist. Patient declines RW, opting for HHA for transfer to bedside chair and requiring up to mod assist during turns. Patient requires min assist for controlled descent. Throughout session, patient easily distracted, benefiting from frequent redirection to task. Patient requesting return to bed, but ultimately agreeable to eating breakfast seated in recliner chair. At this time, patient presents high fall risk with overall min assist needed for safe self-care performance. For best outcome, recommend discharge to SNF. Current Level of Function Impacting Discharge (ADLs): min A    Other factors to consider for discharge:          PLAN :  Patient continues to benefit from skilled intervention to address the above impairments. Continue treatment per established plan of care to address goals. Recommendation for discharge: (in order for the patient to meet his/her long term goals)  Therapy up to 5 days/week in SNF setting    This discharge recommendation:  Has been made in collaboration with the attending provider and/or case management    IF patient discharges home will need the following DME: needs 24/7       SUBJECTIVE:   Patient stated I fell yesterday, right there.     OBJECTIVE DATA SUMMARY:   Cognitive/Behavioral Status:  Neurologic State: Alert; Appropriate for age;Confused  Orientation Level: Oriented to person;Oriented to place  Cognition: Follows commands  Perception: Appears intact  Perseveration: Perseverates during conversation  Safety/Judgement: Fall prevention;Decreased insight into deficits; Decreased awareness of need for safety;Decreased awareness of need for assistance    Functional Mobility and Transfers for ADLs:  Bed Mobility:   Supine to Sit: Contact guard assistance   Scooting: Contact guard assistance    Transfers:  Sit to Stand: Contact guard assistance  Stand to Sit: Minimum assistance (to control descent to chair)  Bed to Chair: Minimum assistance; Moderate assistance (w/ HHA, declines RW)     Balance:  Sitting: Intact  Standing: Impaired  Standing - Static: Constant support; [address]  Standing - Dynamic : Constant support; Fair    ADL Intervention:  Feeding  Feeding Assistance: Stand-by assistance  Container Management: Standing-by assistance  Food to Mouth: Supervision (using hands)  Cues: Verbal cues provided    Upper Body Dressing Assistance  Dressing Assistance: Minimum assistance  Hospital Gown: Minimum  assistance  Cues: Verbal cues provided;Physical assistance    Cognitive Retraining  Attention to Task: Distractibility  Safety/Judgement: Fall prevention;Decreased insight into deficits; Decreased awareness of need for safety;Decreased awareness of need for assistance    Pain:  Reports low back pain in bed, improved w/ OOB to chair. Activity Tolerance:   Fair and requires rest breaks    After treatment patient left in no apparent distress:   Sitting in chair, Call bell within reach, and Bed / chair alarm activated    COMMUNICATION/COLLABORATION:   The patients plan of care was discussed with: Physical therapist and Registered nurse.      Stepan Madrid OT  Time Calculation: 43 mins

## 2022-04-21 LAB
GLUCOSE BLD STRIP.AUTO-MCNC: 110 MG/DL (ref 65–117)
GLUCOSE BLD STRIP.AUTO-MCNC: 123 MG/DL (ref 65–117)
GLUCOSE BLD STRIP.AUTO-MCNC: 83 MG/DL (ref 65–117)
GLUCOSE BLD STRIP.AUTO-MCNC: 99 MG/DL (ref 65–117)
SERVICE CMNT-IMP: ABNORMAL
SERVICE CMNT-IMP: NORMAL

## 2022-04-21 PROCEDURE — 65270000046 HC RM TELEMETRY

## 2022-04-21 PROCEDURE — 82962 GLUCOSE BLOOD TEST: CPT

## 2022-04-21 PROCEDURE — 74011250637 HC RX REV CODE- 250/637: Performed by: INTERNAL MEDICINE

## 2022-04-21 PROCEDURE — 74011000250 HC RX REV CODE- 250: Performed by: STUDENT IN AN ORGANIZED HEALTH CARE EDUCATION/TRAINING PROGRAM

## 2022-04-21 PROCEDURE — 74011250636 HC RX REV CODE- 250/636: Performed by: STUDENT IN AN ORGANIZED HEALTH CARE EDUCATION/TRAINING PROGRAM

## 2022-04-21 PROCEDURE — 74011250637 HC RX REV CODE- 250/637: Performed by: STUDENT IN AN ORGANIZED HEALTH CARE EDUCATION/TRAINING PROGRAM

## 2022-04-21 RX ADMIN — OXYCODONE 15 MG: 5 TABLET ORAL at 22:03

## 2022-04-21 RX ADMIN — ATORVASTATIN CALCIUM 40 MG: 40 TABLET, FILM COATED ORAL at 22:03

## 2022-04-21 RX ADMIN — OXYCODONE 15 MG: 5 TABLET ORAL at 09:29

## 2022-04-21 RX ADMIN — ENOXAPARIN SODIUM 40 MG: 40 INJECTION SUBCUTANEOUS at 17:28

## 2022-04-21 RX ADMIN — SODIUM CHLORIDE, PRESERVATIVE FREE 10 ML: 5 INJECTION INTRAVENOUS at 06:36

## 2022-04-21 RX ADMIN — SODIUM CHLORIDE, PRESERVATIVE FREE 10 ML: 5 INJECTION INTRAVENOUS at 16:11

## 2022-04-21 RX ADMIN — ASPIRIN 81 MG: 81 TABLET, CHEWABLE ORAL at 09:29

## 2022-04-21 RX ADMIN — SODIUM CHLORIDE, PRESERVATIVE FREE 10 ML: 5 INJECTION INTRAVENOUS at 22:03

## 2022-04-21 NOTE — PROGRESS NOTES
Bedside shift change report given to TERE Whaley (oncoming nurse) by Joellen Grafu nurse).  Report included the following information SBAR, Kardex, MAR, Recent Results and Cardiac Rhythm NSR     Shift worked: Days   Shift summary and any significant changes:     Pt in bed this shift. Much happier with current diet order of Regular diet. Pt compliant with medications. No complaints noted.    Concerns for physician to address: None   Zone phone for oncoming shift:  [phone]      Patient Information  Lang Craven  49 y.o.  4/16/2022  3:52 PM by Amy Miranda MD. Iwona Sidhu was admitted from Home     Problem List          Patient Active Problem List     Diagnosis Date Noted    Dysarthria 04/16/2022    Recurrent falls 04/16/2022    FTT (failure to thrive) in adult 11/14/2021    Multiple sclerosis exacerbation ([address] 11/12/2021    Recurrent depression ([address] 01/04/2018    Paresthesia of both hands 08/28/2015    Mixed incontinence 08/28/2015    Balance problem 08/28/2015    Falls 08/28/2015    Bilateral thigh pain 08/28/2015    Anxiety 06/19/2015    Midline low back pain without sciatica 06/19/2015    Disability examination 02/18/2013    Prediabetes 01/30/2013    Hearing loss 01/25/2013    Memory loss 01/25/2013    Multiple sclerosis ([address] 01/25/2013    Elevated hemoglobin A1c 08/27/2012    MS (multiple sclerosis) ([address] 08/24/2012    Fatigue 08/24/2012    Incontinence of urine 08/24/2012    Decreased vision 09/28/2011    Acute bronchitis 09/28/2011    Tobacco abuse 03/18/2011    ED (erectile dysfunction) 03/18/2011    Back pain 10/08/2010    Hypercholesterolemia 10/08/2010    Cellulitis 10/08/2010              Past Medical History:   Diagnosis Date    Back pain 10/8/2010    Back pain 10/8/2010    Depression      Diabetes ([address]      Fatigue 8/24/2012    Hearing loss 1/25/2013    Memory loss 1/25/2013    [facility]      Muscle pain      Muscle weakness    Poor appetite      Snoring      Unintentional weight change      Visual disturbance           Core Measures:  CVA: Yes Yes     Activity:  Activity Level: Bed Rest  Number times ambulated in hallways past shift: 0  Number of times OOB to chair past shift: 0     Cardiac:   Cardiac Monitoring: No     Cardiac Rhythm: N/A     Access:   Current line(s): PIV (2)     Genitourinary:   Urinary status: Voiding  Urinary Catheter: External Catheter     Respiratory:   O2 Device: None (Room air)  Chronic home O2 use?: NO  Incentive spirometer at bedside: NO     GI:  Last Bowel Movement Date: 04/16/22  Current diet:  ADULT DIET RegularPassing flatus: YES  Tolerating current diet: YES     Pain Management:   Patient states pain is manageable on current regimen: YES     Skin:  Italo Score: 19  Interventions: increase time out of bed and PT/OT consult    Patient Safety:  Fall Score: Total Score: 5  Interventions: bed/chair alarm  High Fall Risk:  Yes     DVT prophylaxis:  DVT prophylaxis Med- Yes  DVT prophylaxis SCD or LOC- No      Wounds: (If Applicable)  Wounds- No  Location NA     Active Consults:  IP CONSULT TO HOSPITALIST  IP CONSULT TO NEUROLOGY     Length of Stay:  Expected LOS: 3d 0h  Actual LOS: 4  Discharge Plan: Yes

## 2022-04-21 NOTE — PROGRESS NOTES
Bedside shift change report given to JuliusRN (oncoming nurse) Yesenia Ramos RN (offgoing nurse).  Report included the following information SBAR, Kardex, MAR, Recent Results and Cardiac Rhythm NSR     Shift worked: Nights    Shift summary and any significant changes:     Pt in bed sleeping, no complaints of pain .    Concerns for physician to address: None   Zone phone for oncoming shift:  [phone]      Patient Information  Misty Perla  49 y.o.  4/16/2022  3:52 PM by Bipin Escoto MD. Iwona Sidhu was admitted from Home     Problem List          Patient Active Problem List     Diagnosis Date Noted    Dysarthria 04/16/2022    Recurrent falls 04/16/2022    FTT (failure to thrive) in adult 11/14/2021    Multiple sclerosis exacerbation ([address] 11/12/2021    Recurrent depression ([address] 01/04/2018    Paresthesia of both hands 08/28/2015    Mixed incontinence 08/28/2015    Balance problem 08/28/2015    Falls 08/28/2015    Bilateral thigh pain 08/28/2015    Anxiety 06/19/2015    Midline low back pain without sciatica 06/19/2015    Disability examination 02/18/2013    Prediabetes 01/30/2013    Hearing loss 01/25/2013    Memory loss 01/25/2013    Multiple sclerosis ([address] 01/25/2013    Elevated hemoglobin A1c 08/27/2012    MS (multiple sclerosis) ([address] 08/24/2012    Fatigue 08/24/2012    Incontinence of urine 08/24/2012    Decreased vision 09/28/2011    Acute bronchitis 09/28/2011    Tobacco abuse 03/18/2011    ED (erectile dysfunction) 03/18/2011    Back pain 10/08/2010    Hypercholesterolemia 10/08/2010    Cellulitis 10/08/2010              Past Medical History:   Diagnosis Date    Back pain 10/8/2010    Back pain 10/8/2010    Depression      Diabetes ([address]      Fatigue 8/24/2012    Hearing loss 1/25/2013    Memory loss 1/25/2013    MS (multiple sclerosis) ([facility])      Muscle pain      Muscle weakness      Poor appetite      Snoring      Unintentional weight change      Visual disturbance           Core Measures:  CVA: Yes Yes     Activity:  Activity Level: Bed Rest  Number times ambulated in hallways past shift: 0  Number of times OOB to chair past shift: 1     Cardiac:   Cardiac Monitoring: Yes      Cardiac Rhythm: Sinus Rhythm     Access:   Current line(s): PIV (2)     Genitourinary:   Urinary status: Voiding  Urinary Catheter? External Catheter     Respiratory:   O2 Device: None (Room air)  Chronic home O2 use?: NO  Incentive spirometer at bedside: NO     GI:  Last Bowel Movement Date: 04/16/22  Current diet:  ADULT DIET Regular; 4 carb choices (60 gm/meal); Low Fat/Low Chol/High Fiber/2 gm Na  DIET ONE TIME MESSAGE  Passing flatus: YES  Tolerating current diet: YES     Pain Management:   Patient states pain is manageable on current regimen: YES     Skin:  Italo Score: 19  Interventions: increase time out of bed and PT/OT consult    Patient Safety:  Fall Score: Total Score: 5  Interventions: bed/chair alarm  High Fall Risk:  Yes     DVT prophylaxis:  DVT prophylaxis Med- Yes  DVT prophylaxis SCD or LOC- No      Wounds: (If Applicable)  Wounds- No  Location NA     Active Consults:  IP CONSULT TO HOSPITALIST  IP CONSULT TO NEUROLOGY     Length of Stay:  Expected LOS: 3d 0h  Actual LOS: 4  Discharge Plan: Yes

## 2022-04-21 NOTE — PROGRESS NOTES
Hospitalist Progress Note    NAME: Leonel Hatchet   :  1965   MRN:  [id number]       Assessment / Plan:  Dysarthria with recurrent falls in the setting of multiple sclerosis, no acute exacerbation  CVA was ruled out  Hx of paraplegia due to MS  Chronic back pain on narcotics at home    CT head-No acute intracranial abnormality is confirmed. Moderately severe periventricular white matter changes, roughly stable since brain MRI 2021 and consistent with the clinical history of demyelination. MRI brain and C spine neg for acute findings.  Demyelinating disease in the cervical spinal cord and lower brainstem of moderate severity, without progression since 2021 He did received 2 doses IV solumedrol.  and was stopped as MRI not suggestive of MS flare. Neurology eval appreciated. Placement is pending. Per CM, pt needs LTC. He can no longer return to group home  Does not want us to call his mother unless he is present.     Code Status: Full code  DVT Prophylaxis: Lovenox  GI Prophylaxis: not indicated    Estimated discharge date: Medically ready, pending LTC placement  Barriers:    Code status: Full  Prophylaxis: Lovenox  Recommended Disposition: SNF/LTC     Subjective:     Chief Complaint / Reason for Physician Visit  No active complains  Complains of some mild low back pain    Review of Systems:  Symptom Y/N Comments  Symptom Y/N Comments   Fever/Chills    Chest Pain     Poor Appetite    Edema     Cough    Abdominal Pain     Sputum    Joint Pain     SOB/LEVINE    Pruritis/Rash     Nausea/vomit    Tolerating PT/OT     Diarrhea    Tolerating Diet     Constipation    Other       Could NOT obtain due to:      Objective:     VITALS:   Last 24hrs VS reviewed since prior progress note.  Most recent are:  Patient Vitals for the past 24 hrs:   Temp Pulse Resp BP SpO2   22 1148 -- 89 18 129/79 100 %   22 0929 97.9 °F (36.6 °C) 94 18 122/87 100 %   22 0304 97.9 °F (36.6 °C) 70 18 (!) 142/80 96 %   04/20/22 2004 97.9 °F (36.6 °C) 75 18 (!) 141/87 98 %       Intake/Output Summary (Last 24 hours) at 4/21/2022 1447  Last data filed at 4/21/2022 1625  Gross per 24 hour   Intake 440 ml   Output 225 ml   Net 215 ml        I had a face to face encounter and independently examined this patient on 4/21/2022, as outlined below:  PHYSICAL EXAM:  General: Awake, No acute distress  EENT:  EOMI. Anicteric sclerae. MMM  Resp:  CTA bilaterally, no wheezing or rales. No accessory muscle use  CV:  Regular  rhythm,  No edema  GI:  Soft, Non distended, Non tender. +Bowel sounds  Neurologic:  Alert and oriented X 3, normal speech,   Psych:   Not anxious nor agitated  Skin:  No rashes. No jaundice    Reviewed most current lab test results and cultures  YES  Reviewed most current radiology test results   YES  Review and summation of old records today    NO  Reviewed patient's current orders and MAR    YES  PMH/ reviewed - no change compared to H&P  ________________________________________________________________________  Care Plan discussed with:    Comments   Patient y    Family      RN y    Care Manager     Consultant                        Multidiciplinary team rounds were held today with , nursing, pharmacist and clinical coordinator. Patient's plan of care was discussed; medications were reviewed and discharge planning was addressed. ________________________________________________________________________  Total NON critical care TIME: 38  Minutes    Total CRITICAL CARE TIME Spent:   Minutes non procedure based      Comments   >50% of visit spent in counseling and coordination of care     ________________________________________________________________________  Maria Fernanda Osman MD     Procedures: see electronic medical records for all procedures/Xrays and details which were not copied into this note but were reviewed prior to creation of Plan.       LABS:  I reviewed today's most current labs and imaging studies. Pertinent labs include:  No results for input(s): WBC, HGB, HCT, PLT, HGBEXT, HCTEXT, PLTEXT, HGBEXT, HCTEXT, PLTEXT in the last 72 hours. No results for input(s): NA, K, CL, CO2, GLU, BUN, CREA, CA, MG, PHOS, ALB, TBIL, TBILI, ALT, INR, INREXT, INREXT in the last 72 hours.     No lab exists for component: SGOT    Signed: Rachelle Stubbs MD

## 2022-04-21 NOTE — PROGRESS NOTES
Spiritual Care Assessment/Progress Note  [facility]      NAME: Mindy Ayers      MRN: [id number]  AGE: 49 y.o.  SEX: male  Latter-day Affiliation: Latter day   Language: English     4/21/2022     Total Time (in minutes): 76     Spiritual Assessment begun in [facility] through conversation with:         [x]Patient        [] Family    [] Friend(s)        Reason for Consult: Initial/Spiritual assessment, patient floor     Spiritual beliefs: (Please include comment if needed)     [x] Identifies with a savi tradition: Latter day        [] Supported by a savi community:            [] Claims no spiritual orientation:           [] Seeking spiritual identity:                [] Adheres to an individual form of spirituality:           [] Not able to assess:                           Identified resources for coping:      [] Prayer                               [] Music                  [] Guided Imagery     [x] Family/friends                 [] Pet visits     [] Devotional reading                         [] Unknown     [] Other:                                              Interventions offered during this visit: (See comments for more details)    Patient Interventions: Affirmation of emotions/emotional suffering,Affirmation of savi,Catharsis/review of pertinent events in supportive environment,Coping skills reviewed/reinforced,Life review/legacy,Initial/Spiritual assessment, patient floor,Normalization of emotional/spiritual concerns           Plan of Care:     [x] Support spiritual and/or cultural needs    [] Support AMD and/or advance care planning process      [] Support grieving process   [] Coordinate Rites and/or Rituals    [] Coordination with community clergy   [] No spiritual needs identified at this time   [] Detailed Plan of Care below (See Comments)  [] Make referral to Music Therapy  [] Make referral to Pet Therapy     [] Make referral to Addiction services  [] Make referral to [facility]  [] Make referral to Spiritual Care Partner  [] No future visits requested        [x] Contact Spiritual Care for further referrals     Comments:     Initial Spiritual Care Assessment visit for Mr. Etta Mcbride in 3113. Reviewed the chart notes before visit. Patient was alert, no family was present during the visit. He share about his cravings for [employer] and [employer] sauce. He also complaint about his previous residence, and his plan for moving forward to [address] [place] [address]. He used to work in [employer] was healthy but MS occurred he declined slowly and rapidly in three years. He shared his inconvenience of not having a phone ,  helped him charging his phone. While visiting [facility] [place], his sister arrived, asked another  where the pt's phone is.  brought his phone back. Pt was thankful. Advised of ongoing  availability.       [id number] [facility] Henrietta Patel,Tamiko, [address] Provider   Paging Service [phone])

## 2022-04-21 NOTE — PROGRESS NOTES
Transition of Care Plan:     RUR: 9% - \"low risk\"  Disposition: SNF with transition to [address] approved through 4/25/22 - facility to initiate auth for [facility] 4/21/22)  Follow up appointments: PCP & specialist as indicated   DME needed: No DME needs identified for d/c  Transportation at Discharge: CM to secure medical transport for d/c; PCS to be completed & placed on chart  Keys or means to access home: N/A        IM Medicare Letter: 2nd IM reviewed/signed at bedside 4/18/22; copy on chart  Is patient a BCPI-A Bundle: N/A     Is patient a  and connected with the [facility]? N/A            Caregiver Contact: Pt's mother (Syed Perry)   Discharge Caregiver contacted prior to [address] needed?: No    Update - 3:30 PM: CM received call from [address] representative reporting auth for SNF was approved. Auth ID: [id number] [place] reference: [id number], Alen approved through 4/25/22. CM contacted [address] [place] [address] admission director to report update. Kisha Robertson to initiate auth for [facility]. Initial note: Chart reviewed. Insurance auth for SNF placement at [address] [place] [address] initiated 4/20/22 via [employer] No; Rochellea pending at this time. KARILE spoke with [address] [place] [address] admission director Arvind Frost) regarding pt's medication re: [facility]. Per Kisha Robertson, medication is extremely expensive ($1,500.00 per day). Facility will have to [place] [facility] [place] is confirmed. KARLIE spoke with pt's mother Concepcion Alfredo) regarding updates related to SNF/LTC placement at [address] [place] [address]. Mother agreeable to proceeding with placement at the facility; mother informed/aware of barriers related to expensive medication, as well as the process involved in seeking auth for it to be covered at accepting facility. No immediate questions/concerns reported out.  CM will continue to follow & remain accessible for d/c planning.     JUNO Cazares  Care Manager, [facility]  [phone]

## 2022-04-22 VITALS
HEIGHT: 70 IN | DIASTOLIC BLOOD PRESSURE: 92 MMHG | TEMPERATURE: 98.6 F | OXYGEN SATURATION: 100 % | HEART RATE: 80 BPM | RESPIRATION RATE: 16 BRPM | WEIGHT: 180.34 LBS | SYSTOLIC BLOOD PRESSURE: 141 MMHG | BODY MASS INDEX: 25.82 KG/M2

## 2022-04-22 LAB
COVID-19 RAPID TEST, COVR: NOT DETECTED
GLUCOSE BLD STRIP.AUTO-MCNC: 123 MG/DL (ref 65–117)
GLUCOSE BLD STRIP.AUTO-MCNC: 73 MG/DL (ref 65–117)
SERVICE CMNT-IMP: ABNORMAL
SERVICE CMNT-IMP: NORMAL
SOURCE, COVRS: NORMAL

## 2022-04-22 PROCEDURE — 97116 GAIT TRAINING THERAPY: CPT

## 2022-04-22 PROCEDURE — 97535 SELF CARE MNGMENT TRAINING: CPT

## 2022-04-22 PROCEDURE — 74011250637 HC RX REV CODE- 250/637: Performed by: STUDENT IN AN ORGANIZED HEALTH CARE EDUCATION/TRAINING PROGRAM

## 2022-04-22 PROCEDURE — 97530 THERAPEUTIC ACTIVITIES: CPT

## 2022-04-22 PROCEDURE — 74011000250 HC RX REV CODE- 250: Performed by: STUDENT IN AN ORGANIZED HEALTH CARE EDUCATION/TRAINING PROGRAM

## 2022-04-22 PROCEDURE — 87635 SARS-COV-2 COVID-19 AMP PRB: CPT

## 2022-04-22 PROCEDURE — 82962 GLUCOSE BLOOD TEST: CPT

## 2022-04-22 RX ORDER — OXYCODONE HYDROCHLORIDE 15 MG/1
15 TABLET ORAL 2 TIMES DAILY
Qty: 10 TABLET | Refills: 0 | Status: SHIPPED | OUTPATIENT
Start: 2022-04-22 | End: 2022-04-27

## 2022-04-22 RX ADMIN — ASPIRIN 81 MG: 81 TABLET, CHEWABLE ORAL at 08:34

## 2022-04-22 RX ADMIN — SODIUM CHLORIDE, PRESERVATIVE FREE 10 ML: 5 INJECTION INTRAVENOUS at 06:21

## 2022-04-22 RX ADMIN — OXYCODONE 15 MG: 5 TABLET ORAL at 08:34

## 2022-04-22 NOTE — PROGRESS NOTES
Problem: Mobility Impaired (Adult and Pediatric)  Goal: *Acute Goals and Plan of Care (Insert Text)  Description: FUNCTIONAL STATUS PRIOR TO ADMISSION: Patient was modified independent using a rollator walker for functional mobility. HOME SUPPORT PRIOR TO ADMISSION: The patient lived in group home. Reports he has assistance available but typically does not use. Physical Therapy Goals  Initiated 4/17/2022  1. Patient will move from supine to sit and sit to supine , scoot up and down, and roll side to side in bed with independence within 7 day(s). 2.  Patient will transfer from bed to chair and chair to bed with modified independence using the least restrictive device within 7 day(s). 3.  Patient will perform sit to stand with modified independence within 7 day(s). 4.  Patient will ambulate with supervision/set-up for 15 feet with the least restrictive device within 7 day(s). Outcome: Progressing Towards Goal   PHYSICAL THERAPY TREATMENT  Patient: Jermaine Giles (58 y.o. male)  Date: 4/22/2022  Diagnosis: [facility] [G35]  Dysarthria [R47.1]  Recurrent falls [R29.6] <principal problem not specified>       Precautions:    Chart, physical therapy assessment, plan of care and goals were reviewed. ASSESSMENT  Patient continues with skilled PT services and is progressing towards goals. Patient received in bed and agreeable to participate with encouragement, was able to come to sit EOB with SBA, tends to be mildly impulsive and requires cuing for safety when mobilizing. Patient stood to rollator and ambulated forward to sink in room with min assist, then able to stand to brush teeth. Ambulated x 15 feet around bed to chair with rollator, gait is ataxic and requires min assist for safety and walker management, then left sitting up in chair. Patient will benefit from rehab in SNF to improve functional mobility, safety  and gait.      Current Level of Function Impacting Discharge (mobility/balance): min assist to ambulate with rollator    Other factors to consider for discharge: high falls risk, impaired gait and balance         PLAN :  Patient continues to benefit from skilled intervention to address the above impairments. Continue treatment per established plan of care. to address goals. Recommendation for discharge: (in order for the patient to meet his/her long term goals)  Therapy up to 5 days/week in SNF setting    This discharge recommendation:  Has been made in collaboration with the attending provider and/or case management    IF patient discharges home will need the following DME: to be determined (TBD)       SUBJECTIVE:   Patient stated My day was great till you two came along.     OBJECTIVE DATA SUMMARY:   Critical Behavior:  Neurologic State: Alert  Orientation Level: Oriented to person,Oriented to place,Oriented to time  Cognition: Follows commands  Safety/Judgement: Fall prevention,Decreased insight into deficits,Decreased awareness of need for safety,Decreased awareness of need for assistance  Functional Mobility Training:  Bed Mobility:                    Transfers:  Sit to Stand: Contact guard assistance  Stand to Sit: Contact guard assistance                             Balance:  Sitting: Intact  Standing: Impaired  Standing - Static: Constant support; Fair  Standing - Dynamic : Constant support; Fair  Ambulation/Gait Training:  Distance (ft): 15 Feet (ft)  Assistive Device: Gait belt;Walker, rollator  Ambulation - Level of Assistance: Minimal assistance        Gait Abnormalities: Ataxic;Path deviations;Trunk sway increased              Speed/Radha: Delayed;Pace decreased (<100 feet/min)  Step Length: Left shortened;Right shortened                    Stairs:                 Activity Tolerance:   Good    After treatment patient left in no apparent distress:   Sitting in chair, Call bell within reach, and Bed / chair alarm activated    COMMUNICATION/COLLABORATION:   The patients plan of care was discussed with: Occupational therapist and Registered nurse.      Willem Le, PT   Time Calculation: 18 mins

## 2022-04-22 NOTE — PROGRESS NOTES
Problem: Self Care Deficits Care Plan (Adult)  Goal: *Acute Goals and Plan of Care (Insert Text)  Description:     FUNCTIONAL STATUS PRIOR TO ADMISSION: Patient ambulates up to 10 feet with rollator mod I, he is mod I for ADLs, with the exception of requiring supervision for showers. He has had increased falls during ambulation, and he has had one fall in the shower after due to suction grab bar coming loose. HOME SUPPORT: The patient lived in group home. Occupational Therapy Goals  Initiated 4/17/2022  1. Patient will perform grooming standing at sink with supervision/set-up within 7 day(s). 2.  Patient will perform sponge bathing with supervision/set-up within 7 day(s). 3.  Patient will perform lower body dressing with supervision/set-up within 7 day(s). 4.  Patient will perform toilet transfers with supervision/set-up within 7 day(s). 5.  Patient will perform all aspects of toileting with supervision/set-up within 7 day(s).         4/22/2022 1557 by Sonny Oquendo OT  Outcome: Progressing Towards Goal  OCCUPATIONAL THERAPY TREATMENT  Patient: Jenny Jj (31 y.o. male)  Date: 4/22/2022  Diagnosis: Multiple sclerosis exacerbation ([facility] [G35]  Dysarthria [R47.1]  Recurrent falls [R29.6] <principal problem not specified>       Precautions:    Chart, occupational therapy assessment, plan of care, and goals were reviewed. ASSESSMENT     Patient continues with skilled OT services and is progressing towards goals. Pt was supine in bed and SBA for supine to sit. Pt was unsafe and needed VC to scoot to EOB and get his feet on floor and not to reach for rollator. He was CGA of 2 to stand and with use of rollator pt was min of 1 for walking to sink, and was setup for brushing his teeth. Pt was not able to lean forward over sink due to decreased balance, and had to spit into a cup. He was min of 2 for walking to chair and VC for safety for transfer to chair.   He was reminded of moving slowly and reaching back for chair. Pt was able to wash UB and legs with CHG wipes seated in chair,with setup. Clean gown donned, with CGA. Pt was issued a plastic cup with handle for drinks and built up handles for utensils and tooth brush. Pt left sitting up in chair with call bell with in reach. Current Level of Function Impacting Discharge (ADLs): min to mod for ADLs             PLAN :  Patient continues to benefit from skilled intervention to address the above impairments. Continue treatment per established plan of care to address goals. Recommend with staff: Harsh Gallegos for meals and walk to bathroom    Recommend next OT session: work on standing endurance and balance at sink    Recommendation for discharge: (in order for the patient to meet his/her long term goals)  Therapy up to 5 days/week in SNF setting    This discharge recommendation:  Has been made in collaboration with the attending provider and/or case management    IF patient discharges home will need the following DME: tbd       SUBJECTIVE:   Patient stated Cherry Moreno I get back to bed now? Nata Gorman    OBJECTIVE DATA SUMMARY:   Cognitive/Behavioral Status:  Neurologic State: Alert  Orientation Level: Oriented to person;Oriented to place;Oriented to time  Cognition: Follows commands             Functional Mobility and Transfers for ADLs:  Bed Mobility:   SBA    Transfers:  Sit to Stand: Contact guard assistance          Balance:  Sitting: Intact  Standing: Impaired  Standing - Static: Constant support; Fair  Standing - Dynamic : Constant support; Fair    ADL Intervention:     Feeding  Feeding Assistance: Set-up (with built up handles)     Pt also issued plastic up with lid and straw for pt        pt is setup for UB ADLs and mod to min for LB ADLs               Activity Tolerance:   Fair    After treatment patient left in no apparent distress:   Sitting in chair, Call bell within reach and Bed / chair alarm activated    COMMUNICATION/COLLABORATION:   The patients plan of care was discussed with: Physical therapist and Registered nurse.      Maia Ritchie, OT  Time Calculation: 38 mins

## 2022-04-22 NOTE — PROGRESS NOTES
No further CM needs identified. CM notified pt's nurse of d/c. Transition of Care Plan:     RUR: 9% - \"low risk\"  Disposition: SNF with transition to [address] [place] [address]   *Report: [phone]   *Room: to be provided upon calling report   *Fax: [phone]  Follow up appointments: PCP & specialist as indicated   DME needed: Defer to SNF for DME needs  Transportation at Discharge: [facility] transport secured for 4:30 PM; PCS completed, copy on chart  Keys or means to access home: N/A        IM Medicare Letter: 2nd IM reviewed/signed at bedside 4/22/22; copy on chart   Is patient a BCPI-A Bundle: N/A     Is patient a  and connected with the VA? N/A            Caregiver Contact: Pt's mother (Clem Huntley)   Discharge Caregiver contacted prior to discharge? Pt's mother contacted the day of d/c (4/22/22); mother agreeable to the d/c plan  Care Conference needed?: No  COVID-19 test: Rapid COVID-19 test to be completed 4/22/22 prior to d/c to SNF    Update - 2:44 PM: CM met with pt at bedside & confirmed he is agreeable to the d/c plan. Pt aware of [facility] transport secured for 4:30 PM. No additional questions/concerns reported out. 2nd IM reviewed/signed at bedside; copy on chart. CM contacted pt's mother & confirmed she is agreeable to the d/c plan. Mother aware of [facility] transport secured for 4:30 PM. No additional questions/concerns reported out. Update - 2:00 PM: MD contacted CM & inquired why accepting SNF needed auth on [address] when the pt has not started medication. MD inquired why pt's primary neurologist couldn't continue working on medication approval while pt was at [facility]. MD reported [address] is not necessary for d/c to SNF. CM contacted [address] [place] [address] admission director Bouchra Culver) to report update. Eliezer Mccollum requested for MD to remove [place] from d/c summary & confirmed facility can accept pt for placement today. Tre to contact CM with pt's room assignment once available. Update reported to MD; MD to place d/c order. MD informed of need for hard scripts for any controlled substances. Referral sent to [facility] requesting transport at 4:30 PM; request pending. CM will coordinate updates with pt and mother (Carlos Carney). Initial note: Chart reviewed. CM spoke with [address] [place] [address] nurse liaison Mitchell Allen) regarding prior auth for medication [facility]). Traci Kessler reported facility is still working to receive auth for medication; auth for medication has to be received/approved prior to the facility being able to accept for placement. CM sent Perfect Serve message to MD reporting update. Care Management Interventions  PCP Verified by CM:  Yes  Palliative Care Criteria Met (RRAT>21 & CHF Dx)?: No  Mode of Transport at Discharge: [facility] Transport Time of Discharge: Mojgan Winter [address] (CM Consult): SNF  Partner SNF: Yes  Discharge Durable Medical Equipment: No  Health Maintenance Reviewed: Yes  Physical Therapy Consult: Yes  Occupational Therapy Consult: Yes  Speech Therapy Consult: Yes  Support Systems: Parent(s)  Confirm Follow Up Transport: Other (see comment) ([facility] transport vs wheel chair transport)  The Plan for Transition of Care is Related to the Following Treatment Goals : SNF  The Patient and/or Patient Representative was Provided with a Choice of Provider and Agrees with the Discharge Plan?: Yes  Name of the Patient Representative Who was Provided with a Choice of Provider and Agrees with the Discharge Plan: patient Rod Torres)  [place] of Choice List was Provided with Basic Dialogue that Supports the Patient's Individualized Plan of Care/Goals, Treatment Preferences and Shares the Quality Data Associated with the Providers?: Yes   Resource Information Provided?: No  Discharge Location  Patient Expects to be Discharged to[de-identified] Skilled nursing facility (SNF w/ transition to LTC at [address] [place] [address])     Transition of Care Plan to SNF/Rehab    SNF/Rehab Transition:  Patient has been accepted to [address] [place] [address] and meets criteria for admission. Patient will transported by [facility] and expected to leave at 4:30 PM 4/22/22    Communication to Patient/Family:  Met with patient and contacted his mother Charlie Nielson); both parties are agreeable to the transition plan. Communication to SNF/Rehab:  Bedside RN has been notified to update the transition plan to the facility and call report: [phone]  Discharge information has been updated on the AVS.     Discharge instructions to be fax'd to facility at: [phone]    Nursing Please include all hard scripts for controlled substances, med rec and dc summary, and AVS in packet.      Reviewed and confirmed with [address] [place] [address] that they can manage the patient care needs for the following:     Isabella Fowler with (X) only those applicable:    Medication:  [x]  Medications will be available at the facility  []  IV Antibiotics   [x]  Controlled Substance - hard copy to be sent with patient   []  Weekly Labs   Documents:  [x] Hard RX: hard script provided for oxyCODONE IR 15 mg immediate release tablet  [x] MAR  [x] Kardex  [x] AVS  []Transfer Summary  [x]Discharge   Equipment:  []  CPAP/BiPAP  []  Wound Vacuum  []  Thurman or Urinary Device  []  PICC/Central Line  []  Nebulizer  []  Ventilator   Treatment:  []Isolation (for MRSA, VRE, etc.)  []Surgical Drain Management  []Tracheostomy Care  []Dressing Changes  []Dialysis with transportation and chair time   []PEG Care  [x]Oxygen: 2L (as needed)  []Daily Weights for Heart Failure   Dietary:  [x]Any diet limitations: regular diet  []Tube Feedings   []Total Parenteral Management (TPN)   Eligible for Medicaid Long Term Services and Supports  Yes:  [x] Eligible for medical assistance or will become eligible within 180 days: CM to confirm whether LTSS has been completed in the past; if not, CM will complete & fax copy to SNF early next week  [] Provider/Patient and/or support system has requested screening. [] UAI copy provided to patient or responsible party  [] UAI unavailable at discharge will send once processed to SNF provider. [] UAI unavailable at discharged mailed to patient  No:   [] Private pay and is not financially eligible for Medicaid within the next 180 days. [] Reside out-of-state.   [] A residents of a state owned/operated facility that is licensed  by [address] [facility] or [facility]  [] Enrollment in [facility] hospice services  [] [address] [place] [address]  [] Patient /Family declines to have screening completed or provide financial information for screening     Financial Resources:  Medicaid    [] Initiated and application pending   [x] Full coverage     Advanced Care Plan:  [x]Surrogate Decision Maker of Care: Pt's mother Hardeep Siu AMAURIMO: [phone])   []POA  [x]Communicated Code Status: FULL   Other         [facility], [facility], [address] [facility]

## 2022-04-22 NOTE — PROGRESS NOTES
Hospitalist Progress Note    NAME: Rachel Rivera   :  1965   MRN:  [id number]       Assessment / Plan:  Dysarthria with recurrent falls in the setting of multiple sclerosis, no acute exacerbation  CVA was ruled out  Hx of paraplegia due to MS  Chronic back pain on narcotics at home    CT head-No acute intracranial abnormality is confirmed. Moderately severe periventricular white matter changes, roughly stable since brain MRI 2021 and consistent with the clinical history of demyelination. MRI brain and C spine neg for acute findings.  Demyelinating disease in the cervical spinal cord and lower brainstem of moderate severity, without progression since 2021 He did received 2 doses IV solumedrol.  and was stopped as MRI not suggestive of MS flare. Neurology eval appreciated. His neurologist Dr. Mark Hudson was trying to get [address] approved through his insurance which he hasn't started yet, will have to f/u his office to get this started. Needs LTC, medically ready, can go when LTC is set up     Code Status: Full code  DVT Prophylaxis: Lovenox  GI Prophylaxis: not indicated    Estimated discharge date: Medically ready, pending LTC placement  Barriers:    Code status: Full  Prophylaxis: Lovenox  Recommended Disposition: SNF/LTC     Subjective:     Chief Complaint / Reason for Physician Visit  No active complains  Complains of some mild low back pain    Review of Systems:  Symptom Y/N Comments  Symptom Y/N Comments   Fever/Chills    Chest Pain     Poor Appetite    Edema     Cough    Abdominal Pain     Sputum    Joint Pain     SOB/LEVINE    Pruritis/Rash     Nausea/vomit    Tolerating PT/OT     Diarrhea    Tolerating Diet     Constipation    Other       Could NOT obtain due to:      Objective:     VITALS:   Last 24hrs VS reviewed since prior progress note.  Most recent are:  Patient Vitals for the past 24 hrs:   Temp Pulse Resp BP SpO2   22 1253 98.2 °F (36.8 °C) 88 16 (!) 122/92 100 % 04/22/22 0836 (!) 96.1 °F (35.6 °C) 78 18 (!) 138/92 97 %   04/22/22 0305 98 °F (36.7 °C) -- -- -- --   04/21/22 1935 97.9 °F (36.6 °C) 81 18 118/84 100 %   04/21/22 1527 97.5 °F (36.4 °C) 80 18 (!) 118/90 100 %       Intake/Output Summary (Last 24 hours) at 4/22/2022 1317  Last data filed at 4/21/2022 5168  Gross per 24 hour   Intake --   Output 250 ml   Net -250 ml        I had a face to face encounter and independently examined this patient on 4/22/2022, as outlined below:  PHYSICAL EXAM:  General: Awake, No acute distress  EENT:  EOMI. Anicteric sclerae. MMM  Resp:  CTA bilaterally, no wheezing or rales. No accessory muscle use  CV:  Regular  rhythm,  No edema  GI:  Soft, Non distended, Non tender. +Bowel sounds  Neurologic:  Alert and oriented X 2, normal speech,   Psych:   Not anxious nor agitated  Skin:  No rashes. No jaundice    Reviewed most current lab test results and cultures  YES  Reviewed most current radiology test results   YES  Review and summation of old records today    NO  Reviewed patient's current orders and MAR    YES  PMH/ reviewed - no change compared to H&P  ________________________________________________________________________  Care Plan discussed with:    Comments   Patient y    Family      RN y    Care Manager     Consultant                        Multidiciplinary team rounds were held today with , nursing, pharmacist and clinical coordinator. Patient's plan of care was discussed; medications were reviewed and discharge planning was addressed.      ________________________________________________________________________  Total NON critical care TIME: 38  Minutes    Total CRITICAL CARE TIME Spent:   Minutes non procedure based      Comments   >50% of visit spent in counseling and coordination of care     ________________________________________________________________________  Ashly Olmedo MD     Procedures: see electronic medical records for all procedures/Xrays and details which were not copied into this note but were reviewed prior to creation of Plan. LABS:  I reviewed today's most current labs and imaging studies. Pertinent labs include:  No results for input(s): WBC, HGB, HCT, PLT, HGBEXT, HCTEXT, PLTEXT, HGBEXT, HCTEXT, PLTEXT in the last 72 hours. No results for input(s): NA, K, CL, CO2, GLU, BUN, CREA, CA, MG, PHOS, ALB, TBIL, TBILI, ALT, INR, INREXT, INREXT in the last 72 hours.     No lab exists for component: SGOT    Signed: Ashly Olmedo MD

## 2022-04-22 NOTE — DISCHARGE INSTRUCTIONS
HOSPITALIST DISCHARGE INSTRUCTIONS    NAME: Edu Carlin   :  1965   MRN:  [id number]     Date/Time:  2022 2:11 PM    ADMIT DATE: 2022     DISCHARGE DATE: 2022     DISCHARGE DIAGNOSIS:  Active Problems:    Multiple sclerosis exacerbation ([facility] [address] (2021)      Dysarthria (2022)      Recurrent falls (2022)         MEDICATIONS:  As per medication reconciliation  list  · It is important that you take the medication exactly as they are prescribed. · Keep your medication in the bottles provided by the pharmacist and keep a list of the medication names, dosages, and times to be taken in your wallet. · Do not take other medications without consulting your doctor. Pain Management: per above medications    What to do at Home    Recommended diet:  Regular Diet    Recommended activity: Activity as tolerated    If you have questions regarding the hospital related prescriptions or hospital related issues please call [facility] chikis Ray at [phone]. If you experience any of the following symptoms then please call your primary care physician or return to the emergency room if you cannot get hold of your doctor:  Fever, chills, nausea, vomiting, diarrhea, change in mentation, falling, bleeding, shortness of breath    Follow Up:  Dr. Sofie Box MD  you are to call and set up an appointment to see them in 7-10 days. Information obtained by :  I understand that if any problems occur once I am at home I am to contact my physician. I understand and acknowledge receipt of the instructions indicated above.                                                                                                                                            Physician's or R.N.'s Signature                                                                  Date/Time Patient or Representative Signature                                                          Date/Time        HOSPITALIST DISCHARGE INSTRUCTIONS    NAME: Jakob Lees   :  1965   MRN:  [id number]     Date/Time:  2022 2:11 PM    ADMIT DATE: 2022   DISCHARGE DATE: 2022     Attending Physician: Zakia Velazquez MD    DISCHARGE DIAGNOSIS:  Multiple sclerosis   Chronic back pain      Medications: Per above medication reconciliation. Pain Management: per above medications    Recommended diet: Regular Diet    Recommended activity: Activity as tolerated    Wound care: None    Indwelling devices:  None    Supplemental Oxygen: None    Required Lab work: Per SNF routine    Glucose management:  None    Code status: Full        Outside physician follow up: Follow-up Information     Follow up With Specialties Details Why Saintclair Mangle, Nancey Flair., MD Internal Medicine Go on 2022 at 11:30am for your PCP hospital follow up with NP Tu Keys. Please arrive 15 minutes early, bring photo ID, insurance cards, copay, medication bottles and any completed forms. [facility]  [phone]      Kashif De La Cruz DO Neurology Schedule an appointment as soon as possible for a visit in 2 weeks  [address] [place] [address] 111 [phone]               Skilled nursing facility/ SNF MD responsible for above on discharge. Information obtained by :  I understand that if any problems occur once I am at home I am to contact my physician. I understand and acknowledge receipt of the instructions indicated above.                                                                                                                                            Physician's or R.N.'s Signature                                                                  Date/Time                                                                                                                                              Patient or Repres

## 2022-04-22 NOTE — DISCHARGE SUMMARY
Hospitalist Discharge Summary     Patient ID:  Geovani Appiah  [id number]  49 y.o.  1965 4/16/2022    PCP on record: Sandeep Charlton MD    Admit date: 4/16/2022  Discharge date and time: 4/22/2022    DISCHARGE DIAGNOSIS:  Dysarthria  Hx of paraplegia d/t MS  Chronic back pain    CONSULTATIONS:  IP CONSULT TO HOSPITALIST  IP CONSULT TO NEUROLOGY    Excerpted HPI from H&P of Shane aP MD:  Omar Kamara is a 49 y.o.  male who presents with recurrent falls with one fall happened today. Patient past medical history of multiple sclerosis, states a group home. Patient states that today he fell down while at a grocery store. Patient also found to have some dysarthria which was worse from his baseline according to the group home staff but according to the patient very much at the baseline. Denies any weakness or numbness in the body, no fever or chills, no chest pain or shortness of breath, no nausea vomiting, no abdominal pain, no headache, no passing out episodes. Patient has multiple falls in the last few weeks secondary to the weakness.       ______________________________________________________________________  DISCHARGE SUMMARY/HOSPITAL COURSE:  for full details see H&P, daily progress notes, labs, consult notes. Dysarthria with recurrent falls in the setting of multiple sclerosis, no acute exacerbation  CVA was ruled out  Hx of paraplegia due to MS  Chronic back pain on narcotics at home     CT head-No acute intracranial abnormality is confirmed. Moderately severe periventricular white matter changes, roughly stable since brain MRI 11/12/2021 and consistent with the clinical history of demyelination.   MRI brain and C spine neg for acute findings.  Demyelinating disease in the cervical spinal cord and lower brainstem of moderate severity, without progression since 11/12/2021 He did received 2 doses IV solumedrol.  and was stopped as MRI not suggestive of MS flare. Neurology eval appreciated. His neurologist Dr. Suman Mcclure was trying to get [address] approved through his insurance which he hasn't started yet, will have to f/u his office to get this started. D/c to SNF today             _______________________________________________________________________  Patient seen and examined by me on discharge day. Pertinent Findings:  Gen:    Not in distress  Chest: Clear lungs  CVS:   Regular rhythm. No edema  Abd:  Soft, not distended, not tender  Neuro:  Alert,   _______________________________________________________________________  DISCHARGE MEDICATIONS:   Current Discharge Medication List      CONTINUE these medications which have CHANGED    Details   oxyCODONE IR (OXY-IR) 15 mg immediate release tablet Take 1 Tablet by mouth two (2) times a day for 5 days. Max Daily Amount: 30 mg. as needed for pain  Qty: 10 Tablet, Refills: 0  Start date: 4/22/2022, End date: 4/27/2022    Associated Diagnoses: [facility]         CONTINUE these medications which have NOT CHANGED    Details   tiZANidine (ZANAFLEX) 4 mg tablet Take 1 Tablet by mouth nightly. Qty: 30 Tablet, Refills: 2      senna-docusate (PERICOLACE) 8.6-50 mg per tablet Take 1 Tablet by mouth two (2) times a day. Qty: 30 Tablet, Refills: 0      polyethylene glycol (MIRALAX) 17 gram packet Take 1 Packet by mouth daily. Qty: 30 Packet, Refills: 0         STOP taking these medications       methylPREDNISolone (MEDROL) 32 mg tablet Comments:   Reason for Stopping:                 Patient Follow Up Instructions: Activity: Activity as tolerated  Diet: Regular Diet  Wound Care: None needed    Follow-up with     Follow-up Information     Follow up With Specialties Details Why Shiva Polanco MD Internal Medicine Go on 4/26/2022 at 11:30am for your PCP hospital follow up with NP Niru Allred.  Please arrive 15 minutes early, bring photo ID, insurance cards, copay, medication bottles and any completed forms.  [facility]  [phone]      Vita Portillo DO Neurology Schedule an appointment as soon as possible for a visit in 2 weeks  [address] [place] [address]      [address] [place] [address] [address]   [address] [address]  [phone]        ________________________________________________________________    Risk of deterioration: Moderate    Condition at Discharge:  Stable  __________________________________________________________________    Disposition  SNF/LTC    ____________________________________________________________________    Code Status: Full Code  ___________________________________________________________________      Total time in minutes spent coordinating this discharge (includes going over instructions, follow-up, prescriptions, and preparing report for sign off to her PCP) :  >30 minutes    Signed:  Fay Pope MD

## 2022-04-22 NOTE — PROGRESS NOTES
Problem: Falls - Risk of  Goal: *Absence of Falls  Description: Document Birdie Locket Fall Risk and appropriate interventions in the flowsheet.   Outcome: Progressing Towards Goal  Note: Fall Risk Interventions:  Mobility Interventions: Bed/chair exit alarm    Mentation Interventions: Bed/chair exit alarm    Medication Interventions: Bed/chair exit alarm    Elimination Interventions: Call light in reach    History of Falls Interventions: Bed/chair exit alarm

## 2022-04-25 ENCOUNTER — PATIENT OUTREACH (OUTPATIENT)
Dept: CASE MANAGEMENT | Age: 57
End: 2022-04-25

## 2022-04-27 ENCOUNTER — PATIENT OUTREACH (OUTPATIENT)
Dept: CASE MANAGEMENT | Age: 57
End: 2022-04-27

## 2022-04-27 NOTE — PROGRESS NOTES
Post Acute Facility Update     *The information contained in this note was received during a weekly care coordination call with the post acute facility*    Current Facility: [address] [place] [address] ([facility])  Anticipated Discharge Date: 3 weeks    Facility Nursing Update: No current nursing update  Facility Social Work Update: No current social work update  Bundle Program Status: none  Upper Extremity Assistance:  Moderate Assistance   Lower Extremity Assistance: Maximum Assistance  Bed Mobility: Stand by Assist - Presence and Cueing  Transfers: Maximum Assistance  Ambulation: Maximum Assistance  How far (in feet) is the patient ambulating? 25 ft  Device Used: Walker  Barriers to Discharge: ARTURO Horn LPN Care Coordinator

## 2022-05-04 ENCOUNTER — PATIENT OUTREACH (OUTPATIENT)
Dept: CASE MANAGEMENT | Age: 57
End: 2022-05-04

## 2022-05-04 NOTE — PROGRESS NOTES
Post Acute Facility Update     *The information contained in this note was received during a weekly care coordination call with the post acute facility*    Current Facility: [address] (SNF)  Anticipated Discharge Date: TBD    Facility Nursing Update: No nursing updates provided. Facility Social Work Update:  Lived in group home and was independent with ADLs. Discharge plan TBD.   Bundle Program Status: none  Upper Extremity Assistance: Minimal Assistance   Lower Extremity Assistance: Minimal Assistance   Bed Mobility: Stand by Assist - Presence and Cueing  Transfers: Minimal Assistance   Ambulation: Minimal Assistance   How far (in feet) is the patient ambulating? 50ft  Device Used:Walker  Barriers to Discharge: TBD      Laquita Olivares MSJEANE  [facility] Team

## 2022-05-04 NOTE — TELEPHONE ENCOUNTER
I called the patient's mother and found they have not done the medication because something was missing from our office. I called [place] [employer] and found there was something incorrect on the date of possible last dose taken for the patient. It has since been corrected and being faxed to me for Dr. Gerson Way to sign off on.

## 2022-05-04 NOTE — TELEPHONE ENCOUNTER
I called [place] [employer] back because the information wasn't a form. Just information. I spoke with them again and was told nothing further was required because the permission to start was faxed last month.  I is in the media under 4/15/22

## 2022-05-09 ENCOUNTER — PATIENT OUTREACH (OUTPATIENT)
Dept: CASE MANAGEMENT | Age: 57
End: 2022-05-09

## 2022-05-11 ENCOUNTER — PATIENT OUTREACH (OUTPATIENT)
Dept: CASE MANAGEMENT | Age: 57
End: 2022-05-11

## 2022-05-11 NOTE — Clinical Note
Good afternoon,    FYI- Mr. Elton Brink transitioned into LTC placement at  [address] [place] [address]) on 5/9/2022.      Thanks,    JUNO Noguera

## 2022-05-11 NOTE — PROGRESS NOTES
[address] [place] [address] Dr Discharge Note    *The information contained in this note was received during a weekly care coordination call with the post acute facility*      Patient transitioned into LTC placement at  [address] [place] [address] ([facility]) on 5/9/2022. PCP: MD RAVI Diggs appointment:   Future Appointments   Date Time Provider Shira Messeri   6/28/2022  9:00 AM Sylvia Polo MD [facility] BS AMB       Home Health/Outpatient orders at discharge: [facility] ordered at discharge: None  [facility] N/A  Durable Medical Equipment received prior to discharge: N/A    LPN Care Coordinator managing patient: CHRISTELLE Michel. Community Care Team will sign off at this time. Medications were not reconciled and general patient assessment was not completed during this skilled nursing facility outreach.      JUNO Noguera  [facility] Team

## 2022-05-17 ENCOUNTER — PATIENT OUTREACH (OUTPATIENT)
Dept: CASE MANAGEMENT | Age: 57
End: 2022-05-17

## 2022-05-17 NOTE — PROGRESS NOTES
No transition of care outreach indicated due to patient discharge to a [address] [place] [address].

## 2022-05-18 ENCOUNTER — DOCUMENTATION ONLY (OUTPATIENT)
Dept: NEUROLOGY | Age: 57
End: 2022-05-18

## 2022-05-18 NOTE — PROGRESS NOTES
Faxed to [facility] at [phone] for [place] [employer] [place] durable medical equipment and supplies. Received fax confirmation.

## 2022-05-19 ENCOUNTER — TELEPHONE (OUTPATIENT)
Dept: NEUROLOGY | Age: 57
End: 2022-05-19

## 2022-05-19 NOTE — TELEPHONE ENCOUNTER
Request for [address] -     This was originally approved (Year 1) that  21. It is not showing in current med list.     Sent to Dr. Memo Mendoza for review. Is the dosage still for 8 tablets per 30 days?

## 2022-06-02 ENCOUNTER — TELEPHONE (OUTPATIENT)
Dept: NEUROLOGY | Age: 57
End: 2022-06-02

## 2022-06-02 NOTE — TELEPHONE ENCOUNTER
Malachi Rich  Ph [phone] visited office and we reviewed patient's [address]. Submitted PA and [employer] approved it to 12/31/22. Called [facility]  - they ran the test claim and co-pay is $9.85. S/w Zack Rothman at [facility] only needs the Rx to be escribed to [address] [place] [address] in [place], [place]. They have everything else. Dr. Omar Padron,     Please escribe to [facility].   It is listed in his pharmacy list

## 2022-06-02 NOTE — TELEPHONE ENCOUNTER
Malachi rep came to office and we reviewed. He has not been on his [address] for over 18 months. I submitted a PA today to [address] [place] [address]. Key: [id number]) - [id number]  Mavenclad (8 Tabs) 10MG tablets         Status is pending.

## 2022-06-09 ENCOUNTER — TELEPHONE (OUTPATIENT)
Dept: NEUROLOGY | Age: 57
End: 2022-06-09

## 2022-06-13 NOTE — TELEPHONE ENCOUNTER
Called pt, verified pt with two pt identifiers, advised pt I am returning his call on a medication. Pt advised the snf house he stays with called and received the answers they needed on medication. Pt is ok with the medications he is on. Pt had no further questions regarding medications. Pt verbalized understanding.

## 2022-06-28 ENCOUNTER — VIRTUAL VISIT (OUTPATIENT)
Dept: NEUROLOGY | Age: 57
End: 2022-06-28
Payer: MEDICARE

## 2022-06-28 DIAGNOSIS — R25.1 TREMOR: ICD-10-CM

## 2022-06-28 DIAGNOSIS — G62.9 POLYNEUROPATHY: ICD-10-CM

## 2022-06-28 DIAGNOSIS — E55.9 VITAMIN D DEFICIENCY: ICD-10-CM

## 2022-06-28 DIAGNOSIS — G95.9 CERVICAL MYELOPATHY (HCC): ICD-10-CM

## 2022-06-28 DIAGNOSIS — R41.3 MEMORY LOSS: ICD-10-CM

## 2022-06-28 DIAGNOSIS — G35 SECONDARY PROGRESSIVE MULTIPLE SCLEROSIS (HCC): Primary | ICD-10-CM

## 2022-06-28 DIAGNOSIS — R29.6 FREQUENT FALLS: ICD-10-CM

## 2022-06-28 DIAGNOSIS — R26.9 GAIT DISORDER: ICD-10-CM

## 2022-06-28 PROCEDURE — G8417 CALC BMI ABV UP PARAM F/U: HCPCS | Performed by: PSYCHIATRY & NEUROLOGY

## 2022-06-28 PROCEDURE — G8427 DOCREV CUR MEDS BY ELIG CLIN: HCPCS | Performed by: PSYCHIATRY & NEUROLOGY

## 2022-06-28 PROCEDURE — G9717 DOC PT DX DEP/BP F/U NT REQ: HCPCS | Performed by: PSYCHIATRY & NEUROLOGY

## 2022-06-28 PROCEDURE — 99214 OFFICE O/P EST MOD 30 MIN: CPT | Performed by: PSYCHIATRY & NEUROLOGY

## 2022-06-28 PROCEDURE — 3017F COLORECTAL CA SCREEN DOC REV: CPT | Performed by: PSYCHIATRY & NEUROLOGY

## 2022-07-04 RX ORDER — ERGOCALCIFEROL 1.25 MG/1
50000 CAPSULE ORAL
Qty: 4 CAPSULE | Refills: 4 | Status: SHIPPED | OUTPATIENT
Start: 2022-07-04

## 2022-07-04 RX ORDER — MEMANTINE HYDROCHLORIDE 10 MG/1
10 TABLET ORAL DAILY
Qty: 30 TABLET | Refills: 3 | Status: SHIPPED | OUTPATIENT
Start: 2022-07-04

## 2022-07-04 RX ORDER — TIZANIDINE 4 MG/1
4 TABLET ORAL
Qty: 30 TABLET | Refills: 2 | Status: SHIPPED | OUTPATIENT
Start: 2022-07-04

## 2022-07-04 RX ORDER — PREGABALIN 50 MG/1
50 CAPSULE ORAL 2 TIMES DAILY
Qty: 60 CAPSULE | Refills: 2 | Status: SHIPPED | OUTPATIENT
Start: 2022-07-04

## 2022-07-04 NOTE — PROGRESS NOTES
Neurology Progress Note  Vicky Mcfarlane was seen by synchronous (real-time) audio-video technology on 2022. Consent:  He and/or his healthcare decision maker is aware that this patient-initiated Telehealth encounter is a billable service, with coverage as determined by his insurance carrier. He is aware that he may receive a bill and has provided verbal consent to proceed: Yes    I was in the office while conducting this encounter. Pursuant to the emergency declaration under the [address] [place] [address] waiver authority and the [employer] Act, this Virtual  Visit was conducted, with patient's consent, to reduce the patient's risk of exposure to COVID-19 and provide continuity of care for an established patient. Services were provided through a video synchronous discussion virtually to substitute for in-person clinic visit. NAME:  Vicky Mcfarlane   :   1965   MRN:   [id number]     Date/Time:  2022  Subjective:   Vicky Mcfarlane is a 56 y.o. male here today for follow-up for relapsing remitting secondary progressive multiple sclerosis, pain, weakness difficulty ambulating, frequent fall, dysarthria. Patient's is an assisted living facility resident, was admitted to the hospital in April due to frequent fall and speech difficulty, discharged to inpatient rehab. Patient was hospitalized because he was said to have fallen while in the grocery store. While patient was in rehab, was able to get his first dose of Mavenclad, currently in assisted living facility. Patient has received the second course of his [address]. He says he is doing fairly well, has not had any fall  Patient is still complaining of some pain, however, it appears he is  getting some pain medication. Patient today says however, he is still very weak, difficulty walking and walking with a cane.    Patient says he has has not been been losing a lot of weight anymore. Denies loss of consciousness. He noted that he has experience of confusion and been having a lot of nausea. He continues to have neck pain is sharp in nature, continuous, burning sensation that goes around to the arms, making the arms weak. He says he drops things at times. He says he is still having tremors both hands and head. I will start patient on Mysoline 50 mg p.o. nightly for the tremors. Back pain is also sharp and constant, goes on to the legs causing numbness and tingling sensation of the legs with weakness, he says he is still weaker on the right side. .  Patient once again is advised to quit smoking as this is making multiple sclerosis worse. He says his memory is still very bad. He denies loss of consciousness, dysphagia, odynophagia. .  Review of Systems - General ROS: positive for  - fatigue, night sweats and sleep disturbance  Psychological ROS: positive for - anxiety, concentration difficulties, depression, mood swings and sleep disturbances  Ophthalmic ROS: positive for - blurry vision, decreased vision and photophobia  ENT ROS: positive for - headaches, tinnitus, vertigo and visual changes  Allergy and Immunology ROS: negative  Hematological and Lymphatic ROS: negative  Endocrine ROS: negative  Respiratory ROS: no cough, shortness of breath, or wheezing  Cardiovascular ROS: no chest pain or dyspnea on exertion  Gastrointestinal ROS: no abdominal pain, change in bowel habits, or black or bloody stools  Genito-Urinary ROS: no dysuria, trouble voiding, or hematuria  Musculoskeletal ROS: positive for - gait disturbance, joint pain, joint stiffness, joint swelling, muscle pain and muscular weakness  Neurological ROS: positive for - dizziness, gait disturbance, headaches, impaired coordination/balance, memory loss, numbness/tingling, visual changes and weakness  Dermatological ROS: negative             Medications reviewed:  Current Outpatient Medications Medication Sig Dispense Refill    tiZANidine (ZANAFLEX) 4 mg tablet Take 1 Tablet by mouth nightly. 30 Tablet 2    senna-docusate (PERICOLACE) 8.6-50 mg per tablet Take 1 Tablet by mouth two (2) times a day. (Patient not taking: Reported on 4/17/2022) 30 Tablet 0    polyethylene glycol (MIRALAX) 17 gram packet Take 1 Packet by mouth daily. (Patient not taking: Reported on 4/17/2022) 30 Packet 0        Objective:   Vitals: There were no vitals filed for this visit.             Lab Data Reviewed:  Lab Results   Component Value Date/Time    WBC 11.5 (H) 04/18/2022 05:35 AM    HCT 37.0 04/18/2022 05:35 AM    HGB 12.2 04/18/2022 05:35 AM    PLATELET 713 80/62/7267 05:35 AM       Lab Results   Component Value Date/Time    Sodium 138 04/18/2022 05:35 AM    Potassium 4.2 04/18/2022 05:35 AM    Chloride 106 04/18/2022 05:35 AM    CO2 29 04/18/2022 05:35 AM    Glucose 141 (H) 04/18/2022 05:35 AM    BUN 17 04/18/2022 05:35 AM    Creatinine 0.93 04/18/2022 05:35 AM    Calcium 9.2 04/18/2022 05:35 AM       No components found for: TROPQUANT    No results found for: BELEN      Lab Results   Component Value Date/Time    Hemoglobin A1c 5.4 04/17/2022 01:06 AM    Hemoglobin A1c (POC) 5.4 01/11/2018 04:22 PM        No results found for: B12LT, FOL, RBCF    No results found for: BELEN, Florin Distel, XBANA    Lab Results   Component Value Date/Time    Cholesterol, total 184 04/17/2022 01:06 AM    Cholesterol (POC) 195 01/17/2018 04:18 PM    HDL Cholesterol 63 04/17/2022 01:06 AM    HDL Cholesterol (POC) 37 01/17/2018 04:18 PM    LDL Cholesterol (POC) 105 01/17/2018 04:18 PM    LDL, calculated 110.8 (H) 04/17/2022 01:06 AM    VLDL, calculated 10.2 04/17/2022 01:06 AM    Triglyceride 51 04/17/2022 01:06 AM    Triglycerides (POC) 266 01/17/2018 04:18 PM    CHOL/HDL Ratio 2.9 04/17/2022 01:06 AM         CT Results (recent):  Results from Hospital Encounter encounter on 04/16/22    CT CODE NEURO HEAD WO CONTRAST    Narrative  EXAM: CT CODE NEURO HEAD WO CONTRAST    INDICATION: Code Stroke. MS.    COMPARISON: Brain MRI 11/12/2021. CONTRAST: None. TECHNIQUE: Unenhanced CT of the head was performed using 5 mm images. Brain and  bone windows were generated. Coronal and sagittal reformats. CT dose reduction  was achieved through use of a standardized protocol tailored for this  examination and automatic exposure control for dose modulation. FINDINGS:  Generalized prominence of cerebral sulci and ventricles is increased, greater  than expected for age, but stable and likely related to the clinical history of  demyelination. .. Patchy periventricular white matter low-density is moderately  severe and also stable. . There is no intracranial hemorrhage, extra-axial  collection, or mass effect. The basilar cisterns are open. No CT evidence of  acute infarct. The bone windows demonstrate no abnormalities. The visualized portions of the  paranasal sinuses and mastoid air cells are clear. Impression  1. No acute intracranial abnormality is confirmed. 2. Moderately severe periventricular white matter changes, roughly stable since  brain MRI 11/12/2021 and consistent with the clinical history of demyelination. MRI Results (recent):  Results from [place] encounter on 04/16/22    MRI BRAIN W WO CONT    Narrative  EXAM: MRI BRAIN W WO CONT    TECHNIQUE: Brain images including sagittal and axial T1-weighted, axial FLAIR,  T2-weighted, diffusion weighted, gradient echo,  sagittal T2 FLAIR, coronal T2. Axial and coronal post contrast T1-weighted images.     IV CONTRAST:  15 cc ProHance    INDICATION:  MS flareup, dysarthria, recurrent falls    COMPARISON:  MRI of 11/12/2021    FINDINGS:  BRAIN PARENCHYMA:  Findings consistent with moderately extensive multiple  sclerosis including diffuse thinning and signal abnormality in the corpus  callosum, multiple confluent periventricular white matter lesions as well as  focal lesions in the subcortical region, the brainstem, and the cerebellum. No  significant change in number and extent of lesions when compared to 11/12/2021. No enhancing lesions. No areas of diffusion restriction. No acute infarct. Partially visualized abnormality seen in the upper cervical spine. Multiple T1  hypointense lesions in the deep cerebral white matter. INTRACRANIAL HEMORRHAGE: None. CSF SPACES:  Mild prominence of the ventricles and cortical sulci, consistent  with cerebral volume loss. BASAL CISTERNS:  Patent. MIDLINE SHIFT: None. VASCULAR SYSTEM:  Normal flow voids. PARANASAL SINUSES AND MASTOID AIR CELLS:  No significant opacification. VISUALIZED ORBITS:  No significant abnormalities. VISUALIZED UPPER CERVICAL SPINE:  No significant abnormalities. SELLA:  No enlargement or  focal abnormality. SKULL BASE:  No significant abnormalities. Cerebellar tonsils in normal  position. CALVARIUM:  Intact. Impression  1. No acute findings. 2. Moderately extensive findings of multiple sclerosis with both supratentorial  and infratentorial involvement, not significantly changed since 11/12/2021. IR Results (recent):  No results found for this or any previous visit. VAS/US Results (recent):  No results found for this or any previous visit. PHYSICAL EXAM:  General:    Alert, cooperative, no distress, appears stated age. Head:   Normocephalic, without obvious abnormality, atraumatic. Eyes:   Conjunctivae/corneas clear. Nose:  Nares normal.   Throat:    Lips, mucosa, and tongue normal.   Neck:  Symmetrical,  no adenopathy, thyroid     no JVD. Back:    Symmetric, . Lungs:   Deferred. Chest wall:   No Accessory muscle use. Heart:   Deferred. Abdomen:    Not distended. Extremities: Extremities normal, atraumatic. No edema. No clubbing  Skin:      No rashes or lesions. Lymph nodes: Cervical, supraclavicular normal.  Psych:  Good insight. Not depressed. Anxious .       NEUROLOGICAL EXAM:  Appearance: The patient is well developed, well nourished, provides a coherent history and is in no acute distress. Mental Status: Oriented to time, place and person. Mood and affect appropriate. Cranial Nerves:   Intact visual fields. EOM's full, no nystagmus, no ptosis. .  Facial movement is symmetric. Hearing is normal bilaterally. Tongue is midline. Motor:  Moves all extremities. No fasciculations. Reflexes:   Deferred. Sensory:   Deferred   Gait:  Unsteady gait. Tremor:   Tremor noted. Cerebellar:  No cerebellar signs present. Assesment  1. Secondary progressive multiple sclerosis ([facility] [address]  Mavenclad  2. Gait disorder  Physical therapy    3. Polyneuropathy  Lyrica    4. Cervical myelopathy (HCC)  Intermittent physical therapy. 5. Frequent falls  Physicaltherapy    6. Vitamin D deficiency  Replace vitamin D    7. Memory loss  Namenda    8. Tremor  Stable    ___________________________________________________  PLAN:      ICD-10-CM ICD-9-CM    1. Secondary progressive multiple sclerosis ([facility] [address]  G35 340    2. Gait disorder  R26.9 781.2    3. Polyneuropathy  G62.9 356.9    4. Cervical myelopathy (HCC)  G95.9 721.1    5. Frequent falls  R29.6 V15.88    6. Vitamin D deficiency  E55.9 268.9    7. Memory loss  R41.3 780.93    8. Tremor  R25.1 781.0      Follow-up and Dispositions    · Return in about 3 months (around 9/28/2022).                ___________________________________________________    Attending Physician: Pauline Granger MD

## 2022-09-07 DIAGNOSIS — G35 MULTIPLE SCLEROSIS (HCC): Primary | ICD-10-CM

## 2022-09-07 DIAGNOSIS — R53.1 WEAKNESS: ICD-10-CM

## 2022-09-07 NOTE — PROGRESS NOTES
Patient followed at [address] as long term care resident. Noted new weakness today where patient requiring assistance transferring to wheelchair, unable to get himself from chair back to bed. He notes he feels more generally weak all over. No acute s/s of infectious process, but will obtain labs and urinalysis to rule this out. Once up on side of bed he cannot maintain his posture. Now requiring assistance putting legs back in bed.  are equal. Face symmetrical, follows commands, Alert and oriented x3, speech slow but clear, pedal push against gravity equal, eyes PERRLA.  Concern for MS flare - spoke with patients primary neurologist, Dr. Keenan Baltazar who recommended taper of prednisone and MRI of brain w/ and w/o contrast.

## 2022-09-24 ENCOUNTER — HOSPITAL ENCOUNTER (OUTPATIENT)
Dept: MRI IMAGING | Age: 57
Discharge: HOME | End: 2022-09-24
Attending: NURSE PRACTITIONER
Payer: MEDICARE

## 2022-09-24 DIAGNOSIS — R53.1 WEAKNESS: ICD-10-CM

## 2022-09-24 DIAGNOSIS — G35 MULTIPLE SCLEROSIS (HCC): ICD-10-CM

## 2022-09-24 PROCEDURE — 70553 MRI BRAIN STEM W/O & W/DYE: CPT

## 2022-09-24 PROCEDURE — 74011250636 HC RX REV CODE- 250/636: Performed by: NURSE PRACTITIONER

## 2022-09-24 PROCEDURE — A9576 INJ PROHANCE MULTIPACK: HCPCS | Performed by: NURSE PRACTITIONER

## 2022-09-24 RX ADMIN — GADOTERIDOL 20 ML: 279.3 INJECTION, SOLUTION INTRAVENOUS at 14:09

## 2022-11-14 ENCOUNTER — TELEPHONE (OUTPATIENT)
Dept: NEUROLOGY | Age: 57
End: 2022-11-14

## 2022-12-19 ENCOUNTER — HOSPITAL ENCOUNTER (INPATIENT)
Age: 57
LOS: 3 days | Discharge: NURSING, REHAB OR LONG-TERM CARE | DRG: 571 | End: 2022-12-22
Attending: STUDENT IN AN ORGANIZED HEALTH CARE EDUCATION/TRAINING PROGRAM | Admitting: SURGERY
Payer: MEDICARE

## 2022-12-19 ENCOUNTER — APPOINTMENT (OUTPATIENT)
Dept: CT IMAGING | Age: 57
DRG: 571 | End: 2022-12-19
Attending: STUDENT IN AN ORGANIZED HEALTH CARE EDUCATION/TRAINING PROGRAM
Payer: MEDICARE

## 2022-12-19 DIAGNOSIS — L02.31 GLUTEAL ABSCESS: Primary | ICD-10-CM

## 2022-12-19 LAB
ALBUMIN SERPL-MCNC: 2.9 G/DL (ref 3.5–5)
ALBUMIN/GLOB SERPL: 0.6 {RATIO} (ref 1.1–2.2)
ALP SERPL-CCNC: 118 U/L (ref 45–117)
ALT SERPL-CCNC: 57 U/L (ref 12–78)
ANION GAP SERPL CALC-SCNC: 9 MMOL/L (ref 5–15)
AST SERPL-CCNC: 47 U/L (ref 15–37)
BASOPHILS # BLD: 0.1 K/UL (ref 0–0.1)
BASOPHILS NFR BLD: 1 % (ref 0–1)
BILIRUB SERPL-MCNC: 1 MG/DL (ref 0.2–1)
BUN SERPL-MCNC: 14 MG/DL (ref 6–20)
BUN/CREAT SERPL: 16 (ref 12–20)
CALCIUM SERPL-MCNC: 9.1 MG/DL (ref 8.5–10.1)
CHLORIDE SERPL-SCNC: 106 MMOL/L (ref 97–108)
CO2 SERPL-SCNC: 26 MMOL/L (ref 21–32)
CREAT SERPL-MCNC: 0.89 MG/DL (ref 0.7–1.3)
DIFFERENTIAL METHOD BLD: ABNORMAL
EOSINOPHIL # BLD: 0.1 K/UL (ref 0–0.4)
EOSINOPHIL NFR BLD: 1 % (ref 0–7)
ERYTHROCYTE [DISTWIDTH] IN BLOOD BY AUTOMATED COUNT: 13.2 % (ref 11.5–14.5)
GLOBULIN SER CALC-MCNC: 4.9 G/DL (ref 2–4)
GLUCOSE SERPL-MCNC: 84 MG/DL (ref 65–100)
HCT VFR BLD AUTO: 40.7 % (ref 36.6–50.3)
HGB BLD-MCNC: 13.4 G/DL (ref 12.1–17)
IMM GRANULOCYTES # BLD AUTO: 0.1 K/UL (ref 0–0.04)
IMM GRANULOCYTES NFR BLD AUTO: 1 % (ref 0–0.5)
LYMPHOCYTES # BLD: 1.5 K/UL (ref 0.8–3.5)
LYMPHOCYTES NFR BLD: 13 % (ref 12–49)
MAGNESIUM SERPL-MCNC: 1.9 MG/DL (ref 1.6–2.4)
MCH RBC QN AUTO: 29.5 PG (ref 26–34)
MCHC RBC AUTO-ENTMCNC: 32.9 G/DL (ref 30–36.5)
MCV RBC AUTO: 89.6 FL (ref 80–99)
MONOCYTES # BLD: 1 K/UL (ref 0–1)
MONOCYTES NFR BLD: 9 % (ref 5–13)
NEUTS SEG # BLD: 8.4 K/UL (ref 1.8–8)
NEUTS SEG NFR BLD: 75 % (ref 32–75)
NRBC # BLD: 0 K/UL (ref 0–0.01)
NRBC BLD-RTO: 0 PER 100 WBC
PLATELET # BLD AUTO: 295 K/UL (ref 150–400)
PMV BLD AUTO: 9.9 FL (ref 8.9–12.9)
POTASSIUM SERPL-SCNC: 3.9 MMOL/L (ref 3.5–5.1)
PROT SERPL-MCNC: 7.8 G/DL (ref 6.4–8.2)
RBC # BLD AUTO: 4.54 M/UL (ref 4.1–5.7)
SODIUM SERPL-SCNC: 141 MMOL/L (ref 136–145)
WBC # BLD AUTO: 11.2 K/UL (ref 4.1–11.1)

## 2022-12-19 PROCEDURE — 96365 THER/PROPH/DIAG IV INF INIT: CPT

## 2022-12-19 PROCEDURE — 74011250636 HC RX REV CODE- 250/636: Performed by: SURGERY

## 2022-12-19 PROCEDURE — 80053 COMPREHEN METABOLIC PANEL: CPT

## 2022-12-19 PROCEDURE — 74011250636 HC RX REV CODE- 250/636: Performed by: STUDENT IN AN ORGANIZED HEALTH CARE EDUCATION/TRAINING PROGRAM

## 2022-12-19 PROCEDURE — 85025 COMPLETE CBC W/AUTO DIFF WBC: CPT

## 2022-12-19 PROCEDURE — 74011000258 HC RX REV CODE- 258: Performed by: STUDENT IN AN ORGANIZED HEALTH CARE EDUCATION/TRAINING PROGRAM

## 2022-12-19 PROCEDURE — 83735 ASSAY OF MAGNESIUM: CPT

## 2022-12-19 PROCEDURE — 96366 THER/PROPH/DIAG IV INF ADDON: CPT

## 2022-12-19 PROCEDURE — 36415 COLL VENOUS BLD VENIPUNCTURE: CPT

## 2022-12-19 PROCEDURE — 74177 CT ABD & PELVIS W/CONTRAST: CPT

## 2022-12-19 PROCEDURE — 99285 EMERGENCY DEPT VISIT HI MDM: CPT

## 2022-12-19 PROCEDURE — 87040 BLOOD CULTURE FOR BACTERIA: CPT

## 2022-12-19 PROCEDURE — 65270000029 HC RM PRIVATE

## 2022-12-19 PROCEDURE — 74011000636 HC RX REV CODE- 636: Performed by: STUDENT IN AN ORGANIZED HEALTH CARE EDUCATION/TRAINING PROGRAM

## 2022-12-19 RX ORDER — HYDROMORPHONE HYDROCHLORIDE 1 MG/ML
0.5 INJECTION, SOLUTION INTRAMUSCULAR; INTRAVENOUS; SUBCUTANEOUS
Status: DISCONTINUED | OUTPATIENT
Start: 2022-12-19 | End: 2022-12-22 | Stop reason: HOSPADM

## 2022-12-19 RX ORDER — ONDANSETRON 2 MG/ML
4 INJECTION INTRAMUSCULAR; INTRAVENOUS
Status: DISCONTINUED | OUTPATIENT
Start: 2022-12-19 | End: 2022-12-22 | Stop reason: HOSPADM

## 2022-12-19 RX ORDER — IPRATROPIUM BROMIDE AND ALBUTEROL SULFATE 2.5; .5 MG/3ML; MG/3ML
3 SOLUTION RESPIRATORY (INHALATION)
Status: DISCONTINUED | OUTPATIENT
Start: 2022-12-19 | End: 2022-12-22 | Stop reason: HOSPADM

## 2022-12-19 RX ORDER — VANCOMYCIN 2 GRAM/500 ML IN 0.9 % SODIUM CHLORIDE INTRAVENOUS
2000
Status: COMPLETED | OUTPATIENT
Start: 2022-12-19 | End: 2022-12-20

## 2022-12-19 RX ORDER — DEXTROSE, SODIUM CHLORIDE, AND POTASSIUM CHLORIDE 5; .45; .15 G/100ML; G/100ML; G/100ML
125 INJECTION INTRAVENOUS CONTINUOUS
Status: DISCONTINUED | OUTPATIENT
Start: 2022-12-19 | End: 2022-12-22 | Stop reason: HOSPADM

## 2022-12-19 RX ORDER — DIPHENHYDRAMINE HYDROCHLORIDE 50 MG/ML
50 INJECTION, SOLUTION INTRAMUSCULAR; INTRAVENOUS
Status: DISCONTINUED | OUTPATIENT
Start: 2022-12-19 | End: 2022-12-22 | Stop reason: HOSPADM

## 2022-12-19 RX ORDER — HYDRALAZINE HYDROCHLORIDE 20 MG/ML
10 INJECTION INTRAMUSCULAR; INTRAVENOUS
Status: DISCONTINUED | OUTPATIENT
Start: 2022-12-19 | End: 2022-12-22 | Stop reason: HOSPADM

## 2022-12-19 RX ADMIN — POTASSIUM CHLORIDE, DEXTROSE MONOHYDRATE AND SODIUM CHLORIDE 125 ML/HR: 150; 5; 450 INJECTION, SOLUTION INTRAVENOUS at 21:48

## 2022-12-19 RX ADMIN — IOPAMIDOL 100 ML: 755 INJECTION, SOLUTION INTRAVENOUS at 18:49

## 2022-12-19 RX ADMIN — VANCOMYCIN HYDROCHLORIDE 2000 MG: 10 INJECTION, POWDER, LYOPHILIZED, FOR SOLUTION INTRAVENOUS at 21:19

## 2022-12-19 RX ADMIN — PIPERACILLIN AND TAZOBACTAM 3.38 G: 3; .375 INJECTION, POWDER, FOR SOLUTION INTRAVENOUS at 17:41

## 2022-12-19 NOTE — ED PROVIDER NOTES
EMERGENCY DEPARTMENT HISTORY AND PHYSICAL EXAM      Date: 12/19/2022  Patient Name: Alen Parada    History of Presenting Illness     Chief Complaint   Patient presents with    Wound Infection       History Provided By: Patient    HPI: Alen Parada, 56 y.o. male with a past medical history significant for medical problems as stated below presents to the ED with cc of rectal pain. He notes pain is been going on for 2 to 3 days. He is at [facility] and they noted that he had a large wound to his rectal region with pus draining and a temperature as well. He was thus referred to the emergency department for evaluation. Patient notes his only complaint is \"my ass hurts\". Denies any fever. Denies any nausea or vomiting. Denies any chest pain. Denies any abdominal pain has been eating and drinking normally. EMS note the patient was billed as a paraplegic but he notes he can move all extremities and there is no change in that from his baseline. He is known to have MS but denies being on any immunosuppressant medications. Denies any hematuria, diarrhea, dysuria or constipation. Notes he is never had this happen before. Denies any other lesions. There are no associated symptoms. No other exacerbating or ameliorating factors. PCP: Jasvir Navarro MD    No current facility-administered medications on file prior to encounter. Current Outpatient Medications on File Prior to Encounter   Medication Sig Dispense Refill    tiZANidine (ZANAFLEX) 4 mg tablet Take 1 Tablet by mouth nightly. 30 Tablet 2    pregabalin (LYRICA) 50 mg capsule Take 1 Capsule by mouth two (2) times a day. Max Daily Amount: 100 mg. 60 Capsule 2    ergocalciferol (ERGOCALCIFEROL) 1,250 mcg (50,000 unit) capsule Take 1 Capsule by mouth every seven (7) days. 4 Capsule 4    memantine (NAMENDA) 10 mg tablet Take 1 Tablet by mouth daily.  30 Tablet 3    senna-docusate (PERICOLACE) 8.6-50 mg per tablet Take 1 Tablet by mouth two (2) times a day. (Patient not taking: Reported on 4/17/2022) 30 Tablet 0    polyethylene glycol (MIRALAX) 17 gram packet Take 1 Packet by mouth daily. (Patient not taking: Reported on 4/17/2022) 30 Packet 0       Past History     Past Medical History:  Past Medical History:   Diagnosis Date    Back pain 10/8/2010    Back pain 10/8/2010    Depression     Diabetes ([address]     Fatigue 8/24/2012    Hearing loss 1/25/2013    Memory loss 1/25/2013    MS (multiple sclerosis) ([facility])     Muscle pain     Muscle weakness     Poor appetite     Snoring     Unintentional weight change     Visual disturbance        Past Surgical History:  Past Surgical History:   Procedure Laterality Date    HX HEENT Right     ear       Family History:  Family History   Problem Relation Age of Onset    Hypertension Father     Cancer Father         prostate    Hypertension Sister     Diabetes Maternal Grandmother     Cancer Paternal Uncle         prostate    Dementia Paternal Uncle        Social History:  Social History     Tobacco Use    Smoking status: Every Day     Packs/day: 0.50     Years: 21.00     Pack years: 10.50     Types: Cigarettes    Smokeless tobacco: Never   Substance Use Topics    Alcohol use: No    Drug use: No       Allergies:  No Known Allergies      Review of Systems   Review of Systems   Constitutional:  Positive for fever. Negative for activity change and appetite change. HENT:  Negative for congestion. Eyes:  Negative for visual disturbance. Respiratory:  Negative for chest tightness and shortness of breath. Cardiovascular:  Negative for chest pain. Gastrointestinal:  Positive for rectal pain. Negative for nausea and vomiting. Endocrine: Negative for polyuria. Genitourinary:  Negative for dysuria, flank pain, frequency, hematuria, penile discharge, penile pain, penile swelling, scrotal swelling and testicular pain. Musculoskeletal:  Negative for myalgias. Skin:  Positive for wound.    Allergic/Immunologic: Positive for immunocompromised state. Neurological:  Negative for dizziness and headaches. Hematological:  Does not bruise/bleed easily. Physical Exam   Physical Exam  Vitals and nursing note reviewed. Constitutional:       Appearance: Normal appearance. HENT:      Head: Normocephalic and atraumatic. Nose: Nose normal.      Mouth/Throat:      Mouth: Mucous membranes are moist.      Pharynx: Oropharynx is clear. Eyes:      Extraocular Movements: Extraocular movements intact. Pupils: Pupils are equal, round, and reactive to light. Cardiovascular:      Rate and Rhythm: Normal rate. Pulmonary:      Effort: Pulmonary effort is normal.   Abdominal:      General: Abdomen is flat. Comments: Mild diffuse abdominal tenderness to palpation. Genitourinary:     Penis: Normal.       Testes: Normal.          Comments: Large ulcerated open sore to the midline perirectal region just proximal to the rectum with malodorous purulent discharge scant granulation tissue and internal tracking. Musculoskeletal:         General: No swelling or deformity. Cervical back: Normal range of motion. Skin:     General: Skin is dry. Comments: Rectal wound as above   Neurological:      General: No focal deficit present. Mental Status: He is alert and oriented to person, place, and time.    Psychiatric:         Mood and Affect: Mood normal.         Behavior: Behavior normal.         Diagnostic Study Results     Labs -     Recent Results (from the past 24 hour(s))   METABOLIC PANEL, COMPREHENSIVE    Collection Time: 12/19/22  5:34 PM   Result Value Ref Range    Sodium 141 136 - 145 mmol/L    Potassium 3.9 3.5 - 5.1 mmol/L    Chloride 106 97 - 108 mmol/L    CO2 26 21 - 32 mmol/L    Anion gap 9 5 - 15 mmol/L    Glucose 84 65 - 100 mg/dL    BUN 14 6 - 20 MG/DL    Creatinine 0.89 0.70 - 1.30 MG/DL    BUN/Creatinine ratio 16 12 - 20      eGFR >60 >60 ml/min/1.73m2    Calcium 9.1 8.5 - 10.1 MG/DL Bilirubin, total 1.0 0.2 - 1.0 MG/DL    ALT (SGPT) 57 12 - 78 U/L    AST (SGOT) 47 (H) 15 - 37 U/L    Alk. phosphatase 118 (H) 45 - 117 U/L    Protein, total 7.8 6.4 - 8.2 g/dL    Albumin 2.9 (L) 3.5 - 5.0 g/dL    Globulin 4.9 (H) 2.0 - 4.0 g/dL    A-G Ratio 0.6 (L) 1.1 - 2.2     CBC WITH AUTOMATED DIFF    Collection Time: 12/19/22  5:34 PM   Result Value Ref Range    WBC 11.2 (H) 4.1 - 11.1 K/uL    RBC 4.54 4.10 - 5.70 M/uL    HGB 13.4 12.1 - 17.0 g/dL    HCT 40.7 36.6 - 50.3 %    MCV 89.6 80.0 - 99.0 FL    MCH 29.5 26.0 - 34.0 PG    MCHC 32.9 30.0 - 36.5 g/dL    RDW 13.2 11.5 - 14.5 %    PLATELET 979 592 - 033 K/uL    MPV 9.9 8.9 - 12.9 FL    NRBC 0.0 0  WBC    ABSOLUTE NRBC 0.00 0.00 - 0.01 K/uL    NEUTROPHILS 75 32 - 75 %    LYMPHOCYTES 13 12 - 49 %    MONOCYTES 9 5 - 13 %    EOSINOPHILS 1 0 - 7 %    BASOPHILS 1 0 - 1 %    IMMATURE GRANULOCYTES 1 (H) 0.0 - 0.5 %    ABS. NEUTROPHILS 8.4 (H) 1.8 - 8.0 K/UL    ABS. LYMPHOCYTES 1.5 0.8 - 3.5 K/UL    ABS. MONOCYTES 1.0 0.0 - 1.0 K/UL    ABS. EOSINOPHILS 0.1 0.0 - 0.4 K/UL    ABS. BASOPHILS 0.1 0.0 - 0.1 K/UL    ABS. IMM. GRANS. 0.1 (H) 0.00 - 0.04 K/UL    DF AUTOMATED     MAGNESIUM    Collection Time: 12/19/22  5:34 PM   Result Value Ref Range    Magnesium 1.9 1.6 - 2.4 mg/dL       Radiologic Studies -   CT ABD PELV W CONT   Final Result   Perianal soft tissue stranding and probable intragluteal cleft subcutaneous   abscess but no definite extension into the sphincter complex or into the   perirectal fat. CT Results  (Last 48 hours)                 12/19/22 1916  CT ABD PELV W CONT Final result    Impression:  Perianal soft tissue stranding and probable intragluteal cleft subcutaneous   abscess but no definite extension into the sphincter complex or into the   perirectal fat. Narrative:  EXAM: CT ABD PELV W CONT       INDICATION: perirectal abscess, eval for tracking or fistula       COMPARISON: None        CONTRAST: 100 mL of Isovue-370. ORAL CONTRAST: None       TECHNIQUE:    Following the uneventful intravenous administration of contrast, thin axial   images were obtained through the abdomen and pelvis. Coronal and sagittal   reconstructions were generated. CT dose reduction was achieved through use of a   standardized protocol tailored for this examination and automatic exposure   control for dose modulation. FINDINGS:    LOWER THORAX: No significant abnormality in the incidentally imaged lower chest.   LIVER: No mass. BILIARY TREE: Gallbladder is within normal limits. CBD is not dilated. SPLEEN: within normal limits. PANCREAS: No mass or ductal dilatation. ADRENALS: Unremarkable. KIDNEYS: No mass, calculus, or hydronephrosis. STOMACH: Unremarkable. SMALL BOWEL: No dilatation or wall thickening. COLON: No dilatation or wall thickening. APPENDIX: Normal   PERITONEUM: No ascites or pneumoperitoneum. RETROPERITONEUM: No lymphadenopathy or aortic aneurysm. REPRODUCTIVE ORGANS: Within normal limits   URINARY BLADDER: No mass or calculus. BONES: No destructive bone lesion. ABDOMINAL WALL: No mass or hernia. ADDITIONAL COMMENTS: There is soft tissue stranding in the perianal regions and   extending into the gluteal cleft. This extends to the skin surface of the left   gluteal fold. No definite perianal fistula. No infiltration of the perirectal   fat. No intra-abdominal extension. CXR Results  (Last 48 hours)      None              Medical Decision Making   I am the first provider for this patient. I reviewed the vital signs, available nursing notes, past medical history, past surgical history, family history and social history. Vital Signs-Reviewed the patient's vital signs.   Patient Vitals for the past 12 hrs:   Temp Pulse Resp BP SpO2   12/19/22 2030 -- -- -- (!) 125/94 99 %   12/19/22 1711 -- -- -- 123/86 98 %   12/19/22 1703 99 °F (37.2 °C) 90 14 122/88 97 %       Records Reviewed: Nursing records and medical records reviewed    MDM:  DDx includes pararectal abscess, sacral abscess, osteomyelitis, rectal abscess, fistula    Provider Notes (Medical Decision Making):   70-year-old male with history of MS, dementia, hypertension presents with rectal abscess. Vital signs show mild fever at 99 but his heart rate is 90 and is saturating well on room air. He was billed as paraplegic but he can move all extremities with no difficulty and is 5 out of 5 strength normal sensation. He has mild diffuse abdominal tenderness but he is nonperitoneal and is not complaining of any abdominal pain. Rectal exam does show a large open ulcerated region with purulent malodorous discharge. High concern for infection. Will team CT to evaluate for any internal tracking or fistula formation. Suspect he will need debridement surgically as there is some areas of necrotic tissue. We will start empiric broad-spectrum iv antibiotics, blood cultures as well as lactate to evaluate for any septic etiology. Basic labs evaluate for leukocytosis or any signs of kidney dysfunction. ED Course:   Initial assessment performed. The patients presenting problems have been discussed, and they are in agreement with the care plan formulated and outlined with them. I have encouraged them to ask questions as they arise throughout their visit. ED Course as of 12/19/22 2259   Mon Dec 19, 2022   2018 CT shows subcutaneous stranding but no definitive fluid collection. Patient getting Zosyn and vancomycin. Discussed case with surgery as patient would likely benefit from debridement given the extent of the abscess and surrounding necrotic tissue. [JS]      ED Course User Index  [JS] Brando Chatman MD               Critical Care:  none      Disposition:    1. Admit to Hospitalist    Admission Note:  10:59 PM  Patient is being admitted to the hospital by Dr. Shivam Brooks.  The results of their tests and reasons for their admission have been discussed with them and available family. They convey agreement and understanding for the need to be admitted and for their admission diagnosis. DISCHARGE PLAN:  1. Current Discharge Medication List        2. Follow-up Information    None       3. Return to ED if worse     Diagnosis     Clinical Impression:   1. Gluteal abscess        Attestations:    Kaila Obrien MD    Please note that this dictation was completed with [employer], the computer voice recognition software. Quite often unanticipated grammatical, syntax, homophones, and other interpretive errors are inadvertently transcribed by the computer software. Please disregard these errors. Please excuse any errors that have escaped final proofreading. Thank you.

## 2022-12-19 NOTE — ED TRIAGE NOTES
Pt arrives to ed via ems w reports of abscess on L buttocks w infection and purulent drainage. Low grade fever this AM.   From [facility].  Hx paraplegia per facility

## 2022-12-20 ENCOUNTER — ANESTHESIA (OUTPATIENT)
Dept: SURGERY | Age: 57
DRG: 571 | End: 2022-12-20
Payer: MEDICARE

## 2022-12-20 ENCOUNTER — ANESTHESIA EVENT (OUTPATIENT)
Dept: SURGERY | Age: 57
DRG: 571 | End: 2022-12-20
Payer: MEDICARE

## 2022-12-20 LAB
ANION GAP SERPL CALC-SCNC: 6 MMOL/L (ref 5–15)
BUN SERPL-MCNC: 13 MG/DL (ref 6–20)
BUN/CREAT SERPL: 13 (ref 12–20)
CALCIUM SERPL-MCNC: 8.7 MG/DL (ref 8.5–10.1)
CHLORIDE SERPL-SCNC: 106 MMOL/L (ref 97–108)
CO2 SERPL-SCNC: 27 MMOL/L (ref 21–32)
CREAT SERPL-MCNC: 1.02 MG/DL (ref 0.7–1.3)
ERYTHROCYTE [DISTWIDTH] IN BLOOD BY AUTOMATED COUNT: 13.2 % (ref 11.5–14.5)
GLUCOSE SERPL-MCNC: 151 MG/DL (ref 65–100)
HCT VFR BLD AUTO: 40.1 % (ref 36.6–50.3)
HGB BLD-MCNC: 13 G/DL (ref 12.1–17)
HGB BLD-MCNC: 13.1 G/DL (ref 12.1–17)
MCH RBC QN AUTO: 28.8 PG (ref 26–34)
MCHC RBC AUTO-ENTMCNC: 32.4 G/DL (ref 30–36.5)
MCV RBC AUTO: 88.9 FL (ref 80–99)
NRBC # BLD: 0 K/UL (ref 0–0.01)
NRBC BLD-RTO: 0 PER 100 WBC
PLATELET # BLD AUTO: 323 K/UL (ref 150–400)
PMV BLD AUTO: 9.7 FL (ref 8.9–12.9)
POTASSIUM SERPL-SCNC: 4 MMOL/L (ref 3.5–5.1)
RBC # BLD AUTO: 4.51 M/UL (ref 4.1–5.7)
SODIUM SERPL-SCNC: 139 MMOL/L (ref 136–145)
WBC # BLD AUTO: 11.8 K/UL (ref 4.1–11.1)

## 2022-12-20 PROCEDURE — 77030013708 HC HNDPC SUC IRR PULS STRY –B: Performed by: SURGERY

## 2022-12-20 PROCEDURE — 77030013079 HC BLNKT BAIR HGGR 3M -A: Performed by: ANESTHESIOLOGY

## 2022-12-20 PROCEDURE — 88305 TISSUE EXAM BY PATHOLOGIST: CPT

## 2022-12-20 PROCEDURE — 74011000250 HC RX REV CODE- 250: Performed by: NURSE ANESTHETIST, CERTIFIED REGISTERED

## 2022-12-20 PROCEDURE — 77030038692 HC WND DEB SYS IRMX -B: Performed by: SURGERY

## 2022-12-20 PROCEDURE — 85027 COMPLETE CBC AUTOMATED: CPT

## 2022-12-20 PROCEDURE — 77030026438 HC STYL ET INTUB CARD -A: Performed by: ANESTHESIOLOGY

## 2022-12-20 PROCEDURE — 74011250636 HC RX REV CODE- 250/636: Performed by: ANESTHESIOLOGY

## 2022-12-20 PROCEDURE — 77030018390 HC SPNG HEMSTAT2 J&J -B: Performed by: SURGERY

## 2022-12-20 PROCEDURE — 74011250637 HC RX REV CODE- 250/637: Performed by: SURGERY

## 2022-12-20 PROCEDURE — 65270000029 HC RM PRIVATE

## 2022-12-20 PROCEDURE — 99221 1ST HOSP IP/OBS SF/LOW 40: CPT | Performed by: SURGERY

## 2022-12-20 PROCEDURE — 94762 N-INVAS EAR/PLS OXIMTRY CONT: CPT

## 2022-12-20 PROCEDURE — 74011250636 HC RX REV CODE- 250/636

## 2022-12-20 PROCEDURE — 77030031139 HC SUT VCRL2 J&J -A: Performed by: SURGERY

## 2022-12-20 PROCEDURE — 76210000006 HC OR PH I REC 0.5 TO 1 HR: Performed by: SURGERY

## 2022-12-20 PROCEDURE — 76010000149 HC OR TIME 1 TO 1.5 HR: Performed by: SURGERY

## 2022-12-20 PROCEDURE — 74011250636 HC RX REV CODE- 250/636: Performed by: SURGERY

## 2022-12-20 PROCEDURE — 74011250636 HC RX REV CODE- 250/636: Performed by: NURSE ANESTHETIST, CERTIFIED REGISTERED

## 2022-12-20 PROCEDURE — 85018 HEMOGLOBIN: CPT

## 2022-12-20 PROCEDURE — 74011000258 HC RX REV CODE- 258: Performed by: NURSE ANESTHETIST, CERTIFIED REGISTERED

## 2022-12-20 PROCEDURE — 2709999900 HC NON-CHARGEABLE SUPPLY: Performed by: SURGERY

## 2022-12-20 PROCEDURE — 76060000033 HC ANESTHESIA 1 TO 1.5 HR: Performed by: SURGERY

## 2022-12-20 PROCEDURE — 77030008684 HC TU ET CUF COVD -B: Performed by: ANESTHESIOLOGY

## 2022-12-20 PROCEDURE — 80048 BASIC METABOLIC PNL TOTAL CA: CPT

## 2022-12-20 PROCEDURE — 36415 COLL VENOUS BLD VENIPUNCTURE: CPT

## 2022-12-20 RX ORDER — SODIUM CHLORIDE, SODIUM LACTATE, POTASSIUM CHLORIDE, CALCIUM CHLORIDE 600; 310; 30; 20 MG/100ML; MG/100ML; MG/100ML; MG/100ML
25 INJECTION, SOLUTION INTRAVENOUS CONTINUOUS
Status: DISCONTINUED | OUTPATIENT
Start: 2022-12-20 | End: 2022-12-20

## 2022-12-20 RX ORDER — LIDOCAINE HYDROCHLORIDE 10 MG/ML
0.1 INJECTION, SOLUTION EPIDURAL; INFILTRATION; INTRACAUDAL; PERINEURAL AS NEEDED
Status: DISCONTINUED | OUTPATIENT
Start: 2022-12-20 | End: 2022-12-22 | Stop reason: HOSPADM

## 2022-12-20 RX ORDER — DEXAMETHASONE SODIUM PHOSPHATE 4 MG/ML
INJECTION, SOLUTION INTRA-ARTICULAR; INTRALESIONAL; INTRAMUSCULAR; INTRAVENOUS; SOFT TISSUE AS NEEDED
Status: DISCONTINUED | OUTPATIENT
Start: 2022-12-20 | End: 2022-12-20 | Stop reason: HOSPADM

## 2022-12-20 RX ORDER — FENTANYL CITRATE 50 UG/ML
50 INJECTION, SOLUTION INTRAMUSCULAR; INTRAVENOUS AS NEEDED
Status: DISCONTINUED | OUTPATIENT
Start: 2022-12-20 | End: 2022-12-22 | Stop reason: HOSPADM

## 2022-12-20 RX ORDER — HYDROMORPHONE HYDROCHLORIDE 1 MG/ML
.2-.5 INJECTION, SOLUTION INTRAMUSCULAR; INTRAVENOUS; SUBCUTANEOUS
Status: DISCONTINUED | OUTPATIENT
Start: 2022-12-20 | End: 2022-12-20

## 2022-12-20 RX ORDER — OXYCODONE HYDROCHLORIDE 5 MG/1
5 TABLET ORAL
Status: DISCONTINUED | OUTPATIENT
Start: 2022-12-20 | End: 2022-12-22 | Stop reason: HOSPADM

## 2022-12-20 RX ORDER — ONDANSETRON 2 MG/ML
INJECTION INTRAMUSCULAR; INTRAVENOUS AS NEEDED
Status: DISCONTINUED | OUTPATIENT
Start: 2022-12-20 | End: 2022-12-20 | Stop reason: HOSPADM

## 2022-12-20 RX ORDER — CEFAZOLIN SODIUM 1 G/3ML
INJECTION, POWDER, FOR SOLUTION INTRAMUSCULAR; INTRAVENOUS AS NEEDED
Status: DISCONTINUED | OUTPATIENT
Start: 2022-12-20 | End: 2022-12-20 | Stop reason: HOSPADM

## 2022-12-20 RX ORDER — ONDANSETRON 2 MG/ML
4 INJECTION INTRAMUSCULAR; INTRAVENOUS AS NEEDED
Status: DISCONTINUED | OUTPATIENT
Start: 2022-12-20 | End: 2022-12-20

## 2022-12-20 RX ORDER — FENTANYL CITRATE 50 UG/ML
INJECTION, SOLUTION INTRAMUSCULAR; INTRAVENOUS AS NEEDED
Status: DISCONTINUED | OUTPATIENT
Start: 2022-12-20 | End: 2022-12-20 | Stop reason: HOSPADM

## 2022-12-20 RX ORDER — AMOXICILLIN 250 MG
1 CAPSULE ORAL 2 TIMES DAILY
Status: DISCONTINUED | OUTPATIENT
Start: 2022-12-20 | End: 2022-12-22 | Stop reason: HOSPADM

## 2022-12-20 RX ORDER — TIZANIDINE 4 MG/1
4 TABLET ORAL
Status: DISCONTINUED | OUTPATIENT
Start: 2022-12-20 | End: 2022-12-22 | Stop reason: HOSPADM

## 2022-12-20 RX ORDER — LIDOCAINE HYDROCHLORIDE 20 MG/ML
INJECTION, SOLUTION EPIDURAL; INFILTRATION; INTRACAUDAL; PERINEURAL AS NEEDED
Status: DISCONTINUED | OUTPATIENT
Start: 2022-12-20 | End: 2022-12-20 | Stop reason: HOSPADM

## 2022-12-20 RX ORDER — MIDAZOLAM HYDROCHLORIDE 1 MG/ML
1 INJECTION, SOLUTION INTRAMUSCULAR; INTRAVENOUS AS NEEDED
Status: DISCONTINUED | OUTPATIENT
Start: 2022-12-20 | End: 2022-12-22 | Stop reason: HOSPADM

## 2022-12-20 RX ORDER — MEMANTINE HYDROCHLORIDE 10 MG/1
10 TABLET ORAL DAILY
Status: DISCONTINUED | OUTPATIENT
Start: 2022-12-21 | End: 2022-12-22 | Stop reason: HOSPADM

## 2022-12-20 RX ORDER — MIDAZOLAM HYDROCHLORIDE 1 MG/ML
INJECTION, SOLUTION INTRAMUSCULAR; INTRAVENOUS AS NEEDED
Status: DISCONTINUED | OUTPATIENT
Start: 2022-12-20 | End: 2022-12-20 | Stop reason: HOSPADM

## 2022-12-20 RX ORDER — FENTANYL CITRATE 50 UG/ML
25 INJECTION, SOLUTION INTRAMUSCULAR; INTRAVENOUS
Status: DISCONTINUED | OUTPATIENT
Start: 2022-12-20 | End: 2022-12-20

## 2022-12-20 RX ORDER — DEXTROSE, SODIUM CHLORIDE, AND POTASSIUM CHLORIDE 5; .45; .15 G/100ML; G/100ML; G/100ML
INJECTION INTRAVENOUS
Status: COMPLETED
Start: 2022-12-20 | End: 2022-12-20

## 2022-12-20 RX ORDER — PREGABALIN 50 MG/1
50 CAPSULE ORAL 2 TIMES DAILY
Status: DISCONTINUED | OUTPATIENT
Start: 2022-12-20 | End: 2022-12-22 | Stop reason: HOSPADM

## 2022-12-20 RX ORDER — PROPOFOL 10 MG/ML
INJECTION, EMULSION INTRAVENOUS AS NEEDED
Status: DISCONTINUED | OUTPATIENT
Start: 2022-12-20 | End: 2022-12-20 | Stop reason: HOSPADM

## 2022-12-20 RX ADMIN — POTASSIUM CHLORIDE, DEXTROSE MONOHYDRATE AND SODIUM CHLORIDE 125 ML/HR: 150; 5; 450 INJECTION, SOLUTION INTRAVENOUS at 17:03

## 2022-12-20 RX ADMIN — PROPOFOL 200 MG: 10 INJECTION, EMULSION INTRAVENOUS at 13:06

## 2022-12-20 RX ADMIN — SODIUM CHLORIDE, POTASSIUM CHLORIDE, SODIUM LACTATE AND CALCIUM CHLORIDE 25 ML/HR: 600; 310; 30; 20 INJECTION, SOLUTION INTRAVENOUS at 12:45

## 2022-12-20 RX ADMIN — PROPOFOL 50 MG: 10 INJECTION, EMULSION INTRAVENOUS at 13:49

## 2022-12-20 RX ADMIN — DEXMEDETOMIDINE HYDROCHLORIDE 10 MCG: 100 INJECTION, SOLUTION, CONCENTRATE INTRAVENOUS at 13:48

## 2022-12-20 RX ADMIN — LIDOCAINE HYDROCHLORIDE 40 MG: 20 INJECTION, SOLUTION EPIDURAL; INFILTRATION; INTRACAUDAL; PERINEURAL at 13:06

## 2022-12-20 RX ADMIN — Medication 3 AMPULE: at 12:45

## 2022-12-20 RX ADMIN — DEXAMETHASONE SODIUM PHOSPHATE 4 MG: 4 INJECTION, SOLUTION INTRAMUSCULAR; INTRAVENOUS at 13:11

## 2022-12-20 RX ADMIN — ONDANSETRON HYDROCHLORIDE 4 MG: 2 INJECTION, SOLUTION INTRAMUSCULAR; INTRAVENOUS at 13:11

## 2022-12-20 RX ADMIN — MIDAZOLAM HYDROCHLORIDE 2 MG: 1 INJECTION, SOLUTION INTRAMUSCULAR; INTRAVENOUS at 13:06

## 2022-12-20 RX ADMIN — SENNOSIDES AND DOCUSATE SODIUM 1 TABLET: 50; 8.6 TABLET ORAL at 18:55

## 2022-12-20 RX ADMIN — FENTANYL CITRATE 100 MCG: 50 INJECTION, SOLUTION INTRAMUSCULAR; INTRAVENOUS at 13:06

## 2022-12-20 RX ADMIN — VANCOMYCIN HYDROCHLORIDE 1000 MG: 1 INJECTION, POWDER, LYOPHILIZED, FOR SOLUTION INTRAVENOUS at 21:41

## 2022-12-20 RX ADMIN — PREGABALIN 50 MG: 50 CAPSULE ORAL at 18:55

## 2022-12-20 RX ADMIN — CEFAZOLIN 2 G: 1 INJECTION, POWDER, FOR SOLUTION INTRAMUSCULAR; INTRAVENOUS; PARENTERAL at 13:18

## 2022-12-20 RX ADMIN — VANCOMYCIN HYDROCHLORIDE 1000 MG: 1 INJECTION, POWDER, LYOPHILIZED, FOR SOLUTION INTRAVENOUS at 09:10

## 2022-12-20 RX ADMIN — SODIUM CHLORIDE, POTASSIUM CHLORIDE, SODIUM LACTATE AND CALCIUM CHLORIDE 25 ML/HR: 600; 310; 30; 20 INJECTION, SOLUTION INTRAVENOUS at 09:10

## 2022-12-20 NOTE — PROGRESS NOTES
Pharmacy Antimicrobial Kinetic Dosing    Indication for Antimicrobials: ssti     Current Regimen of Each Antimicrobial:  Vancomycin pharmacy to dose (Start Date ; Day # 2/7)    Previous Antimicrobial Therapy:  Zosyn x 1:     Goal Level: AUC: 400-600 mg/hr/Liter/day    Date Dose & Interval Measured (mcg/mL) Predicted AUC/RANDA                       Date & time of next level:   Dosing calculator used: [employer] calculator    Significant Positive Cultures:   : blood- ngtd    Conditions for Dosing Consideration: None    Labs:  Recent Labs     22  0220 22  1734   CREA 1.02 0.89   BUN 13 14     Recent Labs     22  0220 22  1734   WBC 11.8* 11.2*     Temp (24hrs), Av.6 °F (37 °C), Min:98.1 °F (36.7 °C), Max:99 °F (37.2 °C)    Creatinine Clearance (mL/min):   CrCl (Adjusted Body Weight): 95.9 mL/min   If actual weight < IBW: CrCl (Actual Body Weight) 105.0    Impression/Plan:   Vancomycin 2000mg load, then 1000mg q 12h for auc 492 and tr~ 16mcg/mL  Vancomycin trough on  prior to 9am dose  Bmp and cbc ordered daily  Antimicrobial stop date 7 days     Pharmacy will follow daily and adjust medications as appropriate for renal function and/or serum levels.     Thank you,  ROMERO Kraft

## 2022-12-20 NOTE — PERIOP NOTES
TRANSFER - IN REPORT:    Verbal report received from TERE Santana on Mal Fret  being received from ED 35 for ordered procedure      Report consisted of patients Situation, Background, Assessment and   Recommendations(SBAR). Information from the following report(s) SBAR, Kardex, Intake/Output, MAR, and Recent Results was reviewed with the receiving nurse. Opportunity for questions and clarification was provided. Assessment completed upon patients arrival to unit and care assumed.

## 2022-12-20 NOTE — ED NOTES
Bedside and Verbal shift change report given to Dena Woodruff RN (oncoming nurse) by Hanna Francis RN (offgoing nurse). Report included the following information SBAR, Kardex, ED Summary, MAR, Recent Results, and Med Rec Status. No

## 2022-12-20 NOTE — WOUND CARE
Wound care consulted for this patient for \"Gluteal Cleft abscess\" POA:  Chart reviewed. Spoke with Dr. Shayne Mcgrath about this patient today. Pt. Is going is going to the OR to have a sacrum Pressure injury debrided per Dr. Shayne Mcgrath today. Plan: Will defer our follow up consult til tomorrow to determine the ongoing wound care recommendations.

## 2022-12-20 NOTE — ANESTHESIA PREPROCEDURE EVALUATION
Relevant Problems   NEUROLOGY   (+) Recurrent depression (HCC)       Anesthetic History   No history of anesthetic complications            Review of Systems / Medical History  Patient summary reviewed, nursing notes reviewed and pertinent labs reviewed    Pulmonary  Within defined limits                 Neuro/Psych         Psychiatric history     Cardiovascular  Within defined limits                Exercise tolerance: >4 METS     GI/Hepatic/Renal  Within defined limits              Endo/Other    Diabetes         Other Findings   Comments: History of MS           Physical Exam    Airway  Mallampati: II  TM Distance: 4 - 6 cm  Neck ROM: normal range of motion   Mouth opening: Normal     Cardiovascular  Regular rate and rhythm,  S1 and S2 normal,  no murmur, click, rub, or gallop  Rhythm: regular  Rate: normal         Dental  No notable dental hx       Pulmonary  Breath sounds clear to auscultation               Abdominal  GI exam deferred       Other Findings            Anesthetic Plan    ASA: 2  Anesthesia type: general    Monitoring Plan: BIS      Induction: Intravenous  Anesthetic plan and risks discussed with: Patient

## 2022-12-20 NOTE — PROGRESS NOTES
Verbal and Bedside report received from Ariadne Galicia, [facility]. Report included the following information SBAR, Kardex, ED Summary, MAR, and Recent Results. This RN verbalized understanding of plan of care with opportunity for clarification and questions.

## 2022-12-20 NOTE — PROGRESS NOTES
Problem: Pressure Injury - Risk of  Goal: *Prevention of pressure injury  Description: Document Italo Scale and appropriate interventions in the flowsheet. Outcome: Progressing Towards Goal  Note: Pressure Injury Interventions:  Sensory Interventions: Assess changes in LOC, Maintain/enhance activity level    Moisture Interventions: Absorbent underpads    Activity Interventions: Pressure redistribution bed/mattress(bed type)    Mobility Interventions: HOB 30 degrees or less, Float heels    Nutrition Interventions: Document food/fluid/supplement intake    Friction and Shear Interventions: Lift sheet, HOB 30 degrees or less, Lift team/patient mobility team                Problem: Patient Education: Go to Patient Education Activity  Goal: Patient/Family Education  Outcome: Progressing Towards Goal     Problem: Falls - Risk of  Goal: *Absence of Falls  Description: Document Johan Fall Risk and appropriate interventions in the flowsheet.   Outcome: Progressing Towards Goal  Note: Fall Risk Interventions:                                Problem: Patient Education: Go to Patient Education Activity  Goal: Patient/Family Education  Outcome: Progressing Towards Goal

## 2022-12-20 NOTE — PERIOP NOTES
Handoff Report from Operating Room to PACU    Report received from [address] Ratliff RN and Silva Goldberg CRNA regarding Elieser Babcock. Surgeon(s):  Oumar Hicks MD  And Procedure(s) (LRB):  DEBRIDEMENT OF SACRAL DECUBITIS (N/A)  confirmed   with allergies, dressings, and wound packing discussed. Anesthesia type, drugs, patient history, complications, estimated blood loss, vital signs, intake and output, and last pain medication, lines, and temperature were reviewed.

## 2022-12-20 NOTE — PROGRESS NOTES
TRANSFER - OUT REPORT:    Verbal report given to nurse(name) on Sang Stanley  being transferred to [facility](unit) for routine post - op       Report consisted of patients Situation, Background, Assessment and   Recommendations(SBAR). Information from the following report(s) SBAR, Kardex, OR Summary, and MAR was reviewed with the receiving nurse. Lines:   Peripheral IV 12/19/22 Left Antecubital (Active)   Site Assessment Clean, dry, & intact 12/20/22 1403   Phlebitis Assessment 0 12/20/22 1403   Infiltration Assessment 0 12/20/22 1403   Dressing Status Clean, dry, & intact 12/20/22 1403   Dressing Type Transparent;Tape 12/20/22 1403   Hub Color/Line Status Infusing;Patent 12/20/22 1403       Peripheral IV 12/20/22 Right Antecubital (Active)   Site Assessment Clean, dry, & intact 12/20/22 1403   Phlebitis Assessment 0 12/20/22 1403   Infiltration Assessment 0 12/20/22 1403   Dressing Status Clean, dry, & intact 12/20/22 1403   Dressing Type Transparent;Tape 12/20/22 1403   Hub Color/Line Status Capped 12/20/22 1403        Opportunity for questions and clarification was provided.       Patient transported with:   [employer]

## 2022-12-20 NOTE — H&P
Consult    Subjective:     Ophelia Mcguire is a 56 y.o.  male with MS who is being seen for an infected unstageable sacral pressure injury. Onset of symptoms was abrupt and 3 days ago. He was sent from [facility] for a  large wound on his buttocks and fevers. Patient complains of rectal pain. Patient is wheelchair bound due to his MS.     Past Medical History:   Diagnosis Date    Back pain 10/8/2010    Back pain 10/8/2010    Depression     Diabetes ([address]     Fatigue 8/24/2012    Hearing loss 1/25/2013    Memory loss 1/25/2013    MS (multiple sclerosis) ([facility])     Muscle pain     Muscle weakness     Poor appetite     Snoring     Unintentional weight change     Visual disturbance       Past Surgical History:   Procedure Laterality Date    HX HEENT Right     ear     Family History   Problem Relation Age of Onset    Hypertension Father     Cancer Father         prostate    Hypertension Sister     Diabetes Maternal Grandmother     Cancer Paternal Uncle         prostate    Dementia Paternal Uncle       Social History     Tobacco Use    Smoking status: Every Day     Packs/day: 0.50     Years: 21.00     Pack years: 10.50     Types: Cigarettes    Smokeless tobacco: Never   Substance Use Topics    Alcohol use: No       Current Facility-Administered Medications   Medication Dose Route Frequency Provider Last Rate Last Admin    lactated Ringers infusion  25 mL/hr IntraVENous CONTINUOUS Yamileth Camargo MD        lidocaine (PF) (XYLOCAINE) 10 mg/mL (1 %) injection 0.1 mL  0.1 mL SubCUTAneous PRN Yamileth Camargo MD        fentaNYL citrate (PF) injection 50 mcg  50 mcg IntraVENous PRN Yamileth Camargo MD        midazolam (VERSED) injection 1 mg  1 mg IntraVENous PRN Yamileth Camargo MD        lactated Ringers infusion  25 mL/hr IntraVENous CONTINUOUS Yamileth Camargo MD 25 mL/hr at 12/20/22 0910 25 mL/hr at 12/20/22 0910    fentaNYL citrate (PF) injection 25 mcg  25 mcg IntraVENous Multiple Raman Oumar Dodson MD        HYDROmorphone (DILAUDID) injection 0.2-0.5 mg  0.2-0.5 mg IntraVENous Multiple Oumar Camargo MD        ondansetron [facility]) injection 4 mg  4 mg IntraVENous PRN Oumar Camargo MD        meperidine (DEMEROL) injection 12.5 mg  12.5 mg IntraVENous Q5MIN PRN Milan Fonseca MD        [START ON 12/21/2022] Vancomycin Trough: Wednesday, 12/21 prior to 9am dose   Other ONCE Ankur Villavicencio MD        albuterol-ipratropium (DUO-NEB) 2.5 MG-0.5 MG/3 ML  3 mL Nebulization Q6H PRN Ankur Villavicencio MD        dextrose 5% - 0.45% NaCl with KCl 20 mEq/L infusion  125 mL/hr IntraVENous CONTINUOUS Ankur Villavicencio  mL/hr at 12/20/22 1033 125 mL/hr at 12/20/22 1033    diphenhydrAMINE (BENADRYL) injection 50 mg  50 mg IntraVENous Q6H PRN Ankur Villavicencio MD        hydrALAZINE (APRESOLINE) 20 mg/mL injection 10 mg  10 mg IntraVENous Q6H PRN Ankur Villavicencio MD        HYDROmorphone (DILAUDID) injection 0.5 mg  0.5 mg IntraVENous Q3H PRN Ankur Villavicencio MD        ondansetron [facility]) injection 4 mg  4 mg IntraVENous Q6H PRN Ankur Villavicencio MD        vancomycin (VANCOCIN) 1,000 mg in 0.9% sodium chloride 250 mL ([id number])  1,000 mg IntraVENous Q12H Ankur Villavicencio  mL/hr at 12/20/22 0910 1,000 mg at 12/20/22 0910        No Known Allergies     Review of Systems:  A comprehensive review of systems was negative except for that written in the History of Present Illness. Objective: Intake and Output:    No intake/output data recorded. No intake/output data recorded. Physical Exam:   General appearance: alert, cooperative, no distress, appears stated age  Lungs: clear to auscultation bilaterally  Heart: regular rate and rhythm, S1, S2 normal, no murmur, click, rub or gallop  Abdomen: soft, non-tender.  Bowel sounds normal. No masses,  no organomegaly  Skin: large sacral wound with gray slough and foul odor    Data Review:   Recent Results (from the past 24 hour(s))   CULTURE, BLOOD, PAIRED Collection Time: 12/19/22  5:15 PM    Specimen: Blood   Result Value Ref Range    Special Requests: NO SPECIAL REQUESTS      Culture result: NO GROWTH AFTER 13 HOURS     METABOLIC PANEL, COMPREHENSIVE    Collection Time: 12/19/22  5:34 PM   Result Value Ref Range    Sodium 141 136 - 145 mmol/L    Potassium 3.9 3.5 - 5.1 mmol/L    Chloride 106 97 - 108 mmol/L    CO2 26 21 - 32 mmol/L    Anion gap 9 5 - 15 mmol/L    Glucose 84 65 - 100 mg/dL    BUN 14 6 - 20 MG/DL    Creatinine 0.89 0.70 - 1.30 MG/DL    BUN/Creatinine ratio 16 12 - 20      eGFR >60 >60 ml/min/1.73m2    Calcium 9.1 8.5 - 10.1 MG/DL    Bilirubin, total 1.0 0.2 - 1.0 MG/DL    ALT (SGPT) 57 12 - 78 U/L    AST (SGOT) 47 (H) 15 - 37 U/L    Alk. phosphatase 118 (H) 45 - 117 U/L    Protein, total 7.8 6.4 - 8.2 g/dL    Albumin 2.9 (L) 3.5 - 5.0 g/dL    Globulin 4.9 (H) 2.0 - 4.0 g/dL    A-G Ratio 0.6 (L) 1.1 - 2.2     CBC WITH AUTOMATED DIFF    Collection Time: 12/19/22  5:34 PM   Result Value Ref Range    WBC 11.2 (H) 4.1 - 11.1 K/uL    RBC 4.54 4.10 - 5.70 M/uL    HGB 13.4 12.1 - 17.0 g/dL    HCT 40.7 36.6 - 50.3 %    MCV 89.6 80.0 - 99.0 FL    MCH 29.5 26.0 - 34.0 PG    MCHC 32.9 30.0 - 36.5 g/dL    RDW 13.2 11.5 - 14.5 %    PLATELET 124 347 - 396 K/uL    MPV 9.9 8.9 - 12.9 FL    NRBC 0.0 0  WBC    ABSOLUTE NRBC 0.00 0.00 - 0.01 K/uL    NEUTROPHILS 75 32 - 75 %    LYMPHOCYTES 13 12 - 49 %    MONOCYTES 9 5 - 13 %    EOSINOPHILS 1 0 - 7 %    BASOPHILS 1 0 - 1 %    IMMATURE GRANULOCYTES 1 (H) 0.0 - 0.5 %    ABS. NEUTROPHILS 8.4 (H) 1.8 - 8.0 K/UL    ABS. LYMPHOCYTES 1.5 0.8 - 3.5 K/UL    ABS. MONOCYTES 1.0 0.0 - 1.0 K/UL    ABS. EOSINOPHILS 0.1 0.0 - 0.4 K/UL    ABS. BASOPHILS 0.1 0.0 - 0.1 K/UL    ABS. IMM.  GRANS. 0.1 (H) 0.00 - 0.04 K/UL    DF AUTOMATED     MAGNESIUM    Collection Time: 12/19/22  5:34 PM   Result Value Ref Range    Magnesium 1.9 1.6 - 2.4 mg/dL   CBC W/O DIFF    Collection Time: 12/20/22  2:20 AM   Result Value Ref Range    WBC 11. 8 (H) 4.1 - 11.1 K/uL    RBC 4.51 4.10 - 5.70 M/uL    HGB 13.0 12.1 - 17.0 g/dL    HCT 40.1 36.6 - 50.3 %    MCV 88.9 80.0 - 99.0 FL    MCH 28.8 26.0 - 34.0 PG    MCHC 32.4 30.0 - 36.5 g/dL    RDW 13.2 11.5 - 14.5 %    PLATELET 505 764 - 643 K/uL    MPV 9.7 8.9 - 12.9 FL    NRBC 0.0 0  WBC    ABSOLUTE NRBC 0.00 0.00 - 0.16 K/uL   METABOLIC PANEL, BASIC    Collection Time: 12/20/22  2:20 AM   Result Value Ref Range    Sodium 139 136 - 145 mmol/L    Potassium 4.0 3.5 - 5.1 mmol/L    Chloride 106 97 - 108 mmol/L    CO2 27 21 - 32 mmol/L    Anion gap 6 5 - 15 mmol/L    Glucose 151 (H) 65 - 100 mg/dL    BUN 13 6 - 20 MG/DL    Creatinine 1.02 0.70 - 1.30 MG/DL    BUN/Creatinine ratio 13 12 - 20      eGFR >60 >60 ml/min/1.73m2    Calcium 8.7 8.5 - 10.1 MG/DL   HEMOGLOBIN    Collection Time: 12/20/22  8:17 AM   Result Value Ref Range    HGB 13.1 12.1 - 17.0 g/dL     Assessment:     56year old male with history of MS who presents with an infected unstageable pressure injury on his sacrum. Wound needs debridement. Discussed procedure with the patient. Outlined risks including bleeding, infection, anesthesia risks and need for further debridement. He understands and agrees to surgery      Plan:       To OR for debridement

## 2022-12-20 NOTE — ANESTHESIA POSTPROCEDURE EVALUATION
Procedure(s):  DEBRIDEMENT OF SACRAL DECUBITIS. general    Anesthesia Post Evaluation      Multimodal analgesia: multimodal analgesia used between 6 hours prior to anesthesia start to PACU discharge  Patient location during evaluation: PACU  Patient participation: complete - patient participated  Level of consciousness: sleepy but conscious and responsive to verbal stimuli  Pain score: 2  Pain management: adequate  Airway patency: patent  Anesthetic complications: no  Cardiovascular status: acceptable  Respiratory status: acceptable  Hydration status: acceptable  Comments: +Post-Anesthesia Evaluation and Assessment    Patient: Jake Van MRN: [id number]  SSN: [id number]   YOB: 1965  Age: 56 y.o. Sex: male      Cardiovascular Function/Vital Signs    /74   Pulse 96   Temp 36.8 °C (98.2 °F)   Resp 14   Ht 6' (1.829 m)   Wt 90.7 kg (200 lb)   SpO2 96%   BMI 27.12 kg/m²     Patient is status post Procedure(s):  DEBRIDEMENT OF SACRAL DECUBITIS. Nausea/Vomiting: Controlled. Postoperative hydration reviewed and adequate. Pain:  Pain Scale 1: Numeric (0 - 10) (12/20/22 1430)  Pain Intensity 1: 0 (12/20/22 1430)   Managed. Neurological Status:   Neuro (WDL): Exceptions to WDL (12/20/22 1403)   At baseline. Mental Status and Level of Consciousness: Arousable. Pulmonary Status:   O2 Device: Nasal cannula (12/20/22 1430)   Adequate oxygenation and airway patent. Complications related to anesthesia: None    Post-anesthesia assessment completed. No concerns.     Signed By: Ran Pérez MD    12/20/2022  Post anesthesia nausea and vomiting:  controlled  Final Post Anesthesia Temperature Assessment:  Normothermia (36.0-37.5 degrees C)      INITIAL Post-op Vital signs:   Vitals Value Taken Time   /78 12/20/22 1500   Temp 36.8 °C (98.2 °F) 12/20/22 1405   Pulse 96 12/20/22 1501   Resp 11 12/20/22 1501   SpO2 96 % 12/20/22 1501   Vitals shown include unvalidated device data.

## 2022-12-20 NOTE — BRIEF OP NOTE
Brief Postoperative Note    Patient: Ambreen Prajapati  YOB: 1965  MRN: [id number]    Date of Procedure: 12/20/2022     Pre-Op Diagnosis: infected, unstageable sacral pressure injury    Post-Op Diagnosis: infected, stage IV  sacral decubitus     Procedure(s):  DEBRIDEMENT OF 88 cm2 of skin and subcutaneous fat  from sacrum     Surgeon(s):  Michael Tinoco MD    Surgical Assistant: Surg Asst-1: Carlo King    Anesthesia: General     Estimated Blood Loss (mL): Minimal    Complications: None    Specimens:   ID Type Source Tests Collected by Time Destination   1 : gluteal abscess Preservative Buttock  Michael Tinoco MD 12/20/2022 1328 Pathology        Implants: * No implants in log *    Drains: * No LDAs found *    Findings:               Electronically Signed by Homa Rodriguez MD on 12/20/2022 at 2:01 PM

## 2022-12-21 LAB
ANION GAP SERPL CALC-SCNC: 4 MMOL/L (ref 5–15)
BUN SERPL-MCNC: 10 MG/DL (ref 6–20)
BUN/CREAT SERPL: 13 (ref 12–20)
CALCIUM SERPL-MCNC: 8.2 MG/DL (ref 8.5–10.1)
CHLORIDE SERPL-SCNC: 109 MMOL/L (ref 97–108)
CO2 SERPL-SCNC: 29 MMOL/L (ref 21–32)
CREAT SERPL-MCNC: 0.77 MG/DL (ref 0.7–1.3)
DATE LAST DOSE: NORMAL
ERYTHROCYTE [DISTWIDTH] IN BLOOD BY AUTOMATED COUNT: 13.1 % (ref 11.5–14.5)
GLUCOSE SERPL-MCNC: 113 MG/DL (ref 65–100)
HCT VFR BLD AUTO: 35.1 % (ref 36.6–50.3)
HGB BLD-MCNC: 11.3 G/DL (ref 12.1–17)
MCH RBC QN AUTO: 29.3 PG (ref 26–34)
MCHC RBC AUTO-ENTMCNC: 32.2 G/DL (ref 30–36.5)
MCV RBC AUTO: 90.9 FL (ref 80–99)
NRBC # BLD: 0 K/UL (ref 0–0.01)
NRBC BLD-RTO: 0 PER 100 WBC
PLATELET # BLD AUTO: 310 K/UL (ref 150–400)
PMV BLD AUTO: 9.7 FL (ref 8.9–12.9)
POTASSIUM SERPL-SCNC: 3.7 MMOL/L (ref 3.5–5.1)
RBC # BLD AUTO: 3.86 M/UL (ref 4.1–5.7)
REPORTED DOSE,DOSE: NORMAL UNITS
REPORTED DOSE/TIME,TMG: NORMAL
SODIUM SERPL-SCNC: 142 MMOL/L (ref 136–145)
VANCOMYCIN TROUGH SERPL-MCNC: 9.7 UG/ML (ref 5–10)
WBC # BLD AUTO: 9.9 K/UL (ref 4.1–11.1)

## 2022-12-21 PROCEDURE — 0JB70ZZ EXCISION OF BACK SUBCUTANEOUS TISSUE AND FASCIA, OPEN APPROACH: ICD-10-PCS | Performed by: SURGERY

## 2022-12-21 PROCEDURE — 85027 COMPLETE CBC AUTOMATED: CPT

## 2022-12-21 PROCEDURE — 36415 COLL VENOUS BLD VENIPUNCTURE: CPT

## 2022-12-21 PROCEDURE — 74011250636 HC RX REV CODE- 250/636: Performed by: SURGERY

## 2022-12-21 PROCEDURE — 80202 ASSAY OF VANCOMYCIN: CPT

## 2022-12-21 PROCEDURE — 74011250637 HC RX REV CODE- 250/637: Performed by: SURGERY

## 2022-12-21 PROCEDURE — 80048 BASIC METABOLIC PNL TOTAL CA: CPT

## 2022-12-21 PROCEDURE — 74011250636 HC RX REV CODE- 250/636: Performed by: NURSE PRACTITIONER

## 2022-12-21 PROCEDURE — 65270000029 HC RM PRIVATE

## 2022-12-21 RX ORDER — VANCOMYCIN HYDROCHLORIDE
1250 EVERY 12 HOURS
Status: DISCONTINUED | OUTPATIENT
Start: 2022-12-21 | End: 2022-12-22 | Stop reason: HOSPADM

## 2022-12-21 RX ORDER — ENOXAPARIN SODIUM 100 MG/ML
40 INJECTION SUBCUTANEOUS EVERY 24 HOURS
Status: DISCONTINUED | OUTPATIENT
Start: 2022-12-21 | End: 2022-12-22 | Stop reason: HOSPADM

## 2022-12-21 RX ADMIN — HYDROMORPHONE HYDROCHLORIDE 0.5 MG: 1 INJECTION, SOLUTION INTRAMUSCULAR; INTRAVENOUS; SUBCUTANEOUS at 21:37

## 2022-12-21 RX ADMIN — ENOXAPARIN SODIUM 40 MG: 100 INJECTION SUBCUTANEOUS at 16:44

## 2022-12-21 RX ADMIN — MEMANTINE 10 MG: 10 TABLET ORAL at 09:08

## 2022-12-21 RX ADMIN — PREGABALIN 50 MG: 50 CAPSULE ORAL at 17:56

## 2022-12-21 RX ADMIN — SENNOSIDES AND DOCUSATE SODIUM 1 TABLET: 50; 8.6 TABLET ORAL at 09:08

## 2022-12-21 RX ADMIN — VANCOMYCIN HYDROCHLORIDE 1250 MG: 10 INJECTION, POWDER, LYOPHILIZED, FOR SOLUTION INTRAVENOUS at 21:35

## 2022-12-21 RX ADMIN — POTASSIUM CHLORIDE, DEXTROSE MONOHYDRATE AND SODIUM CHLORIDE 125 ML/HR: 150; 5; 450 INJECTION, SOLUTION INTRAVENOUS at 04:00

## 2022-12-21 RX ADMIN — POTASSIUM CHLORIDE, DEXTROSE MONOHYDRATE AND SODIUM CHLORIDE 125 ML/HR: 150; 5; 450 INJECTION, SOLUTION INTRAVENOUS at 14:53

## 2022-12-21 RX ADMIN — TIZANIDINE 4 MG: 4 TABLET ORAL at 21:37

## 2022-12-21 RX ADMIN — PREGABALIN 50 MG: 50 CAPSULE ORAL at 09:08

## 2022-12-21 RX ADMIN — SENNOSIDES AND DOCUSATE SODIUM 1 TABLET: 50; 8.6 TABLET ORAL at 17:56

## 2022-12-21 RX ADMIN — VANCOMYCIN HYDROCHLORIDE 1000 MG: 1 INJECTION, POWDER, LYOPHILIZED, FOR SOLUTION INTRAVENOUS at 09:08

## 2022-12-21 NOTE — PROGRESS NOTES
Surgery NP Progress Note    Chandan Zhou  [id number]  male  56 y.o.  1965    s/p DEBRIDEMENT OF SACRAL DECUBITIS on 2022   Pt seen with Dr. Remy Edmondson    Assessment:   Active Problems:    Gluteal abscess (2022)        Expected post-op progress. Plan/Recommendations/Medical Decision Making:     - Attention to bed mobility and offloading.   - Specialty bed being ordered. - Continue diet  - Pain management- Continue current pain control methods.   - VTE Prophylaxis: Lovenox  - Plan for return to [facility] tomorrow with plans for them to initiate [facility] therapy. Subjective:     Patient states \"it hurts some\". His sister is at bedside and notes he first started reporting the pain on Thursday. They ask appropriate questions about bowel function. Objective:     Blood pressure 100/65, pulse 93, temperature 97.7 °F (36.5 °C), resp. rate 16, height 6' (1.829 m), weight 90.7 kg (200 lb), SpO2 98 %. Temp (24hrs), Av.3 °F (36.8 °C), Min:97.7 °F (36.5 °C), Max:99 °F (37.2 °C)      Pt resting in bed. NAD   Wound as documented in wound care note. SCDs for mechanical DVT proph while in bed     Body mass index is 27.12 kg/m². Reference: BMI greater than 30 is classified as obesity and greater than 40 is classified as morbid obesity.      Last 3 Recorded Weights in this Encounter    22 1703   Weight: 90.7 kg (200 lb)         Ortiz Michelle NP   MSN, APRN, FNP-C, CWOCN-AP, RNFA    22

## 2022-12-21 NOTE — PROGRESS NOTES
Problem: Pressure Injury - Risk of  Goal: *Prevention of pressure injury  Description: Document Italo Scale and appropriate interventions in the flowsheet. Outcome: Progressing Towards Goal  Note: Pressure Injury Interventions:  Sensory Interventions: Assess changes in LOC, Assess need for specialty bed, Keep linens dry and wrinkle-free, Maintain/enhance activity level, Minimize linen layers    Moisture Interventions: Absorbent underpads    Activity Interventions: Pressure redistribution bed/mattress(bed type), Increase time out of bed    Mobility Interventions: HOB 30 degrees or less, Float heels    Nutrition Interventions: Document food/fluid/supplement intake    Friction and Shear Interventions: Lift sheet, HOB 30 degrees or less, Foam dressings/transparent film/skin sealants, Minimize layers                Problem: Patient Education: Go to Patient Education Activity  Goal: Patient/Family Education  Outcome: Progressing Towards Goal     Problem: Falls - Risk of  Goal: *Absence of Falls  Description: Document Johan Fall Risk and appropriate interventions in the flowsheet.   Outcome: Progressing Towards Goal  Note: Fall Risk Interventions:                                Problem: Patient Education: Go to Patient Education Activity  Goal: Patient/Family Education  Outcome: Progressing Towards Goal

## 2022-12-21 NOTE — PROGRESS NOTES
End of Shift Note    Bedside shift change report given to TERE Cool (oncoming nurse) by Judyth Romberg, RN (offgoing nurse). Report included the following information SBAR, Kardex, Intake/Output, and Recent Results    Shift worked:  7am-7pm     Shift summary and any significant changes:     Pt arrived to the unit late in the shift, refused dinner, this nurse phoned pts sister per his request, no c/o pain      Concerns for physician to address:  none     Zone phone for oncoming shift:   [phone]       Activity:  Activity Level: Chair  Number times ambulated in hallways past shift: 0  Number of times OOB to chair past shift: 0    Cardiac:   Cardiac Monitoring: No      Cardiac Rhythm: Sinus Rhythm    Access:  Current line(s): PIV     Genitourinary:   Urinary status: due to void    Respiratory:   O2 Device: None (Room air)  Chronic home O2 use?: NO  Incentive spirometer at bedside: NO       GI:     Current diet:  ADULT DIET Regular  DIET ONE TIME MESSAGE  Passing flatus: YES  Tolerating current diet: YES       Pain Management:   Patient states pain is manageable on current regimen: N/A    Skin:  Italo Score: 14  Interventions: increase time out of bed    Patient Safety:  Fall Score:  Total Score: 3  Interventions: gripper socks and pt to call before getting OOB       Length of Stay:  Expected LOS: - - -  Actual LOS: 1      Judyth Romberg, RN

## 2022-12-21 NOTE — PROGRESS NOTES
Reason for Admission:  Gluteal Abscess                     RUR Score:  11%                   Plan for utilizing home health:   LTC       PCP: First and Last name:  Cameron Thomas MD     Name of Practice:    Are you a current patient: Yes/No:    Approximate date of last visit: unsure   Can you participate in a virtual visit with your PCP:                     Current Advanced Directive/Advance Care Plan: Full Code  CM confirmed patient is a Full Code     Healthcare Decision Maker:   Click here to complete [address] including selection of the Healthcare Decision Maker Relationship (ie \"Primary\")             Primary Decision Maker: [facility] - [phone]    Secondary Decision Maker: Dahlia Garcia [facility] - [phone]    Supplemental (Other) Decision Maker: Sienna Box [facility] - [phone]                  Transition of Care Plan:                    Patient is a LTC resident at [facility] and [address] [place] [address]. Patient plans to return there at discharge. Patient is mostly bedbound.   Current Dispo: LTC

## 2022-12-21 NOTE — PROGRESS NOTES
Problem: Pressure Injury - Risk of  Goal: *Prevention of pressure injury  Description: Document Italo Scale and appropriate interventions in the flowsheet. Outcome: Progressing Towards Goal  Note: Pressure Injury Interventions:  Sensory Interventions: Assess need for specialty bed, Assess changes in LOC, Maintain/enhance activity level    Moisture Interventions: Absorbent underpads, Check for incontinence Q2 hours and as needed    Activity Interventions: Assess need for specialty bed, Increase time out of bed    Mobility Interventions: HOB 30 degrees or less, Pressure redistribution bed/mattress (bed type)    Nutrition Interventions: Document food/fluid/supplement intake    Friction and Shear Interventions: HOB 30 degrees or less, Lift sheet                Problem: Patient Education: Go to Patient Education Activity  Goal: Patient/Family Education  Outcome: Progressing Towards Goal     Problem: Falls - Risk of  Goal: *Absence of Falls  Description: Document Johan Fall Risk and appropriate interventions in the flowsheet.   Outcome: Progressing Towards Goal  Note: Fall Risk Interventions:                                Problem: Patient Education: Go to Patient Education Activity  Goal: Patient/Family Education  Outcome: Progressing Towards Goal

## 2022-12-21 NOTE — PROGRESS NOTES
Comprehensive Nutrition Assessment    Type and Reason for Visit: Initial, Wound    Nutrition Recommendations/Plan:   Continue diet as tolerated  RD to add Ensure High Protein BID and Ensure Plus High Protein daily  Please document % meals and supplements consumed in flowsheet I/O's under intake      Malnutrition Assessment:  Malnutrition Status:  No malnutrition (12/21/22 1539)        Nutrition Assessment:  Pt admitted with gluteal abscess. PMH: MS, nonambulatory, DM (A1c only 5.4). Chart reviewed for wound triggered. MST negative for any recent wt loss or poor appetite. Pt lying in bed awake and alert. He is s/p wound debridement, WOCN is following and wound vac placed. He reports a good appetite and that he enjoys candy like [employer]. Discussed the need for increased protein to help his wound heal.  Pt 'loves' ensure and prefers vanilla or stawberry, willing to consume 1 on every meal tray. Labs reviewed, A1c well controlled. Will monitor BG during this admission. Wt Readings from Last 10 Encounters:   12/19/22 90.7 kg (200 lb)   04/16/22 81.8 kg (180 lb 5.4 oz)   03/22/22 81.8 kg (180 lb 6.4 oz)   11/13/21 84 kg (185 lb 3 oz)   08/12/21 88 kg (194 lb)   08/12/21 88 kg (194 lb)   01/15/20 86.1 kg (189 lb 12.8 oz)   06/06/19 103.4 kg (228 lb)   04/10/19 102.5 kg (226 lb)   02/18/19 107 kg (236 lb)            Nutrition Related Findings:    Meds: pericolace, vancomycin, KCl, [email or web address]. BM PTA Wound Type: Stage IV, Surgical incision    Current Nutrition Intake & Therapies:  Average Meal Intake: 51-75%  Average Supplement Intake: None ordered  ADULT DIET Regular  ADULT ORAL NUTRITION SUPPLEMENT Dinner, Breakfast; Low Calorie/High Protein  ADULT ORAL NUTRITION SUPPLEMENT Lunch; Standard High Calorie/High Protein  DIET ONE TIME MESSAGE    Anthropometric Measures:  Height: 6' (182.9 cm)  Ideal Body Weight (IBW): 178 lbs (81 kg)     Current Body Wt:  90.7 kg (199 lb 15.3 oz), 112.3 % IBW.  Not specified  Current BMI (kg/m2): 27.1                          BMI Category: Overweight (BMI 25.0-29. 9)    Estimated Daily Nutrient Needs:  Energy Requirements Based On: Formula  Weight Used for Energy Requirements: Current  Energy (kcal/day): MSJ 1950 (1781 x 1.1)  Weight Used for Protein Requirements: Current  Protein (g/day): 91-109g (1-1.2gPro/kg)  Method Used for Fluid Requirements: 1 ml/kcal  Fluid (ml/day): 2000mL    Nutrition Diagnosis:   Increased nutrient needs related to increased demand for energy/nutrients as evidenced by wounds    Nutrition Interventions:   Food and/or Nutrient Delivery: Continue current diet, Start oral nutrition supplement  Nutrition Education/Counseling: No recommendations at this time  Coordination of Nutrition Care: Continue to monitor while inpatient       Goals:     Goals: PO intake 75% or greater, by next RD assessment       Nutrition Monitoring and Evaluation:   Behavioral-Environmental Outcomes: None identified  Food/Nutrient Intake Outcomes: Food and nutrient intake, Supplement intake  Physical Signs/Symptoms Outcomes: Biochemical data, Nutrition focused physical findings, Skin, Weight    Discharge Planning:    Continue current diet, Continue oral nutrition supplement    King Sanchez RD, CNSC  Contact: ext [phone]

## 2022-12-21 NOTE — WOUND CARE
Wound Care nurse consult for newly debrided sacral pressure injury. Chart reviewed and patient assessed with Kimberlyn Morgan NP.    55 y/o AAM admitted for infected sacral wound. POD# 1 from sacral debridement in OR with Dr Anitha Person. Past Medical History:   Diagnosis Date    Back pain 10/8/2010    Back pain 10/8/2010    Depression     Diabetes ([address]     Fatigue 8/24/2012    Hearing loss 1/25/2013    Memory loss 1/25/2013    MS (multiple sclerosis) ([facility])     Muscle pain     Muscle weakness     Poor appetite     Snoring     Unintentional weight change     Visual disturbance      Past Surgical History:   Procedure Laterality Date    HX HEENT Right     ear     Patient is a resident of [address] [place] [address]  Sacral Stage 4 Pressure Injury:    WOUND POA CONDITIONS    Wound Sacrum Lower;Mid 12/21/22 (Active)   Wound Image   12/21/22 1506   Wound Etiology Pressure Stage 4 12/21/22 1506   Dressing Status New dressing applied 12/21/22 1506   Cleansed Cleansed with saline 12/21/22 1506   Dressing/Treatment Packing;Moist to dry 12/21/22 1506   Dressing Change Due 12/22/22 12/21/22 1506   Wound Length (cm) 8.5 cm 12/21/22 1506   Wound Width (cm) 12 cm 12/21/22 1506   Wound Depth (cm) 2.5 cm 12/21/22 1506   Wound Surface Area (cm^2) 102 cm^2 12/21/22 1506   Wound Volume (cm^3) 255 cm^3 12/21/22 1506   Wound Assessment Granulation tissue 12/21/22 1506   Drainage Amount Moderate 12/21/22 1506   Drainage Description Serosanguinous 12/21/22 1506   Wound Odor None 12/21/22 1506   Janet-Wound/Incision Assessment Fragile 12/21/22 1506   Edges Defined edges 12/21/22 1506   Wound Thickness Description Full thickness 12/21/22 1506   Number of days: 1       Incision 12/20/22 Buttocks (Active)   Dressing Status Intact; New drainage noted 12/21/22 0908   Dressing/Treatment Gauze dressing/dressing sponge 12/21/22 0908   Drainage Amount Moderate 12/21/22 0908   Wound Odor None 12/21/22 0908   Number of days: 1       Patient has room between wound and anal verge to get a seal with a wound vac/NPWT dressing. Recommend:  Sacral wound: until wound vac dressing can [place] [facility] [place] - daily cleanse with Vashe wound cleanser (normal saline if Vashe not available), pack wound with Vashe moist Kerlix/Bulkee rolled gauze and secure with dry dressing/foam.    Wound VAC orders:  Sacral full thickness wound  -125 mm/hg, continuous  Large black foam kit with bridge to right/left flank  Dressing changes 2-3x/week    Plan: daily dressing changes as ordered until discharge back to [facility] and wound vac can be applied.     eVrona Wilson RN, [employer]

## 2022-12-21 NOTE — PROGRESS NOTES
KARLIE spoke with Darnell Damon for [id number] [employer] can accept patient tomorrow and is in process of getting wound vac and KARLIE has also requested speciality mattress for bed as well - CM sent orders via fax at [phone] and also opened chart via cc link to [facility] - they will order needed DME and anticipate patient returning to facility once DME in place tomorrow. CM spoke with patient and [facility] sister and made them aware of return to facility. Medicare pt has received, reviewed, and signed 2nd IM letter informing them of their right to appeal the discharge. Signed copied has been placed on pt bedside chart.       Will follow up in am.  JUNO Roland

## 2022-12-21 NOTE — PROGRESS NOTES
End of Shift Note    Bedside shift change report given to Ruthie Hutton RN (oncoming nurse) by Rochelle Apgar, RN (offgoing nurse). Report included the following information SBAR, Kardex, Intake/Output, and Recent Results    Shift worked: 7 pm - 7 am     Shift summary and any significant changes:    Pt observed to have cherie red drainage that soaked through surgical dressing. Dressing reinforced, linens changed. Pt requested a diaper, d/t inability to ambulate, incidence of urinary incontinence, and dressing on sacrum, pt was provided an external catheter. Pt unable to complete medication reconciliation. Consult to pharmacy ordered. Pt requested valuables returned from security. Security brought 1 necklace, 1 watch and 2 rings to bedside. Pt unable to sign, witnessed by this Gregorio Nguyễn RN, and the . Concerns for physician to address:  none     Zone phone for oncoming shift:   [phone]       Activity:  Activity Level: Chair  Number times ambulated in hallways past shift: 0  Number of times OOB to chair past shift: 0    Cardiac:   Cardiac Monitoring: No      Cardiac Rhythm: Sinus Rhythm    Access:  Current line(s): PIV     Genitourinary:   Urinary status: Voiding, incontinent    Respiratory:   O2 Device: None, None (Room air)  Chronic home O2 use?: NO  Incentive spirometer at bedside: NO       GI:     Current diet:  ADULT DIET Regular  DIET ONE TIME MESSAGE  Passing flatus: YES  Tolerating current diet: YES       Pain Management:   Patient states pain is manageable on current regimen: N/A    Skin:  Italo Score: 15  Interventions: increase time out of bed    Patient Safety:  Fall Score:  Total Score: 3  Interventions: gripper socks and pt to call before getting OOB       Length of Stay:  Expected LOS: - - -  Actual LOS: 2      Rochelle Apgar, RN

## 2022-12-22 VITALS
OXYGEN SATURATION: 94 % | TEMPERATURE: 98.1 F | DIASTOLIC BLOOD PRESSURE: 90 MMHG | BODY MASS INDEX: 27.09 KG/M2 | HEART RATE: 84 BPM | WEIGHT: 200 LBS | SYSTOLIC BLOOD PRESSURE: 124 MMHG | RESPIRATION RATE: 18 BRPM | HEIGHT: 72 IN

## 2022-12-22 LAB
ANION GAP SERPL CALC-SCNC: 6 MMOL/L (ref 5–15)
BUN SERPL-MCNC: 5 MG/DL (ref 6–20)
BUN/CREAT SERPL: 7 (ref 12–20)
CALCIUM SERPL-MCNC: 8.1 MG/DL (ref 8.5–10.1)
CHLORIDE SERPL-SCNC: 110 MMOL/L (ref 97–108)
CO2 SERPL-SCNC: 27 MMOL/L (ref 21–32)
CREAT SERPL-MCNC: 0.67 MG/DL (ref 0.7–1.3)
ERYTHROCYTE [DISTWIDTH] IN BLOOD BY AUTOMATED COUNT: 13.1 % (ref 11.5–14.5)
GLUCOSE SERPL-MCNC: 112 MG/DL (ref 65–100)
HCT VFR BLD AUTO: 32.6 % (ref 36.6–50.3)
HGB BLD-MCNC: 10.5 G/DL (ref 12.1–17)
MCH RBC QN AUTO: 29.1 PG (ref 26–34)
MCHC RBC AUTO-ENTMCNC: 32.2 G/DL (ref 30–36.5)
MCV RBC AUTO: 90.3 FL (ref 80–99)
NRBC # BLD: 0 K/UL (ref 0–0.01)
NRBC BLD-RTO: 0 PER 100 WBC
PLATELET # BLD AUTO: 298 K/UL (ref 150–400)
PMV BLD AUTO: 9.4 FL (ref 8.9–12.9)
POTASSIUM SERPL-SCNC: 3.6 MMOL/L (ref 3.5–5.1)
RBC # BLD AUTO: 3.61 M/UL (ref 4.1–5.7)
SODIUM SERPL-SCNC: 143 MMOL/L (ref 136–145)
WBC # BLD AUTO: 6.4 K/UL (ref 4.1–11.1)

## 2022-12-22 PROCEDURE — 85027 COMPLETE CBC AUTOMATED: CPT

## 2022-12-22 PROCEDURE — 36415 COLL VENOUS BLD VENIPUNCTURE: CPT

## 2022-12-22 PROCEDURE — 80048 BASIC METABOLIC PNL TOTAL CA: CPT

## 2022-12-22 PROCEDURE — 74011250637 HC RX REV CODE- 250/637: Performed by: SURGERY

## 2022-12-22 PROCEDURE — 74011250636 HC RX REV CODE- 250/636: Performed by: SURGERY

## 2022-12-22 RX ORDER — OXYCODONE HYDROCHLORIDE 5 MG/1
5 TABLET ORAL
Qty: 10 TABLET | Refills: 0 | Status: SHIPPED | OUTPATIENT
Start: 2022-12-22 | End: 2022-12-25

## 2022-12-22 RX ORDER — OXYCODONE HYDROCHLORIDE 5 MG/1
5 TABLET ORAL
Qty: 10 TABLET | Refills: 0 | Status: SHIPPED | OUTPATIENT
Start: 2022-12-22 | End: 2022-12-22 | Stop reason: SDUPTHER

## 2022-12-22 RX ADMIN — OXYCODONE 5 MG: 5 TABLET ORAL at 08:37

## 2022-12-22 RX ADMIN — PREGABALIN 50 MG: 50 CAPSULE ORAL at 08:37

## 2022-12-22 RX ADMIN — HYDROMORPHONE HYDROCHLORIDE 0.5 MG: 1 INJECTION, SOLUTION INTRAMUSCULAR; INTRAVENOUS; SUBCUTANEOUS at 00:46

## 2022-12-22 RX ADMIN — POTASSIUM CHLORIDE, DEXTROSE MONOHYDRATE AND SODIUM CHLORIDE 125 ML/HR: 150; 5; 450 INJECTION, SOLUTION INTRAVENOUS at 01:07

## 2022-12-22 RX ADMIN — SENNOSIDES AND DOCUSATE SODIUM 1 TABLET: 50; 8.6 TABLET ORAL at 08:37

## 2022-12-22 RX ADMIN — VANCOMYCIN HYDROCHLORIDE 1250 MG: 10 INJECTION, POWDER, LYOPHILIZED, FOR SOLUTION INTRAVENOUS at 08:57

## 2022-12-22 RX ADMIN — POTASSIUM CHLORIDE, DEXTROSE MONOHYDRATE AND SODIUM CHLORIDE 125 ML/HR: 150; 5; 450 INJECTION, SOLUTION INTRAVENOUS at 08:54

## 2022-12-22 RX ADMIN — MEMANTINE 10 MG: 10 TABLET ORAL at 08:37

## 2022-12-22 NOTE — PROGRESS NOTES
Transition of Care Plan to SNF/Rehab    SNF/Rehab Transition:  Patient has been accepted to [address] and meets criteria for admission. Patient will transported by Hospital to home and expected to leave at 3 pm.    Communication to Patient/Family:  Met with patient and Cleveland Barajas (identified care giver) and they are agreeable to the transition plan. Communication to SNF/Rehab:  Bedside RN, Zia Ramírez, has been notified to update the transition plan to the facility and call report (phone number [phone]). Discharge information has been updated on the AVS.       MEdical record opened to [facility] via [address] Please include all hard scripts for controlled substances, med rec and dc summary, and AVS in packet. Reviewed and confirmed with facility, Tay Miranda, can manage the patient care needs for the following:     Elizabeth Becker with (X) only those applicable:    Medication:  [x]  Medications will be available at the facility  []  IV Antibiotics   []  Controlled Substance - hard copy to be sent with patient   []  Weekly Labs   Documents:  [] Hard RX  [] MAR  [] Kardex  [] AVS  []Transfer Summary  [x]Discharge   Equipment:  []  CPAP/BiPAP  [x]  Wound Vacuum  []  Thurman or Urinary Device  []  PICC/Central Line  []  Nebulizer  []  Ventilator   Treatment:  []Isolation (for MRSA, VRE, etc.)  []Surgical Drain Management  []Tracheostomy Care  []Dressing Changes  []Dialysis with transportation and chair time . []PEG Care  []Oxygen  []Daily Weights for Heart Failure   Dietary:  []Any diet limitations  []Tube Feedings   []Total Parenteral Management (TPN)   Eligible for Medicaid Long Term Services and Supports  Yes:  [] Eligible for medical assistance or will become eligible within 180 days and UAI completed. [] Provider/Patient and/or support system has requested screening. [] UAI copy provided to patient or responsible party,  [] UAI unavailable at discharge will send once processed to SNF provider.   [] UAI unavailable at discharged mailed to patient  No:   [] Private pay and is not financially eligible for Medicaid within the next 180 days. [] Reside out-of-state.   [] A residents of a state owned/operated facility that is licensed  by [address] [place] [facility] or Merged with [facility]  [] Enrollment in [facility] hospice services  [] [address] [place] [address]  [] Patient /Family declines to have screening completed or provide financial information for screening     Financial Resources:  Medicaid    [] Initiated and application pending   [x] Full coverage     Advanced Care Plan:  []Surrogate Decision Maker of Care  []POA  []Communicated Code Status full (DDNR\", \"Full\")    Other

## 2022-12-22 NOTE — PROGRESS NOTES
Transition of Care Plan:    RUR: 10%  Disposition: [facility]  If SNF or IPR: Date FOC offered:  Date FOC received:  Date authorization started with reference number:  Date authorization received and expires:  Accepting facility:  Follow up appointments:  DME needed:  Transportation at Discharge: Hospital to home at 3 pm  [place] or means to access home:        IM Medicare Letter: delivered 12/21/22  Is patient a [place] and connected with the [place]? If yes, was [employer] transfer form completed and VA notified? Caregiver Contact:    Primary Decision Maker: [facility] - Parent - [phone]    Secondary Decision Maker: Kyle Ellis - Sister - [phone]    Supplemental (Other) Decision Maker: Kip Luis Sister - [phone]                  Discharge Caregiver contacted prior to discharge? yes  Care Conference needed?:       no    Care Management Interventions  PCP Verified by CM:  Yes  Mode of Transport at Discharge: BLS  Transition of Care Consult (CM Consult): Discharge Planning  Support Systems: Other Family Member(s)  Confirm Follow Up Transport: Other (see comment) (LTC)  Discharge Location  Patient Expects to be Discharged to[de-identified] [facility]

## 2022-12-22 NOTE — PROGRESS NOTES
Problem: Pressure Injury - Risk of  Goal: *Prevention of pressure injury  Description: Document Italo Scale and appropriate interventions in the flowsheet. Outcome: Progressing Towards Goal  Note: Pressure Injury Interventions:  Sensory Interventions: Assess changes in LOC, Keep linens dry and wrinkle-free, Maintain/enhance activity level, Minimize linen layers    Moisture Interventions: Internal/External urinary devices, Absorbent underpads, Check for incontinence Q2 hours and as needed    Activity Interventions: Pressure redistribution bed/mattress(bed type)    Mobility Interventions: HOB 30 degrees or less, Pressure redistribution bed/mattress (bed type), Turn and reposition approx. every two hours(pillow and wedges)    Nutrition Interventions: Document food/fluid/supplement intake, Offer support with meals,snacks and hydration    Friction and Shear Interventions: HOB 30 degrees or less, Minimize layers                Problem: Patient Education: Go to Patient Education Activity  Goal: Patient/Family Education  Outcome: Progressing Towards Goal     Problem: Falls - Risk of  Goal: *Absence of Falls  Description: Document Johan Fall Risk and appropriate interventions in the flowsheet.   Outcome: Progressing Towards Goal  Note: Fall Risk Interventions:                                Problem: Patient Education: Go to Patient Education Activity  Goal: Patient/Family Education  Outcome: Progressing Towards Goal

## 2022-12-22 NOTE — PROGRESS NOTES
Pt stated he is not going anywhere today, he is to stay right here. Will check with CM to see what his disposition is to be.

## 2022-12-22 NOTE — PROGRESS NOTES
DISCHARGE NOTE FROM [facility] NURSE    Patient determined to be stable for discharge by attending provider. I have reviewed the discharge instructions and [facility] the [facility] . They verbalized understanding and all questions were answered to their satisfaction. No complaints or further questions were expressed. Hard scripts for medications given to patient. Hard Script sent with transport to the facility. Appropriate educational materials and medication side effect teaching were provided. PIV were removed prior to discharge. Patient did not discharge with any line, nation, or drain. All personal items collected during admission were returned to the patient prior to discharge.     Post-op patient: Daysi Mccarthy RN

## 2022-12-22 NOTE — PROGRESS NOTES
End of Shift Note    Bedside shift change report given to Vito Zhang RN (oncoming nurse) by Levy Hampton RN (offgoing nurse). Report included the following information SBAR, Kardex, Intake/Output, and Recent Results    Shift worked: 7 pm - 7 am     Shift summary and any significant changes:    [facility] bed delivered, patient moved onto new bed. Pt dressing has some breakthrough drainage, but dressing is still dry and intact. Pt pain well managed throughout the shift, and vital signs stable. Concerns for physician to address:  none     Zone phone for oncoming shift:   [phone]       Activity:  Activity Level: Chair  Number times ambulated in hallways past shift: 0  Number of times OOB to chair past shift: 0    Cardiac:   Cardiac Monitoring: No      Cardiac Rhythm: Sinus Rhythm    Access:  Current line(s): PIV     Genitourinary:   Urinary status: Voiding, incontinent    Respiratory:   O2 Device: None (Room air)  Chronic home O2 use?: NO  Incentive spirometer at bedside: NO       GI:     Current diet:  ADULT DIET Regular  ADULT ORAL NUTRITION SUPPLEMENT Dinner, Breakfast; Low Calorie/High Protein  ADULT ORAL NUTRITION SUPPLEMENT Lunch; Standard High Calorie/High Protein  DIET ONE TIME MESSAGE  ADULT ORAL NUTRITION SUPPLEMENT Breakfast; Low Calorie/High Protein  Passing flatus: YES  Tolerating current diet: YES       Pain Management:   Patient states pain is manageable on current regimen: N/A    Skin:  Italo Score: 16  Interventions: increase time out of bed    Patient Safety:  Fall Score:  Total Score: 3  Interventions: gripper socks and pt to call before getting OOB       Length of Stay:  Expected LOS: 3d 0h  Actual LOS: [address], RN

## 2022-12-22 NOTE — DISCHARGE SUMMARY
Post- Surgical Discharge Summary    Patient ID:  Madan Quezada  [id number]  male  56 y.o.  1965    Admit date: 12/19/2022    Discharge date: 12/22/22     Admitting Physician: Tim Baum MD     Consulting Physician(s):   Treatment Team: Attending Provider: Graeme Martin MD; Utilization Review: Christophe Pavon; Care Manager: JUNO Swartz; Primary Nurse: True Pennington RN    Date of Surgery:   12/20/2022     Preoperative Diagnosis:  DECUBITIS    Postoperative Diagnosis:   DECUBITIS    Procedure(s):  DEBRIDEMENT OF SACRAL DECUBITIS     Anesthesia Type:   General     Surgeon: Graeme Martin MD                            HPI:  Pt is a 56 y.o. male who has a history of sacral pressure injury who presents at this time for a surgical debridement following the failure of conservative management. Problem List:   Problem List as of 12/22/2022 Date Reviewed: 12/20/2022            Codes Class Noted - Resolved    Gluteal abscess ICD-10-CM: L02.31  ICD-9-CM: 682.5  12/19/2022 - Present        Dysarthria ICD-10-CM: R47.1  ICD-9-CM: 784.51  4/16/2022 - Present        Recurrent falls ICD-10-CM: R29.6  ICD-9-CM: V15.88  4/16/2022 - Present        FTT (failure to thrive) in adult ICD-10-CM: R62.7  ICD-9-CM: 783.7  11/14/2021 - Present        Multiple sclerosis exacerbation ([facility] ICD-10-CM: G35  ICD-9-CM: 340  11/12/2021 - Present        Recurrent depression ([facility] ICD-10-CM: F33.9  ICD-9-CM: 296.30  1/4/2018 - Present        Paresthesia of both hands ICD-10-CM: R20.2  ICD-9-CM: 782.0  8/28/2015 - Present        Mixed incontinence ICD-10-CM: N39.46  ICD-9-CM: 788.33  8/28/2015 - Present        Balance problem ICD-10-CM: R26.89  ICD-9-CM: 781.99  8/28/2015 - Present        Falls ICD-10-CM: W19. Rachel Kaplan  ICD-9-CM: E888.9  8/28/2015 - Present        Bilateral thigh pain ICD-10-CM: I04.573, M79.652  ICD-9-CM: 729.5  8/28/2015 - Present        Anxiety ICD-10-CM: F41.9  ICD-9-CM: 300.00  6/19/2015 - Present Midline low back pain without sciatica ICD-10-CM: M54.50  ICD-9-CM: 724.2  6/19/2015 - Present        Disability examination ICD-10-CM: Z02.71  ICD-9-CM: V68.01  2/18/2013 - Present        Prediabetes ICD-10-CM: R73.03  ICD-9-CM: 790.29  1/30/2013 - Present        Hearing loss ICD-10-CM: H91.90  ICD-9-CM: 389.9  1/25/2013 - Present        Memory loss ICD-10-CM: R41.3  ICD-9-CM: 780.93  1/25/2013 - Present        Multiple sclerosis ([facility] ICD-10-CM: G35  ICD-9-CM: 650  1/25/2013 - Present        Elevated hemoglobin A1c ICD-10-CM: R73.09  ICD-9-CM: 790.29  8/27/2012 - Present        MS (multiple sclerosis) ([facility] ICD-10-CM: G35  ICD-9-CM: 755  8/24/2012 - Present        Fatigue ICD-10-CM: R53.83  ICD-9-CM: 780.79  8/24/2012 - Present        Incontinence of urine ICD-10-CM: R32  ICD-9-CM: 788.30  8/24/2012 - Present        Decreased vision ICD-10-CM: H54.7  ICD-9-CM: 369.9  9/28/2011 - Present        Acute bronchitis ICD-10-CM: J20.9  ICD-9-CM: 466.0  9/28/2011 - Present        Tobacco abuse ICD-10-CM: Z72.0  ICD-9-CM: 305.1  3/18/2011 - Present        ED (erectile dysfunction) ICD-10-CM: N52.9  ICD-9-CM: 607.84  3/18/2011 - Present        Back pain ICD-10-CM: M54.9  ICD-9-CM: 724.5  10/8/2010 - Present        Hypercholesterolemia ICD-10-CM: E78.00  ICD-9-CM: 272.0  10/8/2010 - Present        Cellulitis ICD-10-CM: L03.90  ICD-9-CM: 682.9  10/8/2010 - Present            Hospital Course: The patient underwent surgery. Intra-operative complications: None; patient tolerated the procedure well. Was taken to the PACU in stable condition and then transferred to the surgical floor. Perioperative Antibiotics: Piperacillin/Tazobactam and Vancomycin    Postoperative Pain Management:  Oxycodone     Postoperative transfusions:    Number of units banked PRBCs =   none     Post Op complications: None     Incisions  - clean, dry and intact. No significant erythema or swelling.  Wound(s) appear to be healing without any evidence of infection. Patient mobilized with nursing and was found to be safe and steady with ambulation. Discharged to: Return to LTC facility    Condition on Discharge: Stable     Discharge instructions:    - Take pain medications as prescribed  - Diet Regular  - Discharge activity:    - Activity as tolerated    - Ambulate several times a day   - No heavy lifting for 4 weeks   - Do not drive for two weeks or while on opioid pain medications  - Wound Care: Keep wound(s) clean and dry. See discharge instruction sheet. Allergies:  No Known Allergies           -DISCHARGE MEDICATION LIST     Current Discharge Medication List        START taking these medications    Details   oxyCODONE IR (ROXICODONE) 5 mg immediate release tablet Take 1 Tablet by mouth every six (6) hours as needed for Pain for up to 3 days. Max Daily Amount: 20 mg.  Qty: 10 Tablet, Refills: 0  Start date: 12/22/2022, End date: 12/25/2022    Associated Diagnoses: Gluteal abscess           CONTINUE these medications which have NOT CHANGED    Details   tiZANidine (ZANAFLEX) 4 mg tablet Take 1 Tablet by mouth nightly. Qty: 30 Tablet, Refills: 2      pregabalin (LYRICA) 50 mg capsule Take 1 Capsule by mouth two (2) times a day. Max Daily Amount: 100 mg. Qty: 60 Capsule, Refills: 2    Associated Diagnoses: Polyneuropathy      ergocalciferol (ERGOCALCIFEROL) 1,250 mcg (50,000 unit) capsule Take 1 Capsule by mouth every seven (7) days. Qty: 4 Capsule, Refills: 4      memantine (NAMENDA) 10 mg tablet Take 1 Tablet by mouth daily. Qty: 30 Tablet, Refills: 3          per medical continuation form      -Follow up in office not required. If outpatient input needed for wound suggestions then outpatient wound center would be best.       Signed:  Delfino Reyes.  Rabia Díaz  MSN, APRN, FNP-C, CWOCN-AP  Surgical Nurse Practitioner    12/22/2022  12:49 PM    NOTE FROM DR Joel MIKE  I Rounded with Nurse Practitioner and independently assessed patient with hx and exam and  review appropriate imaging and labs as needed.   I  Agree with plan, and review of systems and independent exam as performed by me and nurse practitioner, as outlined above by NP   Face-to-face time and review of imaging and labs:  15  minutes

## 2022-12-22 NOTE — PROGRESS NOTES
[facility] to [address] [place] [address]                                                                        56 y.o.   male    [address]   Room: [address] [place] [address]    MRM 3 SURG TELE  Unit Phone# :  [phone]      [address]   [place] [address]  Dept: [phone]  Loc: [phone]                    SITUATION     Admitted:  12/19/2022         Attending Provider:  Jarrett Garvin MD       Consultations:  None    PCP:  Fly Kwon MD   [phone]    Treatment Team: Attending Provider: Jarrett Garvin MD; Utilization Review: Antoni Lozada;  Care Manager: JUNO No; Primary Nurse: Talya Garcia RN    Admitting Dx:  Gluteal abscess [L02.31]       Principal Problem: <principal problem not specified>    2 Days Post-Op of   Procedure(s):  DEBRIDEMENT OF SACRAL DECUBITIS   BY: Jarrett Garvin MD             ON: 12/20/2022                  Code Status: Full Code                Advance Directives:   Advance Care Planning 12/21/2022   Patient's Healthcare Decision Maker is: -   Confirm Advance Directive None   Patient Would Like to Complete Advance Directive -    (Send w/patient)   No Doesnt Have       Isolation:  There are currently no Active Isolations       MDRO: No current active infections    Pain Medications given:  Oxycodone 5 mg    Last dose: 12/22/2022 at  [facility] needed: yes  Type of equipment: Walker         BACKGROUND     Allergies:  No Known Allergies    Past Medical History:   Diagnosis Date    Back pain 10/8/2010    Back pain 10/8/2010    Depression     Diabetes ([address]     Fatigue 8/24/2012    Hearing loss 1/25/2013    Memory loss 1/25/2013    MS (multiple sclerosis) ([facility])     Muscle pain     Muscle weakness     Poor appetite     Snoring     Unintentional weight change     Visual disturbance        Past Surgical History:   Procedure Laterality Date    HX HEENT Right     ear Medications Prior to Admission   Medication Sig    tiZANidine (ZANAFLEX) 4 mg tablet Take 1 Tablet by mouth nightly. pregabalin (LYRICA) 50 mg capsule Take 1 Capsule by mouth two (2) times a day. Max Daily Amount: 100 mg.    ergocalciferol (ERGOCALCIFEROL) 1,250 mcg (50,000 unit) capsule Take 1 Capsule by mouth every seven (7) days. memantine (NAMENDA) 10 mg tablet Take 1 Tablet by mouth daily. senna-docusate (PERICOLACE) 8.6-50 mg per tablet Take 1 Tablet by mouth two (2) times a day. (Patient not taking: Reported on 4/17/2022)    polyethylene glycol (MIRALAX) 17 gram packet Take 1 Packet by mouth daily. (Patient not taking: Reported on 4/17/2022)       Hard scripts included in transfer packet yes    Vaccinations: There is no immunization history on file for this patient. Readmission Risks:    Known Risks: The Charlson CoMorbitiy Index tool is an evidenced based tool that has more automatic generated information. The tool looks at many different items such as the age of the patient, how many times they were admitted in the last calendar year, current length of stay in the hospital and their diagnosis. All of these items are pulled automatically from information documented in the chart from various places and will generate a score that predicts whether a patient is at low (less than 13), medium (13-20) or high (21 or greater) risk of being readmitted.         ASSESSMENT                Temp: 98.1 °F (36.7 °C) (12/22/22 1501) Pulse (Heart Rate): 84 (12/22/22 1501)     Resp Rate: 18 (12/22/22 1501)           BP: (!) 124/90 (12/22/22 1501)     O2 Sat (%): 94 % (12/22/22 1501)     Weight: 90.7 kg (200 lb)    Height: 6' (182.9 cm) (12/21/22 1536)       If above not within 1 hour of discharge:    BP:_____  P:____  R:____ T:_____ O2 Sat: ___%  O2: ______    Active Orders   Diet    ADULT DIET Regular         Orientation: oriented to time, place, person and situation     Active Behaviors: None Active Lines/Drains:  (Peg Tube / Thurman / CL or S/L?): no    Urinary Status: Voiding, External catheter     Last BM: Last Bowel Movement Date: 12/19/22     Skin Integrity: Wound (add Wound LDA)             Mobility: Slightly limited   Weight Bearing Status: WBAT (Weight Bearing as Tolerated)                Lab Results   Component Value Date/Time    Glucose 112 (H) 12/22/2022 02:06 AM    Hemoglobin A1c 5.4 04/17/2022 01:06 AM    INR 1.0 04/16/2022 04:12 PM    HGB 10.5 (L) 12/22/2022 02:06 AM    HGB 11.3 (L) 12/21/2022 05:21 AM        RECOMMENDATION     See After Visit Summary (AVS) for:  Discharge instructions  After [address] equipment needed (entered pre-discharge by Care Management)  Medication Reconciliation    Follow up Appointment(s)         Report given/sent by:  Orly Irvin RN                    Verbal report given to: Yazmin Palafox LPN  FAXED to:  Hard copy sent with transport         Estimated discharge time:  12/22/2022 at 1500

## 2022-12-22 NOTE — OP NOTES
[address]  OPERATIVE REPORT    Name:  Yasmin Diaz  MR#:  [id number]  :  1965  ACCOUNT #:  [de-identified]  DATE OF SERVICE:  2022    PREOPERATIVE DIAGNOSIS:  Infected unstageable sacral pressure injury. POSTOPERATIVE DIAGNOSIS:  Infected stage IV sacral pressure injury. PROCEDURE PERFORMED:  Debridement of 88 cm2 of skin and subcutaneous fat from the sacral region. SURGEON:  Jimmy Erickson MD    ASSISTANT:  Bayron Martínez. ANESTHESIA:  General endotracheal.    COMPLICATIONS:  None. SPECIMENS REMOVED:  Soft tissue from sacral pressure injury. IMPLANTS:  None. ESTIMATED BLOOD LOSS:  Minimal.    DRAINS:  None. FINDINGS:  An area of 88 cm2 of necrotic skin and subcutaneous tissue in the sacral region, there was no evidence of necrotic fascia and the bone was covered by adequate soft tissue. INDICATIONS FOR PROCEDURE:  The patient is a 32-year-old male with history of multiple sclerosis. The patient is wheelchair bound and lives in a nursing home. The patient developed a draining wound on his buttocks approximately 30 days ago. The patient presented to the Emergency Department where he was found to have a sacral pressure injury with necrotic tissue making it unstageable. So the patient presents for debridement. DESCRIPTION OF PROCEDURE:  The patient was identified in the preoperative holding area, informed consent obtained. He was given preoperative antibiotics, he was then taken to the operating room and intubated on his bed. He was then flipped to a prone position on chest rolls and all pressure points were padded. Once he was adequately positioned and secured to the table, the lower back and buttocks and thighs were prepped and draped in usual sterile fashion. A time-out was performed to confirm the patient by name and that he was presenting for debridement of sacral pressure injury. Once this was confirmed, attention was focused on the wound. Electrocautery was used to excise out a [place] around the wound incorporating all of the necrotic skin. Dissection was then carried down to the subcutaneous fat. The deeper fat appeared viable. Once the plane was created between viable and nonviable tissue, the specimen was excised along this plane and the specimen was then sent to Pathology for evaluation. The wound was checked for hemostasis and then the wound was pulse lavaged 3 liters of normal saline. The wound was then packed with gauze soaked in Irrisept irrigation solution. The deepest part of the wound was 4 cm deep right at the region of the coccyx bone. A dry dressing was then applied. The patient was flipped to a supine position, extubated in the room and taken to the postanesthesia care in stable condition. Instrument and sponge counts correct x2.       MD PARVIZ [id number]  D:  12/21/2022 16:36  T:  12/21/2022 19:56  JOB #:  [id number]

## 2022-12-22 NOTE — DISCHARGE INSTRUCTIONS
Discharge Instructions:     Wound VAC dressing changes: M-W-F 125 mm/Hg continuous pressure with black foam in the wound base. To be initiated by receiving facility. DISCHARGE SUMMARY from Nurse    The following personal items are in your possession at time of discharge:    Dental Appliances: None  Visual Aid: None  Home Medications: None  Jewelry: None  Clothing: None (left in in pt. room)  Other Valuables: None             PATIENT INSTRUCTIONS:    While taking prescription Narcotics:  Limit your activities  Do not drive and operate hazardous machinery  Do not make important personal or business decisions  Do  not drink alcoholic beverages      Report the following to your surgeon:  Excessive pain, swelling, redness or odor of or around the surgical area  Temperature over 100.5  Nausea and vomiting lasting longer than 4 hours or if unable to take medications  Any signs of decreased circulation or nerve impairment to extremity: change in color, persistent  numbness, tingling, coldness or increase pain  Any questions    To prevent infection remember to use good handwashing. Your surgeons phone number is [phone]. What to do at Home:  Recommended activity: Activity as tolerated    Recommended diet: Regular    If you have surgical incisions you may shower. Please do not scrub incision, let water flow over area and pat dry. Do not tub bathe, get in a hot tub or swim; until your wounds have healed and given the OK by your surgeon to do so. *  Please give a list of your current medications to your Primary Care Provider. *  Please update this list whenever your medications are discontinued, doses are      changed, or new medications (including over-the-counter products) are added. *  Please carry medication information at all times in case of emergency situations.           These are general instructions for a healthy lifestyle:    No smoking/ No tobacco products/ Avoid exposure to second hand smoke    Surgeon General's Warning:  Quitting smoking now greatly reduces serious risk to your health. Obesity, smoking, and sedentary lifestyle greatly increases your risk for illness    A healthy diet, regular physical exercise & weight monitoring are important for maintaining a healthy lifestyle    You may be retaining fluid if you have a history of heart failure or if you experience any of the following symptoms:  Weight gain of 3 pounds or more overnight or 5 pounds in a week, increased swelling in our hands or feet or shortness of breath while lying flat in bed. Please call your doctor as soon as you notice any of these symptoms; do not wait until your next office visit. Recognize signs and symptoms of STROKE:    F-face looks uneven    A-arms unable to move or move unevenly    S-speech slurred or non-existent    T-time-call 911 as soon as signs and symptoms begin-DO NOT go       Back to bed or wait to see if you get better-TIME IS BRAIN. The discharge information has been reviewed with the accepting facility nurse. The nurse verbalized understanding.

## 2022-12-22 NOTE — PROGRESS NOTES
End of Shift Note    Bedside shift change report given to TERE Cool  (oncoming nurse) by Sherry Avalos RN (offgoing nurse). Report included the following information SBAR, Kardex, Intake/Output, and Recent Results    Shift worked:  7am-7pm     Shift summary and any significant changes:     Pt calm and cooperative during shift, no c/o pain, sacral dsg CDI, specialty bed delivered @2238     Concerns for physician to address:  none     Zone phone for oncoming shift:   [phone]       Activity:  Activity Level: Chair  Number times ambulated in hallways past shift: 0  Number of times OOB to chair past shift: 0    Cardiac:   Cardiac Monitoring: No      Cardiac Rhythm: Sinus Rhythm    Access:  Current line(s): PIV     Genitourinary:   Urinary status: external catheter    Respiratory:   O2 Device: None (Room air)  Chronic home O2 use?: YES  Incentive spirometer at bedside: YES       GI:     Current diet:  ADULT DIET Regular  ADULT ORAL NUTRITION SUPPLEMENT Dinner, Breakfast; Low Calorie/High Protein  ADULT ORAL NUTRITION SUPPLEMENT Lunch; Standard High Calorie/High Protein  DIET ONE TIME MESSAGE  Passing flatus: YES  Tolerating current diet: YES       Pain Management:   Patient states pain is manageable on current regimen: N/A    Skin:  Italo Score: 15  Interventions: float heels, foam dressing, and PT/OT consult    Patient Safety:  Fall Score:  Total Score: 3  Interventions: bed/chair alarm and gripper socks       Length of Stay:  Expected LOS: 3d 0h  Actual LOS: 2      Sherry Avalos RN

## 2022-12-23 ENCOUNTER — PATIENT OUTREACH (OUTPATIENT)
Dept: CASE MANAGEMENT | Age: 57
End: 2022-12-23

## 2022-12-23 LAB
BACTERIA SPEC CULT: NORMAL
SERVICE CMNT-IMP: NORMAL

## 2022-12-23 NOTE — PROGRESS NOTES
No transition of care outreach indicated due to patient discharge to a [address] [place] [address].

## 2022-12-26 LAB
BACTERIA SPEC CULT: NORMAL
SERVICE CMNT-IMP: NORMAL

## 2022-12-31 NOTE — PROGRESS NOTES
Physician Progress Note      Espinoza Bazan  CSN #:                  [id number]  :                       1965  ADMIT DATE:       2022 4:55 PM  [address] [place] DATE:        2022 3:24 PM  RESPONDING  PROVIDER #:        Gerardo Peraza MD [facility] MD          QUERY TEXT:    Patient admitted with sacral pressure injury. Per Brief Op note dated  documentation of debridement. To accurately reflect the procedure performed please document if debridement was excisional or nonexcisional and the deepest depth of tissue removed as down to and including: The medical record reflects the following:    Risk Factors: From [address] with MS who is wheelchair bound    Clinical Indicators: 22 Dr Estuardo Echevarria note: Procedure(s): DEBRIDEMENT OF 88 cm2 of skin and subcutaneous fat from sacrum    Treatment: Debridement from sacrum    Thank you,  Adis Lamar RN, CDI  Options provided:  -- Nonexcisional debridement of subcutaneous tissue  -- Excisional debridement of subcutaneous tissue  -- Other - I will add my own diagnosis  -- Disagree - Not applicable / Not valid  -- Disagree - Clinically unable to determine / Unknown  -- Refer to Clinical Documentation Reviewer    PROVIDER RESPONSE TEXT:    Excisional debridement of subcutaneous tissue of sacrum was performed during procedure on 22.     Query created by: Ale Harrison on 2022 1:06 PM      Electronically signed by:  Gerardo Peraza MD [facility] MD 2022 7:47 AM

## 2023-01-09 NOTE — MR AVS SNAPSHOT
Chief complaint:   Chief Complaint   Patient presents with   • Cough     5 days: productive cough, congestion, headache, 100.3 temp (resolved). Trying robitussin, dayquil/nyquil.        Vitals:  Visit Vitals  /60   Pulse 90   Temp 97.7 °F (36.5 °C)   Resp 18   Wt 71.7 kg (158 lb)   SpO2 99%   BMI 22.67 kg/m²       HISTORY OF PRESENT ILLNESS     27-year-old male presents to Urgent Care complaining of nasal congestion with cough and headache that has been going on for 5 days.  He had temp up to 100.3° F. he has taken Robitussin with inadequate relief.  He has some earache.  He has sore throat.      Other significant problems:  There are no problems to display for this patient.      PAST MEDICAL, FAMILY AND SOCIAL HISTORY     Medications:  Current Outpatient Medications   Medication Sig Dispense Refill   • amoxicillin (AMOXIL) 875 MG tablet Take 1 tablet by mouth in the morning and 1 tablet in the evening. 20 tablet 0     No current facility-administered medications for this visit.       Allergies:  ALLERGIES:  No Known Allergies    Past Medical  History/Surgeries:  History reviewed. No pertinent past medical history.    History reviewed. No pertinent surgical history.    Family History:  History reviewed. No pertinent family history.    Social History:  Social History     Tobacco Use   • Smoking status: Never   • Smokeless tobacco: Never   Substance Use Topics   • Alcohol use: Yes     Comment: occasional       REVIEW OF SYSTEMS     Review of Systems   Constitutional: Positive for fever.   HENT: Positive for congestion, ear pain and sore throat. Negative for trouble swallowing.    Respiratory: Positive for cough.        PHYSICAL EXAM     Physical Exam  Vitals and nursing note reviewed.   Constitutional:       General: He is not in acute distress.     Appearance: Normal appearance. He is not ill-appearing, toxic-appearing or diaphoretic.   HENT:      Head: Normocephalic and atraumatic.      Right Ear: Tympanic  Visit Information Date & Time Provider Department Dept. Phone Encounter #  
 8/3/2017  8:20 AM MD [facility] at [address] [place] [address] [id number] Follow-up Instructions Return in about 2 months (around 10/3/2017). Your Appointments 8/8/2017  2:30 PM  
ROUTINE CARE with Kaleigh Groves MD  
[facility] [address] [place] [address] ([facility]) Appt Note: check up /w refill [facility],[address] [place] [address] [phone]  
[phone]  
  
   
 [address] [id number] Upcoming Health Maintenance Date Due  
 EYE EXAM RETINAL OR DILATED Q1 1/27/2013 FOOT EXAM Q1 9/3/2016 MICROALBUMIN Q1 2/24/2017 FOBT Q 1 YEAR AGE 50-75 6/13/2017 INFLUENZA AGE 9 TO ADULT 8/1/2017 HEMOGLOBIN A1C Q6M 8/21/2017 DTaP/Tdap/Td series (1 - Tdap) 12/20/2017* LIPID PANEL Q1 2/21/2018 *Topic was postponed. The date shown is not the original due date. Allergies as of 8/3/2017  Review Complete On: 8/3/2017 By: Linda Chu MD  
 No Known Allergies Current Immunizations  Reviewed on 6/25/2013 No immunizations on file. Not reviewed this visit You Were Diagnosed With   
  
 Codes Comments Chronic, continuous use of opioids    -  Primary ICD-10-CM: F11.90 ICD-9-CM: 305.51   
 MS (multiple sclerosis) ([facility]     ICD-10-CM: G35 
ICD-9-CM: 785 Midline low back pain without sciatica, unspecified chronicity     ICD-10-CM: M54.5 ICD-9-CM: 724.2 Chronic pain syndrome     ICD-10-CM: G89.4 ICD-9-CM: 338.4 Gait disorder     ICD-10-CM: R26.9 ICD-9-CM: 781.2 MCI (mild cognitive impairment)     ICD-10-CM: G31.84 ICD-9-CM: 331.83   
 PBA (pseudobulbar affect)     ICD-10-CM: H28.6 ICD-9-CM: 310.81 Paresthesia     ICD-10-CM: R20.2 ICD-9-CM: 782.0 Polyneuropathy ([facility]     ICD-10-CM: G62.9 ICD-9-CM: 356.9  Multiple sclerosis ([facility].)     ICD-10-CM: J47 
 ICD-9-CM: 625 Bilateral thigh pain     ICD-10-CM: M79.651, T06.702 ICD-9-CM: 729.5 Vitals BP Pulse Temp Resp Height(growth percentile) Weight(growth percentile) (!) 133/94 (BP 1 Location: Right arm, BP Patient Position: Sitting) 74 97.6 °F (36.4 °C) (Oral) 16 5' 10\" (1.778 m) 227 lb 12.8 oz (103.3 kg) SpO2 BMI Smoking Status 99% 32.69 kg/m2 Current Every Day Smoker Vitals History BMI and BSA Data Body Mass Index Body Surface Area  
 32.69 kg/m 2 2.26 m 2 Preferred Pharmacy Pharmacy Name [facility] [address] [place] [address] [phone] Your Updated Medication List  
  
   
This list is accurate as of: 8/3/17  9:52 AM.  Always use your most recent med list.  
  
  
  
  
 aspirin 81 mg tablet Take 1 Tab by mouth daily. atorvastatin 80 mg tablet Commonly known as:  LIPITOR Take 1 Tab by mouth daily. baclofen 10 mg tablet Commonly known as:  LIORESAL Take 1 Tab by mouth three (3) times daily. buPROPion  mg tablet Commonly known as:  Earnest Daina Take 1 Tab by mouth every morning. colesevelam 625 mg tablet Commonly known as:  [facility] Take 3 Tabs by mouth two (2) times daily (with meals). dextromethorphan-quiNIDine 20-10 mg per capsule Commonly known as:  Kathreen Rast Take 1 Cap by mouth every twelve (12) hours. gabapentin 300 mg capsule Commonly known as:  NEURONTIN Take 1 Cap by mouth three (3) times daily. Nerve pain HYDROcodone-acetaminophen  mg tablet Commonly known as:  NORCO  
  
 interferon beta-1a (albumin) 44 mcg/0.5 mL injection Commonly known as:  REBIF (WITH ALBUMIN) 0.5 mL by SubCUTAneous route every Monday, Wednesday, Friday. lidocaine 5 % Commonly known as:  LIDODERM  
1 Patch by TransDERmal route every twenty-four (24) hours.   
  
 lidocaine HCl-hydrocortison ac topical cream  
 membrane is erythematous.      Left Ear: Tympanic membrane is erythematous.      Nose: Congestion present.      Mouth/Throat:      Mouth: Mucous membranes are moist.      Pharynx: Posterior oropharyngeal erythema present. No oropharyngeal exudate.      Neck: Neck supple.   Cardiovascular:      Rate and Rhythm: Normal rate and regular rhythm.      Heart sounds: Normal heart sounds.   Pulmonary:      Effort: Pulmonary effort is normal. No respiratory distress.      Breath sounds: Normal breath sounds.   Lymphadenopathy:      Cervical: No cervical adenopathy.   Skin:     General: Skin is warm and dry.      Findings: No rash.   Neurological:      Mental Status: He is alert and oriented to person, place, and time.   Psychiatric:         Mood and Affect: Mood normal.         Behavior: Behavior normal.         Judgment: Judgment normal.         ASSESSMENT/PLAN     MDM/ Urgent care course  27 year old male with upper respiratory infection and pharyngitis with bilateral otitis media.  Rapid strep test is negative.  Influenza a and B are negative.  COVID antigen is negative.  He was prescribed amoxicillin.  Primary care follow-up was advised    See patient instructions and after visit summary.    Martir was seen today for cough.    Diagnoses and all orders for this visit:    Acute URI  -     POCT SARS-COV-2 ANTIGEN/FLU ANTIGEN PANEL    Pharyngitis, unspecified etiology  -     POCT RAPID STREP A    Other acute nonsuppurative otitis media of both ears, recurrence not specified  -     amoxicillin (AMOXIL) 875 MG tablet; Take 1 tablet by mouth in the morning and 1 tablet in the evening.      Supervising physician is Dr. Jose Crawford.     Apply  to affected area two (2) times a day. metFORMIN 500 mg tablet Commonly known as:  GLUCOPHAGE Take 1 Tab by mouth daily (with breakfast). naproxen 500 mg tablet Commonly known as:  NAPROSYN Take 1 Tab by mouth two (2) times daily as needed. Omeprazole delayed release 20 mg tablet Commonly known as:  PRILOSEC D/R Take 1 Tab by mouth daily. oxyCODONE IR 15 mg immediate release tablet Commonly known as:  OXY-IR Take 1 Tab by mouth every eight (8) hours as needed for Pain. Max Daily Amount: 45 mg. Start taking on:  9/3/2017 Prescriptions Printed Refills  
 oxyCODONE IR (OXY-IR) 15 mg immediate release tablet 0 Starting on: 9/3/2017 Sig: Take 1 Tab by mouth every eight (8) hours as needed for Pain. Max Daily Amount: 45 mg.  
 Class: Print Route: Oral  
  
Prescriptions Sent to Pharmacy Refills  
 baclofen (LIORESAL) 10 mg tablet 5 Sig: Take 1 Tab by mouth three (3) times daily. Class: Normal  
 Pharmacy: [facility], [address] [facility] Ph #: [phone] Route: Oral  
 dextromethorphan-quiNIDine (NUEDEXTA) 20-10 mg per capsule 2 Sig: Take 1 Cap by mouth every twelve (12) hours. Class: Normal  
 Pharmacy: [facility], [facility] Ph #: [phone] Route: Oral  
 gabapentin (NEURONTIN) 300 mg capsule 5 Sig: Take 1 Cap by mouth three (3) times daily. Nerve pain  
 Class: Normal  
 Pharmacy: [facility], [address] Ph #: [phone] Route: Oral  
  
We Performed the Following [address] MONITORING [[id number] Custom] Follow-up Instructions Return in about 2 months (around 10/3/2017). Introducing [facility]! Dear Cameron Fulton: 
Thank you for requesting a Checkr account. Our records indicate that you already have an active Checkr account.   You can access your account anytime at [email or web address] Did you know that you can access your hospital and ER discharge instructions at any time in modu? You can also review all of your test results from your hospital stay or ER visit. Additional Information If you have questions, please visit the Frequently Asked Questions section of the modu website at [email or web address] Remember, modu is NOT to be used for urgent needs. For medical emergencies, dial 911. Now available from your iPhone and Android! Please provide this summary of care documentation to your next provider. Your primary care clinician is listed as Randall Verde. If you have any questions after today's visit, please call [phone].

## 2023-08-07 NOTE — PROGRESS NOTES
Rx refilled per standing protocol.  Patient has upcoming appointment scheduled.     Samantha Justin is a 50 y.o. male and presents with Hospital Follow Up and Multiple Sclerosis  . Subjective:  Multiple Sclerosis Review:  Symptoms of MS have been recently reported as some transient loss of speech for a few minutes  Patient denies any additional complaints. Other notable symptoms include symptoms can be paroxysmal.   Onset of symptoms was as noted above. Symptoms are currently of moderate severity. Symptoms occur intermittent and last minutes. Previous evaluation has included MRI of brain, results were consistent with progression of MS. Jorge Luis Paez He was seen in the er treated and released. Diabetes Mellitus Review:  He has diabetes mellitus. he is diet controlled  Diabetic ROS - medication compliance: compliant all of the time, diabetic diet compliance: compliant all of the time, home glucose monitoring: is performed. Known diabetic complications: none  Cardiovascular risk factors: family history, dyslipidemia, diabetes mellitus, obesity, hypertension  Current diabetic medications includes no oral agents/insulin   Eye exam current (within one year): no  Weight trend: stable  Prior visit with dietician: no  Current diet: \"healthy\" diet  in general  Current exercise: walking  Current monitoring regimen: home blood tests - daily  Home blood sugar records: trend: stable  Any episodes of hypoglycemia? no  Is He on ACE inhibitor or angiotensin II receptor blocker? Yes     He has a tooth abscess.     Review of Systems  Constitutional: negative for fevers, chills, anorexia and weight loss  Eyes:   negative for visual disturbance and irritation  ENT:   Tooth pain  Respiratory:  negative for cough, hemoptysis, dyspnea,wheezing  CV:   negative for chest pain, palpitations, lower extremity edema  GI:   negative for nausea, vomiting, diarrhea, abdominal pain,melena  Endo:               negative for polyuria,polydipsia,polyphagia,heat intolerance  Genitourinary: negative for frequency, dysuria and hematuria  Integument:  negative for rash and pruritus  Hematologic:  negative for easy bruising and gum/nose bleeding  Musculoskel: myalgias, arthralgias,  joint pain  Neurological:  negative for headaches, dizziness, vertigo, memory problems and gait   Behavl/Psych: negative for feelings of anxiety, depression, mood changes    Past Medical History:   Diagnosis Date    Back pain 10/8/2010    Back pain 10/8/2010    Depression     Diabetes ([address]     Fatigue 8/24/2012    Hearing loss 1/25/2013    Memory loss 1/25/2013    [facility] sclerosis[facility])     Muscle pain     Muscle weakness     Poor appetite     Snoring     Unintentional weight change     Visual disturbance      Past Surgical History:   Procedure Laterality Date    HX HEENT Right     ear     Social History     Social History    Marital status: SINGLE     Spouse name: N/A    Number of children: N/A    Years of education: N/A     Social History Main Topics    Smoking status: Current Every Day Smoker     Packs/day: 0.50     Years: 21.00     Types: Cigarettes    Smokeless tobacco: Never Used    Alcohol use No    Drug use: No    Sexual activity: Yes     Partners: Female     Birth control/ protection: Condom     Other Topics Concern    None     Social History Narrative     Family History   Problem Relation Age of Onset    Hypertension Father     Cancer Father      prostate    Hypertension Sister     Diabetes Maternal Grandmother     Cancer Paternal Uncle      prostate    Dementia Paternal Uncle      Current Outpatient Prescriptions   Medication Sig Dispense Refill    naloxone 2 mg/actuation spry Use 1 spray intranasally into 1 nostril. Use a new Narcan nasal spray for subsequent doses and administer into alternating nostrils. May repeat every 2 to 3 minutes as needed. 4 Each 2    atorvastatin (LIPITOR) 80 mg tablet Take 1 Tab by mouth daily. 30 Tab 12    baclofen (LIORESAL) 10 mg tablet Take 1 Tab by mouth three (3) times daily. 90 Tab 5    dextromethorphan-quiNIDine (NUEDEXTA) 20-10 mg per capsule Take 1 Cap by mouth every twelve (12) hours. 60 Cap 2    gabapentin (NEURONTIN) 300 mg capsule Take 1 Cap by mouth three (3) times daily. Nerve pain 90 Cap 5    interferon beta-1a, albumin, (REBIF, WITH ALBUMIN,) 44 mcg/0.5 mL injection 0.5 mL by SubCUTAneous route every Monday, Wednesday, Friday. 36 Syringe 2    metFORMIN (GLUCOPHAGE) 500 mg tablet Take 1 Tab by mouth daily (with breakfast). 30 Tab 12    buPROPion XL (WELLBUTRIN XL) 150 mg tablet Take 1 Tab by mouth every morning. 30 Tab 3    oxyCODONE IR (OXY-IR) 15 mg immediate release tablet Take 1 Tab by mouth every eight (8) hours as needed for Pain. Max Daily Amount: 45 mg. 40 Tab 0    tadalafil (CIALIS) 20 mg tablet Take 1 Tab by mouth as needed. 10 Tab 12    lidocaine (LIDODERM) 5 % 1 Patch by TransDERmal route every twenty-four (24) hours. 30 Each 5     No Known Allergies    Objective:  Visit Vitals    /90 (BP 1 Location: Right arm, BP Patient Position: Sitting)    Pulse 75    Temp 98.1 °F (36.7 °C) (Oral)    Resp 20    Ht 5' 10\" (1.778 m)    Wt 209 lb (94.8 kg)    SpO2 100%    BMI 29.99 kg/m2     Physical Exam:   General appearance - alert, well appearing, and in no distress  Mental status - alert, oriented to person, place, and time  EYE-JUS, EOMI, corneas normal, no foreign bodies  ENT-ENT exam normal, no neck nodes or sinus tenderness  Nose - normal and patent, no erythema, discharge or polyps  Mouth - mucous membranes moist, pharynx normal without lesions  Neck - supple, no significant adenopathy   Chest - clear to auscultation, no wheezes, rales or rhonchi, symmetric air entry   Heart - normal rate, regular rhythm, normal S1, S2, no murmurs, rubs, clicks or gallops   Abdomen - soft, nontender, nondistended, no masses or organomegaly  Lymph- no adenopathy palpable  Ext-peripheral pulses normal, no pedal edema, no clubbing or cyanosis  Skin-Warm and dry.  no hyperpigmentation, vitiligo, or suspicious lesions  Neuro -alert, oriented, normal speech, no focal findings or movement disorder noted  Neck-normal C-spine, no tenderness, full ROM without pain  Feet-no nail deformities or callus formation with good pulses noted      Results for orders placed or performed in visit on 04/20/18   AMB POC GLUCOSE BLOOD, BY GLUCOSE MONITORING DEVICE   Result Value Ref Range    Glucose  mg/dL       Assessment/Plan:    ICD-10-CM ICD-9-CM    1. Multiple sclerosis ([facility] G35 340    2. Type 2 diabetes mellitus without complication, without long-term current use of insulin ([facility]) E11.9 250.00 AMB POC GLUCOSE BLOOD, BY GLUCOSE MONITORING DEVICE      AMB POC URINE, MICROALBUMIN, SEMIQUANT (3 RESULTS)      REFERRAL TO NEUROLOGY     Orders Placed This Encounter    REFERRAL TO NEUROLOGY     Referral Priority:   Routine     Referral Type:   Consultation     Referral Reason:   Specialty Services Required     Referred to Provider:   Ovidio Rose MD     Requested Specialty:   Neurology     Number of Visits Requested:   1    AMB POC GLUCOSE BLOOD, BY GLUCOSE MONITORING DEVICE    AMB POC URINE, MICROALBUMIN, SEMIQUANT (3 RESULTS)     continue present diet with no restrictions,Take 81mg aspirin daily  Patient Instructions   [employer] Activation    Thank you for requesting access to [employer]. Please follow the instructions below to securely access and download your online medical record. [employer] allows you to send messages to your doctor, view your test results, renew your prescriptions, schedule appointments, and more. How Do I Sign Up? 1. In your internet browser, go to [email or web address]  2. Click on the First Time User? Click Here link in the Sign In box. You will be redirect to the New Member Sign Up page. 3. Enter your [employer] Access Code exactly as it appears below. You will not need to use this code after youve completed the sign-up process.  If you do not sign up before the expiration date, you must request a new code. [employer]: water Access Code: Activation code not generated  Current charity: water Status: Active (This is the date your [employer]: [employer] access code will )    4. Enter the last four digits of your Social Security Number (xxxx) and Date of Birth (mm/dd/yyyy) as indicated and click Submit. You will be taken to the next sign-up page. 5. Create a [employer] ID. This will be your [employer] login ID and cannot be changed, so think of one that is secure and easy to remember. 6. Create a [employer] password. You can change your password at any time. 7. Enter your Password Reset Question and Answer. This can be used at a later time if you forget your password. 8. Enter your e-mail address. You will receive e-mail notification when new information is available in [address] 9. Click Sign Up. You can now view and download portions of your medical record. 10. Click the Download Summary menu link to download a portable copy of your medical information. Additional Information    If you have questions, please visit the Frequently Asked Questions section of the [employer] website at [email or web address] Remember, [employer] is NOT to be used for urgent needs. For medical emergencies, dial 911. Follow-up Disposition:  Return in about 4 weeks (around 2018). I have reviewed with the patient details of the assessment and plan and all questions were answered. Relevent patient education was performed. The most recent lab findings were reviewed with the patient. An After Visit Summary was printed and given to the patient.

## 2024-01-16 NOTE — Clinical Note
Good morning Dr. Chuck Bateman,   Mr Ingris [place] has moved to [id number] [facility], I spoke to Armin (staff at the facility) for post hospital follow up. He has an appointment with scheduled on Monday 12/13/2021. He is being followed by [facility] for PT, OT SN.      Thank you,   Isabella Norman, [address] [phone] Need for prophylactic measure Need for prophylactic measure Need for prophylactic measure

## (undated) DEVICE — 450 ML BOTTLE OF 0.05% CHLORHEXIDINE GLUCONATE IN 99.95% STERILE WATER FOR IRRIGATION, USP AND APPLICATOR.: Brand: IRRISEPT ANTIMICROBIAL WOUND LAVAGE

## (undated) DEVICE — PAD,ABDOMINAL,5"X9",ST,LF,25/BX: Brand: MEDLINE INDUSTRIES, INC.

## (undated) DEVICE — SOL INJ SOD CL 0.9% 500ML BG --

## (undated) DEVICE — SOLUTION IRRIG 3000ML 0.9% SOD CHL USP UROMATIC PLAS CONT

## (undated) DEVICE — GLOVE ORTHO 7 1/2   MSG9475

## (undated) DEVICE — GLOVE ORANGE PI 7 1/2   MSG9075

## (undated) DEVICE — MAJOR LAPAROTOMY-MRMC: Brand: MEDLINE INDUSTRIES, INC.

## (undated) DEVICE — AGENT HEMSTAT W4XL8IN OXIDIZED REGENERATED CELOS ABSRB

## (undated) DEVICE — CUSHION HEAD PRONEVIEW ADULT

## (undated) DEVICE — HYPODERMIC SAFETY NEEDLE: Brand: MAGELLAN

## (undated) DEVICE — PREMIUM WET SKIN PREP TRAY: Brand: MEDLINE INDUSTRIES, INC.

## (undated) DEVICE — HANDPIECE SET WITH COAXIAL HIGH FLOW TIP AND SUCTION TUBE: Brand: INTERPULSE

## (undated) DEVICE — SUTURE VCRL SZ 3-0 L27IN ABSRB UD L26MM SH 1/2 CIR J416H

## (undated) DEVICE — SYR 10ML LUER LOK 1/5ML GRAD --

## (undated) DEVICE — SPONGE LAPAROTOMY W18XL18IN WHITE STRUNG RADIOPAQUE STERILE